# Patient Record
Sex: MALE | Race: WHITE | NOT HISPANIC OR LATINO | Employment: OTHER | URBAN - METROPOLITAN AREA
[De-identification: names, ages, dates, MRNs, and addresses within clinical notes are randomized per-mention and may not be internally consistent; named-entity substitution may affect disease eponyms.]

---

## 2017-06-19 ENCOUNTER — HOSPITAL ENCOUNTER (OUTPATIENT)
Dept: CARDIOLOGY | Facility: CLINIC | Age: 67
Discharge: HOME | End: 2017-06-19
Attending: INTERNAL MEDICINE | Admitting: INTERNAL MEDICINE
Payer: MEDICARE

## 2017-06-19 DIAGNOSIS — I25.10 CAD (CORONARY ARTERY DISEASE): ICD-10-CM

## 2017-06-19 PROCEDURE — 93350 STRESS TTE ONLY: CPT | Mod: 26 | Performed by: INTERNAL MEDICINE

## 2017-06-19 PROCEDURE — 93018 CV STRESS TEST I&R ONLY: CPT | Performed by: INTERNAL MEDICINE

## 2017-06-19 PROCEDURE — 93321 DOPPLER ECHO F-UP/LMTD STD: CPT | Mod: 26 | Performed by: INTERNAL MEDICINE

## 2017-06-19 PROCEDURE — 93325 DOPPLER ECHO COLOR FLOW MAPG: CPT | Mod: 26 | Performed by: INTERNAL MEDICINE

## 2017-06-19 PROCEDURE — 93350 STRESS TTE ONLY: CPT | Mod: TC

## 2017-06-19 PROCEDURE — 93016 CV STRESS TEST SUPVJ ONLY: CPT | Performed by: INTERNAL MEDICINE

## 2017-06-22 ENCOUNTER — OFFICE VISIT (OUTPATIENT)
Dept: FAMILY MEDICINE | Facility: CLINIC | Age: 67
End: 2017-06-22
Payer: COMMERCIAL

## 2017-06-22 VITALS
RESPIRATION RATE: 16 BRPM | HEIGHT: 74 IN | BODY MASS INDEX: 28.88 KG/M2 | OXYGEN SATURATION: 98 % | DIASTOLIC BLOOD PRESSURE: 64 MMHG | HEART RATE: 68 BPM | SYSTOLIC BLOOD PRESSURE: 116 MMHG | WEIGHT: 225 LBS | TEMPERATURE: 97.7 F

## 2017-06-22 DIAGNOSIS — K52.9 COLITIS: ICD-10-CM

## 2017-06-22 DIAGNOSIS — E66.3 OVERWEIGHT (BMI 25.0-29.9): ICD-10-CM

## 2017-06-22 DIAGNOSIS — R49.0 HOARSENESS: ICD-10-CM

## 2017-06-22 DIAGNOSIS — Z12.5 SCREENING FOR PROSTATE CANCER: ICD-10-CM

## 2017-06-22 DIAGNOSIS — K59.09 CHRONIC CONSTIPATION: ICD-10-CM

## 2017-06-22 DIAGNOSIS — Z23 NEED FOR VACCINATION: ICD-10-CM

## 2017-06-22 DIAGNOSIS — Z79.899 CURRENT USE OF PROTON PUMP INHIBITOR: ICD-10-CM

## 2017-06-22 DIAGNOSIS — I48.0 PAROXYSMAL ATRIAL FIBRILLATION (H): ICD-10-CM

## 2017-06-22 DIAGNOSIS — N40.0 BENIGN PROSTATIC HYPERPLASIA, PRESENCE OF LOWER URINARY TRACT SYMPTOMS UNSPECIFIED, UNSPECIFIED MORPHOLOGY: ICD-10-CM

## 2017-06-22 DIAGNOSIS — R73.01 ELEVATED FASTING BLOOD SUGAR: ICD-10-CM

## 2017-06-22 DIAGNOSIS — Z80.0 FAMILY HISTORY OF COLON CANCER: ICD-10-CM

## 2017-06-22 DIAGNOSIS — Z00.00 LABORATORY EXAMINATION ORDERED AS PART OF A ROUTINE GENERAL MEDICAL EXAMINATION: ICD-10-CM

## 2017-06-22 DIAGNOSIS — K21.9 GASTROESOPHAGEAL REFLUX DISEASE, ESOPHAGITIS PRESENCE NOT SPECIFIED: ICD-10-CM

## 2017-06-22 DIAGNOSIS — E78.5 HYPERLIPIDEMIA LDL GOAL <130: ICD-10-CM

## 2017-06-22 DIAGNOSIS — K21.9 CHRONIC GERD: ICD-10-CM

## 2017-06-22 DIAGNOSIS — D75.839 THROMBOCYTOSIS: ICD-10-CM

## 2017-06-22 DIAGNOSIS — Z00.01 ENCOUNTER FOR ROUTINE ADULT MEDICAL EXAM WITH ABNORMAL FINDINGS: Primary | ICD-10-CM

## 2017-06-22 LAB
ALBUMIN SERPL-MCNC: 3.9 G/DL (ref 3.4–5)
ALP SERPL-CCNC: 73 U/L (ref 40–150)
ALT SERPL W P-5'-P-CCNC: 42 U/L (ref 0–70)
ANION GAP SERPL CALCULATED.3IONS-SCNC: 10 MMOL/L (ref 3–14)
AST SERPL W P-5'-P-CCNC: 29 U/L (ref 0–45)
BASOPHILS NFR BLD AUTO: 1 %
BILIRUB SERPL-MCNC: 1 MG/DL (ref 0.2–1.3)
BUN SERPL-MCNC: 14 MG/DL (ref 7–30)
CALCIUM SERPL-MCNC: 9.4 MG/DL (ref 8.5–10.1)
CHLORIDE SERPL-SCNC: 106 MMOL/L (ref 94–109)
CHOLEST SERPL-MCNC: 141 MG/DL
CO2 SERPL-SCNC: 24 MMOL/L (ref 20–32)
CREAT SERPL-MCNC: 0.98 MG/DL (ref 0.66–1.25)
CRP SERPL-MCNC: <2.9 MG/L (ref 0–8)
DIFFERENTIAL METHOD BLD: NORMAL
EOSINOPHIL NFR BLD AUTO: 9 %
ERYTHROCYTE [DISTWIDTH] IN BLOOD BY AUTOMATED COUNT: 13.5 % (ref 10–15)
ERYTHROCYTE [SEDIMENTATION RATE] IN BLOOD BY WESTERGREN METHOD: 10 MM/H (ref 0–20)
FERRITIN SERPL-MCNC: 96 NG/ML (ref 26–388)
GFR SERPL CREATININE-BSD FRML MDRD: 77 ML/MIN/1.7M2
GLUCOSE SERPL-MCNC: 112 MG/DL (ref 70–99)
HBA1C MFR BLD: 5.6 % (ref 4.3–6)
HCT VFR BLD AUTO: 44.4 % (ref 40–53)
HDLC SERPL-MCNC: 49 MG/DL
HGB BLD-MCNC: 14.5 G/DL (ref 13.3–17.7)
LDLC SERPL CALC-MCNC: 71 MG/DL
LYMPHOCYTES NFR BLD AUTO: 18 %
MAGNESIUM SERPL-MCNC: 2.4 MG/DL (ref 1.6–2.3)
MCH RBC QN AUTO: 29.7 PG (ref 26.5–33)
MCHC RBC AUTO-ENTMCNC: 32.7 G/DL (ref 31.5–36.5)
MCV RBC AUTO: 91 FL (ref 78–100)
MONOCYTES NFR BLD AUTO: 6 %
NEUTROPHILS NFR BLD AUTO: 66 %
NONHDLC SERPL-MCNC: 92 MG/DL
PLATELET # BLD AUTO: 532 10E9/L (ref 150–450)
POTASSIUM SERPL-SCNC: 4.3 MMOL/L (ref 3.4–5.3)
PROT SERPL-MCNC: 7.3 G/DL (ref 6.8–8.8)
PSA SERPL-ACNC: 1.35 UG/L (ref 0–4)
RBC # BLD AUTO: 4.88 10E12/L (ref 4.4–5.9)
RETICS # AUTO: 59.8 10E9/L (ref 25–95)
RETICS/RBC NFR AUTO: 1.2 % (ref 0.5–2)
SODIUM SERPL-SCNC: 140 MMOL/L (ref 133–144)
TRIGL SERPL-MCNC: 107 MG/DL
TSH SERPL DL<=0.005 MIU/L-ACNC: 0.96 MU/L (ref 0.4–4)
VIT B12 SERPL-MCNC: 1178 PG/ML (ref 193–986)
WBC # BLD AUTO: 6.5 10E9/L (ref 4–11)

## 2017-06-22 PROCEDURE — 82607 VITAMIN B-12: CPT | Performed by: PHYSICIAN ASSISTANT

## 2017-06-22 PROCEDURE — 85045 AUTOMATED RETICULOCYTE COUNT: CPT | Performed by: PHYSICIAN ASSISTANT

## 2017-06-22 PROCEDURE — 83036 HEMOGLOBIN GLYCOSYLATED A1C: CPT | Performed by: PHYSICIAN ASSISTANT

## 2017-06-22 PROCEDURE — 99213 OFFICE O/P EST LOW 20 MIN: CPT | Mod: 25 | Performed by: PHYSICIAN ASSISTANT

## 2017-06-22 PROCEDURE — 86140 C-REACTIVE PROTEIN: CPT | Performed by: PHYSICIAN ASSISTANT

## 2017-06-22 PROCEDURE — 80050 GENERAL HEALTH PANEL: CPT | Performed by: PHYSICIAN ASSISTANT

## 2017-06-22 PROCEDURE — G0009 ADMIN PNEUMOCOCCAL VACCINE: HCPCS | Performed by: PHYSICIAN ASSISTANT

## 2017-06-22 PROCEDURE — 80061 LIPID PANEL: CPT | Performed by: PHYSICIAN ASSISTANT

## 2017-06-22 PROCEDURE — 90670 PCV13 VACCINE IM: CPT | Performed by: PHYSICIAN ASSISTANT

## 2017-06-22 PROCEDURE — G0103 PSA SCREENING: HCPCS | Performed by: PHYSICIAN ASSISTANT

## 2017-06-22 PROCEDURE — 82728 ASSAY OF FERRITIN: CPT | Performed by: PHYSICIAN ASSISTANT

## 2017-06-22 PROCEDURE — G0402 INITIAL PREVENTIVE EXAM: HCPCS | Performed by: PHYSICIAN ASSISTANT

## 2017-06-22 PROCEDURE — 83735 ASSAY OF MAGNESIUM: CPT | Performed by: PHYSICIAN ASSISTANT

## 2017-06-22 PROCEDURE — 85652 RBC SED RATE AUTOMATED: CPT | Performed by: PHYSICIAN ASSISTANT

## 2017-06-22 PROCEDURE — 85060 BLOOD SMEAR INTERPRETATION: CPT | Performed by: PHYSICIAN ASSISTANT

## 2017-06-22 PROCEDURE — 36415 COLL VENOUS BLD VENIPUNCTURE: CPT | Performed by: PHYSICIAN ASSISTANT

## 2017-06-22 PROCEDURE — 85004 AUTOMATED DIFF WBC COUNT: CPT | Performed by: PHYSICIAN ASSISTANT

## 2017-06-22 RX ORDER — YEAST,DRIED (S. CEREVISIAE)
1 POWDER (GRAM) ORAL DAILY
COMMUNITY
End: 2022-04-19

## 2017-06-22 RX ORDER — ATORVASTATIN CALCIUM 40 MG/1
40 TABLET, FILM COATED ORAL AT BEDTIME
Qty: 90 TABLET | Refills: 3 | Status: SHIPPED | OUTPATIENT
Start: 2017-06-22 | End: 2018-07-05

## 2017-06-22 RX ORDER — POLYETHYLENE GLYCOL 3350 17 G/17G
POWDER, FOR SOLUTION ORAL
Qty: 510 G | Refills: 1 | COMMUNITY
Start: 2017-06-22 | End: 2019-08-27

## 2017-06-22 NOTE — MR AVS SNAPSHOT
After Visit Summary   6/22/2017    Peter Peralta    MRN: [id number]           Patient Information     Date Of Birth          1950        Visit Information        Provider Department      6/22/2017 8:40 AM Ana Ugarte PA-C [facility]        Today's Diagnoses     Elevated fasting blood sugar    -  1    Laboratory examination ordered as part of a routine general medical examination        Hyperlipidemia LDL goal <130        Chronic GERD        Hoarseness        Current use of proton pump inhibitor        Chronic constipation          Care Instructions    1. Abdominal aortic aneurysm screening due to smoking history.       Preventive Health Recommendations:   Male Ages 65 and over    Yearly exam:             See your health care provider every year in order to  o   Review health changes.   o   Discuss preventive care.    o   Review your medicines if your doctor has prescribed any.    Talk with your health care provider about whether you should have a test to screen for prostate cancer (PSA).    Every 3 years, have a diabetes test (fasting glucose). If you are at risk for diabetes, you should have this test more often.    Every 5 years, have a cholesterol test. Have this test more often if you are at risk for high cholesterol or heart disease.     Every 10 years, have a colonoscopy. Or, have a yearly FIT test (stool test). These exams will check for colon cancer.    Talk to with your health care provider about screening for Abdominal Aortic Aneurysm if you have a family history of AAA or have a history of smoking.    Shots:     Get a flu shot each year.     Get a tetanus shot every 10 years.     Talk to your doctor about your pneumonia vaccines. There are now two you should receive - Pneumovax (PPSV 23) and Prevnar (PCV 13).     Talk to your doctor about a shingles vaccine.     Talk to your doctor about the hepatitis B vaccine.  Nutrition:     Eat at least 5 servings  of fruits and vegetables each day.     Eat whole-grain bread, whole-wheat pasta and brown rice instead of white grains and rice.     Talk to your provider about Calcium and Vitamin D.   Lifestyle    Exercise for at least 150 minutes a week (30 minutes a day, 5 days a week). This will help you control your weight and prevent disease.     Limit alcohol to one drink per day.     No smoking.     Wear sunscreen to prevent skin cancer.     See your dentist every six months for an exam and cleaning.     See your eye doctor every 1 to 2 years to screen for conditions such as glaucoma, macular degeneration, cataracts, etc           Follow-ups after your visit        Additional Services     GASTROENTEROLOGY ADULT REF PROCEDURE ONLY       Last Lab Result: Creatinine (mg/dL)       Date                     Value                 08/11/2016               1.01             ----------  Body mass index is 28.89 kg/(m^2).      Patient will be contacted to schedule procedure.     Please be aware that coverage of these services is subject to the terms and limitations of your health insurance plan.  Call member services at your health plan with any benefit or coverage questions.  Any procedures must be performed at a [facility] facility OR coordinated by your clinic's referral office.    Please bring the following with you to your appointment:    (1) Any X-Rays, CTs or MRIs which have been performed.  Contact the facility where they were done to arrange for  prior to your scheduled appointment.    (2) List of current medications   (3) This referral request   (4) Any documents/labs given to you for this referral                  Your next 10 appointments already scheduled     Jul 06, 2017  4:45 PM CDT   Return Visit with Eulalio Del Rosario MD   [facility] [place] ([facility])    [address] [place] [id number] [phone]              Who to contact     If you have  "questions or need follow up information about today's clinic visit or your schedule please contact [facility] [place] directly at [phone].  Normal or non-critical lab and imaging results will be communicated to you by MyChart, letter or phone within 4 business days after the clinic has received the results. If you do not hear from us within 7 days, please contact the clinic through WebVisible[employer] or phone. If you have a critical or abnormal lab result, we will notify you by phone as soon as possible.  Submit refill requests through [employer] or call your pharmacy and they will forward the refill request to us. Please allow 3 business days for your refill to be completed.          Additional Information About Your Visit        Web[employer]ptive Information     [employer] gives you secure access to your electronic health record. If you see a primary care provider, you can also send messages to your care team and make appointments. If you have questions, please call your primary care clinic.  If you do not have a primary care provider, please call [phone] and they will assist you.        Care EveryWhere ID     This is your Care EveryWhere ID. This could be used by other organizations to access your [facility] medical records  [id number]        Your Vitals Were     Pulse Temperature Respirations Height Pulse Oximetry BMI (Body Mass Index)    68 97.7  F (36.5  C) 16 6' 2\" (1.88 m) 98% 28.89 kg/m2       Blood Pressure from Last 3 Encounters:   06/22/17 116/64   08/11/16 123/82   03/23/16 (!) 142/100    Weight from Last 3 Encounters:   06/22/17 225 lb (102.1 kg)   08/11/16 239 lb 12.8 oz (108.8 kg)   03/23/16 250 lb (113.4 kg)              We Performed the Following     CBC with platelets     Comprehensive metabolic panel (BMP + Alb, Alk Phos, ALT, AST, Total. Bili, TP)     GASTROENTEROLOGY ADULT REF PROCEDURE ONLY     Hemoglobin A1c     Lipid Profile with reflex to direct LDL     Magnesium     TSH with free T4 reflex     " Vitamin B12        Primary Care Provider Office Phone # Fax #    Ana Gomez [phone]       [facility] [place] [address]  [place] [address]        Equal Access to Services     NAINAKIRTI GEREMIAS : Hadii aad ku hadasho Soomaali, waaxda luqadaha, qaybta kaalmada adeegyada, waxay idiin hayaan adenelson khabundiosh laronan ah. So [facility] [phone].    ATENCIÓN: Si habla español, tiene a mirza disposición servicios gratuitos de asistencia lingüística. Llame al [phone].    We comply with applicable federal civil rights laws and [place] laws. We do not discriminate on the basis of race, color, national origin, age, disability sex, sexual orientation or gender identity.            Thank you!     Thank you for choosing [facility] [place]  for your care. Our goal is always to provide you with excellent care. Hearing back from our patients is one way we can continue to improve our services. Please take a few minutes to complete the written survey that you may receive in the mail after your visit with us. Thank you!             Your Updated Medication List - Protect others around you: Learn how to safely use, store and throw away your medicines at www.disposemymeds.org.          This list is accurate as of: 6/22/17  9:25 AM.  Always use your most recent med list.                   Brand Name Dispense Instructions for use Diagnosis    atorvastatin 40 MG tablet    LIPITOR    90 tablet    Take 1 tablet (40 mg) by mouth At Bedtime    Hyperlipidemia LDL goal <130       B-12 PO      Take 1,000 mg by mouth daily        JUAN LOW STRENGTH 81 MG EC tablet   Generic drug:  aspirin     30    2 DAILY        CALCIUM + D + K 750-500-40 MG-UNT-MCG Tabs   Generic drug:  Calcium-Vitamin D-Vitamin K      Take 2 tablets by mouth daily.        coenzyme Q-10 200 MG Caps      Take by mouth daily        fluticasone 50 MCG/ACT spray    FLONASE    16 g    Spray 2 sprays into both nostrils daily     Chronic rhinitis       KRILL OIL PO      Take 500 mg by mouth daily Nael Red        MIRALAX powder   Generic drug:  polyethylene glycol     510 g    Take 3/4 capful daily    Chronic constipation       MOVE FREE JOINT HEALTH ADVANCE Tabs      Take 1 mg by mouth daily        MULTIVITAMIN TABS   OR      1 daily        nexIUM 40 MG CR capsule   Generic drug:  esomeprazole      Take 40 mg by mouth every morning (before breakfast) Take 30-60 minutes before eating.        psyllium 58.6 % Powd    METAMUCIL     Take 2 teaspoonful by mouth 2 times daily        VITAMIN D (CHOLECALCIFEROL) PO      Take 5,000 Units by mouth daily

## 2017-06-22 NOTE — PROGRESS NOTES
"Chief Complaint   Patient presents with     Physical       Initial /64  Pulse 68  Temp 97.7  F (36.5  C)  Resp 16  Ht 6' 2\" (1.88 m)  Wt 225 lb (102.1 kg)  SpO2 98%  BMI 28.89 kg/m2 Estimated body mass index is 28.89 kg/(m^2) as calculated from the following:    Height as of this encounter: 6' 2\" (1.88 m).    Weight as of this encounter: 225 lb (102.1 kg).  Medication Reconciliation: complete. FLORECITA Manzano LPN      SUBJECTIVE:                                                            Peter Peralta is a 67 year old male who presents for Preventive Visit.      Are you in the first 12 months of your Medicare Part B coverage?  Yes,  Visual Acuity:  Right Eye: 20/40   Left Eye: 20/25  Both Eyes: 20/25    Healthy Habits:    Do you get at least three servings of calcium containing foods daily (dairy, green leafy vegetables, etc.)? no, taking calcium and/or vitamin D supplement: yes - several supplements    Amount of exercise or daily activities, outside of work: 3 day(s) per week    Problems taking medications regularly No    Medication side effects: No    Have you had an eye exam in the past two years? yes    Do you see a dentist twice per year? yes    Do you have sleep apnea, excessive snoring or daytime drowsiness?no    COGNITIVE SCREEN  1) Repeat 3 items (Banana, Sunrise, Chair)    2) Clock draw: NORMAL  3) 3 item recall: Recalls 3 objects  Results: 3 items recalled: COGNITIVE IMPAIRMENT LESS LIKELY    Mini-CogTM Copyright [employer]. Licensed by the author for use in [facility]; reprinted with permission (michel@.[facility]). All rights reserved.      Complains of ongoing voice hoarseness for \"a while\". Has chronic GERD - was taking omeprazole, now taking esomeprazole. He was recommend to have EGD last yr, didn't go - has never had one.   Has taken PPI 10 + yrs.   Takes daily B12.   Takes metamucil and miralax daily for chronic constipation. Q5 yr for colonoscopy.     Would like PSA drawn d/t h/o " BPH.   Also question low testosterone - says his desire for intercourse has lessened over the yrs, but no erectile issues. Still having intercourse weekly.   -------------------------------------    Reviewed and updated as needed this visit by clinical staff  Tobacco  Allergies  Meds  Problems  Surg Hx  Fam Hx  Soc Hx        Reviewed and updated as needed this visit by Provider  Allergies  Meds  Problems        Social History   Substance Use Topics     Smoking status: Former Smoker     Packs/day: 1.50     Years: 24.00     Types: Cigarettes     Quit date: 1/2/1996     Smokeless tobacco: Never Used     Alcohol use 0.0 oz/week     0 Standard drinks or equivalent per week      Comment: less than one per week       The patient does not drink >3 drinks per day nor >7 drinks per week.    Today's PHQ-2 Score:   PHQ-2 ( 1999 Pfizer) 6/22/2017 8/11/2016   Q1: Little interest or pleasure in doing things 0 0   Q2: Feeling down, depressed or hopeless 0 0   PHQ-2 Score 0 0   Q1: Little interest or pleasure in doing things - -   Q2: Feeling down, depressed or hopeless - -   PHQ-2 Score - -       Do you feel safe in your environment - No    Do you have a Health Care Directive?: Yes: Advance Directive has been received and scanned.    Current providers sharing in care for this patient include:   Patient Care Team:  Ana Ugarte PA-C as PCP - General (Physician Assistant)      Hearing impairment: No    Ability to successfully perform activities of daily living: Yes, no assistance needed     Fall risk:       Home safety:  none identified      The following health maintenance items are reviewed in Epic and correct as of today:  Health Maintenance   Topic Date Due     AORTIC ANEURYSM SCREENING (SYSTEM ASSIGNED)  06/08/2017     FALL RISK ASSESSMENT  06/08/2017     INFLUENZA VACCINE (SYSTEM ASSIGNED)  09/01/2017     PNEUMOCOCCAL (2 of 2 - PPSV23) 06/22/2018     COLONOSCOPY Q5 YR  01/29/2019     ADVANCE DIRECTIVE  PLANNING Q5 YRS  08/11/2021     LIPID SCREEN Q5 YR MALE (SYSTEM ASSIGNED)  06/22/2022     TETANUS IMMUNIZATION (SYSTEM ASSIGNED)  09/12/2022     HEPATITIS C SCREENING  Completed     FLORECITA Manzano LPN      Pneumonia Vaccine:Adults age 65+ who have not received previous Pneumovax (PPSV23) or PCV13 as an adult: Should first be given PCV13 AND then should be given PPSV23 6-12 months after PCV13     ROS:  10 point review of systems negative other than symptoms noted above.   Constitutional, HEENT, CV, pulmonary, GI, , MS, Endo, Psych systems are all negative, except as otherwise noted.       Problem list, Medication list, Allergies, and Medical/Social/Surgical histories reviewed in [facility] and updated as appropriate.  Labs reviewed in EPIC  BP Readings from Last 3 Encounters:   06/22/17 116/64   08/11/16 123/82   03/23/16 (!) 142/100    Wt Readings from Last 3 Encounters:   06/22/17 225 lb (102.1 kg)   08/11/16 239 lb 12.8 oz (108.8 kg)   03/23/16 250 lb (113.4 kg)                  Patient Active Problem List   Diagnosis     Family history of coronary artery disease     Hyperlipidemia LDL goal <130     GERD (gastroesophageal reflux disease)     Family history of colon cancer     Colitis     Right inguinal hernia     Atrial fibrillation (H)     Hyperlipidemia     Murmur     Nonspecific abnormal results of cardiovascular function study     Chest pain     Elevated blood pressure reading without diagnosis of hypertension     Benign prostatic hyperplasia, presence of lower urinary tract symptoms unspecified, unspecified morphology     Advanced directives, counseling/discussion     Elevated fasting blood sugar     Hoarseness     BMI > 25     Thrombocytosis (H)     Past Surgical History:   Procedure Laterality Date     CARDIAC NUC POLINA STRESS TEST NL      Normal     CARDIOVERSION  12/2011    Atrial Fib     COLONOSCOPY  1/29/2014    Procedure: COMBINED COLONOSCOPY, SINGLE BIOPSY/POLYPECTOMY BY BIOPSY;  COLONOSCOPY;  Surgeon: Janet  Ashtyn Herbert MD;  Location:  GI     CORONARY ANGIOGRAPHY ADULT ORDER  12/2011    1 LMA 5%, LAD 40-50%, LCx 30-40%, RCA 25-30%     HC INJECTION SCLEROSING SOLUTION HEMORRHOID  8/24/2012    Procedure: HEMORRHOID INJECTION SCLEROSING SOLUTION;  Surgeon: Mayito Chow MD;  Location:  GI     HC TOOTH EXTRACTION W/FORCEP       LASIK  1/2011     ROTATOR CUFF REPAIR RT/LT Left 10/15/2015    RCR Left - Dr. Carvalho       Social History   Substance Use Topics     Smoking status: Former Smoker     Packs/day: 1.50     Years: 24.00     Types: Cigarettes     Quit date: 1/2/1996     Smokeless tobacco: Never Used     Alcohol use 0.0 oz/week     0 Standard drinks or equivalent per week      Comment: less than one per week     Family History   Problem Relation Age of Onset     C.A.D. Father      passed at 52 from MI     Cardiovascular Father      HEART DISEASE Father      Obesity Father      Myocardial Infarction Father      52 - passed away     Colon Cancer Mother 65     passed away from colon cancer - lived about 2 mo from dx 1981     Arthritis Maternal Grandmother      DIABETES Maternal Grandmother      Arthritis Maternal Grandfather      HEART DISEASE Brother      DIABETES Brother      Coronary Artery Disease Brother      stent placed      Hypertension No family hx of      CEREBROVASCULAR DISEASE No family hx of      Breast Cancer No family hx of      Prostate Cancer No family hx of      Alcohol/Drug No family hx of      Allergies No family hx of      Alzheimer Disease No family hx of      Circulatory No family hx of      Congenital Anomalies No family hx of      Connective Tissue Disorder No family hx of      Depression No family hx of      Eye Disorder No family hx of      Genetic Disorder No family hx of      GASTROINTESTINAL DISEASE No family hx of      Genitourinary Problems No family hx of      Gynecology No family hx of      Musculoskeletal Disorder No family hx of      Neurologic Disorder No family hx of       "OSTEOPOROSIS No family hx of      Psychotic Disorder No family hx of      Respiratory No family hx of      Thyroid Disease No family hx of      Anesthesia Reaction No family hx of      Blood Disease No family hx of          Current Outpatient Prescriptions   Medication Sig Dispense Refill     Glucos-Chond-Hyal Ac-Ca Fructo (MOVE FREE JOINT HEALTH ADVANCE) TABS Take 1 mg by mouth daily       VITAMIN D, CHOLECALCIFEROL, PO Take 5,000 Units by mouth daily       atorvastatin (LIPITOR) 40 MG tablet Take 1 tablet (40 mg) by mouth At Bedtime 90 tablet 3     polyethylene glycol (MIRALAX) powder Take 3/4 capful daily 510 g 1     fluticasone (FLONASE) 50 MCG/ACT nasal spray Spray 2 sprays into both nostrils daily 16 g 11     esomeprazole (NEXIUM) 40 MG capsule Take 40 mg by mouth every morning (before breakfast) Take 30-60 minutes before eating.       KRILL OIL PO Take 500 mg by mouth daily Nael Red       psyllium (METAMUCIL) 58.6 % POWD Take 2 teaspoonful by mouth 2 times daily        Cyanocobalamin (B-12 PO) Take 1,000 mg by mouth daily        coenzyme Q-10 (COQ-10) 200 MG CAPS Take by mouth daily        Calcium-Vitamin D-Vitamin K (CALCIUM + D + K) 750-500-40 MG-UNT-MCG TABS Take 2 tablets by mouth daily.       JUAN LOW STRENGTH 81 MG OR TBEC 2 DAILY 30 0     MULTIVITAMIN TABS   OR 1 daily  0     [DISCONTINUED] atorvastatin (LIPITOR) 40 MG tablet Take 1 tablet (40 mg) by mouth At Bedtime 90 tablet 3     Allergies   Allergen Reactions     No Known Drug Allergies      OBJECTIVE:                                                            /64  Pulse 68  Temp 97.7  F (36.5  C)  Resp 16  Ht 6' 2\" (1.88 m)  Wt 225 lb (102.1 kg)  SpO2 98%  BMI 28.89 kg/m2 Estimated body mass index is 28.89 kg/(m^2) as calculated from the following:    Height as of this encounter: 6' 2\" (1.88 m).    Weight as of this encounter: 225 lb (102.1 kg).  EXAM:   GENERAL: healthy, alert and no distress  EYES: Eyes grossly normal to " inspection, PERRL and conjunctivae and sclerae normal  HENT: ear canals and TM's normal, nose and mouth without ulcers or lesions  NECK: no adenopathy, no asymmetry, masses, or scars and thyroid normal to palpation  RESP: lungs clear to auscultation - no rales, rhonchi or wheezes  CV: regular rate and rhythm, normal S1 S2, no S3 or S4, no murmur, click or rub, no peripheral edema and peripheral pulses strong  ABDOMEN: soft, nontender, no hepatosplenomegaly, no masses and bowel sounds normal  MS: no gross musculoskeletal defects noted, no edema  SKIN: no suspicious lesions or rashes  NEURO: Normal strength and tone, mentation intact and speech normal  PSYCH: mentation appears normal, affect normal/bright    Results for orders placed or performed in visit on 06/22/17   CBC with platelets   Result Value Ref Range    WBC 6.5 4.0 - 11.0 10e9/L    RBC Count 4.88 4.4 - 5.9 10e12/L    Hemoglobin 14.5 13.3 - 17.7 g/dL    Hematocrit 44.4 40.0 - 53.0 %    MCV 91 78 - 100 fl    MCH 29.7 26.5 - 33.0 pg    MCHC 32.7 31.5 - 36.5 g/dL    RDW 13.5 10.0 - 15.0 %    Platelet Count 532 (H) 150 - 450 10e9/L   Comprehensive metabolic panel (BMP + Alb, Alk Phos, ALT, AST, Total. Bili, TP)   Result Value Ref Range    Sodium 140 133 - 144 mmol/L    Potassium 4.3 3.4 - 5.3 mmol/L    Chloride 106 94 - 109 mmol/L    Carbon Dioxide 24 20 - 32 mmol/L    Anion Gap 10 3 - 14 mmol/L    Glucose 112 (H) 70 - 99 mg/dL    Urea Nitrogen 14 7 - 30 mg/dL    Creatinine 0.98 0.66 - 1.25 mg/dL    GFR Estimate 77 >60 mL/min/1.7m2    GFR Estimate If Black >90   GFR Calc   >60 mL/min/1.7m2    Calcium 9.4 8.5 - 10.1 mg/dL    Bilirubin Total 1.0 0.2 - 1.3 mg/dL    Albumin 3.9 3.4 - 5.0 g/dL    Protein Total 7.3 6.8 - 8.8 g/dL    Alkaline Phosphatase 73 40 - 150 U/L    ALT 42 0 - 70 U/L    AST 29 0 - 45 U/L   TSH with free T4 reflex   Result Value Ref Range    TSH 0.96 0.40 - 4.00 mU/L   Lipid Profile with reflex to direct LDL   Result Value Ref  Range    Cholesterol 141 <200 mg/dL    Triglycerides 107 <150 mg/dL    HDL Cholesterol 49 >39 mg/dL    LDL Cholesterol Calculated 71 <100 mg/dL    Non HDL Cholesterol 92 <130 mg/dL   Hemoglobin A1c   Result Value Ref Range    Hemoglobin A1C 5.6 4.3 - 6.0 %   Magnesium   Result Value Ref Range    Magnesium 2.4 (H) 1.6 - 2.3 mg/dL   Vitamin B12   Result Value Ref Range    Vitamin B12 1178 (H) 193 - 986 pg/mL   PSA, screen   Result Value Ref Range    PSA 1.35 0 - 4 ug/L   Reticulocyte Count   Result Value Ref Range    % Retic 1.2 0.5 - 2.0 %    Absolute Retic 59.8 25 - 95 10e9/L   CRP, inflammation   Result Value Ref Range    CRP Inflammation <2.9 0.0 - 8.0 mg/L   ESR: Erythrocyte sedimentation rate   Result Value Ref Range    Sed Rate 10 0 - 20 mm/h   Ferritin   Result Value Ref Range    Ferritin 96 26 - 388 ng/mL   Differential   Result Value Ref Range    % Neutrophils 66.0 %    % Lymphocytes 18.0 %    % Monocytes 6.0 %    % Eosinophils 9.0 %    % Basophils 1.0 %    Diff Method Manual Differential          ASSESSMENT / PLAN:                                                            Peter was seen today for physical.    Diagnoses and all orders for this visit:    Encounter for routine adult medical exam with abnormal findings  Thrombocytosis, persistent.     Elevated fasting blood sugar  -     Comprehensive metabolic panel (BMP + Alb, Alk Phos, ALT, AST, Total. Bili, TP)  -     Hemoglobin A1c  Just under pre-dm range     Paroxysmal atrial fibrillation (H)  -isolated event during angiogram. Monitor. F/b Cardiology.    Thrombocytosis (H)  -     Cancel: CBC with platelets differential  -     Reticulocyte Count  -     Blood Morphology Pathologist Review  -     CRP, inflammation  -     ESR: Erythrocyte sedimentation rate  -     Ferritin  -     Differential  Noted today and 10 mo ago, slightly worsening. Will f/u with other labs. Await smear.     Laboratory examination ordered as part of a routine general medical  examination  -     CBC with platelets  -     Comprehensive metabolic panel (BMP + Alb, Alk Phos, ALT, AST, Total. Bili, TP)  -     TSH with free T4 reflex  -     Lipid Profile with reflex to direct LDL  -     Hemoglobin A1c    Hyperlipidemia LDL goal <130  -     atorvastatin (LIPITOR) 40 MG tablet; Take 1 tablet (40 mg) by mouth At Bedtime  Stable.     Chronic GERD  -     GASTROENTEROLOGY ADULT REF PROCEDURE ONLY    Hoarseness  -     GASTROENTEROLOGY ADULT REF PROCEDURE ONLY  Since he has had ongoing GERD and PPI use for 10 + yrs, strongly recommend EGD. Re-orderd for him. If normal, consider ENT consult for LP scope to evaluate vocal cords.     Current use of proton pump inhibitor  -     GASTROENTEROLOGY ADULT REF PROCEDURE ONLY  -     Magnesium  -     Vitamin B12  Will have him back off on B12 supp and mg.     Chronic constipation      Benign prostatic hyperplasia, presence of lower urinary tract symptoms unspecified, unspecified morphology    Screening for prostate cancer  -     PSA, screen    Need for vaccination  -     Pneumococcal vaccine 13 valent PCV13 IM (Prevnar) [42833]  -     ADMIN MEDICARE: Pneumococcal Vaccine ()    BMI > 25  LSMs.     Gastroesophageal reflux disease, esophagitis presence not specified    Family history of colon cancer  Q5 yr plan.    Colitis  Noted on colonoscopy 2014- no symptoms - f/b GI    Would not recommend testoserone monitoring - reviewed this will be low, which is age - appropriate. Offered urology referral if wanted to discuss further and talk about supplementation.    End of Life Planning:  Patient currently has an advanced directive: No.  I have verified the patient's ablity to prepare an advanced directive/make health care decisions.  Literature was provided to assist patient in preparing an advanced directive.    COUNSELING:  Reviewed preventive health counseling, as reflected in patient instructions  Special attention given to:       Consider AAA screening for ages  "65-75 and smoking history       Regular exercise       Healthy diet/nutrition       Vision screening       Dental care       Immunizations    Vaccinated for: Pneumococcal           Aspirin Prophylaxsis       Hepatitis C screening       Colon cancer screening       Prostate cancer screening        Estimated body mass index is 28.89 kg/(m^2) as calculated from the following:    Height as of this encounter: 6' 2\" (1.88 m).    Weight as of this encounter: 225 lb (102.1 kg).  Weight management plan: Discussed healthy diet and exercise guidelines and patient will follow up in 12 months in clinic to re-evaluate.   reports that he quit smoking about 21 years ago. His smoking use included Cigarettes. He has a 36.00 pack-year smoking history. He has never used smokeless tobacco.      Appropriate preventive services were discussed with this patient, including applicable screening as appropriate for cardiovascular disease, diabetes, osteopenia/osteoporosis, and glaucoma.  As appropriate for age/gender, discussed screening for colorectal cancer, prostate cancer, breast cancer, and cervical cancer. Checklist reviewing preventive services available has been given to the patient.    Reviewed patients plan of care and provided an AVS. The Basic Care Plan (routine screening as documented in Health Maintenance) for Peter meets the Care Plan requirement. This Care Plan has been established and reviewed with the Patient.    Counseling Resources:  ATP IV Guidelines  Pooled Cohorts Equation Calculator  Breast Cancer Risk Calculator  FRAX Risk Assessment  ICSI Preventive Guidelines  Dietary Guidelines for Americans, 2010  USDA's MyPlate  ASA Prophylaxis  Lung CA Screening    Ana Ugarte PA-C  [facility] [place]  "

## 2017-06-22 NOTE — PATIENT INSTRUCTIONS
1. Abdominal aortic aneurysm screening due to smoking history.       Preventive Health Recommendations:   Male Ages 65 and over    Yearly exam:             See your health care provider every year in order to  o   Review health changes.   o   Discuss preventive care.    o   Review your medicines if your doctor has prescribed any.    Talk with your health care provider about whether you should have a test to screen for prostate cancer (PSA).    Every 3 years, have a diabetes test (fasting glucose). If you are at risk for diabetes, you should have this test more often.    Every 5 years, have a cholesterol test. Have this test more often if you are at risk for high cholesterol or heart disease.     Every 10 years, have a colonoscopy. Or, have a yearly FIT test (stool test). These exams will check for colon cancer.    Talk to with your health care provider about screening for Abdominal Aortic Aneurysm if you have a family history of AAA or have a history of smoking.    Shots:     Get a flu shot each year.     Get a tetanus shot every 10 years.     Talk to your doctor about your pneumonia vaccines. There are now two you should receive - Pneumovax (PPSV 23) and Prevnar (PCV 13).     Talk to your doctor about a shingles vaccine.     Talk to your doctor about the hepatitis B vaccine.  Nutrition:     Eat at least 5 servings of fruits and vegetables each day.     Eat whole-grain bread, whole-wheat pasta and brown rice instead of white grains and rice.     Talk to your provider about Calcium and Vitamin D.   Lifestyle    Exercise for at least 150 minutes a week (30 minutes a day, 5 days a week). This will help you control your weight and prevent disease.     Limit alcohol to one drink per day.     No smoking.     Wear sunscreen to prevent skin cancer.     See your dentist every six months for an exam and cleaning.     See your eye doctor every 1 to 2 years to screen for conditions such as glaucoma, macular degeneration,  cataracts, etc

## 2017-06-23 LAB — COPATH REPORT: NORMAL

## 2017-07-06 ENCOUNTER — OFFICE VISIT (OUTPATIENT)
Dept: CARDIOLOGY | Facility: CLINIC | Age: 67
End: 2017-07-06
Payer: COMMERCIAL

## 2017-07-06 VITALS
OXYGEN SATURATION: 94 % | BODY MASS INDEX: 29.77 KG/M2 | HEIGHT: 74 IN | HEART RATE: 74 BPM | WEIGHT: 232 LBS | DIASTOLIC BLOOD PRESSURE: 72 MMHG | SYSTOLIC BLOOD PRESSURE: 126 MMHG

## 2017-07-06 DIAGNOSIS — I25.10 CORONARY ARTERY DISEASE INVOLVING NATIVE CORONARY ARTERY OF NATIVE HEART WITHOUT ANGINA PECTORIS: Primary | ICD-10-CM

## 2017-07-06 PROCEDURE — 99214 OFFICE O/P EST MOD 30 MIN: CPT | Performed by: INTERNAL MEDICINE

## 2017-07-06 NOTE — PROGRESS NOTES
HPI and Plan:   Mr. Peralta returns for clinical office visit follow-up. Today's visit is 30 minutes. 50% counseling. The patient has a history of three-vessel coronary disease are moderate not flow-limiting. We reviewed his stress echocardiogram today the exercise time was quite good ejection fraction was low normal at the beginning and went to 65% with exercise. No ischemia was identified on the stress test. I did review outside labs including lipid profile which is quite improved. Platelet count is elevated the patient may be seen a hematologist later for that No cardiovascular complaints are noted. I will see the patient back in 2 years I will repeat the stress test  Orders Placed This Encounter   Procedures     Follow-Up with Cardiologist     Exercise Stress Echocardiogram     Orders Placed This Encounter   Medications     APPLE CIDER VINEGAR PO     Sig: Take 1 Tablespoonful by mouth     There are no discontinued medications.      Encounter Diagnosis   Name Primary?     Coronary artery disease involving native coronary artery of native heart without angina pectoris Yes       CURRENT MEDICATIONS:  Current Outpatient Prescriptions   Medication Sig Dispense Refill     APPLE CIDER VINEGAR PO Take 1 Tablespoonful by mouth       Glucos-Chond-Hyal Ac-Ca Fructo (MOVE FREE [facility] ADVANCE) TABS Take 1 mg by mouth daily       atorvastatin (LIPITOR) 40 MG tablet Take 1 tablet (40 mg) by mouth At Bedtime 90 tablet 3     polyethylene glycol (MIRALAX) powder Take 3/4 capful daily 510 g 1     fluticasone (FLONASE) 50 MCG/ACT nasal spray Spray 2 sprays into both nostrils daily 16 g 11     esomeprazole (NEXIUM) 40 MG capsule Take 40 mg by mouth every morning (before breakfast) Take 30-60 minutes before eating.       KRILL OIL PO Take 500 mg by mouth daily Nael Red       psyllium (METAMUCIL) 58.6 % POWD Take 2 teaspoonful by mouth 2 times daily        coenzyme Q-10 (COQ-10) 200 MG CAPS Take by mouth daily         Calcium-Vitamin D-Vitamin K (CALCIUM + D + K) 750-500-40 MG-UNT-MCG TABS Take 2 tablets by mouth daily.       JUAN LOW STRENGTH 81 MG OR TBEC 2 DAILY 30 0     MULTIVITAMIN TABS   OR 1 daily  0     VITAMIN D, CHOLECALCIFEROL, PO Take 5,000 Units by mouth daily       Cyanocobalamin (B-12 PO) Take 1,000 mg by mouth daily          ALLERGIES     Allergies   Allergen Reactions     No Known Drug Allergies        PAST MEDICAL HISTORY:  Past Medical History:   Diagnosis Date     Atrial fibrillation (H)     only after adenosine test     BPH (benign prostatic hypertrophy)      Colitis 1/1/2014    found on colonoscopy     Coronary artery disease 2011 2011-Cath Lma 5%, Lad 40-50%, Lcx 30-40%, Nlj38-51% (-FFR of Lad)     Family history of colon cancer 7/6/2012    mother age 65     GERD (gastroesophageal reflux disease)     nexium     Hyperlipidaemia      IFG (impaired fasting glucose)      Obesity, unspecified     Waist size 42, BMI 30.8     Right inguinal hernia 7/1/2014     Thrombocytosis (H)        PAST SURGICAL HISTORY:  Past Surgical History:   Procedure Laterality Date     CARDIAC NUC POLINA STRESS TEST NL      Normal     CARDIOVERSION  12/2011    Atrial Fib     COLONOSCOPY  1/29/2014    Procedure: COMBINED COLONOSCOPY, SINGLE BIOPSY/POLYPECTOMY BY BIOPSY;  COLONOSCOPY;  Surgeon: Ashtyn Gonzales MD;  Location:  GI     CORONARY ANGIOGRAPHY ADULT ORDER  12/2011    1 LMA 5%, LAD 40-50%, LCx 30-40%, RCA 25-30%     HC INJECTION SCLEROSING SOLUTION HEMORRHOID  8/24/2012    Procedure: HEMORRHOID INJECTION SCLEROSING SOLUTION;  Surgeon: Mayito Chow MD;  Location:  GI     HC TOOTH EXTRACTION W/FORCEP       LASIK  1/2011     ROTATOR CUFF REPAIR RT/LT Left 10/15/2015    RCR Left - Dr. Carvalho       FAMILY HISTORY:  Family History   Problem Relation Age of Onset     C.A.D. Father      passed at 52 from MI     Cardiovascular Father      HEART DISEASE Father      Obesity Father      Myocardial Infarction Father      52 -  passed away     Colon Cancer Mother 65     passed away from colon cancer - lived about 2 mo from dx 1981     Arthritis Maternal Grandmother      DIABETES Maternal Grandmother      Arthritis Maternal Grandfather      HEART DISEASE Brother      DIABETES Brother      Coronary Artery Disease Brother      stent placed      Hypertension No family hx of      CEREBROVASCULAR DISEASE No family hx of      Breast Cancer No family hx of      Prostate Cancer No family hx of      Alcohol/Drug No family hx of      Allergies No family hx of      Alzheimer Disease No family hx of      Circulatory No family hx of      Congenital Anomalies No family hx of      Connective Tissue Disorder No family hx of      Depression No family hx of      Eye Disorder No family hx of      Genetic Disorder No family hx of      GASTROINTESTINAL DISEASE No family hx of      Genitourinary Problems No family hx of      Gynecology No family hx of      Musculoskeletal Disorder No family hx of      Neurologic Disorder No family hx of      OSTEOPOROSIS No family hx of      Psychotic Disorder No family hx of      Respiratory No family hx of      Thyroid Disease No family hx of      Anesthesia Reaction No family hx of      Blood Disease No family hx of        SOCIAL HISTORY:  Social History     Social History     Marital status:      Spouse name: Emy     Number of children: 2     Years of education: 16     Occupational History     [employer]     Social History Main Topics     Smoking status: Former Smoker     Packs/day: 1.50     Years: 24.00     Types: Cigarettes     Quit date: 1/2/1996     Smokeless tobacco: Never Used     Alcohol use 0.0 oz/week     0 Standard drinks or equivalent per week      Comment: less than one per week     Drug use: No     Sexual activity: Yes     Partners: Female     Other Topics Concern      Service Yes     Blood Transfusions No     Caffeine Concern No     2 cups per day     Occupational Exposure No  "    Hobby Hazards No     Sleep Concern No     Stress Concern Yes     Weight Concern Yes     Special Diet Yes     Back Care No     Exercise No     8,000 steps a day. Fitbit     Bike Helmet No     n/a     Seat Belt Yes     Self-Exams Yes     Social History Narrative    Eats fruits and vegetables every day. He takes extra vitamin D. He was advised to aim for 1200 mg of calcium per day.       Review of Systems:  Skin:  Negative     Eyes:  Negative    ENT:  Negative    Respiratory:  Negative    Cardiovascular:  Negative lightheadedness  Gastroenterology: Positive for heartburn  Genitourinary:  Negative    Musculoskeletal:  Positive for joint pain (left hip)  Neurologic:  Negative numbness or tingling of hands (left side tingling)  Psychiatric:  Negative    Heme/Lymph/Imm:  Negative    Endocrine:  Negative      Physical Exam:  Vitals: /72  Pulse 74  Ht 1.88 m (6' 2\")  Wt 105.2 kg (232 lb)  SpO2 94%  BMI 29.79 kg/m2    Constitutional:  cooperative, alert and oriented, well developed, well nourished, in no acute distress        Skin:  warm and dry to the touch, no apparent skin lesions or masses noted        Head:  normocephalic, no masses or lesions        Eyes:  pupils equal and round, conjunctivae and lids unremarkable, sclera white, no xanthalasma, EOMS intact, no nystagmus        ENT:  no pallor or cyanosis, dentition good        Neck:  carotid pulses are full and equal bilaterally, JVP normal, no carotid bruit, no thyromegaly        Chest:  normal breath sounds, clear to auscultation, normal A-P diameter, normal symmetry, normal respiratory excursion, no use of accessory muscles          Cardiac: regular rhythm;normal S1 and S2       systolic ejection murmur;grade 1          Abdomen:  abdomen soft, non-tender, BS normoactive, no mass, no HSM, no bruits        Vascular: pulses full and equal, no bruits auscultated                                        Extremities and Back:  no deformities, clubbing, " cyanosis, erythema observed              Neurological:  affect appropriate, oriented to time, person and place          Recent Lab Results:  LIPID RESULTS:  Lab Results   Component Value Date    CHOL 141 06/22/2017    HDL 49 06/22/2017    LDL 71 06/22/2017    TRIG 107 06/22/2017    CHOLHDLRATIO 2.8 07/10/2015       LIVER ENZYME RESULTS:  Lab Results   Component Value Date    AST 29 06/22/2017    ALT 42 06/22/2017       CBC RESULTS:  Lab Results   Component Value Date    WBC 6.5 06/22/2017    RBC 4.88 06/22/2017    HGB 14.5 06/22/2017    HCT 44.4 06/22/2017    MCV 91 06/22/2017    MCH 29.7 06/22/2017    MCHC 32.7 06/22/2017    RDW 13.5 06/22/2017     (H) 06/22/2017       BMP RESULTS:  Lab Results   Component Value Date     06/22/2017    POTASSIUM 4.3 06/22/2017    CHLORIDE 106 06/22/2017    CO2 24 06/22/2017    ANIONGAP 10 06/22/2017     (H) 06/22/2017    BUN 14 06/22/2017    CR 0.98 06/22/2017    GFRESTIMATED 77 06/22/2017    GFRESTBLACK >90   GFR Calc   06/22/2017    CAMERON 9.4 06/22/2017        A1C RESULTS:  Lab Results   Component Value Date    A1C 5.6 06/22/2017       INR RESULTS:  Lab Results   Component Value Date    INR 1.02 12/22/2011           CC  Ana Gomez MD  [facility]  [address] [place] [address] [place] [address]  HPI and Plan:   See dictation    Orders Placed This Encounter   Procedures     Follow-Up with Cardiologist     Exercise Stress Echocardiogram     Orders Placed This Encounter   Medications     APPLE CIDER VINEGAR PO     Sig: Take 1 Tablespoonful by mouth     There are no discontinued medications.      Encounter Diagnosis   Name Primary?     Coronary artery disease involving native coronary artery of native heart without angina pectoris Yes       CURRENT MEDICATIONS:  Current Outpatient Prescriptions   Medication Sig Dispense Refill     APPLE CIDER VINEGAR PO Take 1 Tablespoonful by mouth       Glucos-Chond-Hyal Ac-Ca Fructo (MOVE  FREE [facility] ADVANCE) TABS Take 1 mg by mouth daily       atorvastatin (LIPITOR) 40 MG tablet Take 1 tablet (40 mg) by mouth At Bedtime 90 tablet 3     polyethylene glycol (MIRALAX) powder Take 3/4 capful daily 510 g 1     fluticasone (FLONASE) 50 MCG/ACT nasal spray Spray 2 sprays into both nostrils daily 16 g 11     esomeprazole (NEXIUM) 40 MG capsule Take 40 mg by mouth every morning (before breakfast) Take 30-60 minutes before eating.       KRILL OIL PO Take 500 mg by mouth daily Nael Red       psyllium (METAMUCIL) 58.6 % POWD Take 2 teaspoonful by mouth 2 times daily        coenzyme Q-10 (COQ-10) 200 MG CAPS Take by mouth daily        Calcium-Vitamin D-Vitamin K (CALCIUM + D + K) 750-500-40 MG-UNT-MCG TABS Take 2 tablets by mouth daily.       JUAN LOW STRENGTH 81 MG OR TBEC 2 DAILY 30 0     MULTIVITAMIN TABS   OR 1 daily  0     VITAMIN D, CHOLECALCIFEROL, PO Take 5,000 Units by mouth daily       Cyanocobalamin (B-12 PO) Take 1,000 mg by mouth daily          ALLERGIES     Allergies   Allergen Reactions     No Known Drug Allergies        PAST MEDICAL HISTORY:  Past Medical History:   Diagnosis Date     Atrial fibrillation (H)     only after adenosine test     BPH (benign prostatic hypertrophy)      Colitis 1/1/2014    found on colonoscopy     Coronary artery disease 2011 2011-Cath Lma 5%, Lad 40-50%, Lcx 30-40%, Yxz39-31% (-FFR of Lad)     Family history of colon cancer 7/6/2012    mother age 65     GERD (gastroesophageal reflux disease)     nexium     Hyperlipidaemia      IFG (impaired fasting glucose)      Obesity, unspecified     Waist size 42, BMI 30.8     Right inguinal hernia 7/1/2014     Thrombocytosis (H)        PAST SURGICAL HISTORY:  Past Surgical History:   Procedure Laterality Date     CARDIAC NUC POLINA STRESS TEST NL      Normal     CARDIOVERSION  12/2011    Atrial Fib     COLONOSCOPY  1/29/2014    Procedure: COMBINED COLONOSCOPY, SINGLE BIOPSY/POLYPECTOMY BY BIOPSY;  COLONOSCOPY;  Surgeon:  Ashtyn Gonzales MD;  Location:  GI     CORONARY ANGIOGRAPHY ADULT ORDER  12/2011    1 LMA 5%, LAD 40-50%, LCx 30-40%, RCA 25-30%     HC INJECTION SCLEROSING SOLUTION HEMORRHOID  8/24/2012    Procedure: HEMORRHOID INJECTION SCLEROSING SOLUTION;  Surgeon: Mayito Chow MD;  Location:  GI     HC TOOTH EXTRACTION W/FORCEP       LASIK  1/2011     ROTATOR CUFF REPAIR RT/LT Left 10/15/2015    RCR Left - Dr. Carvalho       FAMILY HISTORY:  Family History   Problem Relation Age of Onset     C.A.D. Father      passed at 52 from MI     Cardiovascular Father      HEART DISEASE Father      Obesity Father      Myocardial Infarction Father      52 - passed away     Colon Cancer Mother 65     passed away from colon cancer - lived about 2 mo from dx 1981     Arthritis Maternal Grandmother      DIABETES Maternal Grandmother      Arthritis Maternal Grandfather      HEART DISEASE Brother      DIABETES Brother      Coronary Artery Disease Brother      stent placed      Hypertension No family hx of      CEREBROVASCULAR DISEASE No family hx of      Breast Cancer No family hx of      Prostate Cancer No family hx of      Alcohol/Drug No family hx of      Allergies No family hx of      Alzheimer Disease No family hx of      Circulatory No family hx of      Congenital Anomalies No family hx of      Connective Tissue Disorder No family hx of      Depression No family hx of      Eye Disorder No family hx of      Genetic Disorder No family hx of      GASTROINTESTINAL DISEASE No family hx of      Genitourinary Problems No family hx of      Gynecology No family hx of      Musculoskeletal Disorder No family hx of      Neurologic Disorder No family hx of      OSTEOPOROSIS No family hx of      Psychotic Disorder No family hx of      Respiratory No family hx of      Thyroid Disease No family hx of      Anesthesia Reaction No family hx of      Blood Disease No family hx of        SOCIAL HISTORY:  Social History     Social History     Marital  "status:      Spouse name: Emy     Number of children: 2     Years of education: 16     Occupational History     [employer]     Social History Main Topics     Smoking status: Former Smoker     Packs/day: 1.50     Years: 24.00     Types: Cigarettes     Quit date: 1/2/1996     Smokeless tobacco: Never Used     Alcohol use 0.0 oz/week     0 Standard drinks or equivalent per week      Comment: less than one per week     Drug use: No     Sexual activity: Yes     Partners: Female     Other Topics Concern      Service Yes     Blood Transfusions No     Caffeine Concern No     2 cups per day     Occupational Exposure No     Hobby Hazards No     Sleep Concern No     Stress Concern Yes     Weight Concern Yes     Special Diet Yes     Back Care No     Exercise No     8,000 steps a day. Fitbit     Bike Helmet No     n/a     Seat Belt Yes     Self-Exams Yes     Social History Narrative    Eats fruits and vegetables every day. He takes extra vitamin D. He was advised to aim for 1200 mg of calcium per day.       Review of Systems:  Skin:  Negative     Eyes:  Negative    ENT:  Negative    Respiratory:  Negative    Cardiovascular:  Negative lightheadedness  Gastroenterology: Positive for heartburn  Genitourinary:  Negative    Musculoskeletal:  Positive for joint pain (left hip)  Neurologic:  Negative numbness or tingling of hands (left side tingling)  Psychiatric:  Negative    Heme/Lymph/Imm:  Negative    Endocrine:  Negative      Physical Exam:  Vitals: /72  Pulse 74  Ht 1.88 m (6' 2\")  Wt 105.2 kg (232 lb)  SpO2 94%  BMI 29.79 kg/m2    Constitutional:  cooperative, alert and oriented, well developed, well nourished, in no acute distress        Skin:  warm and dry to the touch, no apparent skin lesions or masses noted        Head:  normocephalic, no masses or lesions        Eyes:  pupils equal and round, conjunctivae and lids unremarkable, sclera white, no xanthalasma, EOMS intact, no " nystagmus        ENT:  no pallor or cyanosis, dentition good        Neck:  carotid pulses are full and equal bilaterally, JVP normal, no carotid bruit, no thyromegaly        Chest:  normal breath sounds, clear to auscultation, normal A-P diameter, normal symmetry, normal respiratory excursion, no use of accessory muscles          Cardiac: regular rhythm;normal S1 and S2       systolic ejection murmur;grade 1          Abdomen:  abdomen soft, non-tender, BS normoactive, no mass, no HSM, no bruits        Vascular: pulses full and equal, no bruits auscultated                                        Extremities and Back:  no deformities, clubbing, cyanosis, erythema observed              Neurological:  affect appropriate, oriented to time, person and place          Recent Lab Results:  LIPID RESULTS:  Lab Results   Component Value Date    CHOL 141 06/22/2017    HDL 49 06/22/2017    LDL 71 06/22/2017    TRIG 107 06/22/2017    CHOLHDLRATIO 2.8 07/10/2015       LIVER ENZYME RESULTS:  Lab Results   Component Value Date    AST 29 06/22/2017    ALT 42 06/22/2017       CBC RESULTS:  Lab Results   Component Value Date    WBC 6.5 06/22/2017    RBC 4.88 06/22/2017    HGB 14.5 06/22/2017    HCT 44.4 06/22/2017    MCV 91 06/22/2017    MCH 29.7 06/22/2017    MCHC 32.7 06/22/2017    RDW 13.5 06/22/2017     (H) 06/22/2017       BMP RESULTS:  Lab Results   Component Value Date     06/22/2017    POTASSIUM 4.3 06/22/2017    CHLORIDE 106 06/22/2017    CO2 24 06/22/2017    ANIONGAP 10 06/22/2017     (H) 06/22/2017    BUN 14 06/22/2017    CR 0.98 06/22/2017    GFRESTIMATED 77 06/22/2017    GFRESTBLACK >90   GFR Calc   06/22/2017    CAMERON 9.4 06/22/2017        A1C RESULTS:  Lab Results   Component Value Date    A1C 5.6 06/22/2017       INR RESULTS:  Lab Results   Component Value Date    INR 1.02 12/22/2011           CC  Ana Gomez MD  [facility]  [address] [place] [address] [place] [id number]

## 2017-07-06 NOTE — LETTER
7/6/2017    Ana Ugarte PA-C  [facility]   [address]  [place] [address]    RE: Peter Peralta       Dear Colleague,    I had the pleasure of seeing Peter Peralta in the [facility].    HPI and Plan:   Mr. Peralta returns for clinical office visit follow-up. Today's visit is 30 minutes. 50% counseling. The patient has a history of three-vessel coronary disease are moderate not flow-limiting. We reviewed his stress echocardiogram today the exercise time was quite good ejection fraction was low normal at the beginning and went to 65% with exercise. No ischemia was identified on the stress test. I did review outside labs including lipid profile which is quite improved. Platelet count is elevated the patient may be seen a hematologist later for that No cardiovascular complaints are noted. I will see the patient back in 2 years I will repeat the stress test  Orders Placed This Encounter   Procedures     Follow-Up with Cardiologist     Exercise Stress Echocardiogram     Orders Placed This Encounter   Medications     APPLE CIDER VINEGAR PO     Sig: Take 1 Tablespoonful by mouth     There are no discontinued medications.      Encounter Diagnosis   Name Primary?     Coronary artery disease involving native coronary artery of native heart without angina pectoris Yes       CURRENT MEDICATIONS:  Current Outpatient Prescriptions   Medication Sig Dispense Refill     APPLE CIDER VINEGAR PO Take 1 Tablespoonful by mouth       Glucos-Chond-Hyal Ac-Ca Fructo (MOVE FREE [facility] HEALTH ADVANCE) TABS Take 1 mg by mouth daily       atorvastatin (LIPITOR) 40 MG tablet Take 1 tablet (40 mg) by mouth At Bedtime 90 tablet 3     polyethylene glycol (MIRALAX) powder Take 3/4 capful daily 510 g 1     fluticasone (FLONASE) 50 MCG/ACT nasal spray Spray 2 sprays into both nostrils daily 16 g 11     esomeprazole (NEXIUM) 40 MG capsule Take 40 mg by mouth every morning  (before breakfast) Take 30-60 minutes before eating.       KRILL OIL PO Take 500 mg by mouth daily Nael Red       psyllium (METAMUCIL) 58.6 % POWD Take 2 teaspoonful by mouth 2 times daily        coenzyme Q-10 (COQ-10) 200 MG CAPS Take by mouth daily        Calcium-Vitamin D-Vitamin K (CALCIUM + D + K) 750-500-40 MG-UNT-MCG TABS Take 2 tablets by mouth daily.       JUAN LOW STRENGTH 81 MG OR TBEC 2 DAILY 30 0     MULTIVITAMIN TABS   OR 1 daily  0     VITAMIN D, CHOLECALCIFEROL, PO Take 5,000 Units by mouth daily       Cyanocobalamin (B-12 PO) Take 1,000 mg by mouth daily          ALLERGIES     Allergies   Allergen Reactions     No Known Drug Allergies        PAST MEDICAL HISTORY:  Past Medical History:   Diagnosis Date     Atrial fibrillation (H)     only after adenosine test     BPH (benign prostatic hypertrophy)      Colitis 1/1/2014    found on colonoscopy     Coronary artery disease 2011 2011-Cath Lma 5%, Lad 40-50%, Lcx 30-40%, Ook74-95% (-FFR of Lad)     Family history of colon cancer 7/6/2012    mother age 65     GERD (gastroesophageal reflux disease)     nexium     Hyperlipidaemia      IFG (impaired fasting glucose)      Obesity, unspecified     Waist size 42, BMI 30.8     Right inguinal hernia 7/1/2014     Thrombocytosis (H)        PAST SURGICAL HISTORY:  Past Surgical History:   Procedure Laterality Date     CARDIAC NUC POLINA STRESS TEST NL      Normal     CARDIOVERSION  12/2011    Atrial Fib     COLONOSCOPY  1/29/2014    Procedure: COMBINED COLONOSCOPY, SINGLE BIOPSY/POLYPECTOMY BY BIOPSY;  COLONOSCOPY;  Surgeon: Ashtyn Gonzales MD;  Location:  GI     CORONARY ANGIOGRAPHY ADULT ORDER  12/2011    1 LMA 5%, LAD 40-50%, LCx 30-40%, RCA 25-30%     HC INJECTION SCLEROSING SOLUTION HEMORRHOID  8/24/2012    Procedure: HEMORRHOID INJECTION SCLEROSING SOLUTION;  Surgeon: Mayito Chow MD;  Location: [facility]     HC TOOTH EXTRACTION W/FORCEP       LASIK  1/2011     ROTATOR CUFF REPAIR RT/LT Left  10/15/2015    RCR Left - Dr. Carvalho       FAMILY HISTORY:  Family History   Problem Relation Age of Onset     C.A.D. Father      passed at 52 from MI     Cardiovascular Father      HEART DISEASE Father      Obesity Father      Myocardial Infarction Father      52 - passed away     Colon Cancer Mother 65     passed away from colon cancer - lived about 2 mo from dx 1981     Arthritis Maternal Grandmother      DIABETES Maternal Grandmother      Arthritis Maternal Grandfather      HEART DISEASE Brother      DIABETES Brother      Coronary Artery Disease Brother      stent placed      Hypertension No family hx of      CEREBROVASCULAR DISEASE No family hx of      Breast Cancer No family hx of      Prostate Cancer No family hx of      Alcohol/Drug No family hx of      Allergies No family hx of      Alzheimer Disease No family hx of      Circulatory No family hx of      Congenital Anomalies No family hx of      Connective Tissue Disorder No family hx of      Depression No family hx of      Eye Disorder No family hx of      Genetic Disorder No family hx of      GASTROINTESTINAL DISEASE No family hx of      Genitourinary Problems No family hx of      Gynecology No family hx of      Musculoskeletal Disorder No family hx of      Neurologic Disorder No family hx of      OSTEOPOROSIS No family hx of      Psychotic Disorder No family hx of      Respiratory No family hx of      Thyroid Disease No family hx of      Anesthesia Reaction No family hx of      Blood Disease No family hx of        SOCIAL HISTORY:  Social History     Social History     Marital status:      Spouse name: Emy     Number of children: 2     Years of education: 16     Occupational History     [employer]     Social History Main Topics     Smoking status: Former Smoker     Packs/day: 1.50     Years: 24.00     Types: Cigarettes     Quit date: 1/2/1996     Smokeless tobacco: Never Used     Alcohol use 0.0 oz/week     0 Standard drinks or  "equivalent per week      Comment: less than one per week     Drug use: No     Sexual activity: Yes     Partners: Female     Other Topics Concern      Service Yes     Blood Transfusions No     Caffeine Concern No     2 cups per day     Occupational Exposure No     Hobby Hazards No     Sleep Concern No     Stress Concern Yes     Weight Concern Yes     Special Diet Yes     Back Care No     Exercise No     8,000 steps a day. Fitbit     Bike Helmet No     n/a     Seat Belt Yes     Self-Exams Yes     Social History Narrative    Eats fruits and vegetables every day. He takes extra vitamin D. He was advised to aim for 1200 mg of calcium per day.       Review of Systems:  Skin:  Negative     Eyes:  Negative    ENT:  Negative    Respiratory:  Negative    Cardiovascular:  Negative lightheadedness  Gastroenterology: Positive for heartburn  Genitourinary:  Negative    Musculoskeletal:  Positive for joint pain (left hip)  Neurologic:  Negative numbness or tingling of hands (left side tingling)  Psychiatric:  Negative    Heme/Lymph/Imm:  Negative    Endocrine:  Negative      Physical Exam:  Vitals: /72  Pulse 74  Ht 1.88 m (6' 2\")  Wt 105.2 kg (232 lb)  SpO2 94%  BMI 29.79 kg/m2    Constitutional:  cooperative, alert and oriented, well developed, well nourished, in no acute distress        Skin:  warm and dry to the touch, no apparent skin lesions or masses noted        Head:  normocephalic, no masses or lesions        Eyes:  pupils equal and round, conjunctivae and lids unremarkable, sclera white, no xanthalasma, EOMS intact, no nystagmus        ENT:  no pallor or cyanosis, dentition good        Neck:  carotid pulses are full and equal bilaterally, JVP normal, no carotid bruit, no thyromegaly        Chest:  normal breath sounds, clear to auscultation, normal A-P diameter, normal symmetry, normal respiratory excursion, no use of accessory muscles          Cardiac: regular rhythm;normal S1 and S2       systolic " ejection murmur;grade 1          Abdomen:  abdomen soft, non-tender, BS normoactive, no mass, no HSM, no bruits        Vascular: pulses full and equal, no bruits auscultated                                        Extremities and Back:  no deformities, clubbing, cyanosis, erythema observed              Neurological:  affect appropriate, oriented to time, person and place          Recent Lab Results:  LIPID RESULTS:  Lab Results   Component Value Date    CHOL 141 06/22/2017    HDL 49 06/22/2017    LDL 71 06/22/2017    TRIG 107 06/22/2017    CHOLHDLRATIO 2.8 07/10/2015       LIVER ENZYME RESULTS:  Lab Results   Component Value Date    AST 29 06/22/2017    ALT 42 06/22/2017       CBC RESULTS:  Lab Results   Component Value Date    WBC 6.5 06/22/2017    RBC 4.88 06/22/2017    HGB 14.5 06/22/2017    HCT 44.4 06/22/2017    MCV 91 06/22/2017    MCH 29.7 06/22/2017    MCHC 32.7 06/22/2017    RDW 13.5 06/22/2017     (H) 06/22/2017       BMP RESULTS:  Lab Results   Component Value Date     06/22/2017    POTASSIUM 4.3 06/22/2017    CHLORIDE 106 06/22/2017    CO2 24 06/22/2017    ANIONGAP 10 06/22/2017     (H) 06/22/2017    BUN 14 06/22/2017    CR 0.98 06/22/2017    GFRESTIMATED 77 06/22/2017    GFRESTBLACK >90   GFR Calc   06/22/2017    CAMERON 9.4 06/22/2017        A1C RESULTS:  Lab Results   Component Value Date    A1C 5.6 06/22/2017       INR RESULTS:  Lab Results   Component Value Date    INR 1.02 12/22/2011           CC  Ana Gomez MD  [facility]  [address] [place] [address]  HPI and Plan:   See dictation    Orders Placed This Encounter   Procedures     Follow-Up with Cardiologist     Exercise Stress Echocardiogram     Orders Placed This Encounter   Medications     APPLE CIDER VINEGAR PO     Sig: Take 1 Tablespoonful by mouth     There are no discontinued medications.      Encounter Diagnosis   Name Primary?     Coronary artery disease involving  native coronary artery of native heart without angina pectoris Yes       CURRENT MEDICATIONS:  Current Outpatient Prescriptions   Medication Sig Dispense Refill     APPLE CIDER VINEGAR PO Take 1 Tablespoonful by mouth       Glucos-Chond-Hyal Ac-Ca Fructo (MOVE FREE JOINT HEALTH ADVANCE) TABS Take 1 mg by mouth daily       atorvastatin (LIPITOR) 40 MG tablet Take 1 tablet (40 mg) by mouth At Bedtime 90 tablet 3     polyethylene glycol (MIRALAX) powder Take 3/4 capful daily 510 g 1     fluticasone (FLONASE) 50 MCG/ACT nasal spray Spray 2 sprays into both nostrils daily 16 g 11     esomeprazole (NEXIUM) 40 MG capsule Take 40 mg by mouth every morning (before breakfast) Take 30-60 minutes before eating.       KRILL OIL PO Take 500 mg by mouth daily Nael Red       psyllium (METAMUCIL) 58.6 % POWD Take 2 teaspoonful by mouth 2 times daily        coenzyme Q-10 (COQ-10) 200 MG CAPS Take by mouth daily        Calcium-Vitamin D-Vitamin K (CALCIUM + D + K) 750-500-40 MG-UNT-MCG TABS Take 2 tablets by mouth daily.       JUAN LOW STRENGTH 81 MG OR TBEC 2 DAILY 30 0     MULTIVITAMIN TABS   OR 1 daily  0     VITAMIN D, CHOLECALCIFEROL, PO Take 5,000 Units by mouth daily       Cyanocobalamin (B-12 PO) Take 1,000 mg by mouth daily          ALLERGIES     Allergies   Allergen Reactions     No Known Drug Allergies        PAST MEDICAL HISTORY:  Past Medical History:   Diagnosis Date     Atrial fibrillation (H)     only after adenosine test     BPH (benign prostatic hypertrophy)      Colitis 1/1/2014    found on colonoscopy     Coronary artery disease 2011 2011-Cath Lma 5%, Lad 40-50%, Lcx 30-40%, Fcs13-63% (-FFR of Lad)     Family history of colon cancer 7/6/2012    mother age 65     GERD (gastroesophageal reflux disease)     nexium     Hyperlipidaemia      IFG (impaired fasting glucose)      Obesity, unspecified     Waist size 42, BMI 30.8     Right inguinal hernia 7/1/2014     Thrombocytosis (H)        PAST SURGICAL HISTORY:  Past  Surgical History:   Procedure Laterality Date     CARDIAC NUC POLINA STRESS TEST NL      Normal     CARDIOVERSION  12/2011    Atrial Fib     COLONOSCOPY  1/29/2014    Procedure: COMBINED COLONOSCOPY, SINGLE BIOPSY/POLYPECTOMY BY BIOPSY;  COLONOSCOPY;  Surgeon: Ashtyn Gonzales MD;  Location:  GI     CORONARY ANGIOGRAPHY ADULT ORDER  12/2011    1 LMA 5%, LAD 40-50%, LCx 30-40%, RCA 25-30%     HC INJECTION SCLEROSING SOLUTION HEMORRHOID  8/24/2012    Procedure: HEMORRHOID INJECTION SCLEROSING SOLUTION;  Surgeon: Mayito Chow MD;  Location:  GI     HC TOOTH EXTRACTION W/FORCEP       LASIK  1/2011     ROTATOR CUFF REPAIR RT/LT Left 10/15/2015    RCR Left - Dr. Carvalho       FAMILY HISTORY:  Family History   Problem Relation Age of Onset     C.A.D. Father      passed at 52 from MI     Cardiovascular Father      HEART DISEASE Father      Obesity Father      Myocardial Infarction Father      52 - passed away     Colon Cancer Mother 65     passed away from colon cancer - lived about 2 mo from dx 1981     Arthritis Maternal Grandmother      DIABETES Maternal Grandmother      Arthritis Maternal Grandfather      HEART DISEASE Brother      DIABETES Brother      Coronary Artery Disease Brother      stent placed      Hypertension No family hx of      CEREBROVASCULAR DISEASE No family hx of      Breast Cancer No family hx of      Prostate Cancer No family hx of      Alcohol/Drug No family hx of      Allergies No family hx of      Alzheimer Disease No family hx of      Circulatory No family hx of      Congenital Anomalies No family hx of      Connective Tissue Disorder No family hx of      Depression No family hx of      Eye Disorder No family hx of      Genetic Disorder No family hx of      GASTROINTESTINAL DISEASE No family hx of      Genitourinary Problems No family hx of      Gynecology No family hx of      Musculoskeletal Disorder No family hx of      Neurologic Disorder No family hx of      OSTEOPOROSIS No family hx  "of      Psychotic Disorder No family hx of      Respiratory No family hx of      Thyroid Disease No family hx of      Anesthesia Reaction No family hx of      Blood Disease No family hx of        SOCIAL HISTORY:  Social History     Social History     Marital status:      Spouse name: mEy     Number of children: 2     Years of education: 16     Occupational History     [employer]     Social History Main Topics     Smoking status: Former Smoker     Packs/day: 1.50     Years: 24.00     Types: Cigarettes     Quit date: 1/2/1996     Smokeless tobacco: Never Used     Alcohol use 0.0 oz/week     0 Standard drinks or equivalent per week      Comment: less than one per week     Drug use: No     Sexual activity: Yes     Partners: Female     Other Topics Concern      Service Yes     Blood Transfusions No     Caffeine Concern No     2 cups per day     Occupational Exposure No     Hobby Hazards No     Sleep Concern No     Stress Concern Yes     Weight Concern Yes     Special Diet Yes     Back Care No     Exercise No     8,000 steps a day. Fitbit     Bike Helmet No     n/a     Seat Belt Yes     Self-Exams Yes     Social History Narrative    Eats fruits and vegetables every day. He takes extra vitamin D. He was advised to aim for 1200 mg of calcium per day.       Review of Systems:  Skin:  Negative     Eyes:  Negative    ENT:  Negative    Respiratory:  Negative    Cardiovascular:  Negative lightheadedness  Gastroenterology: Positive for heartburn  Genitourinary:  Negative    Musculoskeletal:  Positive for joint pain (left hip)  Neurologic:  Negative numbness or tingling of hands (left side tingling)  Psychiatric:  Negative    Heme/Lymph/Imm:  Negative    Endocrine:  Negative      Physical Exam:  Vitals: /72  Pulse 74  Ht 1.88 m (6' 2\")  Wt 105.2 kg (232 lb)  SpO2 94%  BMI 29.79 kg/m2    Constitutional:  cooperative, alert and oriented, well developed, well nourished, in no acute " distress        Skin:  warm and dry to the touch, no apparent skin lesions or masses noted        Head:  normocephalic, no masses or lesions        Eyes:  pupils equal and round, conjunctivae and lids unremarkable, sclera white, no xanthalasma, EOMS intact, no nystagmus        ENT:  no pallor or cyanosis, dentition good        Neck:  carotid pulses are full and equal bilaterally, JVP normal, no carotid bruit, no thyromegaly        Chest:  normal breath sounds, clear to auscultation, normal A-P diameter, normal symmetry, normal respiratory excursion, no use of accessory muscles          Cardiac: regular rhythm;normal S1 and S2       systolic ejection murmur;grade 1          Abdomen:  abdomen soft, non-tender, BS normoactive, no mass, no HSM, no bruits        Vascular: pulses full and equal, no bruits auscultated                                        Extremities and Back:  no deformities, clubbing, cyanosis, erythema observed              Neurological:  affect appropriate, oriented to time, person and place          Recent Lab Results:  LIPID RESULTS:  Lab Results   Component Value Date    CHOL 141 06/22/2017    HDL 49 06/22/2017    LDL 71 06/22/2017    TRIG 107 06/22/2017    CHOLHDLRATIO 2.8 07/10/2015       LIVER ENZYME RESULTS:  Lab Results   Component Value Date    AST 29 06/22/2017    ALT 42 06/22/2017       CBC RESULTS:  Lab Results   Component Value Date    WBC 6.5 06/22/2017    RBC 4.88 06/22/2017    HGB 14.5 06/22/2017    HCT 44.4 06/22/2017    MCV 91 06/22/2017    MCH 29.7 06/22/2017    MCHC 32.7 06/22/2017    RDW 13.5 06/22/2017     (H) 06/22/2017       BMP RESULTS:  Lab Results   Component Value Date     06/22/2017    POTASSIUM 4.3 06/22/2017    CHLORIDE 106 06/22/2017    CO2 24 06/22/2017    ANIONGAP 10 06/22/2017     (H) 06/22/2017    BUN 14 06/22/2017    CR 0.98 06/22/2017    GFRESTIMATED 77 06/22/2017    GFRESTBLACK >90   GFR Calc   06/22/2017    CAMERON 9.4 06/22/2017         A1C RESULTS:  Lab Results   Component Value Date    A1C 5.6 06/22/2017       INR RESULTS:  Lab Results   Component Value Date    INR 1.02 12/22/2011     Thank you for allowing me to participate in the care of your patient.    Sincerely,     Eulalio Del Rosario MD     [facility]

## 2017-07-06 NOTE — MR AVS SNAPSHOT
After Visit Summary   7/6/2017    Peter Peralta    MRN: [id number]           Patient Information     Date Of Birth          1950        Visit Information        Provider Department      7/6/2017 4:45 PM Eulalio Del Rosario MD [facility] [place]        Today's Diagnoses     Coronary artery disease involving native coronary artery of native heart without angina pectoris    -  1       Follow-ups after your visit        Additional Services     Follow-Up with Cardiologist                 Future tests that were ordered for you today     Open Future Orders        Priority Expected Expires Ordered    Exercise Stress Echocardiogram Routine 7/6/2019 7/26/2019 7/6/2017    Follow-Up with Cardiologist Routine 7/6/2019 7/26/2019 7/6/2017            Who to contact     If you have questions or need follow up information about today's clinic visit or your schedule please contact [facility] directly at [phone].  Normal or non-critical lab and imaging results will be communicated to you by Press[employer], letter or phone within 4 business days after the clinic has received the results. If you do not hear from us within 7 days, please contact the clinic through Pressy[employer] or phone. If you have a critical or abnormal lab result, we will notify you by phone as soon as possible.  Submit refill requests through [facility] or call your pharmacy and they will forward the refill request to us. Please allow 3 business days for your refill to be completed.          Additional Information About Your Visit        MyChart Information     [facility] gives you secure access to your electronic health record. If you see a primary care provider, you can also send messages to your care team and make appointments. If you have questions, please call your primary care clinic.  If you do not have a primary care provider, please call [phone] and they will assist you.    "     Care EveryWhere ID     This is your Care EveryWhere ID. This could be used by other organizations to access your [place] medical records  [id number]        Your Vitals Were     Pulse Height Pulse Oximetry BMI (Body Mass Index)          74 1.88 m (6' 2\") 94% 29.79 kg/m2         Blood Pressure from Last 3 Encounters:   07/06/17 126/72   06/22/17 116/64   08/11/16 123/82    Weight from Last 3 Encounters:   07/06/17 105.2 kg (232 lb)   06/22/17 102.1 kg (225 lb)   08/11/16 108.8 kg (239 lb 12.8 oz)               Primary Care Provider Office Phone # Fax #    Ana Ugarte PA-C [phone]       [facility] [address]  [place] [address]        Equal Access to Services     ZENIA ARCHULETA : Hadii aad ku hadasho Soomaali, waaxda luqadaha, qaybta kaalmada adeegyada, waxay idiin hayaan joya gómez . So [facility] [phone].    ATENCIÓN: Si habla español, tiene a mirza disposición servicios gratuitos de asistencia lingüística. Llame al [phone].    We comply with applicable federal civil rights laws and [place] laws. We do not discriminate on the basis of race, color, national origin, age, disability sex, sexual orientation or gender identity.            Thank you!     Thank you for choosing [facility] [place]  for your care. Our goal is always to provide you with excellent care. Hearing back from our patients is one way we can continue to improve our services. Please take a few minutes to complete the written survey that you may receive in the mail after your visit with us. Thank you!             Your Updated Medication List - Protect others around you: Learn how to safely use, store and throw away your medicines at www.disposemymeds.org.          This list is accurate as of: 7/6/17  5:41 PM.  Always use your most recent med list.                   Brand Name Dispense Instructions for use Diagnosis    APPLE CIDER VINEGAR " PO      Take 1 Tablespoonful by mouth        atorvastatin 40 MG tablet    LIPITOR    90 tablet    Take 1 tablet (40 mg) by mouth At Bedtime    Hyperlipidemia LDL goal <130       B-12 PO      Take 1,000 mg by mouth daily        JUAN LOW STRENGTH 81 MG EC tablet   Generic drug:  aspirin     30    2 DAILY        CALCIUM + D + K 750-500-40 MG-UNT-MCG Tabs   Generic drug:  Calcium-Vitamin D-Vitamin K      Take 2 tablets by mouth daily.        coenzyme Q-10 200 MG Caps      Take by mouth daily        fluticasone 50 MCG/ACT spray    FLONASE    16 g    Spray 2 sprays into both nostrils daily    Chronic rhinitis       KRILL OIL PO      Take 500 mg by mouth daily Nael Red        MIRALAX powder   Generic drug:  polyethylene glycol     510 g    Take 3/4 capful daily    Chronic constipation       MOVE FREE JOINT HEALTH ADVANCE Tabs      Take 1 mg by mouth daily        MULTIVITAMIN TABS   OR      1 daily        nexIUM 40 MG CR capsule   Generic drug:  esomeprazole      Take 40 mg by mouth every morning (before breakfast) Take 30-60 minutes before eating.        psyllium 58.6 % Powd    METAMUCIL     Take 2 teaspoonful by mouth 2 times daily        VITAMIN D (CHOLECALCIFEROL) PO      Take 5,000 Units by mouth daily

## 2018-07-02 ASSESSMENT — ACTIVITIES OF DAILY LIVING (ADL)
I_NEED_ASSISTANCE_FOR_THE_FOLLOWING_DAILY_ACTIVITIES:: NO ASSISTANCE IS NEEDED
CURRENT_FUNCTION: NO ASSISTANCE NEEDED

## 2018-07-05 ENCOUNTER — OFFICE VISIT (OUTPATIENT)
Dept: FAMILY MEDICINE | Facility: CLINIC | Age: 68
End: 2018-07-05
Payer: COMMERCIAL

## 2018-07-05 VITALS
BODY MASS INDEX: 31.14 KG/M2 | SYSTOLIC BLOOD PRESSURE: 127 MMHG | OXYGEN SATURATION: 96 % | RESPIRATION RATE: 14 BRPM | DIASTOLIC BLOOD PRESSURE: 85 MMHG | HEART RATE: 69 BPM | WEIGHT: 235 LBS | TEMPERATURE: 97.4 F | HEIGHT: 73 IN

## 2018-07-05 DIAGNOSIS — Z00.00 HEALTHCARE MAINTENANCE: Primary | ICD-10-CM

## 2018-07-05 DIAGNOSIS — Z23 NEED FOR VACCINATION: ICD-10-CM

## 2018-07-05 DIAGNOSIS — E78.5 HYPERLIPIDEMIA LDL GOAL <130: ICD-10-CM

## 2018-07-05 DIAGNOSIS — K52.9 COLITIS: ICD-10-CM

## 2018-07-05 DIAGNOSIS — Z12.11 SCREEN FOR COLON CANCER: ICD-10-CM

## 2018-07-05 DIAGNOSIS — Z12.5 SCREENING FOR PROSTATE CANCER: ICD-10-CM

## 2018-07-05 DIAGNOSIS — Z23 NEED FOR PROPHYLACTIC VACCINATION AGAINST STREPTOCOCCUS PNEUMONIAE (PNEUMOCOCCUS): ICD-10-CM

## 2018-07-05 LAB
ALBUMIN SERPL-MCNC: 4.1 G/DL (ref 3.4–5)
ALP SERPL-CCNC: 71 U/L (ref 40–150)
ALT SERPL W P-5'-P-CCNC: 50 U/L (ref 0–70)
ANION GAP SERPL CALCULATED.3IONS-SCNC: 7 MMOL/L (ref 3–14)
AST SERPL W P-5'-P-CCNC: 41 U/L (ref 0–45)
BILIRUB SERPL-MCNC: 0.8 MG/DL (ref 0.2–1.3)
BUN SERPL-MCNC: 20 MG/DL (ref 7–30)
CALCIUM SERPL-MCNC: 9.3 MG/DL (ref 8.5–10.1)
CHLORIDE SERPL-SCNC: 105 MMOL/L (ref 94–109)
CHOLEST SERPL-MCNC: 159 MG/DL
CO2 SERPL-SCNC: 28 MMOL/L (ref 20–32)
CREAT SERPL-MCNC: 0.99 MG/DL (ref 0.66–1.25)
ERYTHROCYTE [DISTWIDTH] IN BLOOD BY AUTOMATED COUNT: 13.3 % (ref 10–15)
GFR SERPL CREATININE-BSD FRML MDRD: 76 ML/MIN/1.7M2
GLUCOSE SERPL-MCNC: 111 MG/DL (ref 70–99)
HCT VFR BLD AUTO: 46.8 % (ref 40–53)
HDLC SERPL-MCNC: 44 MG/DL
HGB BLD-MCNC: 15.5 G/DL (ref 13.3–17.7)
LDLC SERPL CALC-MCNC: 83 MG/DL
MCH RBC QN AUTO: 29.8 PG (ref 26.5–33)
MCHC RBC AUTO-ENTMCNC: 33.1 G/DL (ref 31.5–36.5)
MCV RBC AUTO: 90 FL (ref 78–100)
NONHDLC SERPL-MCNC: 115 MG/DL
PLATELET # BLD AUTO: 505 10E9/L (ref 150–450)
POTASSIUM SERPL-SCNC: 4.7 MMOL/L (ref 3.4–5.3)
PROT SERPL-MCNC: 7.9 G/DL (ref 6.8–8.8)
PSA SERPL-ACNC: 1.11 UG/L (ref 0–4)
RBC # BLD AUTO: 5.21 10E12/L (ref 4.4–5.9)
SODIUM SERPL-SCNC: 140 MMOL/L (ref 133–144)
TRIGL SERPL-MCNC: 161 MG/DL
WBC # BLD AUTO: 8.9 10E9/L (ref 4–11)

## 2018-07-05 PROCEDURE — 36415 COLL VENOUS BLD VENIPUNCTURE: CPT | Performed by: FAMILY MEDICINE

## 2018-07-05 PROCEDURE — G0438 PPPS, INITIAL VISIT: HCPCS | Performed by: FAMILY MEDICINE

## 2018-07-05 PROCEDURE — 80061 LIPID PANEL: CPT | Performed by: FAMILY MEDICINE

## 2018-07-05 PROCEDURE — 80053 COMPREHEN METABOLIC PANEL: CPT | Performed by: FAMILY MEDICINE

## 2018-07-05 PROCEDURE — G0103 PSA SCREENING: HCPCS | Performed by: FAMILY MEDICINE

## 2018-07-05 PROCEDURE — 90732 PPSV23 VACC 2 YRS+ SUBQ/IM: CPT | Performed by: FAMILY MEDICINE

## 2018-07-05 PROCEDURE — G0009 ADMIN PNEUMOCOCCAL VACCINE: HCPCS | Performed by: FAMILY MEDICINE

## 2018-07-05 PROCEDURE — 85027 COMPLETE CBC AUTOMATED: CPT | Performed by: FAMILY MEDICINE

## 2018-07-05 RX ORDER — ATORVASTATIN CALCIUM 40 MG/1
40 TABLET, FILM COATED ORAL AT BEDTIME
Qty: 90 TABLET | Refills: 3 | Status: SHIPPED | OUTPATIENT
Start: 2018-07-05 | End: 2019-03-31

## 2018-07-05 ASSESSMENT — ACTIVITIES OF DAILY LIVING (ADL): CURRENT_FUNCTION: NO ASSISTANCE NEEDED

## 2018-07-05 NOTE — MR AVS SNAPSHOT
After Visit Summary   7/5/2018    Peter Peralta    MRN: [id number]           Patient Information     Date Of Birth          1950        Visit Information        Provider Department      7/5/2018 9:40 AM Donell Zuñiga MD [facility] [place]        Today's Diagnoses     Healthcare maintenance    -  1    Need for prophylactic vaccination against Streptococcus pneumoniae (pneumococcus)        Screening for prostate cancer        Hyperlipidemia LDL goal <130        Screen for colon cancer        Colitis          Care Instructions    [facility]  Colonoscopy will be at [facility] with Dr Janet Alejo July 11, 2018 at 8:30AM exam; 7:45AM Arrival    2 day Prep done on the 9th and 10th  *Will need ride home, will send paperwork to you in the mail          Follow-ups after your visit        Additional Services     GASTROENTEROLOGY ADULT REF PROCEDURE ONLY Nikki Brian [phone]       Last Lab Result: Creatinine (mg/dL)       Date                     Value                 06/22/2017               0.98             ----------  Body mass index is 31 kg/(m^2).     Needed:  No  Language:  English    Patient will be contacted to schedule procedure.     Please be aware that coverage of these services is subject to the terms and limitations of your health insurance plan.  Call member services at your health plan with any benefit or coverage questions.  Any procedures must be performed at a [facility] facility OR coordinated by your clinic's referral office.    Please bring the following with you to your appointment:    (1) Any X-Rays, CTs or MRIs which have been performed.  Contact the facility where they were done to arrange for  prior to your scheduled appointment.    (2) List of current medications   (3) This referral request   (4) Any documents/labs given to you for this referral                  Follow-up notes from your care team      "Return in about 1 year (around 7/5/2019) for Physical Exam.      Who to contact     If you have questions or need follow up information about today's clinic visit or your schedule please contact [facility] [place] directly at [phone].  Normal or non-critical lab and imaging results will be communicated to you by MyChart, letter or phone within 4 business days after the clinic has received the results. If you do not hear from us within 7 days, please contact the clinic through [employer] or phone. If you have a critical or abnormal lab result, we will notify you by phone as soon as possible.  Submit refill requests through [employer] or call your pharmacy and they will forward the refill request to us. Please allow 3 business days for your refill to be completed.          Additional Information About Your Visit        VisTrackshart Information     [employer] gives you secure access to your electronic health record. If you see a primary care provider, you can also send messages to your care team and make appointments. If you have questions, please call your primary care clinic.  If you do not have a primary care provider, please call [phone] and they will assist you.        Care EveryWhere ID     This is your Care EveryWhere ID. This could be used by other organizations to access your [facility] medical records  [id number]        Your Vitals Were     Pulse Temperature Respirations Height Pulse Oximetry BMI (Body Mass Index)    69 97.4  F (36.3  C) (Tympanic) 14 6' 1\" (1.854 m) 96% 31 kg/m2       Blood Pressure from Last 3 Encounters:   07/05/18 127/85   07/06/17 126/72   06/22/17 116/64    Weight from Last 3 Encounters:   07/05/18 235 lb (106.6 kg)   07/06/17 232 lb (105.2 kg)   06/22/17 225 lb (102.1 kg)              We Performed the Following     CBC with platelets     Comprehensive metabolic panel     GASTROENTEROLOGY ADULT REF PROCEDURE ONLY Nikki Brian [phone]     Lipid panel reflex to direct " LDL Fasting     PSA, screen          Where to get your medicines      These medications were sent to [id number] - [place], [place] - [address]  [address] [address] [place] [address]    Hours:  Added 10/26 CK Checked with pharmacy Phone:  [phone]     atorvastatin 40 MG tablet          Primary Care Provider Fax #    Physician No Ref-Primary [phone]       No address on file        Equal Access to Services     [facility]: Hadii aad ku hadasho Soomaali, waaxda luqadaha, qaybta kaalmada adeegyada, waxay idiin hayaan adeeg kharash la'aan . So [facility] [phone].    ATENCIÓN: Si habla español, tiene a mirza disposición servicios gratuitos de asistencia lingüística. Llame al [phone].    We comply with applicable federal civil rights laws and [place] laws. We do not discriminate on the basis of race, color, national origin, age, disability, sex, sexual orientation, or gender identity.            Thank you!     Thank you for choosing [facility] [place]  for your care. Our goal is always to provide you with excellent care. Hearing back from our patients is one way we can continue to improve our services. Please take a few minutes to complete the written survey that you may receive in the mail after your visit with us. Thank you!             Your Updated Medication List - Protect others around you: Learn how to safely use, store and throw away your medicines at www.disposemymeds.org.          This list is accurate as of 7/5/18 10:45 AM.  Always use your most recent med list.                   Brand Name Dispense Instructions for use Diagnosis    APPLE CIDER VINEGAR PO      Take 1 Tablespoonful by mouth        atorvastatin 40 MG tablet    LIPITOR    90 tablet    Take 1 tablet (40 mg) by mouth At Bedtime    Hyperlipidemia LDL goal <130       B-12 PO      Take 1,000 mg by mouth daily        JUAN LOW STRENGTH 81 MG EC tablet   Generic drug:  aspirin     30    2 DAILY         CALCIUM + D + K 750-500-40 MG-UNT-MCG Tabs   Generic drug:  Calcium-Vitamin D-Vitamin K      Take 2 tablets by mouth daily.        coenzyme Q-10 200 MG Caps      Take by mouth daily        fluticasone 50 MCG/ACT spray    FLONASE    16 g    Spray 2 sprays into both nostrils daily    Chronic rhinitis       KRILL OIL PO      Take 500 mg by mouth daily Nael Red        MIRALAX powder   Generic drug:  polyethylene glycol     510 g    Take 3/4 capful daily    Chronic constipation       MOVE FREE JOINT HEALTH ADVANCE Tabs      Take 1 mg by mouth daily        MULTIVITAMIN TABS   OR      1 daily        nexIUM 40 MG CR capsule   Generic drug:  esomeprazole      Take 40 mg by mouth every morning (before breakfast) Take 30-60 minutes before eating.        psyllium 58.6 % Powd    METAMUCIL     Take 2 teaspoonful by mouth 2 times daily        VITAMIN D (CHOLECALCIFEROL) PO      Take 5,000 Units by mouth daily

## 2018-07-05 NOTE — PATIENT INSTRUCTIONS
[facility]  Colonoscopy will be at [facility] with Dr Gonzales  Wed July 11, 2018 at 8:30AM exam; 7:45AM Arrival    2 day Prep done on the 9th and 10th  *Will need ride home, will send paperwork to you in the mail

## 2018-07-05 NOTE — PROGRESS NOTES
SUBJECTIVE:   Peter Peralta is a 68 year old male who presents for Preventive Visit.  Are you in the first 12 months of your Medicare coverage?  No    Physical   Annual:     Getting at least 3 servings of Calcium per day:  Yes    Bi-annual eye exam:  Yes    Dental care twice a year:  Yes    Sleep apnea or symptoms of sleep apnea:  None    Diet:  Regular (no restrictions)    Frequency of exercise:  4-5 days/week    Duration of exercise:  30-45 minutes    Taking medications regularly:  Yes    Medication side effects:  Not applicable    Additional concerns today:  YES    Ability to successfully perform activities of daily living: no assistance needed    Home Safety:  Throw rugs in the hallway and lack of grab bars in the bathroom    Hearing Impairment: difficulty following a conversation in a noisy restaurant or crowded room      Right Ear:      1000 Hz RESPONSE- on Level: 40 db (Conditioning sound)   1000 Hz: RESPONSE- on Level:   20 db    2000 Hz: RESPONSE- on Level:   20 db    4000 Hz: RESPONSE- on Level:   20 db     Left Ear:      4000 Hz: RESPONSE- on Level:   20 db    2000 Hz: RESPONSE- on Level:   20 db    1000 Hz: RESPONSE- on Level:   20 db     500 Hz: RESPONSE- on Level: 25 db    Right Ear:    500 Hz: RESPONSE- on Level: 25 db    Hearing Acuity: Pass    Hearing Assessment: normal  Ability to successfully perform activities of daily living: Yes, no assistance needed  Home safety:  none identified     COGNITIVE SCREEN  1) Repeat 3 items (Leader, Season, Table)    2) Clock draw: NORMAL  3) 3 item recall: Recalls 3 objects  Results: 3 items recalled: COGNITIVE IMPAIRMENT LESS LIKELY    Mini-CogTM Jefferson Lora. Licensed by the author for use in [facility]; reprinted with permission ([email or web address]@.[email or web address]). All rights reserved.        Reviewed and updated as needed this visit by clinical staff         Reviewed and updated as needed this visit by Provider        Social History   Substance Use Topics      Smoking status: Former Smoker     Packs/day: 1.50     Years: 24.00     Types: Cigarettes     Quit date: 1/2/1996     Smokeless tobacco: Never Used     Alcohol use 0.0 oz/week     0 Standard drinks or equivalent per week      Comment: less than one per week       Alcohol Use 7/2/2018   If you drink alcohol do you typically have greater than 3 drinks per day OR greater than 7 drinks per week? No   No flowsheet data found.        Additional concerns: Referral for colonoscopy, atorvastatin prescription, colonitis acting up, some abdominal pain and gas for about a month now.     Today's PHQ-2 Score:   PHQ-2 ( 1999 [employer]) 7/2/2018   Q1: Little interest or pleasure in doing things 0   Q2: Feeling down, depressed or hopeless 0   PHQ-2 Score 0   Q1: Little interest or pleasure in doing things Not at all   Q2: Feeling down, depressed or hopeless Not at all   PHQ-2 Score 0       Do you feel safe in your environment - Yes    Do you have a Health Care Directive?: Yes: Advance Directive has been received and scanned.    Current providers sharing in care for this patient include:   Patient Care Team:  System, Provider Not In as PCP - General (Clinic)    The following health maintenance items are reviewed in Epic and correct as of today:  Health Maintenance   Topic Date Due     FALL RISK ASSESSMENT  06/08/2017     AORTIC ANEURYSM SCREENING (SYSTEM ASSIGNED)  06/08/2017     PNEUMOCOCCAL (2 of 2 - PPSV23) 06/22/2018     INFLUENZA VACCINE (1) 09/01/2018     COLONOSCOPY Q5 YR  01/29/2019     PHQ-2 Q1 YR  07/05/2019     ADVANCE DIRECTIVE PLANNING Q5 YRS  08/11/2021     LIPID SCREEN Q5 YR MALE (SYSTEM ASSIGNED)  06/22/2022     TETANUS IMMUNIZATION (SYSTEM ASSIGNED)  09/12/2022     HEPATITIS C SCREENING  Completed     BP Readings from Last 3 Encounters:   07/05/18 127/85   07/06/17 126/72   06/22/17 116/64    Wt Readings from Last 3 Encounters:   07/05/18 235 lb (106.6 kg)   07/06/17 232 lb (105.2 kg)   06/22/17 225 lb (102.1 kg)                   Patient Active Problem List   Diagnosis     Family history of coronary artery disease     Hyperlipidemia LDL goal <130     GERD (gastroesophageal reflux disease)     Family history of colon cancer     Colitis     Right inguinal hernia     Atrial fibrillation (H)     Hyperlipidemia     Murmur     Nonspecific abnormal results of cardiovascular function study     Chest pain     Elevated blood pressure reading without diagnosis of hypertension     Benign prostatic hyperplasia, presence of lower urinary tract symptoms unspecified, unspecified morphology     Advanced directives, counseling/discussion     Elevated fasting blood sugar     Hoarseness     BMI > 25     Thrombocytosis (H)     Past Surgical History:   Procedure Laterality Date     CARDIAC NUC POLINA STRESS TEST NL      Normal     CARDIOVERSION  12/2011    Atrial Fib     COLONOSCOPY  1/29/2014    Procedure: COMBINED COLONOSCOPY, SINGLE BIOPSY/POLYPECTOMY BY BIOPSY;  COLONOSCOPY;  Surgeon: Ashtyn Gonzales MD;  Location:  GI     CORONARY ANGIOGRAPHY ADULT ORDER  12/2011    1 LMA 5%, LAD 40-50%, LCx 30-40%, RCA 25-30%     HC INJECTION SCLEROSING SOLUTION HEMORRHOID  8/24/2012    Procedure: HEMORRHOID INJECTION SCLEROSING SOLUTION;  Surgeon: Mayito Chow MD;  Location:  GI     HC TOOTH EXTRACTION W/FORCEP       LASIK  1/2011     ROTATOR CUFF REPAIR RT/LT Left 10/15/2015    RCR Left - Dr. Carvalho       Social History   Substance Use Topics     Smoking status: Former Smoker     Packs/day: 1.50     Years: 24.00     Types: Cigarettes     Quit date: 1/2/1996     Smokeless tobacco: Never Used     Alcohol use 0.0 oz/week     0 Standard drinks or equivalent per week      Comment: less than one per week     Family History   Problem Relation Age of Onset     C.A.D. Father      passed at 52 from MI     Cardiovascular Father      HEART DISEASE Father      Obesity Father      Myocardial Infarction Father      52 - passed away     Colon Cancer Mother 65      passed away from colon cancer - lived about 2 mo from dx 1981     Arthritis Maternal Grandmother      Diabetes Maternal Grandmother      Arthritis Maternal Grandfather      HEART DISEASE Brother      Diabetes Brother      Coronary Artery Disease Brother      stent placed      Hypertension No family hx of      Cerebrovascular Disease No family hx of      Breast Cancer No family hx of      Prostate Cancer No family hx of      Alcohol/Drug No family hx of      Allergies No family hx of      Alzheimer Disease No family hx of      Circulatory No family hx of      Congenital Anomalies No family hx of      Connective Tissue Disorder No family hx of      Depression No family hx of      Eye Disorder No family hx of      Genetic Disorder No family hx of      GASTROINTESTINAL DISEASE No family hx of      Genitourinary Problems No family hx of      Gynecology No family hx of      Musculoskeletal Disorder No family hx of      Neurologic Disorder No family hx of      Osteoperosis No family hx of      Psychotic Disorder No family hx of      Respiratory No family hx of      Thyroid Disease No family hx of      Anesthesia Reaction No family hx of      Blood Disease No family hx of          Current Outpatient Prescriptions   Medication Sig Dispense Refill     APPLE CIDER VINEGAR PO Take 1 Tablespoonful by mouth       atorvastatin (LIPITOR) 40 MG tablet Take 1 tablet (40 mg) by mouth At Bedtime 90 tablet 3     JUAN LOW STRENGTH 81 MG OR TBEC 2 DAILY 30 0     Calcium-Vitamin D-Vitamin K (CALCIUM + D + K) 750-500-40 MG-UNT-MCG TABS Take 2 tablets by mouth daily.       coenzyme Q-10 (COQ-10) 200 MG CAPS Take by mouth daily        Cyanocobalamin (B-12 PO) Take 1,000 mg by mouth daily        esomeprazole (NEXIUM) 40 MG capsule Take 40 mg by mouth every morning (before breakfast) Take 30-60 minutes before eating.       fluticasone (FLONASE) 50 MCG/ACT nasal spray Spray 2 sprays into both nostrils daily 16 g 11     Glucos-Chond-Hyal Ac-Ca  "Fructo (MOVE FREE JOINT HEALTH ADVANCE) TABS Take 1 mg by mouth daily       KRILL OIL PO Take 500 mg by mouth daily Nael Red       MULTIVITAMIN TABS   OR 1 daily  0     polyethylene glycol (MIRALAX) powder Take 3/4 capful daily 510 g 1     psyllium (METAMUCIL) 58.6 % POWD Take 2 teaspoonful by mouth 2 times daily        VITAMIN D, CHOLECALCIFEROL, PO Take 5,000 Units by mouth daily       [DISCONTINUED] atorvastatin (LIPITOR) 40 MG tablet Take 1 tablet (40 mg) by mouth At Bedtime 90 tablet 3     Allergies   Allergen Reactions     No Known Drug Allergies      Recent Labs   Lab Test  06/22/17   0931  08/11/16   0846  10/20/15   1208  07/10/15   0716   A1C  5.6   --    --    --    LDL  71  63   --   54   HDL  49  41   --   44   TRIG  107  145   --   126   ALT  42  40  29  42   CR  0.98  1.01  1.08  0.92   GFRESTIMATED  77  74  69  83   GFRESTBLACK  >90   GFR Calc    90  83  >90   GFR Calc     POTASSIUM  4.3  4.2  4.3  3.9   TSH  0.96   --    --    --         Pneumonia Vaccine:Adults age 65+ who received Pneumovax (PPSV23) at 65 years or older: Should be given PCV13 > 1 year after their most recent PPSV23    Review of Systems  Constitutional, HEENT, cardiovascular, pulmonary, gi and gu systems are negative, except as otherwise noted.    OBJECTIVE:   /85  Pulse 69  Temp 97.4  F (36.3  C) (Tympanic)  Resp 14  Ht 6' 1\" (1.854 m)  Wt 235 lb (106.6 kg)  SpO2 96%  BMI 31 kg/m2 Estimated body mass index is 31 kg/(m^2) as calculated from the following:    Height as of this encounter: 6' 1\" (1.854 m).    Weight as of this encounter: 235 lb (106.6 kg).  Physical Exam  GENERAL: healthy, alert and no distress  EYES: Eyes grossly normal to inspection, PERRL and conjunctivae and sclerae normal  HENT: ear canals and TM's normal, nose and mouth without ulcers or lesions  NECK: no adenopathy, no asymmetry, masses, or scars and thyroid normal to palpation  RESP: lungs clear to auscultation - no " "rales, rhonchi or wheezes  CV: regular rate and rhythm, normal S1 S2, no S3 or S4, no murmur, click or rub, no peripheral edema and peripheral pulses strong  ABDOMEN: soft, nontender, no hepatosplenomegaly, no masses and bowel sounds normal  MS: no gross musculoskeletal defects noted, no edema  SKIN: no suspicious lesions or rashes  NEURO: Normal strength and tone, mentation intact and speech normal  BACK: no CVA tenderness, no paralumbar tenderness  PSYCH: mentation appears normal, affect normal/bright      ASSESSMENT / PLAN:   1. Need for prophylactic vaccination against Streptococcus pneumoniae (pneumococcus)      2. Healthcare maintenance    - CBC with platelets  - Comprehensive metabolic panel  - Lipid panel reflex to direct LDL Fasting  - PSA, screen    3. Screening for prostate cancer    - PSA, screen    4. Hyperlipidemia LDL goal <130    - atorvastatin (LIPITOR) 40 MG tablet; Take 1 tablet (40 mg) by mouth At Bedtime  Dispense: 90 tablet; Refill: 3    5. Screen for colon cancer    - GASTROENTEROLOGY ADULT REF PROCEDURE ONLY Nikki Brian [phone]    6. Colitis  Has diarrhea with low abd pain, had blood in stool,   Had past h/o colitis in the past, will have him to check with GI and colonoscopy for another colonoscopy   - GASTROENTEROLOGY ADULT REF PROCEDURE ONLY Nikki Brian [phone]    End of Life Planning:  Patient currently has an advanced directive: Yes.  Practitioner is supportive of decision.    COUNSELING:  Reviewed preventive health counseling, as reflected in patient instructions    BP Readings from Last 1 Encounters:   07/06/17 126/72     Estimated body mass index is 29.79 kg/(m^2) as calculated from the following:    Height as of 7/6/17: 6' 2\" (1.88 m).    Weight as of 7/6/17: 232 lb (105.2 kg).           reports that he quit smoking about 22 years ago. His smoking use included Cigarettes. He has a 36.00 pack-year smoking history. He has never used smokeless " tobacco.      Appropriate preventive services were discussed with this patient, including applicable screening as appropriate for cardiovascular disease, diabetes, osteopenia/osteoporosis, and glaucoma.  As appropriate for age/gender, discussed screening for colorectal cancer, prostate cancer, breast cancer, and cervical cancer. Checklist reviewing preventive services available has been given to the patient.    Reviewed patients plan of care and provided an AVS. The Basic Care Plan (routine screening as documented in Health Maintenance) for Peter meets the Care Plan requirement. This Care Plan has been established and reviewed with the Patient.    Counseling Resources:  ATP IV Guidelines  Pooled Cohorts Equation Calculator  Breast Cancer Risk Calculator  FRAX Risk Assessment  ICSI Preventive Guidelines  Dietary Guidelines for Americans, 2010  Partender's MyPlate  ASA Prophylaxis  Lung CA Screening    Donell Zuñiga MD  [facility] [place] for HPI/ROS submitted by the patient on 7/2/2018   PHQ-2 Score: 0

## 2018-07-11 ENCOUNTER — SURGERY (OUTPATIENT)
Age: 68
End: 2018-07-11

## 2018-07-11 ENCOUNTER — HOSPITAL ENCOUNTER (OUTPATIENT)
Facility: CLINIC | Age: 68
Discharge: HOME | End: 2018-07-11
Attending: COLON & RECTAL SURGERY | Admitting: COLON & RECTAL SURGERY
Payer: MEDICARE

## 2018-07-11 VITALS
SYSTOLIC BLOOD PRESSURE: 126 MMHG | RESPIRATION RATE: 21 BRPM | DIASTOLIC BLOOD PRESSURE: 80 MMHG | OXYGEN SATURATION: 93 %

## 2018-07-11 LAB — COLONOSCOPY: NORMAL

## 2018-07-11 PROCEDURE — G0500 MOD SEDAT ENDO SERVICE >5YRS: HCPCS | Performed by: COLON & RECTAL SURGERY

## 2018-07-11 PROCEDURE — 25000128 H RX IP 250 OP 636: Performed by: COLON & RECTAL SURGERY

## 2018-07-11 PROCEDURE — 45380 COLONOSCOPY AND BIOPSY: CPT | Performed by: COLON & RECTAL SURGERY

## 2018-07-11 PROCEDURE — 88305 TISSUE EXAM BY PATHOLOGIST: CPT | Performed by: COLON & RECTAL SURGERY

## 2018-07-11 PROCEDURE — 88305 TISSUE EXAM BY PATHOLOGIST: CPT | Mod: 26 | Performed by: COLON & RECTAL SURGERY

## 2018-07-11 RX ORDER — LIDOCAINE 40 MG/G
CREAM TOPICAL
Status: DISCONTINUED | OUTPATIENT
Start: 2018-07-11 | End: 2018-07-11 | Stop reason: HOSPADM

## 2018-07-11 RX ORDER — FLUMAZENIL 0.1 MG/ML
0.2 INJECTION, SOLUTION INTRAVENOUS
Status: DISCONTINUED | OUTPATIENT
Start: 2018-07-11 | End: 2018-07-11 | Stop reason: HOSPADM

## 2018-07-11 RX ORDER — ONDANSETRON 2 MG/ML
4 INJECTION INTRAMUSCULAR; INTRAVENOUS EVERY 6 HOURS PRN
Status: DISCONTINUED | OUTPATIENT
Start: 2018-07-11 | End: 2018-07-11 | Stop reason: HOSPADM

## 2018-07-11 RX ORDER — ONDANSETRON 4 MG/1
4 TABLET, ORALLY DISINTEGRATING ORAL EVERY 6 HOURS PRN
Status: DISCONTINUED | OUTPATIENT
Start: 2018-07-11 | End: 2018-07-11 | Stop reason: HOSPADM

## 2018-07-11 RX ORDER — FENTANYL CITRATE 50 UG/ML
INJECTION, SOLUTION INTRAMUSCULAR; INTRAVENOUS PRN
Status: DISCONTINUED | OUTPATIENT
Start: 2018-07-11 | End: 2018-07-11 | Stop reason: HOSPADM

## 2018-07-11 RX ORDER — ONDANSETRON 2 MG/ML
4 INJECTION INTRAMUSCULAR; INTRAVENOUS
Status: DISCONTINUED | OUTPATIENT
Start: 2018-07-11 | End: 2018-07-11 | Stop reason: HOSPADM

## 2018-07-11 RX ORDER — NALOXONE HYDROCHLORIDE 0.4 MG/ML
.1-.4 INJECTION, SOLUTION INTRAMUSCULAR; INTRAVENOUS; SUBCUTANEOUS
Status: DISCONTINUED | OUTPATIENT
Start: 2018-07-11 | End: 2018-07-11 | Stop reason: HOSPADM

## 2018-07-11 RX ADMIN — MIDAZOLAM 1 MG: 1 INJECTION INTRAMUSCULAR; INTRAVENOUS at 08:54

## 2018-07-11 RX ADMIN — FENTANYL CITRATE 100 MCG: 50 INJECTION, SOLUTION INTRAMUSCULAR; INTRAVENOUS at 08:37

## 2018-07-11 RX ADMIN — MIDAZOLAM 2 MG: 1 INJECTION INTRAMUSCULAR; INTRAVENOUS at 08:38

## 2018-07-11 NOTE — H&P
Pre-Endoscopy History and Physical     Peter Peralta MRN# [id number]   YOB: 1950 Age: 68 year old     Date of Procedure: 7/11/2018  Primary care provider: Yogesh  Type of Endoscopy: colonoscopy  Reason for Procedure: screening  Type of Anesthesia Anticipated: moderate sedation    HPI:    Peter is a 68 year old male who will be undergoing the above procedure.  Patient has noted some rectal bleeding and leakage of mucus after a bout of constipation. He has had internal hemorrhoids treated in the past. He states his bowel habits are more regular now.     A history and physical has been performed. The patient's medications and allergies have been reviewed. The risks and benefits of the procedure and the sedation options and risks were discussed with the patient.  All questions were answered and informed consent was obtained.      He denies a personal or family history of anesthesia complications or bleeding disorders.   Prior to Admission medications    Medication Sig Start Date End Date Taking? Authorizing Provider   APPLE CIDER VINEGAR PO Take 1 Tablespoonful by mouth   Yes Reported, Patient   atorvastatin (LIPITOR) 40 MG tablet Take 1 tablet (40 mg) by mouth At Bedtime 7/5/18  Yes Donell Zuñiga MD   JUAN LOW STRENGTH 81 MG OR TBEC 2 DAILY 6/19/08  Yes Amos Roman MD   Calcium-Vitamin D-Vitamin K (CALCIUM + D + K) 750-500-40 MG-UNT-MCG TABS Take 2 tablets by mouth daily.   Yes Reported, Patient   coenzyme Q-10 (COQ-10) 200 MG CAPS Take by mouth daily    Yes Reported, Patient   esomeprazole (NEXIUM) 40 MG capsule Take 40 mg by mouth every morning (before breakfast) Take 30-60 minutes before eating.   Yes Reported, Patient   fluticasone (FLONASE) 50 MCG/ACT nasal spray Spray 2 sprays into both nostrils daily 3/23/16  Yes Jyothi Alvarez PA-C   MULTIVITAMIN TABS   OR 1 daily 1/2/04  Yes Amos Roman MD   polyethylene glycol (MIRALAX) powder Take 3/4 capful daily 6/22/17  Yes  Ana Ugarte PA-C   Cyanocobalamin (B-12 PO) Take 1,000 mg by mouth daily     Reported, Patient   Glucos-Chond-Hyal Ac-Ca Fructo (MOVE FREE JOINT HEALTH ADVANCE) TABS Take 1 mg by mouth daily    Reported, Patient   KRILL OIL PO Take 500 mg by mouth daily Nael Red    Reported, Patient   psyllium (METAMUCIL) 58.6 % POWD Take 2 teaspoonful by mouth 2 times daily     Reported, Patient   VITAMIN D, CHOLECALCIFEROL, PO Take 5,000 Units by mouth daily    Reported, Patient       Allergies   Allergen Reactions     No Known Drug Allergies         No current facility-administered medications for this encounter.        Patient Active Problem List   Diagnosis     Family history of coronary artery disease     Hyperlipidemia LDL goal <130     GERD (gastroesophageal reflux disease)     Family history of colon cancer     Colitis     Right inguinal hernia     Atrial fibrillation (H)     Hyperlipidemia     Murmur     Nonspecific abnormal results of cardiovascular function study     Chest pain     Elevated blood pressure reading without diagnosis of hypertension     Benign prostatic hyperplasia, presence of lower urinary tract symptoms unspecified, unspecified morphology     Advanced directives, counseling/discussion     Elevated fasting blood sugar     Hoarseness     BMI > 25     Thrombocytosis (H)     Complete tear of left rotator cuff        Past Medical History:   Diagnosis Date     Atrial fibrillation (H)     only after adenosine test     BPH (benign prostatic hypertrophy)      Colitis 1/1/2014    found on colonoscopy     Coronary artery disease 2011 2011-Cath Lma 5%, Lad 40-50%, Lcx 30-40%, Hfm55-37% (-FFR of Lad)     Family history of colon cancer 7/6/2012    mother age 65     GERD (gastroesophageal reflux disease)     nexium     Hyperlipidaemia      IFG (impaired fasting glucose)      Obesity, unspecified     Waist size 42, BMI 30.8     Right inguinal hernia 7/1/2014     Thrombocytosis (H)         Past Surgical  History:   Procedure Laterality Date     CARDIAC NUC POLINA STRESS TEST NL      Normal     CARDIOVERSION  12/2011    Atrial Fib     COLONOSCOPY  1/29/2014    Procedure: COMBINED COLONOSCOPY, SINGLE BIOPSY/POLYPECTOMY BY BIOPSY;  COLONOSCOPY;  Surgeon: Ashtyn Gonzales MD;  Location:  GI     CORONARY ANGIOGRAPHY ADULT ORDER  12/2011    1 LMA 5%, LAD 40-50%, LCx 30-40%, RCA 25-30%     HC INJECTION SCLEROSING SOLUTION HEMORRHOID  8/24/2012    Procedure: HEMORRHOID INJECTION SCLEROSING SOLUTION;  Surgeon: Mayito Chow MD;  Location:  GI     HC TOOTH EXTRACTION W/FORCEP       LASIK  1/2011     ROTATOR CUFF REPAIR RT/LT Left 10/15/2015    RCR Left - Dr. Carvalho       Social History   Substance Use Topics     Smoking status: Former Smoker     Packs/day: 1.50     Years: 24.00     Types: Cigarettes     Quit date: 1/2/1996     Smokeless tobacco: Never Used     Alcohol use 0.0 oz/week     0 Standard drinks or equivalent per week      Comment: less than one per week       Family History   Problem Relation Age of Onset     C.A.D. Father      passed at 52 from MI     Cardiovascular Father      HEART DISEASE Father      Obesity Father      Myocardial Infarction Father      52 - passed away     Colon Cancer Mother 65     passed away from colon cancer - lived about 2 mo from dx 1981     Arthritis Maternal Grandmother      Diabetes Maternal Grandmother      Arthritis Maternal Grandfather      HEART DISEASE Brother      Diabetes Brother      Coronary Artery Disease Brother      stent placed      Hypertension No family hx of      Cerebrovascular Disease No family hx of      Breast Cancer No family hx of      Prostate Cancer No family hx of      Alcohol/Drug No family hx of      Allergies No family hx of      Alzheimer Disease No family hx of      Circulatory No family hx of      Congenital Anomalies No family hx of      Connective Tissue Disorder No family hx of      Depression No family hx of      Eye Disorder No family hx of  "     Genetic Disorder No family hx of      GASTROINTESTINAL DISEASE No family hx of      Genitourinary Problems No family hx of      Gynecology No family hx of      Musculoskeletal Disorder No family hx of      Neurologic Disorder No family hx of      Osteoperosis No family hx of      Psychotic Disorder No family hx of      Respiratory No family hx of      Thyroid Disease No family hx of      Anesthesia Reaction No family hx of      Blood Disease No family hx of        REVIEW OF SYSTEMS:     5 point ROS negative except as noted above in HPI, including Gen., Resp., CV, GI &  system review.      PHYSICAL EXAM:   There were no vitals taken for this visit. Estimated body mass index is 31 kg/(m^2) as calculated from the following:    Height as of 7/5/18: 1.854 m (6' 1\").    Weight as of 7/5/18: 106.6 kg (235 lb).   GENERAL APPEARANCE: healthy  MENTAL STATUS: alert  AIRWAY EXAM: Mallampatti Class II (visualization of the soft palate, fauces, and uvula)  RESP: lungs clear to auscultation - no rales, rhonchi or wheezes  CV: regular rates and rhythm      DIAGNOSTICS:    Not indicated      IMPRESSION   ASA Class 2 - Mild systemic disease        PLAN:       Colonoscopy with possible polypectomy, possible biopsy. The indications, procedure and risks were explained to the patient who agrees to proceed.       The above has been forwarded to the consulting provider.      Signed Electronically by: Ashtyn Gonzales  July 11, 2018          "

## 2018-07-12 LAB — COPATH REPORT: NORMAL

## 2018-09-20 ENCOUNTER — TRANSFERRED RECORDS (OUTPATIENT)
Dept: HEALTH INFORMATION MANAGEMENT | Facility: CLINIC | Age: 68
End: 2018-09-20

## 2018-09-28 ENCOUNTER — TRANSFERRED RECORDS (OUTPATIENT)
Dept: HEALTH INFORMATION MANAGEMENT | Facility: CLINIC | Age: 68
End: 2018-09-28

## 2018-12-18 ENCOUNTER — TRANSFERRED RECORDS (OUTPATIENT)
Dept: HEALTH INFORMATION MANAGEMENT | Facility: CLINIC | Age: 68
End: 2018-12-18

## 2019-03-31 DIAGNOSIS — E78.5 HYPERLIPIDEMIA LDL GOAL <130: ICD-10-CM

## 2019-04-01 RX ORDER — ATORVASTATIN CALCIUM 40 MG/1
TABLET, FILM COATED ORAL
Qty: 90 TABLET | Refills: 0 | Status: SHIPPED | OUTPATIENT
Start: 2019-04-01 | End: 2019-07-06

## 2019-04-01 NOTE — TELEPHONE ENCOUNTER
Patient would like medication sent to a new pharmacy. Sending remaining refill to new pharmacy.     Prescription approved per [facility] Refill Protocol.    Marlene LINN, RN   [facility]

## 2019-04-01 NOTE — TELEPHONE ENCOUNTER
"Requested Prescriptions   Pending Prescriptions Disp Refills     atorvastatin (LIPITOR) 40 MG tablet [Pharmacy Med Name: ATORVASTATIN 40 MG TABLET] 90 tablet 0     Sig: TAKE 1 TABLET BY MOUTH EVERY DAY    Statins Protocol Passed - 3/31/2019  7:37 AM       Passed - LDL on file in past 12 months    Recent Labs   Lab Test 07/05/18  1018   LDL 83            Passed - No abnormal creatine kinase in past 12 months    No lab results found.            Passed - Recent (12 mo) or future (30 days) visit within the authorizing provider's specialty    Patient had office visit in the last 12 months or has a visit in the next 30 days with authorizing provider or within the authorizing provider's specialty.  See \"Patient Info\" tab in inbasket, or \"Choose Columns\" in Meds & Orders section of the refill encounter.             Passed - Medication is active on med list       Passed - Patient is age 18 or older        atorvastatin (LIPITOR) 40 MG tablet 90 tablet 3 7/5/2018       Last Written Prescription Date:  07/05/2018  Last Fill Quantity: 90,  # refills: 3   Last office visit: 7/5/2018 with prescribing provider:  Dr. Zuñiga   Future Office Visit:  Unknown       "

## 2019-06-06 DIAGNOSIS — Z82.49 FAMILY HISTORY OF CORONARY ARTERY DISEASE: Primary | ICD-10-CM

## 2019-06-06 DIAGNOSIS — I48.91 ATRIAL FIBRILLATION (H): ICD-10-CM

## 2019-07-10 DIAGNOSIS — E78.5 HYPERLIPIDEMIA LDL GOAL <130: ICD-10-CM

## 2019-07-10 RX ORDER — ATORVASTATIN CALCIUM 40 MG/1
TABLET, FILM COATED ORAL
Qty: 90 TABLET | Refills: 0 | Status: SHIPPED | OUTPATIENT
Start: 2019-07-10 | End: 2019-08-27

## 2019-07-10 NOTE — TELEPHONE ENCOUNTER
"Requested Prescriptions   Pending Prescriptions Disp Refills     atorvastatin (LIPITOR) 40 MG tablet [Pharmacy Med Name: ATORVASTATIN 40 MG  Last Written Prescription Date:  7-8-2019  Last Fill Quantity: 30 tablet,  # refills: 0   Last office visit: 7/5/2018 with prescribing provider:     Future Office Visit:   Next 5 appointments (look out 90 days)    Aug 15, 2019  9:00 AM CDT  Office Visit with Donell Zuñiga MD  [facility] [place] ([address] [address]  [phone]   Sep 23, 2019  8:45 AM CDT  Return Visit with Eulalio Del Rosario MD  [facility] ([facility]) [address] [place] [phone] OPT 2          TABLET] 90 tablet 0     Sig: TAKE 1 TABLET BY MOUTH EVERY DAY       Statins Protocol Failed - 7/10/2019  3:15 PM        Failed - LDL on file in past 12 months     Recent Labs   Lab Test 07/05/18  1018   LDL 83             Failed - Recent (12 mo) or future (30 days) visit within the authorizing provider's specialty     Patient had office visit in the last 12 months or has a visit in the next 30 days with authorizing provider or within the authorizing provider's specialty.  See \"Patient Info\" tab in inbasket, or \"Choose Columns\" in Meds & Orders section of the refill encounter.              Passed - No abnormal creatine kinase in past 12 months     No lab results found.             Passed - Medication is active on med list        Passed - Patient is age 18 or older            "

## 2019-07-31 DIAGNOSIS — E78.5 HYPERLIPIDEMIA LDL GOAL <130: ICD-10-CM

## 2019-07-31 RX ORDER — ATORVASTATIN CALCIUM 40 MG/1
TABLET, FILM COATED ORAL
Qty: 30 TABLET | Refills: 0 | OUTPATIENT
Start: 2019-07-31

## 2019-07-31 NOTE — TELEPHONE ENCOUNTER
"Requested Prescriptions   Pending Prescriptions Disp Refills     atorvastatin (LIPITOR) 40 MG tablet [Pharmacy Med Name: ATORVASTATIN 40 MG  Last Written Prescription Date:  7-  Last Fill Quantity: 90 tablet,  # refills: 0   Last office visit: 7/5/2018 with prescribing provider:     Future Office Visit:   Next 5 appointments (look out 90 days)    Aug 15, 2019  9:00 AM CDT  Office Visit with Donell Zuñiga MD  [facility] [place] [address]  [phone]   Sep 23, 2019  8:45 AM CDT  Return Visit with Eulalio Del Rosario MD  [facility] ([facility]) [address] [place] [phone] OPT 2          TABLET] 30 tablet 0     Sig: TAKE 1 TABLET BY MOUTH EVERY DAY       Statins Protocol Failed - 7/31/2019 11:34 AM        Failed - LDL on file in past 12 months     Recent Labs   Lab Test 07/05/18  1018   LDL 83             Passed - No abnormal creatine kinase in past 12 months     No lab results found.             Passed - Recent (12 mo) or future (30 days) visit within the authorizing provider's specialty     Patient had office visit in the last 12 months or has a visit in the next 30 days with authorizing provider or within the authorizing provider's specialty.  See \"Patient Info\" tab in inbasket, or \"Choose Columns\" in Meds & Orders section of the refill encounter.              Passed - Medication is active on med list        Passed - Patient is age 18 or older          "

## 2019-07-31 NOTE — TELEPHONE ENCOUNTER
Refill request too soon.  Patient has appointment scheduled on 8/15/2019.    RILEY ElenaN, RN  [employer] [facility]

## 2019-08-16 ENCOUNTER — TRANSFERRED RECORDS (OUTPATIENT)
Dept: HEALTH INFORMATION MANAGEMENT | Facility: CLINIC | Age: 69
End: 2019-08-16

## 2019-08-27 ENCOUNTER — OFFICE VISIT (OUTPATIENT)
Dept: FAMILY MEDICINE | Facility: CLINIC | Age: 69
End: 2019-08-27
Payer: MEDICARE

## 2019-08-27 VITALS
SYSTOLIC BLOOD PRESSURE: 126 MMHG | HEIGHT: 73 IN | RESPIRATION RATE: 14 BRPM | TEMPERATURE: 97.8 F | DIASTOLIC BLOOD PRESSURE: 74 MMHG | WEIGHT: 230 LBS | HEART RATE: 68 BPM | BODY MASS INDEX: 30.48 KG/M2 | OXYGEN SATURATION: 98 %

## 2019-08-27 DIAGNOSIS — Z00.00 ENCOUNTER FOR MEDICARE ANNUAL WELLNESS EXAM: Primary | ICD-10-CM

## 2019-08-27 DIAGNOSIS — Z12.5 SCREENING FOR PROSTATE CANCER: ICD-10-CM

## 2019-08-27 DIAGNOSIS — E78.5 HYPERLIPIDEMIA LDL GOAL <130: ICD-10-CM

## 2019-08-27 DIAGNOSIS — N52.9 ERECTILE DYSFUNCTION, UNSPECIFIED ERECTILE DYSFUNCTION TYPE: ICD-10-CM

## 2019-08-27 LAB
ALBUMIN SERPL-MCNC: 3.7 G/DL (ref 3.4–5)
ALP SERPL-CCNC: 93 U/L (ref 40–150)
ALT SERPL W P-5'-P-CCNC: 48 U/L (ref 0–70)
ANION GAP SERPL CALCULATED.3IONS-SCNC: 6 MMOL/L (ref 3–14)
AST SERPL W P-5'-P-CCNC: 26 U/L (ref 0–45)
BILIRUB SERPL-MCNC: 0.9 MG/DL (ref 0.2–1.3)
BUN SERPL-MCNC: 15 MG/DL (ref 7–30)
CALCIUM SERPL-MCNC: 10 MG/DL (ref 8.5–10.1)
CHLORIDE SERPL-SCNC: 105 MMOL/L (ref 94–109)
CHOLEST SERPL-MCNC: 128 MG/DL
CO2 SERPL-SCNC: 27 MMOL/L (ref 20–32)
CREAT SERPL-MCNC: 0.9 MG/DL (ref 0.66–1.25)
ERYTHROCYTE [DISTWIDTH] IN BLOOD BY AUTOMATED COUNT: 12.8 % (ref 10–15)
GFR SERPL CREATININE-BSD FRML MDRD: 88 ML/MIN/{1.73_M2}
GLUCOSE SERPL-MCNC: 110 MG/DL (ref 70–99)
HCT VFR BLD AUTO: 45.9 % (ref 40–53)
HDLC SERPL-MCNC: 36 MG/DL
HGB BLD-MCNC: 15.4 G/DL (ref 13.3–17.7)
LDLC SERPL CALC-MCNC: 62 MG/DL
MCH RBC QN AUTO: 29.6 PG (ref 26.5–33)
MCHC RBC AUTO-ENTMCNC: 33.6 G/DL (ref 31.5–36.5)
MCV RBC AUTO: 88 FL (ref 78–100)
NONHDLC SERPL-MCNC: 92 MG/DL
PLATELET # BLD AUTO: 553 10E9/L (ref 150–450)
POTASSIUM SERPL-SCNC: 4.2 MMOL/L (ref 3.4–5.3)
PROT SERPL-MCNC: 7.8 G/DL (ref 6.8–8.8)
PSA SERPL-ACNC: 1.03 UG/L (ref 0–4)
RBC # BLD AUTO: 5.2 10E12/L (ref 4.4–5.9)
SODIUM SERPL-SCNC: 138 MMOL/L (ref 133–144)
TRIGL SERPL-MCNC: 149 MG/DL
WBC # BLD AUTO: 10.5 10E9/L (ref 4–11)

## 2019-08-27 PROCEDURE — G0439 PPPS, SUBSEQ VISIT: HCPCS | Performed by: FAMILY MEDICINE

## 2019-08-27 PROCEDURE — 36415 COLL VENOUS BLD VENIPUNCTURE: CPT | Performed by: FAMILY MEDICINE

## 2019-08-27 PROCEDURE — 85027 COMPLETE CBC AUTOMATED: CPT | Performed by: FAMILY MEDICINE

## 2019-08-27 PROCEDURE — 80053 COMPREHEN METABOLIC PANEL: CPT | Performed by: FAMILY MEDICINE

## 2019-08-27 PROCEDURE — G0103 PSA SCREENING: HCPCS | Performed by: FAMILY MEDICINE

## 2019-08-27 PROCEDURE — 80061 LIPID PANEL: CPT | Performed by: FAMILY MEDICINE

## 2019-08-27 RX ORDER — ATORVASTATIN CALCIUM 40 MG/1
40 TABLET, FILM COATED ORAL DAILY
Qty: 90 TABLET | Refills: 3 | Status: SHIPPED | OUTPATIENT
Start: 2019-08-27 | End: 2020-10-06

## 2019-08-27 RX ORDER — SILDENAFIL 25 MG/1
25 TABLET, FILM COATED ORAL DAILY PRN
Qty: 30 TABLET | Refills: 3 | Status: SHIPPED | OUTPATIENT
Start: 2019-08-27 | End: 2019-10-30

## 2019-08-27 ASSESSMENT — MIFFLIN-ST. JEOR: SCORE: 1872.15

## 2019-08-27 NOTE — PROGRESS NOTES
"      SUBJECTIVE:   Peter Peralta is a 69 year old male who presents for Preventive Visit.      Are you in the first 12 months of your Medicare Part B coverage?  No    Physical Health:    In general, how would you rate your overall physical health? good    Outside of work, how many days during the week do you exercise? 4-5 days/week    Outside of work, approximately how many minutes a day do you exercise?45-60 minutes    If you drink alcohol do you typically have >3 drinks per day or >7 drinks per week? No    Do you usually eat at least 4 servings of fruit and vegetables a day, include whole grains & fiber and avoid regularly eating high fat or \"junk\" foods? Yes    Do you have any problems taking medications regularly?  No    Do you have any side effects from medications? none    Needs assistance for the following daily activities: no assistance needed    Which of the following safety concerns are present in your home?  none identified     Hearing impairment: Yes, Difficulty following a conversation in a noisy restaurant or crowded room.    In the past 6 months, have you been bothered by leaking of urine? no    Mental Health:    In general, how would you rate your overall mental or emotional health? excellent  PHQ-2 Score:      Do you feel safe in your environment? Yes    Do you have a Health Care Directive? Yes: Patient states has Advance Directive and will bring in a copy to clinic.    Additional concerns to address?  No    Fall risk:  Fallen 2 or more times in the past year?: No  Any fall with injury in the past year?: No    Cognitive Screenin) Repeat 3 items (Leader, Season, Table)    2) Clock draw: NORMAL  3) 3 item recall: Recalls 3 objects  Results: 3 items recalled: COGNITIVE IMPAIRMENT LESS LIKELY    Mini-CogTM Copyright KARIN Lora. Licensed by the author for use in [facility]; reprinted with permission (michel@.[facility]). All rights reserved.      Do you have sleep apnea, excessive snoring " or daytime drowsiness?: no      Reviewed and updated as needed this visit by clinical staff         Reviewed and updated as needed this visit by Provider        Social History     Tobacco Use     Smoking status: Former Smoker     Packs/day: 1.50     Years: 24.00     Pack years: 36.00     Types: Cigarettes     Last attempt to quit: 1996     Years since quittin.6     Smokeless tobacco: Never Used   Substance Use Topics     Alcohol use: Yes     Alcohol/week: 0.0 oz     Comment: less than one per week                           Current providers sharing in care for this patient include:   Patient Care Team:  No Ref-Primary, Physician as PCP - Donell Pappas MD as Assigned PCP    The following health maintenance items are reviewed in Epic and correct as of today:  Health Maintenance   Topic Date Due     ZOSTER IMMUNIZATION (2 of 3) 2016     AORTIC ANEURYSM SCREENING (SYSTEM ASSIGNED)  2017     PHQ-2  2019     MEDICARE ANNUAL WELLNESS VISIT  2019     FALL RISK ASSESSMENT  2019     INFLUENZA VACCINE (1) 2019     ADVANCE CARE PLANNING  2021     DTAP/TDAP/TD IMMUNIZATION (4 - Td) 2022     LIPID  2023     COLONOSCOPY  2023     HEPATITIS C SCREENING  Completed     PNEUMOCOCCAL IMMUNIZATION 65+ LOW/MEDIUM RISK  Completed     IPV IMMUNIZATION  Aged Out     MENINGITIS IMMUNIZATION  Aged Out     BP Readings from Last 3 Encounters:   19 126/74   18 126/80   18 127/85    Wt Readings from Last 3 Encounters:   19 104.3 kg (230 lb)   18 106.6 kg (235 lb)   17 105.2 kg (232 lb)                  Patient Active Problem List   Diagnosis     Family history of coronary artery disease     Hyperlipidemia LDL goal <130     GERD (gastroesophageal reflux disease)     Family history of colon cancer     Colitis     Right inguinal hernia     Atrial fibrillation (H)     Hyperlipidemia     Murmur     Nonspecific abnormal results of  cardiovascular function study     Chest pain     Elevated blood pressure reading without diagnosis of hypertension     Benign prostatic hyperplasia, presence of lower urinary tract symptoms unspecified, unspecified morphology     Advanced directives, counseling/discussion     Elevated fasting blood sugar     Hoarseness     BMI > 25     Thrombocytosis (H)     Complete tear of left rotator cuff     Past Surgical History:   Procedure Laterality Date     CARDIAC NUC POLINA STRESS TEST NL      Normal     CARDIOVERSION  2011    Atrial Fib     COLONOSCOPY  2014    Procedure: COMBINED COLONOSCOPY, SINGLE BIOPSY/POLYPECTOMY BY BIOPSY;  COLONOSCOPY;  Surgeon: Ashtyn Gonzales MD;  Location: [facility]     COLONOSCOPY N/A 2018    Procedure: COMBINED COLONOSCOPY, SINGLE OR MULTIPLE BIOPSY/POLYPECTOMY BY BIOPSY;  COLONOSCOPY ;  Surgeon: Ashtyn Gonzales MD;  Location: [facility]     CORONARY ANGIOGRAPHY ADULT ORDER  2011    1 LMA 5%, LAD 40-50%, LCx 30-40%, RCA 25-30%     HC INJECTION SCLEROSING SOLUTION HEMORRHOID  2012    Procedure: HEMORRHOID INJECTION SCLEROSING SOLUTION;  Surgeon: Mayito Chow MD;  Location: [facility]     HC TOOTH EXTRACTION W/FORCEP       LASIK  2011     ROTATOR CUFF REPAIR RT/LT Left 10/15/2015    RCR Left - Dr. Carvalho       Social History     Tobacco Use     Smoking status: Former Smoker     Packs/day: 1.50     Years: 24.00     Pack years: 36.00     Types: Cigarettes     Last attempt to quit: 1996     Years since quittin.6     Smokeless tobacco: Never Used   Substance Use Topics     Alcohol use: Yes     Alcohol/week: 0.0 oz     Comment: less than one per week     Family History   Problem Relation Age of Onset     C.A.D. Father         passed at 52 from MI     Cardiovascular Father      Heart Disease Father      Obesity Father      Myocardial Infarction Father         52 - passed away     Colon Cancer Mother 65        passed away from colon cancer - lived about 2 mo from   1981     Arthritis Maternal Grandmother      Diabetes Maternal Grandmother      Arthritis Maternal Grandfather      Heart Disease Brother      Diabetes Brother      Coronary Artery Disease Brother         stent placed      Hypertension No family hx of      Cerebrovascular Disease No family hx of      Breast Cancer No family hx of      Prostate Cancer No family hx of      Alcohol/Drug No family hx of      Allergies No family hx of      Alzheimer Disease No family hx of      Circulatory No family hx of      Congenital Anomalies No family hx of      Connective Tissue Disorder No family hx of      Depression No family hx of      Eye Disorder No family hx of      Genetic Disorder No family hx of      Gastrointestinal Disease No family hx of      Genitourinary Problems No family hx of      Gynecology No family hx of      Musculoskeletal Disorder No family hx of      Neurologic Disorder No family hx of      Osteoporosis No family hx of      Psychotic Disorder No family hx of      Respiratory No family hx of      Thyroid Disease No family hx of      Anesthesia Reaction No family hx of      Blood Disease No family hx of          Current Outpatient Medications   Medication Sig Dispense Refill     atorvastatin (LIPITOR) 40 MG tablet Take 1 tablet (40 mg) by mouth daily 90 tablet 3     JUAN LOW STRENGTH 81 MG OR TBEC 2 DAILY 30 0     Calcium-Vitamin D-Vitamin K (CALCIUM + D + K) 750-500-40 MG-UNT-MCG TABS Take 2 tablets by mouth daily.       coenzyme Q-10 (COQ-10) 200 MG CAPS Take by mouth daily        esomeprazole (NEXIUM) 40 MG capsule Take 40 mg by mouth every morning (before breakfast) Take 30-60 minutes before eating.       fluticasone (FLONASE) 50 MCG/ACT nasal spray Spray 2 sprays into both nostrils daily 16 g 11     Glucos-Chond-Hyal Ac-Ca Fructo (MOVE FREE JOINT HEALTH ADVANCE) TABS Take 1 mg by mouth daily       MULTIVITAMIN TABS   OR 1 daily  0     Probiotic Product (PROBIOTIC PO) Take by mouth daily       psyllium  "(METAMUCIL) 58.6 % POWD Take 2 teaspoonful by mouth 2 times daily        sildenafil (VIAGRA) 25 MG tablet Take 1 tablet (25 mg) by mouth daily as needed 30 tablet 3     VITAMIN D, CHOLECALCIFEROL, PO Take 5,000 Units by mouth daily       Allergies   Allergen Reactions     No Known Drug Allergies      Recent Labs   Lab Test 07/05/18  1018 06/22/17  0931 08/11/16  0846   A1C  --  5.6  --    LDL 83 71 63   HDL 44 49 41   TRIG 161* 107 145   ALT 50 42 40   CR 0.99 0.98 1.01   GFRESTIMATED 76 77 74   GFRESTBLACK >90 >90   GFR Calc   90   POTASSIUM 4.7 4.3 4.2   TSH  --  0.96  --       Pneumonia Vaccine:Adults age 65+ who received Pneumovax (PPSV23) at 65 years or older: Should be given PCV13 > 1 year after their most recent PPSV23    ROS:  Constitutional, HEENT, cardiovascular, pulmonary, gi and gu systems are negative, except as otherwise noted.    OBJECTIVE:   /74 (Cuff Size: Adult Large)   Pulse 68   Temp 97.8  F (36.6  C) (Tympanic)   Resp 14   Ht 1.854 m (6' 1\")   Wt 104.3 kg (230 lb)   SpO2 98%   BMI 30.34 kg/m   Estimated body mass index is 30.34 kg/m  as calculated from the following:    Height as of this encounter: 1.854 m (6' 1\").    Weight as of this encounter: 104.3 kg (230 lb).  EXAM:   GENERAL: healthy, alert and no distress  EYES: Eyes grossly normal to inspection, PERRL and conjunctivae and sclerae normal  HENT: ear canals and TM's normal, nose and mouth without ulcers or lesions  NECK: no adenopathy, no asymmetry, masses, or scars and thyroid normal to palpation  RESP: lungs clear to auscultation - no rales, rhonchi or wheezes  CV: regular rate and rhythm, normal S1 S2, no S3 or S4, no murmur, click or rub, no peripheral edema and peripheral pulses strong  ABDOMEN: soft, nontender, no hepatosplenomegaly, no masses and bowel sounds normal  MS: no gross musculoskeletal defects noted, no edema  SKIN: no suspicious lesions or rashes  NEURO: Normal strength and tone, mentation " "intact and speech normal  BACK: no CVA tenderness, no paralumbar tenderness  PSYCH: mentation appears normal, affect normal/bright  LYMPH: no cervical, supraclavicular, axillary, or inguinal adenopathy        ASSESSMENT / PLAN:       ICD-10-CM    1. Encounter for Medicare annual wellness exam Z00.00 CBC with platelets     Comprehensive metabolic panel     Lipid panel reflex to direct LDL Fasting     PSA, screen   2. Screening for prostate cancer Z12.5 PSA, screen   3. Hyperlipidemia LDL goal <130 E78.5 atorvastatin (LIPITOR) 40 MG tablet   4. Erectile dysfunction, unspecified erectile dysfunction type N52.9 sildenafil (VIAGRA) 25 MG tablet       End of Life Planning:  Patient currently has an advanced directive: Yes.  Practitioner is supportive of decision.    COUNSELING:  Reviewed preventive health counseling, as reflected in patient instructions    Estimated body mass index is 31 kg/m  as calculated from the following:    Height as of 7/5/18: 1.854 m (6' 1\").    Weight as of 7/5/18: 106.6 kg (235 lb).         reports that he quit smoking about 23 years ago. His smoking use included cigarettes. He has a 36.00 pack-year smoking history. He has never used smokeless tobacco.      Appropriate preventive services were discussed with this patient, including applicable screening as appropriate for cardiovascular disease, diabetes, osteopenia/osteoporosis, and glaucoma.  As appropriate for age/gender, discussed screening for colorectal cancer, prostate cancer, breast cancer, and cervical cancer. Checklist reviewing preventive services available has been given to the patient.    Reviewed patients plan of care and provided an AVS. The Basic Care Plan (routine screening as documented in Health Maintenance) for Peter meets the Care Plan requirement. This Care Plan has been established and reviewed with the Patient.    Counseling Resources:  ATP IV Guidelines  Pooled Cohorts Equation Calculator  Breast Cancer Risk " Calculator  FRAX Risk Assessment  ICSI Preventive Guidelines  Dietary Guidelines for Americans, 2010  [employer] MyPlate  ASA Prophylaxis  Lung CA Screening    Donell Zuñiga MD  [facility]

## 2019-08-27 NOTE — PATIENT INSTRUCTIONS
Patient Education   Personalized Prevention Plan  You are due for the preventive services outlined below.  Your care team is available to assist you in scheduling these services.  If you have already completed any of these items, please share that information with your care team to update in your medical record.  Health Maintenance Due   Topic Date Due     Zoster (Shingles) Vaccine (2 of 3) 12/30/2016     AORTIC ANEURYSM SCREENING (SYSTEM ASSIGNED)  06/08/2017     PHQ-2  01/01/2019     Annual Wellness Visit  07/05/2019     FALL RISK ASSESSMENT  07/05/2019

## 2019-09-16 ENCOUNTER — HOSPITAL ENCOUNTER (OUTPATIENT)
Dept: CARDIOLOGY | Facility: CLINIC | Age: 69
Discharge: HOME | End: 2019-09-16
Attending: INTERNAL MEDICINE | Admitting: INTERNAL MEDICINE
Payer: MEDICARE

## 2019-09-16 DIAGNOSIS — Z82.49 FAMILY HISTORY OF CORONARY ARTERY DISEASE: ICD-10-CM

## 2019-09-16 DIAGNOSIS — I48.91 ATRIAL FIBRILLATION (H): ICD-10-CM

## 2019-09-16 PROCEDURE — 93321 DOPPLER ECHO F-UP/LMTD STD: CPT | Mod: 26 | Performed by: INTERNAL MEDICINE

## 2019-09-16 PROCEDURE — 93350 STRESS TTE ONLY: CPT | Mod: 26 | Performed by: INTERNAL MEDICINE

## 2019-09-16 PROCEDURE — 93350 STRESS TTE ONLY: CPT | Mod: TC

## 2019-09-16 PROCEDURE — 93018 CV STRESS TEST I&R ONLY: CPT | Performed by: INTERNAL MEDICINE

## 2019-09-16 PROCEDURE — 93016 CV STRESS TEST SUPVJ ONLY: CPT | Performed by: INTERNAL MEDICINE

## 2019-09-16 PROCEDURE — 93325 DOPPLER ECHO COLOR FLOW MAPG: CPT | Mod: 26 | Performed by: INTERNAL MEDICINE

## 2019-09-23 ENCOUNTER — OFFICE VISIT (OUTPATIENT)
Dept: CARDIOLOGY | Facility: CLINIC | Age: 69
End: 2019-09-23
Payer: MEDICARE

## 2019-09-23 VITALS
BODY MASS INDEX: 30.35 KG/M2 | SYSTOLIC BLOOD PRESSURE: 136 MMHG | HEIGHT: 73 IN | HEART RATE: 59 BPM | DIASTOLIC BLOOD PRESSURE: 78 MMHG

## 2019-09-23 DIAGNOSIS — I25.10 CORONARY ARTERY DISEASE INVOLVING NATIVE CORONARY ARTERY OF NATIVE HEART WITHOUT ANGINA PECTORIS: Primary | ICD-10-CM

## 2019-09-23 PROCEDURE — 99214 OFFICE O/P EST MOD 30 MIN: CPT | Performed by: INTERNAL MEDICINE

## 2019-09-23 RX ORDER — MESALAMINE 1.2 G/1
4800 TABLET, DELAYED RELEASE ORAL
COMMUNITY

## 2019-09-23 NOTE — LETTER
9/23/2019    Donell Zuñiga MD  [address] [facility] [address]    RE: Peter Peralta       Dear Colleague,    I had the pleasure of seeing Peter Peralta in the [facility].    HPI and Plan:   See dictation    Orders Placed This Encounter   Procedures     Follow-Up with Cardiologist     Orders Placed This Encounter   Medications     mesalamine (LIALDA) 1.2 g EC tablet     Sig: Take 1,200 mg by mouth daily (with breakfast)     TURMERIC PO     Sig: Take 1,500 mg by mouth daily     There are no discontinued medications.      Encounter Diagnosis   Name Primary?     Coronary artery disease involving native coronary artery of native heart without angina pectoris Yes       CURRENT MEDICATIONS:  Current Outpatient Medications   Medication Sig Dispense Refill     atorvastatin (LIPITOR) 40 MG tablet Take 1 tablet (40 mg) by mouth daily 90 tablet 3     JUAN LOW STRENGTH 81 MG OR TBEC 2 DAILY 30 0     Calcium-Vitamin D-Vitamin K (CALCIUM + D + K) 750-500-40 MG-UNT-MCG TABS Take 2 tablets by mouth daily.       coenzyme Q-10 (COQ-10) 200 MG CAPS Take by mouth daily        esomeprazole (NEXIUM) 40 MG capsule Take 40 mg by mouth every morning (before breakfast) Take 30-60 minutes before eating.       fluticasone (FLONASE) 50 MCG/ACT nasal spray Spray 2 sprays into both nostrils daily 16 g 11     Glucos-Chond-Hyal Ac-Ca Fructo (MOVE FREE JOINT HEALTH ADVANCE) TABS Take 1 mg by mouth daily       mesalamine (LIALDA) 1.2 g EC tablet Take 1,200 mg by mouth daily (with breakfast)       MULTIVITAMIN TABS   OR 1 daily  0     Probiotic Product (PROBIOTIC PO) Take by mouth daily       psyllium (METAMUCIL) 58.6 % POWD Take 2 teaspoonful by mouth 2 times daily        sildenafil (VIAGRA) 25 MG tablet Take 1 tablet (25 mg) by mouth daily as needed 30 tablet 3     TURMERIC PO Take 1,500 mg by mouth daily       VITAMIN D, CHOLECALCIFEROL, PO Take 5,000 Units by mouth daily         ALLERGIES      Allergies   Allergen Reactions     No Known Drug Allergies        PAST MEDICAL HISTORY:  Past Medical History:   Diagnosis Date     Actinic keratoses     face     Atrial fibrillation (H)     only after adenosine test     BPH (benign prostatic hypertrophy)      Coronary artery disease 2011 2011-Cath Lma 5%, Lad 40-50%, Lcx 30-40%, Gdv40-29% (-FFR of Lad)     ED (erectile dysfunction)      Family history of colon cancer 7/6/2012    mother age 65     GERD (gastroesophageal reflux disease)     nexium     Hyperlipidaemia      IFG (impaired fasting glucose)      Obesity, unspecified     Waist size 42, BMI 30.8     Right inguinal hernia 7/1/2014     Thrombocytosis (H)      Ulcerative colitis (H)        PAST SURGICAL HISTORY:  Past Surgical History:   Procedure Laterality Date     CARDIAC NUC POLINA STRESS TEST NL      Normal     CARDIOVERSION  12/2011    Atrial Fib     COLONOSCOPY  1/29/2014    Procedure: COMBINED COLONOSCOPY, SINGLE BIOPSY/POLYPECTOMY BY BIOPSY;  COLONOSCOPY;  Surgeon: Ashtyn Gonzales MD;  Location: [facility]     COLONOSCOPY N/A 7/11/2018    Procedure: COMBINED COLONOSCOPY, SINGLE OR MULTIPLE BIOPSY/POLYPECTOMY BY BIOPSY;  COLONOSCOPY ;  Surgeon: Ashtyn Gonzales MD;  Location: [facility]     CORONARY ANGIOGRAPHY ADULT ORDER  12/2011    1 LMA 5%, LAD 40-50%, LCx 30-40%, RCA 25-30%     HC INJECTION SCLEROSING SOLUTION HEMORRHOID  8/24/2012    Procedure: HEMORRHOID INJECTION SCLEROSING SOLUTION;  Surgeon: Mayito Chow MD;  Location: [facility]     HC TOOTH EXTRACTION W/FORCEP       LASIK  1/2011     ROTATOR CUFF REPAIR RT/LT Left 10/15/2015    RCR Left - Dr. Carvalho       FAMILY HISTORY:  Family History   Problem Relation Age of Onset     C.A.D. Father         passed at 52 from MI     Cardiovascular Father      Heart Disease Father      Obesity Father      Myocardial Infarction Father         52 - passed away     Colon Cancer Mother 65        passed away from colon cancer - lived about 2 mo from dx 1981      Arthritis Maternal Grandmother      Diabetes Maternal Grandmother      Arthritis Maternal Grandfather      Heart Disease Brother      Diabetes Brother      Coronary Artery Disease Brother         stent placed      Hypertension No family hx of      Cerebrovascular Disease No family hx of      Breast Cancer No family hx of      Prostate Cancer No family hx of      Alcohol/Drug No family hx of      Allergies No family hx of      Alzheimer Disease No family hx of      Circulatory No family hx of      Congenital Anomalies No family hx of      Connective Tissue Disorder No family hx of      Depression No family hx of      Eye Disorder No family hx of      Genetic Disorder No family hx of      Gastrointestinal Disease No family hx of      Genitourinary Problems No family hx of      Gynecology No family hx of      Musculoskeletal Disorder No family hx of      Neurologic Disorder No family hx of      Osteoporosis No family hx of      Psychotic Disorder No family hx of      Respiratory No family hx of      Thyroid Disease No family hx of      Anesthesia Reaction No family hx of      Blood Disease No family hx of        SOCIAL HISTORY:  Social History     Socioeconomic History     Marital status:      Spouse name: Emy     Number of children: 2     Years of education: 16     Highest education level: Not on file   Occupational History     Occupation: [employer]     Employer: [employer]   Social Needs     Financial resource strain: Not on file     Food insecurity:     Worry: Not on file     Inability: Not on file     Transportation needs:     Medical: Not on file     Non-medical: Not on file   Tobacco Use     Smoking status: Former Smoker     Packs/day: 1.50     Years: 24.00     Pack years: 36.00     Types: Cigarettes     Last attempt to quit: 1996     Years since quittin.7     Smokeless tobacco: Never Used   Substance and Sexual Activity     Alcohol use: Yes     Alcohol/week: 0.0 standard drinks      "Comment: less than one per week     Drug use: No     Sexual activity: Yes     Partners: Female   Lifestyle     Physical activity:     Days per week: Not on file     Minutes per session: Not on file     Stress: Not on file   Relationships     Social connections:     Talks on phone: Not on file     Gets together: Not on file     Attends Congregational service: Not on file     Active member of club or organization: Not on file     Attends meetings of clubs or organizations: Not on file     Relationship status: Not on file     Intimate partner violence:     Fear of current or ex partner: Not on file     Emotionally abused: Not on file     Physically abused: Not on file     Forced sexual activity: Not on file   Other Topics Concern      Service Yes     Blood Transfusions No     Caffeine Concern No     Comment: 2 cups per day     Occupational Exposure No     Hobby Hazards No     Sleep Concern No     Stress Concern Yes     Weight Concern Yes     Special Diet Yes     Back Care No     Exercise No     Comment: 8,000 steps a day. Fitbit     Bike Helmet No     Comment: n/a     Seat Belt Yes     Self-Exams Yes     Parent/sibling w/ CABG, MI or angioplasty before 65F 55M? Not Asked   Social History Narrative    Eats fruits and vegetables every day. He takes extra vitamin D. He was advised to aim for 1200 mg of calcium per day.       Review of Systems:  Skin:  Negative     Eyes:  Negative    ENT:  Negative    Respiratory:  Negative    Cardiovascular:  Negative lightheadedness  Gastroenterology: Positive for heartburn  Genitourinary:  Negative    Musculoskeletal:  Positive for joint pain(left hip)  Neurologic:  Negative numbness or tingling of hands(left side tingling)  Psychiatric:  Negative    Heme/Lymph/Imm:  Negative    Endocrine:  Negative      Physical Exam:  Vitals: /78   Pulse 59   Ht 1.854 m (6' 0.99\")   BMI 30.35 kg/m       Constitutional:  cooperative, alert and oriented, well developed, well nourished, in no " acute distress        Skin:  warm and dry to the touch, no apparent skin lesions or masses noted          Head:  normocephalic, no masses or lesions        Eyes:  pupils equal and round, conjunctivae and lids unremarkable, sclera white, no xanthalasma, EOMS intact, no nystagmus        Lymph:No Cervical lymphadenopathy present;No thyromegaly     ENT:  no pallor or cyanosis, dentition good        Neck:  carotid pulses are full and equal bilaterally, JVP normal, no carotid bruit        Respiratory:  normal breath sounds, clear to auscultation, normal A-P diameter, normal symmetry, normal respiratory excursion, no use of accessory muscles         Cardiac: regular rhythm, normal S1/S2, no S3 or S4, apical impulse not displaced, no murmurs, gallops or rubs                pulses full and equal, no bruits auscultated                                        GI:  abdomen soft, non-tender, BS normoactive, no mass, no HSM, no bruits        Extremities and Muscular Skeletal:  no deformities, clubbing, cyanosis, erythema observed;no edema              Neurological:  no gross motor deficits        Psych:  Alert and Oriented x 3      Recent Lab Results:  LIPID RESULTS:  Lab Results   Component Value Date    CHOL 128 08/27/2019    HDL 36 (L) 08/27/2019    LDL 62 08/27/2019    TRIG 149 08/27/2019    CHOLHDLRATIO 2.8 07/10/2015       LIVER ENZYME RESULTS:  Lab Results   Component Value Date    AST 26 08/27/2019    ALT 48 08/27/2019       CBC RESULTS:  Lab Results   Component Value Date    WBC 10.5 08/27/2019    RBC 5.20 08/27/2019    HGB 15.4 08/27/2019    HCT 45.9 08/27/2019    MCV 88 08/27/2019    MCH 29.6 08/27/2019    MCHC 33.6 08/27/2019    RDW 12.8 08/27/2019     (H) 08/27/2019       BMP RESULTS:  Lab Results   Component Value Date     08/27/2019    POTASSIUM 4.2 08/27/2019    CHLORIDE 105 08/27/2019    CO2 27 08/27/2019    ANIONGAP 6 08/27/2019     (H) 08/27/2019    BUN 15 08/27/2019    CR 0.90 08/27/2019     GFRESTIMATED 88 08/27/2019    GFRESTBLACK >90 08/27/2019    CAMERON 10.0 08/27/2019        A1C RESULTS:  Lab Results   Component Value Date    A1C 5.6 06/22/2017       INR RESULTS:  Lab Results   Component Value Date    INR 1.02 12/22/2011           CC  No referring provider defined for this encounter.    Thank you for allowing me to participate in the care of your patient.      Sincerely,     Eulalio Del Rosario MD     [facility]    cc:   No referring provider defined for this encounter.

## 2019-09-23 NOTE — PROGRESS NOTES
HPI and Plan:   See dictation    Orders Placed This Encounter   Procedures     Follow-Up with Cardiologist     Orders Placed This Encounter   Medications     mesalamine (LIALDA) 1.2 g EC tablet     Sig: Take 1,200 mg by mouth daily (with breakfast)     TURMERIC PO     Sig: Take 1,500 mg by mouth daily     There are no discontinued medications.      Encounter Diagnosis   Name Primary?     Coronary artery disease involving native coronary artery of native heart without angina pectoris Yes       CURRENT MEDICATIONS:  Current Outpatient Medications   Medication Sig Dispense Refill     atorvastatin (LIPITOR) 40 MG tablet Take 1 tablet (40 mg) by mouth daily 90 tablet 3     JUAN LOW STRENGTH 81 MG OR TBEC 2 DAILY 30 0     Calcium-Vitamin D-Vitamin K (CALCIUM + D + K) 750-500-40 MG-UNT-MCG TABS Take 2 tablets by mouth daily.       coenzyme Q-10 (COQ-10) 200 MG CAPS Take by mouth daily        esomeprazole (NEXIUM) 40 MG capsule Take 40 mg by mouth every morning (before breakfast) Take 30-60 minutes before eating.       fluticasone (FLONASE) 50 MCG/ACT nasal spray Spray 2 sprays into both nostrils daily 16 g 11     Glucos-Chond-Hyal Ac-Ca Fructo (MOVE FREE JOINT HEALTH ADVANCE) TABS Take 1 mg by mouth daily       mesalamine (LIALDA) 1.2 g EC tablet Take 1,200 mg by mouth daily (with breakfast)       MULTIVITAMIN TABS   OR 1 daily  0     Probiotic Product (PROBIOTIC PO) Take by mouth daily       psyllium (METAMUCIL) 58.6 % POWD Take 2 teaspoonful by mouth 2 times daily        sildenafil (VIAGRA) 25 MG tablet Take 1 tablet (25 mg) by mouth daily as needed 30 tablet 3     TURMERIC PO Take 1,500 mg by mouth daily       VITAMIN D, CHOLECALCIFEROL, PO Take 5,000 Units by mouth daily         ALLERGIES     Allergies   Allergen Reactions     No Known Drug Allergies        PAST MEDICAL HISTORY:  Past Medical History:   Diagnosis Date     Actinic keratoses     face     Atrial fibrillation (H)     only after adenosine test     BPH  (benign prostatic hypertrophy)      Coronary artery disease 2011 2011-Cath Lma 5%, Lad 40-50%, Lcx 30-40%, Xxp49-45% (-FFR of Lad)     ED (erectile dysfunction)      Family history of colon cancer 7/6/2012    mother age 65     GERD (gastroesophageal reflux disease)     nexium     Hyperlipidaemia      IFG (impaired fasting glucose)      Obesity, unspecified     Waist size 42, BMI 30.8     Right inguinal hernia 7/1/2014     Thrombocytosis (H)      Ulcerative colitis (H)        PAST SURGICAL HISTORY:  Past Surgical History:   Procedure Laterality Date     CARDIAC NUC POLINA STRESS TEST NL      Normal     CARDIOVERSION  12/2011    Atrial Fib     COLONOSCOPY  1/29/2014    Procedure: COMBINED COLONOSCOPY, SINGLE BIOPSY/POLYPECTOMY BY BIOPSY;  COLONOSCOPY;  Surgeon: Ashtyn Gonzales MD;  Location: [facility]     COLONOSCOPY N/A 7/11/2018    Procedure: COMBINED COLONOSCOPY, SINGLE OR MULTIPLE BIOPSY/POLYPECTOMY BY BIOPSY;  COLONOSCOPY ;  Surgeon: Ashtyn Gonzales MD;  Location: [facility]     CORONARY ANGIOGRAPHY ADULT ORDER  12/2011    1 LMA 5%, LAD 40-50%, LCx 30-40%, RCA 25-30%     HC INJECTION SCLEROSING SOLUTION HEMORRHOID  8/24/2012    Procedure: HEMORRHOID INJECTION SCLEROSING SOLUTION;  Surgeon: Mayito Chow MD;  Location: [facility]     HC TOOTH EXTRACTION W/FORCEP       LASIK  1/2011     ROTATOR CUFF REPAIR RT/LT Left 10/15/2015    RCR Left - Dr. Carvalho       FAMILY HISTORY:  Family History   Problem Relation Age of Onset     C.A.D. Father         passed at 52 from MI     Cardiovascular Father      Heart Disease Father      Obesity Father      Myocardial Infarction Father         52 - passed away     Colon Cancer Mother 65        passed away from colon cancer - lived about 2 mo from dx 1981     Arthritis Maternal Grandmother      Diabetes Maternal Grandmother      Arthritis Maternal Grandfather      Heart Disease Brother      Diabetes Brother      Coronary Artery Disease Brother         stent placed       Hypertension No family hx of      Cerebrovascular Disease No family hx of      Breast Cancer No family hx of      Prostate Cancer No family hx of      Alcohol/Drug No family hx of      Allergies No family hx of      Alzheimer Disease No family hx of      Circulatory No family hx of      Congenital Anomalies No family hx of      Connective Tissue Disorder No family hx of      Depression No family hx of      Eye Disorder No family hx of      Genetic Disorder No family hx of      Gastrointestinal Disease No family hx of      Genitourinary Problems No family hx of      Gynecology No family hx of      Musculoskeletal Disorder No family hx of      Neurologic Disorder No family hx of      Osteoporosis No family hx of      Psychotic Disorder No family hx of      Respiratory No family hx of      Thyroid Disease No family hx of      Anesthesia Reaction No family hx of      Blood Disease No family hx of        SOCIAL HISTORY:  Social History     Socioeconomic History     Marital status:      Spouse name: Emy     Number of children: 2     Years of education: 16     Highest education level: Not on file   Occupational History     Occupation: [employer]     Employer: [employer]   Social Needs     Financial resource strain: Not on file     Food insecurity:     Worry: Not on file     Inability: Not on file     Transportation needs:     Medical: Not on file     Non-medical: Not on file   Tobacco Use     Smoking status: Former Smoker     Packs/day: 1.50     Years: 24.00     Pack years: 36.00     Types: Cigarettes     Last attempt to quit: 1996     Years since quittin.7     Smokeless tobacco: Never Used   Substance and Sexual Activity     Alcohol use: Yes     Alcohol/week: 0.0 standard drinks     Comment: less than one per week     Drug use: No     Sexual activity: Yes     Partners: Female   Lifestyle     Physical activity:     Days per week: Not on file     Minutes per session: Not on file     Stress: Not on  "file   Relationships     Social connections:     Talks on phone: Not on file     Gets together: Not on file     Attends [employer] service: Not on file     Active member of club or organization: Not on file     Attends meetings of clubs or organizations: Not on file     Relationship status: Not on file     Intimate partner violence:     Fear of current or ex partner: Not on file     Emotionally abused: Not on file     Physically abused: Not on file     Forced sexual activity: Not on file   Other Topics Concern      Service Yes     Blood Transfusions No     Caffeine Concern No     Comment: 2 cups per day     Occupational Exposure No     Hobby Hazards No     Sleep Concern No     Stress Concern Yes     Weight Concern Yes     Special Diet Yes     Back Care No     Exercise No     Comment: 8,000 steps a day. Fitbit     Bike Helmet No     Comment: n/a     Seat Belt Yes     Self-Exams Yes     Parent/sibling w/ CABG, MI or angioplasty before 65F 55M? Not Asked   Social History Narrative    Eats fruits and vegetables every day. He takes extra vitamin D. He was advised to aim for 1200 mg of calcium per day.       Review of Systems:  Skin:  Negative     Eyes:  Negative    ENT:  Negative    Respiratory:  Negative    Cardiovascular:  Negative lightheadedness  Gastroenterology: Positive for heartburn  Genitourinary:  Negative    Musculoskeletal:  Positive for joint pain(left hip)  Neurologic:  Negative numbness or tingling of hands(left side tingling)  Psychiatric:  Negative    Heme/Lymph/Imm:  Negative    Endocrine:  Negative      Physical Exam:  Vitals: /78   Pulse 59   Ht 1.854 m (6' 0.99\")   BMI 30.35 kg/m      Constitutional:  cooperative, alert and oriented, well developed, well nourished, in no acute distress        Skin:  warm and dry to the touch, no apparent skin lesions or masses noted          Head:  normocephalic, no masses or lesions        Eyes:  pupils equal and round, conjunctivae and lids " unremarkable, sclera white, no xanthalasma, EOMS intact, no nystagmus        Lymph:No Cervical lymphadenopathy present;No thyromegaly     ENT:  no pallor or cyanosis, dentition good        Neck:  carotid pulses are full and equal bilaterally, JVP normal, no carotid bruit        Respiratory:  normal breath sounds, clear to auscultation, normal A-P diameter, normal symmetry, normal respiratory excursion, no use of accessory muscles         Cardiac: regular rhythm, normal S1/S2, no S3 or S4, apical impulse not displaced, no murmurs, gallops or rubs                pulses full and equal, no bruits auscultated                                        GI:  abdomen soft, non-tender, BS normoactive, no mass, no HSM, no bruits        Extremities and Muscular Skeletal:  no deformities, clubbing, cyanosis, erythema observed;no edema              Neurological:  no gross motor deficits        Psych:  Alert and Oriented x 3      Recent Lab Results:  LIPID RESULTS:  Lab Results   Component Value Date    CHOL 128 08/27/2019    HDL 36 (L) 08/27/2019    LDL 62 08/27/2019    TRIG 149 08/27/2019    CHOLHDLRATIO 2.8 07/10/2015       LIVER ENZYME RESULTS:  Lab Results   Component Value Date    AST 26 08/27/2019    ALT 48 08/27/2019       CBC RESULTS:  Lab Results   Component Value Date    WBC 10.5 08/27/2019    RBC 5.20 08/27/2019    HGB 15.4 08/27/2019    HCT 45.9 08/27/2019    MCV 88 08/27/2019    MCH 29.6 08/27/2019    MCHC 33.6 08/27/2019    RDW 12.8 08/27/2019     (H) 08/27/2019       BMP RESULTS:  Lab Results   Component Value Date     08/27/2019    POTASSIUM 4.2 08/27/2019    CHLORIDE 105 08/27/2019    CO2 27 08/27/2019    ANIONGAP 6 08/27/2019     (H) 08/27/2019    BUN 15 08/27/2019    CR 0.90 08/27/2019    GFRESTIMATED 88 08/27/2019    GFRESTBLACK >90 08/27/2019    CAMERON 10.0 08/27/2019        A1C RESULTS:  Lab Results   Component Value Date    A1C 5.6 06/22/2017       INR RESULTS:  Lab Results   Component Value  Date    INR 1.02 12/22/2011           CC  No referring provider defined for this encounter.

## 2019-09-23 NOTE — PROGRESS NOTES
Service Date: 09/23/2019      HISTORY OF PRESENT ILLNESS:  It was a pleasure following up on our mutual patient, Peter Peralta.  He is a pleasant 69-year-old gentleman accompanied by his wife who is a nurse.  He has coronary disease.  In 2011, angiogram showed a 50% LAD lesion with a normal flow reserve, a 30%-40% circumflex and a 25%-30% right coronary artery.  The patient has had no further chest pain.        We reviewed his stress echo today.  His stress echo is completely normal.  He went 10 minutes on a Callum protocol with no EKG changes, no chest pain and normal ejection fraction and no ischemia.  He did have PVCs.  We reviewed blood work that I think you actually might have done.  He still has what looks to possibly be a thrombocytosis and you might want to consider having him see [facility] to exclude essential thrombocytosis or further workup.  The rest of his CBC I believe was normal.        His cholesterol numbers are reasonable.  His HDL is a little bit low, but this is the first time we have seen it this low.  He does clearly do have impaired fasting glucose and metabolic syndrome.  We talked pretty extensively about diet and exercise today as a way to treat that and we would not recommend metformin at the outset.        He had a number of other questions, which were not cardiac related, including dark urine that comes and goes, etc. and I am going to refer him back to you for that.        He does have erectile dysfunction, was started on Viagra and he had some questions for me about Viagra.  I reviewed with him the interactions with nitrates and safe use of Viagra and of course, we would be happy to see him if he develops new chest pain, but he is now 8 years from his angiogram and still doing well.  I am going to recommend he come back in about 3 years for an office visit and a repeat stress echo.      Thank you for allowing me to see Mr. Peralta.  Today's visit was 30 minutes, greater than  50% counseling.      cc:      Donell Zuñiga MD    [address] [address]         GINA ALDRIDGE MD             D: 2019   T: 2019   MT: [id number]      Name:     BUCK SAMANIEGO   MRN:      [id number]        Account:      [id number]   :      1950           Service Date: 2019      Document: [id number]

## 2019-09-23 NOTE — LETTER
9/23/2019      Donell Zuñiga MD  [address] [facility] [address]      RE: Peter Bacamendoza       Dear Colleague,    I had the pleasure of seeing Peter Peralta in the [facility].    Service Date: 09/23/2019      HISTORY OF PRESENT ILLNESS:  It was a pleasure following up on our mutual patient, Peter Peralta.  He is a pleasant 69-year-old gentleman accompanied by his wife who is a nurse.  He has coronary disease.  In 2011, angiogram showed a 50% LAD lesion with a normal flow reserve, a 30%-40% circumflex and a 25%-30% right coronary artery.  The patient has had no further chest pain.        We reviewed his stress echo today.  His stress echo is completely normal.  He went 10 minutes on a Callum protocol with no EKG changes, no chest pain and normal ejection fraction and no ischemia.  He did have PVCs.  We reviewed blood work that I think you actually might have done.  He still has what looks to possibly be a thrombocytosis and you might want to consider having him see [facility] to exclude essential thrombocytosis or further workup.  The rest of his CBC I believe was normal.        His cholesterol numbers are reasonable.  His HDL is a little bit low, but this is the first time we have seen it this low.  He does clearly do have impaired fasting glucose and metabolic syndrome.  We talked pretty extensively about diet and exercise today as a way to treat that and we would not recommend metformin at the outset.        He had a number of other questions, which were not cardiac related, including dark urine that comes and goes, etc. and I am going to refer him back to you for that.        He does have erectile dysfunction, was started on Viagra and he had some questions for me about Viagra.  I reviewed with him the interactions with nitrates and safe use of Viagra and of course, we would be happy to see him if he develops new chest pain, but he is now 8 years from  his angiogram and still doing well.  I am going to recommend he come back in about 3 years for an office visit and a repeat stress echo.      Thank you for allowing me to see Mr. Peralta.  Today's visit was 30 minutes, greater than 50% counseling.      cc:      Donell Zuñiga MD    [address] [address]         GINA ALDRIDGE MD             D: 2019   T: 2019   MT:       Name:     BUCK PERALTA   MRN:      [id number]        Account:      [id number]   :      1950           Service Date: 2019      Document: [id number]         Outpatient Encounter Medications as of 2019   Medication Sig Dispense Refill     atorvastatin (LIPITOR) 40 MG tablet Take 1 tablet (40 mg) by mouth daily 90 tablet 3     JUAN LOW STRENGTH 81 MG OR TBEC 2 DAILY 30 0     Calcium-Vitamin D-Vitamin K (CALCIUM + D + K) 750-500-40 MG-UNT-MCG TABS Take 2 tablets by mouth daily.       coenzyme Q-10 (COQ-10) 200 MG CAPS Take by mouth daily        esomeprazole (NEXIUM) 40 MG capsule Take 40 mg by mouth every morning (before breakfast) Take 30-60 minutes before eating.       fluticasone (FLONASE) 50 MCG/ACT nasal spray Spray 2 sprays into both nostrils daily 16 g 11     Glucos-Chond-Hyal Ac-Ca Fructo (MOVE FREE JOINT HEALTH ADVANCE) TABS Take 1 mg by mouth daily       mesalamine (LIALDA) 1.2 g EC tablet Take 1,200 mg by mouth daily (with breakfast)       MULTIVITAMIN TABS   OR 1 daily  0     Probiotic Product (PROBIOTIC PO) Take by mouth daily       psyllium (METAMUCIL) 58.6 % POWD Take 2 teaspoonful by mouth 2 times daily        sildenafil (VIAGRA) 25 MG tablet Take 1 tablet (25 mg) by mouth daily as needed 30 tablet 3     TURMERIC PO Take 1,500 mg by mouth daily       VITAMIN D, CHOLECALCIFEROL, PO Take 5,000 Units by mouth daily       No facility-administered encounter medications on file as of 2019.        Again, thank you for allowing me to participate  in the care of your patient.      Sincerely,    Eulalio Del Rosario MD     [facility]

## 2019-10-01 ENCOUNTER — HEALTH MAINTENANCE LETTER (OUTPATIENT)
Age: 69
End: 2019-10-01

## 2019-10-16 ENCOUNTER — TRANSFERRED RECORDS (OUTPATIENT)
Dept: HEALTH INFORMATION MANAGEMENT | Facility: CLINIC | Age: 69
End: 2019-10-16

## 2019-10-28 ENCOUNTER — TRANSFERRED RECORDS (OUTPATIENT)
Dept: HEALTH INFORMATION MANAGEMENT | Facility: CLINIC | Age: 69
End: 2019-10-28

## 2019-10-30 ENCOUNTER — TELEPHONE (OUTPATIENT)
Dept: FAMILY MEDICINE | Facility: CLINIC | Age: 69
End: 2019-10-30

## 2019-10-30 DIAGNOSIS — N52.9 ERECTILE DYSFUNCTION, UNSPECIFIED ERECTILE DYSFUNCTION TYPE: ICD-10-CM

## 2019-10-30 RX ORDER — SILDENAFIL 50 MG/1
50-75 TABLET, FILM COATED ORAL DAILY PRN
Qty: 30 TABLET | Refills: 3 | Status: CANCELLED | OUTPATIENT
Start: 2019-10-30

## 2019-10-30 RX ORDER — SILDENAFIL 25 MG/1
75 TABLET, FILM COATED ORAL DAILY PRN
Qty: 30 TABLET | Refills: 3
Start: 2019-08-27 | End: 2020-03-17

## 2019-10-30 NOTE — TELEPHONE ENCOUNTER
Patient states that he increased his Viagra dose to 50 mg and is still was not adequate.  Requesting to go to 75 mg.  Please advise. Triage to call the patient back.  LOV with Dr. Zuñiga 8/27/19  Laura Ch RN

## 2019-10-30 NOTE — TELEPHONE ENCOUNTER
Non detailed message left for pt to return call to clinic and ask to speak with a triage nurse.    Catia BERNABE RN  EP Triage

## 2019-10-30 NOTE — TELEPHONE ENCOUNTER
Reason for Call:   call back    Detailed comment PT would like to increase his  dose to 75 mg for  generic Viagra .   plz advise     Phone Number Patient can be reached at: Cell number on file:    Telephone Information:   Mobile [phone]       Best Time: any    Can we leave a detailed message on this number?   Yes  Call taken on 10/30/2019 at 12:19 PM by Mitzi Bower

## 2019-10-30 NOTE — TELEPHONE ENCOUNTER
There is no 75mg tablet, he should try 1.5 tablets of 50mg for 75mg dosage.  I can send same 30 tablets with 3 refill if he agrees  Please ask him   thx

## 2019-10-30 NOTE — TELEPHONE ENCOUNTER
Patient called back, patient states that he has the 25 mg tablets of Viagra and would prefer to continue to take the 25 mg tablets, so then he can take 3 tablets. Patient does not want to take 1.5 tablets, he prefers taking 3, 25 mg tablets in stead of having to cut a tablet in half (patients preference) Patient stated at this time he has 2 more refills left from 8/27/19, so he should be good. He will start taking the 3 tablets (75 mg) as needed.     Dr. Zuñiga do you want to edit the script from 8/27/19?     Routing to PCP as FYI. Thank you.     Marisel Herrera RN, BSN  [facility]

## 2019-10-30 NOTE — TELEPHONE ENCOUNTER
Left a non-detailed message and call back number [phone].     Marisel Herrera RN, BSN  [facility]

## 2019-11-07 ENCOUNTER — TRANSFERRED RECORDS (OUTPATIENT)
Dept: HEALTH INFORMATION MANAGEMENT | Facility: CLINIC | Age: 69
End: 2019-11-07

## 2019-12-12 ENCOUNTER — TRANSFERRED RECORDS (OUTPATIENT)
Dept: HEALTH INFORMATION MANAGEMENT | Facility: CLINIC | Age: 69
End: 2019-12-12

## 2019-12-12 LAB — CREAT SERPL-MCNC: 1.08 MG/DL (ref 0.7–1.3)

## 2020-03-17 DIAGNOSIS — N52.9 ERECTILE DYSFUNCTION, UNSPECIFIED ERECTILE DYSFUNCTION TYPE: ICD-10-CM

## 2020-03-17 RX ORDER — SILDENAFIL 25 MG/1
75 TABLET, FILM COATED ORAL DAILY PRN
Qty: 30 TABLET | Refills: 3 | Status: SHIPPED | OUTPATIENT
Start: 2020-03-17 | End: 2020-10-06

## 2020-03-17 NOTE — TELEPHONE ENCOUNTER
Reason for Call:  Other prescription    Detailed comments: Pt called this morning and would like a refill on his viagra. The pt would like a 90 day supply as well. Please give pt a call if there are any questions. Thank you.    Phone Number Patient can be reached at: Home number on file [phone] (home)    Best Time:     Can we leave a detailed message on this number? YES    Call taken on 3/17/2020 at 10:11 AM by Leanne Barber

## 2020-03-17 NOTE — TELEPHONE ENCOUNTER
"Requested Prescriptions   Pending Prescriptions Disp Refills     sildenafil (VIAGRA) 25 MG tablet 30 tablet 3     Sig: Take 3 tablets (75 mg) by mouth daily as needed       Erectile Dysfuction Protocol Passed - 3/17/2020 10:12 AM        Passed - Absence of nitrates on medication list        Passed - Absence of Alpha Blockers on Med list        Passed - Recent (12 mo) or future (30 days) visit within the authorizing provider's specialty     Patient has had an office visit with the authorizing provider or a provider within the authorizing providers department within the previous 12 mos or has a future within next 30 days. See \"Patient Info\" tab in inbasket, or \"Choose Columns\" in Meds & Orders section of the refill encounter.              Passed - Medication is active on med list        Passed - Patient is age 18 or older           Last Written Prescription Date:  8/27/2019  Last Fill Quantity: 30,  # refills: 3   Last office visit: 8/27/2019 with prescribing provider: 8/27/2019    Future Office Visit:  None        "

## 2020-10-06 ENCOUNTER — OFFICE VISIT (OUTPATIENT)
Dept: FAMILY MEDICINE | Facility: CLINIC | Age: 70
End: 2020-10-06
Payer: MEDICARE

## 2020-10-06 VITALS
BODY MASS INDEX: 30.42 KG/M2 | SYSTOLIC BLOOD PRESSURE: 112 MMHG | HEIGHT: 74 IN | HEART RATE: 103 BPM | WEIGHT: 237 LBS | OXYGEN SATURATION: 97 % | DIASTOLIC BLOOD PRESSURE: 72 MMHG | TEMPERATURE: 97.2 F

## 2020-10-06 DIAGNOSIS — Z12.5 SCREENING FOR PROSTATE CANCER: ICD-10-CM

## 2020-10-06 DIAGNOSIS — N52.9 ERECTILE DYSFUNCTION, UNSPECIFIED ERECTILE DYSFUNCTION TYPE: ICD-10-CM

## 2020-10-06 DIAGNOSIS — Z23 NEED FOR IMMUNIZATION AGAINST INFLUENZA: ICD-10-CM

## 2020-10-06 DIAGNOSIS — R73.09 ELEVATED GLUCOSE: ICD-10-CM

## 2020-10-06 DIAGNOSIS — E78.5 HYPERLIPIDEMIA LDL GOAL <130: ICD-10-CM

## 2020-10-06 DIAGNOSIS — Z00.00 ENCOUNTER FOR MEDICARE ANNUAL WELLNESS EXAM: Primary | ICD-10-CM

## 2020-10-06 LAB
ALBUMIN SERPL-MCNC: 3.9 G/DL (ref 3.4–5)
ALP SERPL-CCNC: 66 U/L (ref 40–150)
ALT SERPL W P-5'-P-CCNC: 70 U/L (ref 0–70)
ANION GAP SERPL CALCULATED.3IONS-SCNC: 5 MMOL/L (ref 3–14)
AST SERPL W P-5'-P-CCNC: 35 U/L (ref 0–45)
BILIRUB SERPL-MCNC: 0.8 MG/DL (ref 0.2–1.3)
BUN SERPL-MCNC: 15 MG/DL (ref 7–30)
CALCIUM SERPL-MCNC: 9.5 MG/DL (ref 8.5–10.1)
CHLORIDE SERPL-SCNC: 105 MMOL/L (ref 94–109)
CHOLEST SERPL-MCNC: 139 MG/DL
CO2 SERPL-SCNC: 27 MMOL/L (ref 20–32)
CREAT SERPL-MCNC: 0.99 MG/DL (ref 0.66–1.25)
ERYTHROCYTE [DISTWIDTH] IN BLOOD BY AUTOMATED COUNT: 13.5 % (ref 10–15)
GFR SERPL CREATININE-BSD FRML MDRD: 78 ML/MIN/{1.73_M2}
GLUCOSE SERPL-MCNC: 131 MG/DL (ref 70–99)
HCT VFR BLD AUTO: 47.9 % (ref 40–53)
HDLC SERPL-MCNC: 41 MG/DL
HGB BLD-MCNC: 16.3 G/DL (ref 13.3–17.7)
LDLC SERPL CALC-MCNC: 69 MG/DL
MCH RBC QN AUTO: 30.2 PG (ref 26.5–33)
MCHC RBC AUTO-ENTMCNC: 34 G/DL (ref 31.5–36.5)
MCV RBC AUTO: 89 FL (ref 78–100)
NONHDLC SERPL-MCNC: 98 MG/DL
PLATELET # BLD AUTO: 595 10E9/L (ref 150–450)
POTASSIUM SERPL-SCNC: 4.8 MMOL/L (ref 3.4–5.3)
PROT SERPL-MCNC: 8 G/DL (ref 6.8–8.8)
PSA SERPL-ACNC: 0.9 UG/L (ref 0–4)
RBC # BLD AUTO: 5.39 10E12/L (ref 4.4–5.9)
SODIUM SERPL-SCNC: 137 MMOL/L (ref 133–144)
TRIGL SERPL-MCNC: 146 MG/DL
WBC # BLD AUTO: 9.1 10E9/L (ref 4–11)

## 2020-10-06 PROCEDURE — 90662 IIV NO PRSV INCREASED AG IM: CPT | Performed by: FAMILY MEDICINE

## 2020-10-06 PROCEDURE — 80061 LIPID PANEL: CPT | Performed by: FAMILY MEDICINE

## 2020-10-06 PROCEDURE — G0008 ADMIN INFLUENZA VIRUS VAC: HCPCS | Performed by: FAMILY MEDICINE

## 2020-10-06 PROCEDURE — G0103 PSA SCREENING: HCPCS | Performed by: FAMILY MEDICINE

## 2020-10-06 PROCEDURE — 36415 COLL VENOUS BLD VENIPUNCTURE: CPT | Performed by: FAMILY MEDICINE

## 2020-10-06 PROCEDURE — G0439 PPPS, SUBSEQ VISIT: HCPCS | Performed by: FAMILY MEDICINE

## 2020-10-06 PROCEDURE — 85027 COMPLETE CBC AUTOMATED: CPT | Performed by: FAMILY MEDICINE

## 2020-10-06 PROCEDURE — 80053 COMPREHEN METABOLIC PANEL: CPT | Performed by: FAMILY MEDICINE

## 2020-10-06 RX ORDER — SILDENAFIL 25 MG/1
75 TABLET, FILM COATED ORAL DAILY PRN
Qty: 90 TABLET | Refills: 11 | Status: ON HOLD | OUTPATIENT
Start: 2020-10-06 | End: 2022-03-05

## 2020-10-06 RX ORDER — SILDENAFIL 25 MG/1
75 TABLET, FILM COATED ORAL DAILY PRN
Qty: 30 TABLET | Refills: 3 | Status: SHIPPED | OUTPATIENT
Start: 2020-10-06 | End: 2020-10-06

## 2020-10-06 RX ORDER — ATORVASTATIN CALCIUM 40 MG/1
40 TABLET, FILM COATED ORAL DAILY
Qty: 90 TABLET | Refills: 3 | Status: SHIPPED | OUTPATIENT
Start: 2020-10-06

## 2020-10-06 ASSESSMENT — MIFFLIN-ST. JEOR: SCORE: 1906.83

## 2020-10-06 NOTE — PROGRESS NOTES
"  SUBJECTIVE:   Peter Peralta is a 70 year old male who presents for Preventive Visit.      Patient has been advised of split billing requirements and indicates understanding: Yes  Are you in the first 12 months of your Medicare Part B coverage?  No    Physical Health:    In general, how would you rate your overall physical health? Good to excellent    Outside of work, how many days during the week do you exercise? 4-5 days/week    Outside of work, approximately how many minutes a day do you exercise?30-45 minutes    If you drink alcohol do you typically have >3 drinks per day or >7 drinks per week? No    Do you usually eat at least 4 servings of fruit and vegetables a day, include whole grains & fiber and avoid regularly eating high fat or \"junk\" foods? Yes    Do you have any problems taking medications regularly?  No    Do you have any side effects from medications? not applicable    Needs assistance for the following daily activities: no assistance needed    Which of the following safety concerns are present in your home?  none identified     Hearing impairment: No    In the past 6 months, have you been bothered by leaking of urine? no    Mental Health:    In general, how would you rate your overall mental or emotional health? excellent  PHQ-2 Score:      Do you feel safe in your environment? Yes    Have you ever done Advance Care Planning? (For example, a Health Directive, POLST, or a discussion with a medical provider or your loved ones about your wishes): Yes, advance care planning is on file.    Additional concerns to address?  No    Fall risk:  Fallen 2 or more times in the past year?: No  Any fall with injury in the past year?: No    Cognitive Screenin) Repeat 3 items (Leader, Season, Table)    2) Clock draw: NORMAL  3) 3 item recall: Recalls 3 objects  Results: 3 items recalled: COGNITIVE IMPAIRMENT LESS LIKELY    Mini-CogTM Copyright KARIN Lora. Licensed by the author for use in [facility] " Services; reprinted with permission (soob@.[email or web address]). All rights reserved.      Do you have sleep apnea, excessive snoring or daytime drowsiness?: no      Reviewed and updated as needed this visit by clinical staff                 Reviewed and updated as needed this visit by Provider                Social History     Tobacco Use     Smoking status: Former Smoker     Packs/day: 1.50     Years: 24.00     Pack years: 36.00     Types: Cigarettes     Quit date: 1996     Years since quittin.7     Smokeless tobacco: Never Used   Substance Use Topics     Alcohol use: Yes     Alcohol/week: 0.0 standard drinks     Comment: less than one per week                           Current providers sharing in care for this patient include:   Patient Care Team:  Donell Zuñiga MD as PCP - General (Family Practice)  Donell Zuñiga MD as Assigned PCP    The following health maintenance items are reviewed in Epic and correct as of today:  Health Maintenance   Topic Date Due     AORTIC ANEURYSM SCREENING (SYSTEM ASSIGNED)  2017     PHQ-2  2020     MEDICARE ANNUAL WELLNESS VISIT  2020     FALL RISK ASSESSMENT  2020     INFLUENZA VACCINE (1) 2020     ZOSTER IMMUNIZATION (3 of 3) 2020     ADVANCE CARE PLANNING  2021     DTAP/TDAP/TD IMMUNIZATION (4 - Td) 2022     COLORECTAL CANCER SCREENING  2023     LIPID  2024     HEPATITIS C SCREENING  Completed     Pneumococcal Vaccine: 65+ Years  Completed     Pneumococcal Vaccine: Pediatrics (0 to 5 Years) and At-Risk Patients (6 to 64 Years)  Aged Out     IPV IMMUNIZATION  Aged Out     MENINGITIS IMMUNIZATION  Aged Out     HEPATITIS B IMMUNIZATION  Aged Out     BP Readings from Last 3 Encounters:   10/06/20 112/72   19 136/78   19 126/74    Wt Readings from Last 3 Encounters:   10/06/20 107.5 kg (237 lb)   19 104.3 kg (230 lb)   18 106.6 kg (235 lb)                  Patient Active Problem List   Diagnosis     Family  history of coronary artery disease     Hyperlipidemia LDL goal <130     GERD (gastroesophageal reflux disease)     Family history of colon cancer     Colitis     Right inguinal hernia     Atrial fibrillation (H)     Hyperlipidemia     Murmur     Nonspecific abnormal results of cardiovascular function study     Chest pain     Elevated blood pressure reading without diagnosis of hypertension     Benign prostatic hyperplasia, presence of lower urinary tract symptoms unspecified, unspecified morphology     Advanced directives, counseling/discussion     Elevated fasting blood sugar     Hoarseness     BMI > 25     Thrombocytosis (H)     Complete tear of left rotator cuff     Past Surgical History:   Procedure Laterality Date     CARDIAC NUC POLINA STRESS TEST NL      Normal     CARDIOVERSION  2011    Atrial Fib     COLONOSCOPY  2014    Procedure: COMBINED COLONOSCOPY, SINGLE BIOPSY/POLYPECTOMY BY BIOPSY;  COLONOSCOPY;  Surgeon: Ashtyn Gonzales MD;  Location: [facility]     COLONOSCOPY N/A 2018    Procedure: COMBINED COLONOSCOPY, SINGLE OR MULTIPLE BIOPSY/POLYPECTOMY BY BIOPSY;  COLONOSCOPY ;  Surgeon: Ashtyn Gonzales MD;  Location: [facility]     CORONARY ANGIOGRAPHY ADULT ORDER  2011    1 LMA 5%, LAD 40-50%, LCx 30-40%, RCA 25-30%     HC INJECTION SCLEROSING SOLUTION HEMORRHOID  2012    Procedure: HEMORRHOID INJECTION SCLEROSING SOLUTION;  Surgeon: Mayito Chow MD;  Location: [facility]     HC TOOTH EXTRACTION W/FORCEP       LASIK  2011     ROTATOR CUFF REPAIR RT/LT Left 10/15/2015    RCR Left - Dr. Carvalho       Social History     Tobacco Use     Smoking status: Former Smoker     Packs/day: 1.50     Years: 24.00     Pack years: 36.00     Types: Cigarettes     Quit date: 1996     Years since quittin.7     Smokeless tobacco: Never Used   Substance Use Topics     Alcohol use: Yes     Alcohol/week: 0.0 standard drinks     Comment: less than one per week     Family History   Problem Relation Age  of Onset     C.A.D. Father         passed at 52 from MI     Cardiovascular Father      Heart Disease Father      Obesity Father      Myocardial Infarction Father         52 - passed away     Colon Cancer Mother 65        passed away from colon cancer - lived about 2 mo from dx 1981     Arthritis Maternal Grandmother      Diabetes Maternal Grandmother      Arthritis Maternal Grandfather      Heart Disease Brother      Diabetes Brother      Coronary Artery Disease Brother         stent placed      Hypertension No family hx of      Cerebrovascular Disease No family hx of      Breast Cancer No family hx of      Prostate Cancer No family hx of      Alcohol/Drug No family hx of      Allergies No family hx of      Alzheimer Disease No family hx of      Circulatory No family hx of      Congenital Anomalies No family hx of      Connective Tissue Disorder No family hx of      Depression No family hx of      Eye Disorder No family hx of      Genetic Disorder No family hx of      Gastrointestinal Disease No family hx of      Genitourinary Problems No family hx of      Gynecology No family hx of      Musculoskeletal Disorder No family hx of      Neurologic Disorder No family hx of      Osteoporosis No family hx of      Psychotic Disorder No family hx of      Respiratory No family hx of      Thyroid Disease No family hx of      Anesthesia Reaction No family hx of      Blood Disease No family hx of          Current Outpatient Medications   Medication Sig Dispense Refill     atorvastatin (LIPITOR) 40 MG tablet Take 1 tablet (40 mg) by mouth daily 90 tablet 3     sildenafil (VIAGRA) 25 MG tablet Take 3 tablets (75 mg) by mouth daily as needed 90 tablet 11     JUAN LOW STRENGTH 81 MG OR TBEC 2 DAILY 30 0     Calcium-Vitamin D-Vitamin K (CALCIUM + D + K) 750-500-40 MG-UNT-MCG TABS Take 2 tablets by mouth daily.       coenzyme Q-10 (COQ-10) 200 MG CAPS Take by mouth daily        esomeprazole (NEXIUM) 40 MG capsule Take 40 mg by mouth  "every morning (before breakfast) Take 30-60 minutes before eating.       fluticasone (FLONASE) 50 MCG/ACT nasal spray Spray 2 sprays into both nostrils daily 16 g 11     Glucos-Chond-Hyal Ac-Ca Fructo (MOVE FREE JOINT HEALTH ADVANCE) TABS Take 1 mg by mouth daily       mesalamine (LIALDA) 1.2 g EC tablet Take 1,200 mg by mouth daily (with breakfast)       MULTIVITAMIN TABS   OR 1 daily  0     Probiotic Product (PROBIOTIC PO) Take by mouth daily       psyllium (METAMUCIL) 58.6 % POWD Take 2 teaspoonful by mouth 2 times daily        TURMERIC PO Take 1,500 mg by mouth daily       VITAMIN D, CHOLECALCIFEROL, PO Take 5,000 Units by mouth daily       Allergies   Allergen Reactions     No Known Drug Allergies      Recent Labs   Lab Test 12/12/19 08/27/19  0929 07/05/18  1018 06/22/17  0931   A1C  --   --   --  5.6   LDL  --  62 83 71   HDL  --  36* 44 49   TRIG  --  149 161* 107   ALT  --  48 50 42   CR 1.08 0.90 0.99 0.98   GFRESTIMATED  --  88 76 77   GFRESTBLACK  --  >90 >90 >90  African American GFR Calc     POTASSIUM  --  4.2 4.7 4.3   TSH  --   --   --  0.96      Pneumonia Vaccine:Adults age 65+ who received Pneumovax (PPSV23) at 65 years or older: Should be given PCV13 > 1 year after their most recent PPSV23    ROS:  Constitutional, HEENT, cardiovascular, pulmonary, gi and gu systems are negative, except as otherwise noted.    OBJECTIVE:   /72 (BP Location: Right arm, Cuff Size: Adult Regular)   Pulse 103   Temp 97.2  F (36.2  C) (Tympanic)   Ht 1.867 m (6' 1.5\")   Wt 107.5 kg (237 lb)   SpO2 97%   BMI 30.84 kg/m   Estimated body mass index is 30.84 kg/m  as calculated from the following:    Height as of this encounter: 1.867 m (6' 1.5\").    Weight as of this encounter: 107.5 kg (237 lb).  EXAM:   GENERAL: healthy, alert and no distress  EYES: Eyes grossly normal to inspection, PERRL and conjunctivae and sclerae normal  HENT: ear canals and TM's normal, nose and mouth without ulcers or lesions  NECK: no " "adenopathy, no asymmetry, masses, or scars and thyroid normal to palpation  RESP: lungs clear to auscultation - no rales, rhonchi or wheezes  CV: regular rate and rhythm, normal S1 S2, no S3 or S4, no murmur, click or rub, no peripheral edema and peripheral pulses strong  ABDOMEN: soft, nontender, no hepatosplenomegaly, no masses and bowel sounds normal  MS: no gross musculoskeletal defects noted, no edema  SKIN: no suspicious lesions or rashes  NEURO: Normal strength and tone, mentation intact and speech normal  BACK: no CVA tenderness, no paralumbar tenderness  PSYCH: mentation appears normal, affect normal/bright  LYMPH: no cervical, supraclavicular, axillary, or inguinal adenopathy        ASSESSMENT / PLAN:       ICD-10-CM    1. Encounter for Medicare annual wellness exam  Z00.00 CBC with platelets     Comprehensive metabolic panel (BMP + Alb, Alk Phos, ALT, AST, Total. Bili, TP)     Lipid panel reflex to direct LDL Fasting     PSA, screen   2. Screening for prostate cancer  Z12.5 PSA, screen   3. Need for immunization against influenza  Z23 FLUZONE HIGH DOSE 65+  [86705]   4. Erectile dysfunction, unspecified erectile dysfunction type  N52.9 sildenafil (VIAGRA) 25 MG tablet     DISCONTINUED: sildenafil (VIAGRA) 25 MG tablet   5. Hyperlipidemia LDL goal <130  E78.5 atorvastatin (LIPITOR) 40 MG tablet       Patient has been advised of split billing requirements and indicates understanding: Yes    COUNSELING:  Reviewed preventive health counseling, as reflected in patient instructions    Estimated body mass index is 30.35 kg/m  as calculated from the following:    Height as of 9/23/19: 1.854 m (6' 0.99\").    Weight as of 8/27/19: 104.3 kg (230 lb).        He reports that he quit smoking about 24 years ago. His smoking use included cigarettes. He has a 36.00 pack-year smoking history. He has never used smokeless tobacco.    Appropriate preventive services were discussed with this patient, including applicable " screening as appropriate for cardiovascular disease, diabetes, osteopenia/osteoporosis, and glaucoma.  As appropriate for age/gender, discussed screening for colorectal cancer, prostate cancer, breast cancer, and cervical cancer. Checklist reviewing preventive services available has been given to the patient.    Reviewed patients plan of care and provided an AVS. The Basic Care Plan (routine screening as documented in Health Maintenance) for Peter meets the Care Plan requirement. This Care Plan has been established and reviewed with the Patient.    Counseling Resources:  ATP IV Guidelines  Pooled Cohorts Equation Calculator  Breast Cancer Risk Calculator  BRCA-Related Cancer Risk Assessment: FHS-7 Tool  FRAX Risk Assessment  ICSI Preventive Guidelines  Dietary Guidelines for Americans, 2010  USDA's MyPlate  ASA Prophylaxis  Lung CA Screening    Donell Zuñiga MD  [facility]

## 2020-10-06 NOTE — PATIENT INSTRUCTIONS
Patient Education   Personalized Prevention Plan  You are due for the preventive services outlined below.  Your care team is available to assist you in scheduling these services.  If you have already completed any of these items, please share that information with your care team to update in your medical record.  Health Maintenance Due   Topic Date Due     AORTIC ANEURYSM SCREENING (SYSTEM ASSIGNED)  06/08/2017     PHQ-2  01/01/2020     Annual Wellness Visit  08/27/2020     FALL RISK ASSESSMENT  08/27/2020     Flu Vaccine (1) 09/01/2020     Zoster (Shingles) Vaccine (3 of 3) 01/13/2020

## 2021-02-24 ENCOUNTER — TELEPHONE (OUTPATIENT)
Dept: FAMILY MEDICINE | Facility: CLINIC | Age: 71
End: 2021-02-24

## 2021-02-24 NOTE — TELEPHONE ENCOUNTER
Called patient and relayed provider message below. They have more detailed questions about specifically how long before the vaccine they can take medications. Advised office visit with provider, but they declined. Advised that this is a new vaccine and that specific detail is likely not known at this time. Provided CDC website and Cvgram.me vaccine information as reference for further questions.

## 2021-02-24 NOTE — TELEPHONE ENCOUNTER
Received COVID 19 vaccine yesterday. Immunization record updated.     Patient is concerned if he can take tylenol before the second vaccine. Has arthritis and takes tylenol arthritis,  1300 mg at bedtime.     Should he not take this the night before the second vaccine?  Laura Ch RN

## 2021-03-26 ENCOUNTER — TELEPHONE (OUTPATIENT)
Dept: FAMILY MEDICINE | Facility: CLINIC | Age: 71
End: 2021-03-26

## 2021-09-29 ENCOUNTER — TRANSFERRED RECORDS (OUTPATIENT)
Dept: HEALTH INFORMATION MANAGEMENT | Facility: CLINIC | Age: 71
End: 2021-09-29

## 2021-09-30 ENCOUNTER — TRANSFERRED RECORDS (OUTPATIENT)
Dept: HEALTH INFORMATION MANAGEMENT | Facility: CLINIC | Age: 71
End: 2021-09-30

## 2021-10-24 ENCOUNTER — HEALTH MAINTENANCE LETTER (OUTPATIENT)
Age: 71
End: 2021-10-24

## 2021-12-19 ENCOUNTER — HEALTH MAINTENANCE LETTER (OUTPATIENT)
Age: 71
End: 2021-12-19

## 2021-12-30 ENCOUNTER — OFFICE VISIT (OUTPATIENT)
Dept: FAMILY MEDICINE | Facility: CLINIC | Age: 71
End: 2021-12-30
Payer: MEDICARE

## 2021-12-30 VITALS
RESPIRATION RATE: 14 BRPM | OXYGEN SATURATION: 98 % | DIASTOLIC BLOOD PRESSURE: 82 MMHG | TEMPERATURE: 97.9 F | HEIGHT: 74 IN | HEART RATE: 98 BPM | SYSTOLIC BLOOD PRESSURE: 118 MMHG | WEIGHT: 237 LBS | BODY MASS INDEX: 30.42 KG/M2

## 2021-12-30 DIAGNOSIS — I48.91 ATRIAL FIBRILLATION, UNSPECIFIED TYPE (H): ICD-10-CM

## 2021-12-30 DIAGNOSIS — R73.01 IFG (IMPAIRED FASTING GLUCOSE): ICD-10-CM

## 2021-12-30 DIAGNOSIS — J20.9 ACUTE BRONCHITIS, UNSPECIFIED ORGANISM: Primary | ICD-10-CM

## 2021-12-30 DIAGNOSIS — K51.919 ULCERATIVE COLITIS WITH COMPLICATION, UNSPECIFIED LOCATION (H): ICD-10-CM

## 2021-12-30 PROBLEM — J31.0 RHINITIS: Status: ACTIVE | Noted: 2021-12-30

## 2021-12-30 LAB — HBA1C MFR BLD: 6.3 % (ref 0–5.6)

## 2021-12-30 PROCEDURE — 83036 HEMOGLOBIN GLYCOSYLATED A1C: CPT | Performed by: INTERNAL MEDICINE

## 2021-12-30 PROCEDURE — 99214 OFFICE O/P EST MOD 30 MIN: CPT | Performed by: INTERNAL MEDICINE

## 2021-12-30 PROCEDURE — 36415 COLL VENOUS BLD VENIPUNCTURE: CPT | Performed by: INTERNAL MEDICINE

## 2021-12-30 RX ORDER — SILDENAFIL 50 MG/1
TABLET, FILM COATED ORAL
COMMUNITY
Start: 2021-03-26 | End: 2021-12-30

## 2021-12-30 RX ORDER — GUAIFENESIN/DEXTROMETHORPHAN 100-10MG/5
10 SYRUP ORAL EVERY 4 HOURS PRN
Qty: 354 ML | Refills: 0 | Status: SHIPPED | OUTPATIENT
Start: 2021-12-30 | End: 2022-02-25

## 2021-12-30 RX ORDER — ATORVASTATIN CALCIUM 80 MG/1
TABLET, FILM COATED ORAL
COMMUNITY
Start: 2021-03-26 | End: 2022-02-16

## 2021-12-30 ASSESSMENT — PAIN SCALES - GENERAL: PAINLEVEL: NO PAIN (0)

## 2021-12-30 ASSESSMENT — MIFFLIN-ST. JEOR: SCORE: 1901.83

## 2021-12-30 NOTE — PROGRESS NOTES
Assessment and Plan  1. Acute bronchitis, unspecified organism  New problem, given HPI and my physical exam this is acute bronchitis. Will give emperic antibiotic, no albuterol given A.fib risk. X ray down in clinic today, pt will inform us if no improvement for possible need of CXR at that time.    - amoxicillin-clavulanate (AUGMENTIN) 875-125 MG tablet; Take 1 tablet by mouth 2 times daily  Dispense: 20 tablet; Refill: 0  - guaiFENesin-dextromethorphan (ROBITUSSIN DM) 100-10 MG/5ML syrup; Take 10 mLs by mouth every 4 hours as needed for cough  Dispense: 354 mL; Refill: 0    2. IFG (impaired fasting glucose)  - Hemoglobin A1c; Future    3. Atrial fibrillation, unspecified type (H) S/P Cardioversion  Stable, not on OAC . Follows Cardiology.     4. Ulcerative colitis with complication, unspecified location (H)  Stable, follows GI. Continue current Mesalamine.      Patient Instructions   As discussed sent in antibiotic and cough syrup for improvement.     ==================    Patient Education     Acute Bronchitis  Your healthcare provider has told you that you have acute bronchitis. Bronchitis is infection or inflammation of the airways in the lungs (bronchial tubes). Normally, air moves easily in and out of the airways. Bronchitis narrows the airways. This makes it harder for air to flow in and out of the lungs. This causes symptoms such as shortness of breath, coughing up yellow or green mucus, and wheezing.  Bronchitis can be acute or chronic. Acute means it happens quickly and goes away in a short time. Chronic means a condition lasts a long time and often comes back. Most people with acute bronchitis get better in 1 to 2 weeks.     What causes acute bronchitis?  Acute bronchitis is often caused by a virus such as a cold or the flu. In some cases, it may be caused by bacteria. Certain factors make it more likely for a cold or flu to turn into bronchitis. These include being very young, being elderly, having a  heart or lung problem, or having a weak immune system. Cigarette smoking also makes bronchitis more likely.  When bronchitis develops, the airways become swollen. The airways may also become infected with bacteria. This is known as a secondary infection.  Symptoms of acute bronchitis  Symptoms can include:    Coughing with mucus    Wheezing    Feeling short of breath    Chest pain    Fever  Diagnosing acute bronchitis  Your healthcare provider will ask about your symptoms and health history. He or she will give you a physical exam. This will include listening to your lungs while you breathe. You may have a chest X-ray to look for infection in the lungs (pneumonia) if you have had a fever. You may also have blood tests to check for infection.  Treating acute bronchitis  Bronchitis usually goes away in 1 to 2 weeks without treatment. You can help feel better by:    Taking medicine as directed. Talk to your healthcare provider before taking any over-the-counter medicines (OTC). Some OTC medicines help relieve inflammation in your bronchial tubes. They can also thin mucus. This makes it easier to cough up. Your healthcare provider may prescribe an inhaler to help open up the bronchial tubes. Most of the time, acute bronchitis is caused by a viral infection. Antibiotics are usually not prescribed for viral infections.    Drinking plenty of fluids, such as water, juice, or warm soup. Fluids loosen mucus so that you can cough it up. This helps you breathe more easily. Fluids also prevent dehydration.    Using a humidifier. This can help reduce coughing.    Getting plenty of rest    Not smoking. Also, don't let anyone else smoke in your home. In public places, move away from secondhand smoke.  Recovery and follow-up  Follow up with your doctor. You will likely feel better in 1 to 2 weeks. But you may have a dry cough for a longer time. Let your doctor know if you still have symptoms other than a dry cough after 2 weeks.  Tell him or her if you get bronchial infections often.  Self-care tips  To get relief from your symptoms and prevent bronchitis:    Stop smoking. Stopping smoking is the most important step you can take to treat bronchitis. If you need help stopping smoking, talk with your healthcare provider.    Stay away from secondhand smoke and other irritants. Try to stay away from smoke, chemicals, fumes, and dust. Don t let anyone smoke in your home. Stay indoors on smoggy days.    Prevent lung infections. Ask your healthcare provider about the flu and pneumonia vaccines. Take steps to prevent colds and other lung infections.    Wash your hands well. Wash your hands often with soap and water. Use hand  when you can t wash your hands. Stay away from crowds during cold and flu season.  When to call your healthcare provider  Call the healthcare provider if you have any of these:    Fever of 100.4 F ( 38.0 C) or higher, or as directed by your healthcare provider    Symptoms that get worse, or new symptoms    Breathing not getting better with treatments    Symptoms that don t start to get better in 1 week  [facility] last reviewed this educational content on 8/1/2019 2000-2021 [employer]. All rights reserved. This information is not intended as a substitute for professional medical care. Always follow your healthcare professional's instructions.             Return in about 4 weeks (around 1/27/2022), or if symptoms worsen or fail to improve, for Annual Wellness Exam.    Leann Correia MD  [facility] is a 71 year old who presents for the following health issues       HPI     Acute Illness  Acute illness concerns: Cough (at home test negative)  Onset/Duration: 5 weeks  Symptoms:  Fever: no  Chills/Sweats: no  Headache (location?): YES  Sinus Pressure: YES  Conjunctivitis:  no  Ear Pain: no  Rhinorrhea: YES  Congestion: YES  Sore Throat: no  Cough:  YES-productive of yellow sputum  Wheeze: YES  Decreased Appetite: YES  Nausea: no  Vomiting: no  Diarrhea: no  Dysuria/Freq.: no  Dysuria or Hematuria: no  Fatigue/Achiness: no  Sick/Strep Exposure: YES- wife and daughter had the same issue back in November  Therapies tried and outcome: Robitussin, cough drops       Allergies   Allergen Reactions     No Known Drug Allergies         Past Medical History:   Diagnosis Date     Actinic keratoses     face     Atrial fibrillation (H)     only after adenosine test     BPH (benign prostatic hypertrophy)      Coronary artery disease 2011 2011-Cath Lma 5%, Lad 40-50%, Lcx 30-40%, Nge88-20% (-FFR of Lad)     ED (erectile dysfunction)      Family history of colon cancer 7/6/2012    mother age 65     GERD (gastroesophageal reflux disease)     nexium     Hyperlipidaemia      IFG (impaired fasting glucose)      Obesity, unspecified     Waist size 42, BMI 30.8     Right inguinal hernia 7/1/2014     Thrombocytosis      Ulcerative colitis (H)        Past Surgical History:   Procedure Laterality Date     CARDIAC NUC POLINA STRESS TEST NL      Normal     CARDIOVERSION  12/2011    Atrial Fib     COLONOSCOPY  1/29/2014    Procedure: COMBINED COLONOSCOPY, SINGLE BIOPSY/POLYPECTOMY BY BIOPSY;  COLONOSCOPY;  Surgeon: Ashtyn Gonzales MD;  Location: [facility]     COLONOSCOPY N/A 7/11/2018    Procedure: COMBINED COLONOSCOPY, SINGLE OR MULTIPLE BIOPSY/POLYPECTOMY BY BIOPSY;  COLONOSCOPY ;  Surgeon: Ashtyn Gonzales MD;  Location: [facility]     CORONARY ANGIOGRAPHY ADULT ORDER  12/2011    1 LMA 5%, LAD 40-50%, LCx 30-40%, RCA 25-30%     HC INJECTION SCLEROSING SOLUTION HEMORRHOID  8/24/2012    Procedure: HEMORRHOID INJECTION SCLEROSING SOLUTION;  Surgeon: Mayito Chow MD;  Location: [facility]     HC TOOTH EXTRACTION W/FORCEP       LASIK  1/2011     ROTATOR CUFF REPAIR RT/LT Left 10/15/2015    RCR Left - Dr. Carvalho       Family History   Problem Relation Age of Onset     C.A.D. Father          passed at 52 from MI     Cardiovascular Father      Heart Disease Father      Obesity Father      Myocardial Infarction Father         52 - passed away     Colon Cancer Mother 65        passed away from colon cancer - lived about 2 mo from dx      Arthritis Maternal Grandmother      Diabetes Maternal Grandmother      Arthritis Maternal Grandfather      Heart Disease Brother      Diabetes Brother      Coronary Artery Disease Brother         stent placed      Hypertension No family hx of      Cerebrovascular Disease No family hx of      Breast Cancer No family hx of      Prostate Cancer No family hx of      Alcohol/Drug No family hx of      Allergies No family hx of      Alzheimer Disease No family hx of      Circulatory No family hx of      Congenital Anomalies No family hx of      Connective Tissue Disorder No family hx of      Depression No family hx of      Eye Disorder No family hx of      Genetic Disorder No family hx of      Gastrointestinal Disease No family hx of      Genitourinary Problems No family hx of      Gynecology No family hx of      Musculoskeletal Disorder No family hx of      Neurologic Disorder No family hx of      Osteoporosis No family hx of      Psychotic Disorder No family hx of      Respiratory No family hx of      Thyroid Disease No family hx of      Anesthesia Reaction No family hx of      Blood Disease No family hx of        Social History     Tobacco Use     Smoking status: Former Smoker     Packs/day: 1.50     Years: 24.00     Pack years: 36.00     Types: Cigarettes     Quit date: 1996     Years since quittin.0     Smokeless tobacco: Never Used   Substance Use Topics     Alcohol use: Yes     Alcohol/week: 0.0 standard drinks     Comment: less than one per week        Current Outpatient Medications   Medication     amoxicillin-clavulanate (AUGMENTIN) 875-125 MG tablet     aspirin (ASA) 81 MG EC tablet     atorvastatin (LIPITOR) 40 MG tablet     atorvastatin (LIPITOR) 80 MG  "tablet     JUAN LOW STRENGTH 81 MG OR TBEC     Calcium-Vitamin D-Vitamin K (CALCIUM + D + K) 750-500-40 MG-UNT-MCG TABS     coenzyme Q-10 (COQ-10) 200 MG CAPS     esomeprazole (NEXIUM) 40 MG capsule     fluticasone (FLONASE) 50 MCG/ACT nasal spray     Glucos-Chond-Hyal Ac-Ca Fructo (MOVE FREE JOINT HEALTH ADVANCE) TABS     guaiFENesin-dextromethorphan (ROBITUSSIN DM) 100-10 MG/5ML syrup     mesalamine (LIALDA) 1.2 g EC tablet     MULTIVITAMIN TABS   OR     Probiotic Product (PROBIOTIC PO)     psyllium (METAMUCIL) 58.6 % POWD     sildenafil (VIAGRA) 25 MG tablet     TURMERIC PO     VITAMIN D, CHOLECALCIFEROL, PO     No current facility-administered medications for this visit.        Review of Systems   Constitutional, HEENT, cardiovascular, pulmonary, GI, , musculoskeletal, neuro, skin, endocrine and psych systems are negative, except as otherwise noted.      Objective    /82   Pulse 98   Temp 97.9  F (36.6  C) (Tympanic)   Resp 14   Ht 1.867 m (6' 1.5\")   Wt 107.5 kg (237 lb)   SpO2 98%   BMI 30.84 kg/m    Body mass index is 30.84 kg/m .  Physical Exam   GENERAL: healthy, alert and no distress  NECK: no adenopathy, no asymmetry, masses, or scars and thyroid normal to palpation  RESP: lungs clear to auscultation - no rales, rhonchi or wheezes  CV: regular rate and rhythm, normal S1 S2, no S3 or S4, no murmur, click or rub, no peripheral edema and peripheral pulses strong  ABDOMEN: soft, nontender, no hepatosplenomegaly, no masses and bowel sounds normal  MS: no gross musculoskeletal defects noted, no edema      "

## 2021-12-30 NOTE — PATIENT INSTRUCTIONS
As discussed sent in antibiotic and cough syrup for improvement.     ==================    Patient Education     Acute Bronchitis  Your healthcare provider has told you that you have acute bronchitis. Bronchitis is infection or inflammation of the airways in the lungs (bronchial tubes). Normally, air moves easily in and out of the airways. Bronchitis narrows the airways. This makes it harder for air to flow in and out of the lungs. This causes symptoms such as shortness of breath, coughing up yellow or green mucus, and wheezing.  Bronchitis can be acute or chronic. Acute means it happens quickly and goes away in a short time. Chronic means a condition lasts a long time and often comes back. Most people with acute bronchitis get better in 1 to 2 weeks.     What causes acute bronchitis?  Acute bronchitis is often caused by a virus such as a cold or the flu. In some cases, it may be caused by bacteria. Certain factors make it more likely for a cold or flu to turn into bronchitis. These include being very young, being elderly, having a heart or lung problem, or having a weak immune system. Cigarette smoking also makes bronchitis more likely.  When bronchitis develops, the airways become swollen. The airways may also become infected with bacteria. This is known as a secondary infection.  Symptoms of acute bronchitis  Symptoms can include:    Coughing with mucus    Wheezing    Feeling short of breath    Chest pain    Fever  Diagnosing acute bronchitis  Your healthcare provider will ask about your symptoms and health history. He or she will give you a physical exam. This will include listening to your lungs while you breathe. You may have a chest X-ray to look for infection in the lungs (pneumonia) if you have had a fever. You may also have blood tests to check for infection.  Treating acute bronchitis  Bronchitis usually goes away in 1 to 2 weeks without treatment. You can help feel better by:    Taking medicine as  directed. Talk to your healthcare provider before taking any over-the-counter medicines (OTC). Some OTC medicines help relieve inflammation in your bronchial tubes. They can also thin mucus. This makes it easier to cough up. Your healthcare provider may prescribe an inhaler to help open up the bronchial tubes. Most of the time, acute bronchitis is caused by a viral infection. Antibiotics are usually not prescribed for viral infections.    Drinking plenty of fluids, such as water, juice, or warm soup. Fluids loosen mucus so that you can cough it up. This helps you breathe more easily. Fluids also prevent dehydration.    Using a humidifier. This can help reduce coughing.    Getting plenty of rest    Not smoking. Also, don't let anyone else smoke in your home. In public places, move away from secondhand smoke.  Recovery and follow-up  Follow up with your doctor. You will likely feel better in 1 to 2 weeks. But you may have a dry cough for a longer time. Let your doctor know if you still have symptoms other than a dry cough after 2 weeks. Tell him or her if you get bronchial infections often.  Self-care tips  To get relief from your symptoms and prevent bronchitis:    Stop smoking. Stopping smoking is the most important step you can take to treat bronchitis. If you need help stopping smoking, talk with your healthcare provider.    Stay away from secondhand smoke and other irritants. Try to stay away from smoke, chemicals, fumes, and dust. Don t let anyone smoke in your home. Stay indoors on smoggy days.    Prevent lung infections. Ask your healthcare provider about the flu and pneumonia vaccines. Take steps to prevent colds and other lung infections.    Wash your hands well. Wash your hands often with soap and water. Use hand  when you can t wash your hands. Stay away from crowds during cold and flu season.  When to call your healthcare provider  Call the healthcare provider if you have any of these:    Fever  of 100.4 F ( 38.0 C) or higher, or as directed by your healthcare provider    Symptoms that get worse, or new symptoms    Breathing not getting better with treatments    Symptoms that don t start to get better in 1 week  [employer] last reviewed this educational content on 8/1/2019 2000-2021 [employer]. All rights reserved. This information is not intended as a substitute for professional medical care. Always follow your healthcare professional's instructions.

## 2021-12-31 NOTE — RESULT ENCOUNTER NOTE
"Your lab work is POSITIVE for Prediabetes. Please follow the diet below for improvement. Will need follow up on this with your PCP .     American Diabetes Association (ADA) nutritional guidelines, which do not give specific total dietary compositional targets except for the following recommendations [1] that are in large part similar to the recommendations for the general population (see \"Healthy diet in adults\"):  ?A diet that includes carbohydrates from fruits, vegetables, whole grains, legumes, and low-fat milk is encouraged.  The ideal amount of carbohydrate intake is uncertain. However, monitoring carbohydrate intake (carbohydrate counting or experience-based estimation) is important in patients with diabetes, as carbohydrate intake directly determines postprandial blood glucose, and appropriate insulin adjustment for identified quantities of carbohydrate is one of the most important factors that can improve glycemic control.  ?A variety of eating patterns (Mediterranean, low fat, low carbohydrate, vegetarian) are acceptable.  ?Fat quality is more important than fat quantity. Trans fats contribute to coronary heart disease, while mono- and polyunsaturated fats (eg, those found in fish, olive oil, nuts) are relatively protective. Saturated fatty acids and different food sources of saturated fat have divergent effects on cardiovascular and metabolic health. Trans fatty acid consumption should be kept as low as possible.  ?Protein intake goals should be individualized but not lower than 0.8 g/kg body weight per day (the recommended daily allowance). Patients should be encouraged to substitute lean meats, fish, eggs, beans, peas, soy products, and nuts and seeds for red meat.  An automatic reduction of dietary protein intake (eg, 15 to 19 percent of calories) below usual protein intake in patients who develop diabetic kidney disease is not recommended. The role of dietary protein restriction is uncertain, " particularly in view of problems with compliance in patients already being treated with saturated fat and simple carbohydrate restriction. Furthermore, it is uncertain if a low-protein diet is significantly additive to other measures aimed at reducing cardiovascular risk and preserving renal function, such as angiotensin-converting enzyme (ACE) inhibition and aggressive control of blood pressure and blood glucose.  The usual daily intake of protein should be approximately 10 to 20 percent of total caloric intake. Higher levels of dietary protein intake (>20 percent of calories from protein or >1.3 g/kg/day) have been associated with increased albuminuria, more rapid kidney function loss, and cardiovascular disease (CVD) mortality and therefore should be avoided [69].  ?Fiber intake should be at least 14 grams per 1000 calories daily; higher fiber intake may improve glycemic control.  ?A reduced sodium intake of 2300 mg per day with a diet high in fruits, vegetables, and low-fat dairy products is prudent and has demonstrated beneficial effects on blood pressure.  ?Sugar-sweetened beverages should be avoided in order to control glycemia, weight, and reduce risk for CVD and fatty liver. Consumption of foods with added sugar that have the capacity to displace healthier, more nutrient-dense food choices should be minimized. Care should be taken to avoid excess calories from sucrose; however, foods containing sucrose may be substituted for other carbohydrates or covered with insulin or insulin secretagogue medications.  ?Sugar alcohols and non-nutritive sweeteners are safe when consumed within daily levels established by the US Food and Drug Administration (FDA). When calculating carbohydrate content of foods, one-half of the sugar alcohol content can be counted in the total carbohydrate content of the food. Use of sugar alcohols needs to be balanced with their potential to cause gastrointestinal side effects in sensitive  individuals.        AVOID THESE FOODS WITH HIGH GLYCEMIC INDEX      Dietary glycemic indices and glycemic load for the top 20 carbohydrate-contributing foods in the Nurses' Health Study in 1984  Foods Glycemic index*, % Carbohydrate per serving, g Glycemic load per serving  1. Cooked potatoes (mashed or baked)  102 37 38  2. White bread 100 13 13  3. Cold breakfast cereal Varies by cereal Varies by cereal Varies by cereal  4. Dark bread 102 12 12  5. Orange juice 75 20 15  6. Banana 88 27 24  7. White rice 102 45 46  8. Pizza 86 78 68  9. Pasta 71 40 28  10. English muffins 84 26 22  11. Fruit punch 95 44 42  12. Cola 90 39 35  13. Apple 55 21 12  14. Skim milk 46 11 5  15. Pancake 119 56 67  16. Table sugar 84 4 3  17. Jam 91 13 12  18. Cranberry juice 105 19 20  19. French fries 95 35 33  20. Candy 99 28 28    Please let me know if you have any questions.  Leann Correia MD on 12/30/2021 at 8:55 PM

## 2022-02-09 ENCOUNTER — TRANSFERRED RECORDS (OUTPATIENT)
Dept: HEALTH INFORMATION MANAGEMENT | Facility: CLINIC | Age: 72
End: 2022-02-09
Payer: MEDICARE

## 2022-02-09 LAB
ALT SERPL-CCNC: 38 U/L
AST SERPL-CCNC: 31 U/L
CHOLESTEROL (EXTERNAL): 129 MG/DL
CREATININE (EXTERNAL): 1 MG/DL (ref 0.7–1.2)
GLUCOSE (EXTERNAL): 123 MG/DL (ref 74–100)
HBA1C MFR BLD: 6.3 % (ref 4–6)
HDLC SERPL-MCNC: 35 MG/DL
HEP C HIM: NORMAL
LDL CHOLESTEROL (EXTERNAL): 71 MG/DL
POTASSIUM (EXTERNAL): 4.5 MMOL/L (ref 3.5–5.1)
TRIGLYCERIDES (EXTERNAL): 117 MG/DL
TSH SERPL-ACNC: 1.12 UIU/ML (ref 0.35–4.94)

## 2022-02-15 ENCOUNTER — TELEPHONE (OUTPATIENT)
Dept: CARDIOLOGY | Facility: CLINIC | Age: 72
End: 2022-02-15
Payer: MEDICARE

## 2022-02-15 ENCOUNTER — TRANSFERRED RECORDS (OUTPATIENT)
Dept: HEALTH INFORMATION MANAGEMENT | Facility: CLINIC | Age: 72
End: 2022-02-15
Payer: MEDICARE

## 2022-02-15 NOTE — TELEPHONE ENCOUNTER
[facility] Call Center    Phone Message    May a detailed message be left on voicemail: no     Reason for Call: Pt is calling and states the he is having irregular heart beats which have been happening for about a month. Pt states that this is not an emergency situation.    Action Taken: TE sent to clinic    Travel Screening: Not Applicable     Sasha Rowell on 2/15/2022 at 4:52 PM

## 2022-02-16 ENCOUNTER — OFFICE VISIT (OUTPATIENT)
Dept: CARDIOLOGY | Facility: CLINIC | Age: 72
End: 2022-02-16
Payer: MEDICARE

## 2022-02-16 VITALS
BODY MASS INDEX: 32.07 KG/M2 | HEART RATE: 73 BPM | WEIGHT: 242 LBS | SYSTOLIC BLOOD PRESSURE: 148 MMHG | HEIGHT: 73 IN | DIASTOLIC BLOOD PRESSURE: 78 MMHG

## 2022-02-16 DIAGNOSIS — Z11.59 ENCOUNTER FOR SCREENING FOR OTHER VIRAL DISEASES: Primary | ICD-10-CM

## 2022-02-16 DIAGNOSIS — I48.19 PERSISTENT ATRIAL FIBRILLATION (H): Primary | ICD-10-CM

## 2022-02-16 PROCEDURE — 99215 OFFICE O/P EST HI 40 MIN: CPT | Mod: 25 | Performed by: INTERNAL MEDICINE

## 2022-02-16 PROCEDURE — 93000 ELECTROCARDIOGRAM COMPLETE: CPT | Performed by: INTERNAL MEDICINE

## 2022-02-16 RX ORDER — METOPROLOL SUCCINATE 50 MG/1
50 TABLET, EXTENDED RELEASE ORAL DAILY
Qty: 60 TABLET | Refills: 0 | Status: SHIPPED | OUTPATIENT
Start: 2022-02-16 | End: 2022-02-25

## 2022-02-16 NOTE — PROGRESS NOTES
Service Date: 02/16/2022    REFERRING PHYSICIAN:  Dr. Donell Zuñiga.    HISTORY OF PRESENT ILLNESS:  It was my pleasure to see your patient, ePter Peralta, who is a 71-year-old patient with a history of non-flow-limiting coronary artery disease based upon coronary angiography in 2011, which showed a 50% LAD lesion with a normal fractional flow reserve, 30-40% circumflex disease, 25-30% disease.  The patient had a normal stress echocardiogram performed on 09/16/2019, which was the last time that he saw his own cardiologist, which was Dr. Eulalio Del Rosario.  The patient was seen by his gastroenterologist yesterday and it was noted that he had an irregular heartbeat and for that reason, he was referred here today.  A 12-lead electrocardiogram confirms that the patient is in atrial fibrillation with a ventricular rate of 101 beats per minute.  The patient is asymptomatic with his atrial fibrillation.  He does not feel any palpitations.  He does not feel tachycardic at all.  He did have an upper respiratory tract infection and felt to be possible pneumonia back in November last, and he stated that he has not felt quite well since that time and that he has noticed that he is more short of breath going down to the mailbox.  [facility] physician, but no mention of atrial fibrillation or irregular heartbeat was noticed, and [facility] may [facility] recently if I am not mistaken and there was no mention made of any irregular heartbeat at that time.  The patient does have a right bundle branch block also on his 12-lead electrocardiogram.  He has not been complaining of any chest pains or chest pressure.  [facility] were available to me today.  These were drawn recently, I believe within the last month and this did not show any evidence of hypokalemia, hypomagnesemia and his TSH was also normal.  He has not been complaining  of any arm, throat, jaw discomfort or chest pain or chest pressure with exertion.  The patient does have a history of hyperglycemia, controlled with diet.  His CHADS-VASc score is as follows:  1 for being over 65 years of age, 1 for hypertension as his blood pressure today was raised at 148/78,  1 for diabetes and 1 for vascular disease as he has known coronary artery disease.  With a CHADS-VASc score of 4, this patient requires anticoagulation.    The patient may have obstructive sleep apnea.  His wife who is a nurse has noticed that he has obstruction and snoring.  [facility] or his primary care physician is arranging the home sleep apnea test.  There is a very close association as you know between atrial fibrillation and obstructive sleep apnea.    IMPRESSION:    1.  Newly diagnosed atrial fibrillation of uncertain duration and uncertain onset with a CHADS-VASc score of 4.  2.  Possible obstructive sleep apnea, which may have a bearing on the development of atrial fibrillation.  3.  Normal potassium, magnesium and TSH.  4.  Non-flow limiting coronary artery disease based upon coronary angiography in 2011 and no symptoms to suggest angina pectoris.  5.  Essential hypertension today at 148/78, though his blood pressure normally is quite well controlled.    PLAN:    1.  Firstly, we will start the patient on Eliquis 5 mg twice a day.  The patient does have a history of ulcerative colitis, but he has no bleeding at present and his ulcerative colitis appears to be quiescent.  2.  We will stop the baby aspirin.  3.  We will start the patient on metoprolol succinate 50 mg at nighttime.  I did warn the patient that this can affect erectile dysfunction, but he is taking Viagra.  4.  The patient should switch from coffee to decaffeinated coffee.  He drinks 3 cups of coffee a day.  5.  We will obtain an echocardiogram to look for possible causes of atrial fibrillation.  6.  We will obtain a Lexiscan study to determine  if there is any evidence of ischemia.  7.  We will arrange for the patient to have electrical cardioversion in 4 weeks' time and I told him not to miss any doses of Eliquis.  We will have the patient seen by an [facility] just before the electrical cardioversion to go through the risks and benefits which I have already explained to him.  We will have the patient follow up with his own cardiologist who normally follows with and has done so for quite some time, which is Dr. Del Rosario, and we will have him follow up in 2 months' time, which will be 4 weeks after his electrical cardioversion.    It has been my pleasure to be involved in the care of this very nice patient.  This was a 1-hour visit including explaining atrial fibrillation, going through the laboratory investigations, explaining CHADS-VASc scoring system.    One important point is that [facility], but it takes time for that to be set up, so I have given him 2 months' worth of Eliquis and 2 months' worth of metoprolol succinate, which will go to [facility].  I have told him not to run out of his medications and to get moving on having his medications at the [facility] which is much cheaper for him, but again I stressed to him that he is not to run out of any of his medications.  It has been my pleasure to be involved in the care of this nice patient.      Yon Modi MD, FACC    cc:  Eulalio Del Rosario MD  [facility] at [address], [address]  [address]     Yon Modi MD, FACC        D: 2022   T: 2022   MT: DELMER    Name:     BUCK SAMANIEGO  MRN:      [id number]        Account:      [id number]   :      1950           Service Date: 2022       Document: [id number]

## 2022-02-16 NOTE — PROGRESS NOTES
HPI and Plan:   See dictation          Orders Placed This Encounter   Procedures     Follow-Up with Cardiology [facility]UR     Follow-Up with Cardiology     EKG 12-lead complete w/read - Clinics (performed today)     EKG 12-lead complete w/read - Clinics (to be scheduled)     Cardioversion External     Echocardiogram Complete       Orders Placed This Encounter   Medications     apixaban ANTICOAGULANT (ELIQUIS ANTICOAGULANT) 5 MG tablet     Sig: Take 1 tablet (5 mg) by mouth 2 times daily     Dispense:  120 tablet     Refill:  0     metoprolol succinate ER (TOPROL-XL) 50 MG 24 hr tablet     Sig: Take 1 tablet (50 mg) by mouth daily     Dispense:  60 tablet     Refill:  0       Medications Discontinued During This Encounter   Medication Reason     atorvastatin (LIPITOR) 80 MG tablet      aspirin (ASA) 81 MG EC tablet          Encounter Diagnosis   Name Primary?     Persistent atrial fibrillation (H) Yes       CURRENT MEDICATIONS:  Current Outpatient Medications   Medication Sig Dispense Refill     apixaban ANTICOAGULANT (ELIQUIS ANTICOAGULANT) 5 MG tablet Take 1 tablet (5 mg) by mouth 2 times daily 120 tablet 0     atorvastatin (LIPITOR) 40 MG tablet Take 1 tablet (40 mg) by mouth daily 90 tablet 3     JUAN LOW STRENGTH 81 MG OR TBEC 2 DAILY 30 0     Calcium-Vitamin D-Vitamin K (CALCIUM + D + K) 750-500-40 MG-UNT-MCG TABS Take 2 tablets by mouth daily.       esomeprazole (NEXIUM) 40 MG capsule Take 40 mg by mouth every morning (before breakfast) Take 30-60 minutes before eating.       fluticasone (FLONASE) 50 MCG/ACT nasal spray Spray 2 sprays into both nostrils daily 16 g 11     Glucos-Chond-Hyal Ac-Ca Fructo (MOVE FREE JOINT HEALTH ADVANCE) TABS Take 1 mg by mouth daily       guaiFENesin-dextromethorphan (ROBITUSSIN DM) 100-10 MG/5ML syrup Take 10 mLs by mouth every 4 hours as needed for cough 354 mL 0     mesalamine (LIALDA) 1.2 g EC tablet Take 1,200 mg by mouth daily (with breakfast)       metoprolol succinate ER  (TOPROL-XL) 50 MG 24 hr tablet Take 1 tablet (50 mg) by mouth daily 60 tablet 0     MULTIVITAMIN TABS   OR 1 daily  0     Probiotic Product (PROBIOTIC PO) Take by mouth daily       psyllium (METAMUCIL) 58.6 % POWD Take 2 teaspoonful by mouth 2 times daily        sildenafil (VIAGRA) 25 MG tablet Take 3 tablets (75 mg) by mouth daily as needed 90 tablet 11     TURMERIC PO Take 1,500 mg by mouth daily       amoxicillin-clavulanate (AUGMENTIN) 875-125 MG tablet Take 1 tablet by mouth 2 times daily (Patient not taking: Reported on 2/16/2022) 20 tablet 0     coenzyme Q-10 (COQ-10) 200 MG CAPS Take by mouth 2 times daily        VITAMIN D, CHOLECALCIFEROL, PO Take 5,000 Units by mouth daily         ALLERGIES     Allergies   Allergen Reactions     No Known Drug Allergies        PAST MEDICAL HISTORY:  Past Medical History:   Diagnosis Date     Actinic keratoses     face     Atrial fibrillation (H)     only after adenosine test     BPH (benign prostatic hypertrophy)      Coronary artery disease 2011 2011-Cath Lma 5%, Lad 40-50%, Lcx 30-40%, Vsl63-91% (-FFR of Lad)     ED (erectile dysfunction)      Family history of colon cancer 7/6/2012    mother age 65     GERD (gastroesophageal reflux disease)     nexium     Hyperlipidaemia      IFG (impaired fasting glucose)      Obesity, unspecified     Waist size 42, BMI 30.8     Right inguinal hernia 7/1/2014     Thrombocytosis      Ulcerative colitis (H)        PAST SURGICAL HISTORY:  Past Surgical History:   Procedure Laterality Date     CARDIAC NUC POLINA STRESS TEST NL      Normal     CARDIOVERSION  12/2011    Atrial Fib     COLONOSCOPY  1/29/2014    Procedure: COMBINED COLONOSCOPY, SINGLE BIOPSY/POLYPECTOMY BY BIOPSY;  COLONOSCOPY;  Surgeon: Ashtyn Gonzales MD;  Location:  GI     COLONOSCOPY N/A 7/11/2018    Procedure: COMBINED COLONOSCOPY, SINGLE OR MULTIPLE BIOPSY/POLYPECTOMY BY BIOPSY;  COLONOSCOPY ;  Surgeon: Ashtyn Gonzales MD;  Location:  GI     CORONARY  ANGIOGRAPHY ADULT ORDER  12/2011    1 LMA 5%, LAD 40-50%, LCx 30-40%, RCA 25-30%     HC INJECTION SCLEROSING SOLUTION HEMORRHOID  8/24/2012    Procedure: HEMORRHOID INJECTION SCLEROSING SOLUTION;  Surgeon: Mayito Chow MD;  Location: [facility]     HC TOOTH EXTRACTION W/FORCEP       LASIK  1/2011     ROTATOR CUFF REPAIR RT/LT Left 10/15/2015    RCR Left - Dr. Carvalho       FAMILY HISTORY:  Family History   Problem Relation Age of Onset     C.A.D. Father         passed at 52 from MI     Cardiovascular Father      Heart Disease Father      Obesity Father      Myocardial Infarction Father         52 - passed away     Colon Cancer Mother 65        passed away from colon cancer - lived about 2 mo from dx 1981     Arthritis Maternal Grandmother      Diabetes Maternal Grandmother      Arthritis Maternal Grandfather      Heart Disease Brother      Diabetes Brother      Coronary Artery Disease Brother         stent placed      Hypertension No family hx of      Cerebrovascular Disease No family hx of      Breast Cancer No family hx of      Prostate Cancer No family hx of      Alcohol/Drug No family hx of      Allergies No family hx of      Alzheimer Disease No family hx of      Circulatory No family hx of      Congenital Anomalies No family hx of      Connective Tissue Disorder No family hx of      Depression No family hx of      Eye Disorder No family hx of      Genetic Disorder No family hx of      Gastrointestinal Disease No family hx of      Genitourinary Problems No family hx of      Gynecology No family hx of      Musculoskeletal Disorder No family hx of      Neurologic Disorder No family hx of      Osteoporosis No family hx of      Psychotic Disorder No family hx of      Respiratory No family hx of      Thyroid Disease No family hx of      Anesthesia Reaction No family hx of      Blood Disease No family hx of        SOCIAL HISTORY:  Social History     Socioeconomic History     Marital status:      Spouse name:  Emy     Number of children: 2     Years of education: 16     Highest education level: None   Occupational History     Occupation: [employer]     Employer: [employer]   Tobacco Use     Smoking status: Former Smoker     Packs/day: 1.50     Years: 24.00     Pack years: 36.00     Types: Cigarettes     Quit date: 1996     Years since quittin.1     Smokeless tobacco: Never Used   Substance and Sexual Activity     Alcohol use: Yes     Alcohol/week: 0.0 standard drinks     Comment: less than one per week     Drug use: No     Sexual activity: Yes     Partners: Female   Other Topics Concern      Service Yes     Blood Transfusions No     Caffeine Concern No     Comment: 2 cups per day     Occupational Exposure No     Hobby Hazards No     Sleep Concern No     Stress Concern Yes     Weight Concern Yes     Special Diet Yes     Back Care No     Exercise No     Comment: 8,000 steps a day. Fitbit     Bike Helmet No     Comment: n/a     Seat Belt Yes     Self-Exams Yes     Parent/sibling w/ CABG, MI or angioplasty before 65F 55M? Not Asked   Social History Narrative    Eats fruits and vegetables every day. He takes extra vitamin D. He was advised to aim for 1200 mg of calcium per day.     Social Determinants of Health     Financial Resource Strain: Not on file   Food Insecurity: Not on file   Transportation Needs: Not on file   Physical Activity: Not on file   Stress: Not on file   Social Connections: Not on file   Intimate Partner Violence: Not on file   Housing Stability: Not on file       Review of Systems:  Skin:  Negative       Eyes:  Negative      ENT:  Negative      Respiratory:  Positive for dyspnea on exertion;cough     Cardiovascular:    lightheadedness;Positive for    Gastroenterology: Positive for heartburn    Genitourinary:  not assessed      Musculoskeletal:  Positive for neck pain    Neurologic:  Positive for headaches;numbness or tingling of hands    Psychiatric:  Positive for sleep  "disturbances    Heme/Lymph/Imm:  Negative      Endocrine:  Negative        Physical Exam:  Vitals: BP (!) 148/78   Pulse 73   Ht 1.854 m (6' 1\")   Wt 109.8 kg (242 lb)   BMI 31.93 kg/m      Constitutional:  cooperative, alert and oriented, well developed, well nourished, in no acute distress;cooperative overweight      Skin:  warm and dry to the touch, no apparent skin lesions or masses noted          Head:  normocephalic, no masses or lesions        Eyes:  pupils equal and round        Lymph:No Cervical lymphadenopathy present;No thyromegaly     ENT:  no pallor or cyanosis, dentition good        Neck:  carotid pulses are full and equal bilaterally, JVP normal, no carotid bruit        Respiratory:  normal breath sounds, clear to auscultation, normal A-P diameter, normal symmetry, normal respiratory excursion, no use of accessory muscles         Cardiac:  (Variable S1 and normal S2) irregularly irregular rhythm     systolic ejection murmur;grade 1        pulses full and equal, no bruits auscultated                                        GI:    obese      Extremities and Muscular Skeletal:  no deformities, clubbing, cyanosis, erythema observed;no edema              Neurological:  no gross motor deficits        Psych:  Alert and Oriented x 3        CC  No referring provider defined for this encounter.              "

## 2022-02-16 NOTE — TELEPHONE ENCOUNTER
"RN received call from Dr. Coulter advising that he received after hours call from patient and patient's wife with concerns with patient's irregular HR. Dr. Coulter advised this RN he would like patient to be seen today with EKG prior. Patient has not been seen since 2019 so will need to be DOD spot. RN called patient and patient's wife and reviewed with them their concerns with patient's irregular heart beat. Patient's wife reported that patient has been having an irregular Heart beat for the past month and has also noticed some chest discomfort and SOB with ambulation. Patient's HR currently in the 70's, but irregular per patient's wife's report. Patient reports that he has noticed more \"indigestion\" the past few months and increased SOB with going up and down stairs and ambulating across the floor in his kitchen while making breakfast. Patient currently endorses very mild chest pressure, but reports this is not new for him. Patient and patient's wife are in agreement with Dr. Coulter's recommendations to have EKG completed today and f/u apt with cardiology. Patient and Patient's wife will call prior to apt with any questions/concerns.           "

## 2022-02-16 NOTE — LETTER
2/16/2022    Donell Zuñiga MD  [address] [facility] [address]    RE: Peetr Peralta       Dear Colleague,     I had the pleasure of seeing Peter Peralta in the [facility].  HPI and Plan:   See dictation          Orders Placed This Encounter   Procedures     Follow-Up with Cardiology MAYUR     Follow-Up with Cardiology     EKG 12-lead complete w/read - Clinics (performed today)     EKG 12-lead complete w/read - Clinics (to be scheduled)     Cardioversion External     Echocardiogram Complete       Orders Placed This Encounter   Medications     apixaban ANTICOAGULANT (ELIQUIS ANTICOAGULANT) 5 MG tablet     Sig: Take 1 tablet (5 mg) by mouth 2 times daily     Dispense:  120 tablet     Refill:  0     metoprolol succinate ER (TOPROL-XL) 50 MG 24 hr tablet     Sig: Take 1 tablet (50 mg) by mouth daily     Dispense:  60 tablet     Refill:  0       Medications Discontinued During This Encounter   Medication Reason     atorvastatin (LIPITOR) 80 MG tablet      aspirin (ASA) 81 MG EC tablet          Encounter Diagnosis   Name Primary?     Persistent atrial fibrillation (H) Yes       CURRENT MEDICATIONS:  Current Outpatient Medications   Medication Sig Dispense Refill     apixaban ANTICOAGULANT (ELIQUIS ANTICOAGULANT) 5 MG tablet Take 1 tablet (5 mg) by mouth 2 times daily 120 tablet 0     atorvastatin (LIPITOR) 40 MG tablet Take 1 tablet (40 mg) by mouth daily 90 tablet 3     JUAN LOW STRENGTH 81 MG OR TBEC 2 DAILY 30 0     Calcium-Vitamin D-Vitamin K (CALCIUM + D + K) 750-500-40 MG-UNT-MCG TABS Take 2 tablets by mouth daily.       esomeprazole (NEXIUM) 40 MG capsule Take 40 mg by mouth every morning (before breakfast) Take 30-60 minutes before eating.       fluticasone (FLONASE) 50 MCG/ACT nasal spray Spray 2 sprays into both nostrils daily 16 g 11     Glucos-Chond-Hyal Ac-Ca Fructo (MOVE FREE JOINT HEALTH ADVANCE) TABS Take 1 mg by mouth daily       guaiFENesin-dextromethorphan  (ROBITUSSIN DM) 100-10 MG/5ML syrup Take 10 mLs by mouth every 4 hours as needed for cough 354 mL 0     mesalamine (LIALDA) 1.2 g EC tablet Take 1,200 mg by mouth daily (with breakfast)       metoprolol succinate ER (TOPROL-XL) 50 MG 24 hr tablet Take 1 tablet (50 mg) by mouth daily 60 tablet 0     MULTIVITAMIN TABS   OR 1 daily  0     Probiotic Product (PROBIOTIC PO) Take by mouth daily       psyllium (METAMUCIL) 58.6 % POWD Take 2 teaspoonful by mouth 2 times daily        sildenafil (VIAGRA) 25 MG tablet Take 3 tablets (75 mg) by mouth daily as needed 90 tablet 11     TURMERIC PO Take 1,500 mg by mouth daily       amoxicillin-clavulanate (AUGMENTIN) 875-125 MG tablet Take 1 tablet by mouth 2 times daily (Patient not taking: Reported on 2/16/2022) 20 tablet 0     coenzyme Q-10 (COQ-10) 200 MG CAPS Take by mouth 2 times daily        VITAMIN D, CHOLECALCIFEROL, PO Take 5,000 Units by mouth daily         ALLERGIES     Allergies   Allergen Reactions     No Known Drug Allergies        PAST MEDICAL HISTORY:  Past Medical History:   Diagnosis Date     Actinic keratoses     face     Atrial fibrillation (H)     only after adenosine test     BPH (benign prostatic hypertrophy)      Coronary artery disease 2011 2011-Cath Lma 5%, Lad 40-50%, Lcx 30-40%, Ixv70-81% (-FFR of Lad)     ED (erectile dysfunction)      Family history of colon cancer 7/6/2012    mother age 65     GERD (gastroesophageal reflux disease)     nexium     Hyperlipidaemia      IFG (impaired fasting glucose)      Obesity, unspecified     Waist size 42, BMI 30.8     Right inguinal hernia 7/1/2014     Thrombocytosis      Ulcerative colitis (H)        PAST SURGICAL HISTORY:  Past Surgical History:   Procedure Laterality Date     CARDIAC NUC POLINA STRESS TEST NL      Normal     CARDIOVERSION  12/2011    Atrial Fib     COLONOSCOPY  1/29/2014    Procedure: COMBINED COLONOSCOPY, SINGLE BIOPSY/POLYPECTOMY BY BIOPSY;  COLONOSCOPY;  Surgeon: Ashtyn Gonzales MD;   Location:  GI     COLONOSCOPY N/A 7/11/2018    Procedure: COMBINED COLONOSCOPY, SINGLE OR MULTIPLE BIOPSY/POLYPECTOMY BY BIOPSY;  COLONOSCOPY ;  Surgeon: Ashtyn Gonzales MD;  Location:  GI     CORONARY ANGIOGRAPHY ADULT ORDER  12/2011    1 LMA 5%, LAD 40-50%, LCx 30-40%, RCA 25-30%     HC INJECTION SCLEROSING SOLUTION HEMORRHOID  8/24/2012    Procedure: HEMORRHOID INJECTION SCLEROSING SOLUTION;  Surgeon: Mayito Chow MD;  Location:  GI     HC TOOTH EXTRACTION W/FORCEP       LASIK  1/2011     ROTATOR CUFF REPAIR RT/LT Left 10/15/2015    RCR Left - Dr. Carvalho       FAMILY HISTORY:  Family History   Problem Relation Age of Onset     C.A.D. Father         passed at 52 from MI     Cardiovascular Father      Heart Disease Father      Obesity Father      Myocardial Infarction Father         52 - passed away     Colon Cancer Mother 65        passed away from colon cancer - lived about 2 mo from dx 1981     Arthritis Maternal Grandmother      Diabetes Maternal Grandmother      Arthritis Maternal Grandfather      Heart Disease Brother      Diabetes Brother      Coronary Artery Disease Brother         stent placed      Hypertension No family hx of      Cerebrovascular Disease No family hx of      Breast Cancer No family hx of      Prostate Cancer No family hx of      Alcohol/Drug No family hx of      Allergies No family hx of      Alzheimer Disease No family hx of      Circulatory No family hx of      Congenital Anomalies No family hx of      Connective Tissue Disorder No family hx of      Depression No family hx of      Eye Disorder No family hx of      Genetic Disorder No family hx of      Gastrointestinal Disease No family hx of      Genitourinary Problems No family hx of      Gynecology No family hx of      Musculoskeletal Disorder No family hx of      Neurologic Disorder No family hx of      Osteoporosis No family hx of      Psychotic Disorder No family hx of      Respiratory No family hx of      Thyroid  Disease No family hx of      Anesthesia Reaction No family hx of      Blood Disease No family hx of        SOCIAL HISTORY:  Social History     Socioeconomic History     Marital status:      Spouse name: Emy     Number of children: 2     Years of education: 16     Highest education level: None   Occupational History     Occupation: [employer]     Employer: [employer]   Tobacco Use     Smoking status: Former Smoker     Packs/day: 1.50     Years: 24.00     Pack years: 36.00     Types: Cigarettes     Quit date: 1996     Years since quittin.1     Smokeless tobacco: Never Used   Substance and Sexual Activity     Alcohol use: Yes     Alcohol/week: 0.0 standard drinks     Comment: less than one per week     Drug use: No     Sexual activity: Yes     Partners: Female   Other Topics Concern      Service Yes     Blood Transfusions No     Caffeine Concern No     Comment: 2 cups per day     Occupational Exposure No     Hobby Hazards No     Sleep Concern No     Stress Concern Yes     Weight Concern Yes     Special Diet Yes     Back Care No     Exercise No     Comment: 8,000 steps a day. Fitbit     Bike Helmet No     Comment: n/a     Seat Belt Yes     Self-Exams Yes     Parent/sibling w/ CABG, MI or angioplasty before 65F 55M? Not Asked   Social History Narrative    Eats fruits and vegetables every day. He takes extra vitamin D. He was advised to aim for 1200 mg of calcium per day.     Social Determinants of Health     Financial Resource Strain: Not on file   Food Insecurity: Not on file   Transportation Needs: Not on file   Physical Activity: Not on file   Stress: Not on file   Social Connections: Not on file   Intimate Partner Violence: Not on file   Housing Stability: Not on file       Review of Systems:  Skin:  Negative       Eyes:  Negative      ENT:  Negative      Respiratory:  Positive for dyspnea on exertion;cough     Cardiovascular:    lightheadedness;Positive for    Gastroenterology:  "Positive for heartburn    Genitourinary:  not assessed      Musculoskeletal:  Positive for neck pain    Neurologic:  Positive for headaches;numbness or tingling of hands    Psychiatric:  Positive for sleep disturbances    Heme/Lymph/Imm:  Negative      Endocrine:  Negative        Physical Exam:  Vitals: BP (!) 148/78   Pulse 73   Ht 1.854 m (6' 1\")   Wt 109.8 kg (242 lb)   BMI 31.93 kg/m      Constitutional:  cooperative, alert and oriented, well developed, well nourished, in no acute distress;cooperative overweight      Skin:  warm and dry to the touch, no apparent skin lesions or masses noted          Head:  normocephalic, no masses or lesions        Eyes:  pupils equal and round        Lymph:No Cervical lymphadenopathy present;No thyromegaly     ENT:  no pallor or cyanosis, dentition good        Neck:  carotid pulses are full and equal bilaterally, JVP normal, no carotid bruit        Respiratory:  normal breath sounds, clear to auscultation, normal A-P diameter, normal symmetry, normal respiratory excursion, no use of accessory muscles         Cardiac:  (Variable S1 and normal S2) irregularly irregular rhythm     systolic ejection murmur;grade 1        pulses full and equal, no bruits auscultated                                        GI:    obese      Extremities and Muscular Skeletal:  no deformities, clubbing, cyanosis, erythema observed;no edema              Neurological:  no gross motor deficits        Psych:  Alert and Oriented x 3        CC  No referring provider defined for this encounter.    Thank you for allowing me to participate in the care of your patient.      Sincerely,     Yon iGlliam MD, MD     [facility]  cc:   No referring provider defined for this encounter.        "

## 2022-02-17 ENCOUNTER — TELEPHONE (OUTPATIENT)
Dept: CARDIOLOGY | Facility: CLINIC | Age: 72
End: 2022-02-17
Payer: MEDICARE

## 2022-02-17 NOTE — TELEPHONE ENCOUNTER
Called pt - reviewed that patient is having Lexiscan on Feb 24th and no need to hold metoprolol.  Also do not miss any Eliquis doses to protect patient from forming a potential clot and delaying cardioversion.  Call if further questions.

## 2022-02-21 ENCOUNTER — TELEPHONE (OUTPATIENT)
Dept: CARDIOLOGY | Facility: CLINIC | Age: 72
End: 2022-02-21
Payer: MEDICARE

## 2022-02-21 ASSESSMENT — ACTIVITIES OF DAILY LIVING (ADL): CURRENT_FUNCTION: NO ASSISTANCE NEEDED

## 2022-02-21 ASSESSMENT — ENCOUNTER SYMPTOMS
HEMATURIA: 0
NERVOUS/ANXIOUS: 0
CONSTIPATION: 0
WEAKNESS: 0
ABDOMINAL PAIN: 0
SORE THROAT: 0
NAUSEA: 0
SHORTNESS OF BREATH: 1
JOINT SWELLING: 0
DIZZINESS: 0
PALPITATIONS: 0
DIARRHEA: 0
HEMATOCHEZIA: 0
ARTHRALGIAS: 0
HEADACHES: 1
FREQUENCY: 0
MYALGIAS: 1
EYE PAIN: 0
CHILLS: 0
COUGH: 1
PARESTHESIAS: 0
DYSURIA: 0
FEVER: 0
HEARTBURN: 1

## 2022-02-21 NOTE — TELEPHONE ENCOUNTER
Called pt back regarding questions  Pt states he was given information regarding his appts - his Nuc gxt lexiscan 02/24/2022 info says that test is at 0830 and arrive at 0630  Reviewed that he does not need to come early and should arrive at 0830    Also, pt has cardioversion 03/22/2022- that test is at 0830 and he should arrive at hospital at 0630  Do not miss eliquis dose  And then see Kaye GOLDSTEIN 03/30/2022 to discuss results of nuc gxt and cardioversion  BJ Bhagat 04/20/2022 to follow up    Pt would  [facility] - [facility] be less [facility].  Also would [facility]   [facility] Agnes Lane

## 2022-02-23 ENCOUNTER — OFFICE VISIT (OUTPATIENT)
Dept: FAMILY MEDICINE | Facility: CLINIC | Age: 72
End: 2022-02-23
Payer: MEDICARE

## 2022-02-23 ENCOUNTER — TELEPHONE (OUTPATIENT)
Dept: FAMILY MEDICINE | Facility: CLINIC | Age: 72
End: 2022-02-23

## 2022-02-23 VITALS
HEART RATE: 99 BPM | DIASTOLIC BLOOD PRESSURE: 74 MMHG | WEIGHT: 235 LBS | RESPIRATION RATE: 20 BRPM | BODY MASS INDEX: 31.14 KG/M2 | SYSTOLIC BLOOD PRESSURE: 118 MMHG | OXYGEN SATURATION: 98 % | TEMPERATURE: 98.7 F | HEIGHT: 73 IN

## 2022-02-23 DIAGNOSIS — G47.33 OSA (OBSTRUCTIVE SLEEP APNEA): ICD-10-CM

## 2022-02-23 DIAGNOSIS — N52.9 ERECTILE DYSFUNCTION, UNSPECIFIED ERECTILE DYSFUNCTION TYPE: ICD-10-CM

## 2022-02-23 DIAGNOSIS — R80.9 MICROALBUMINURIA: Primary | ICD-10-CM

## 2022-02-23 DIAGNOSIS — Z12.5 SCREENING FOR PROSTATE CANCER: ICD-10-CM

## 2022-02-23 DIAGNOSIS — R73.09 ELEVATED GLUCOSE: ICD-10-CM

## 2022-02-23 DIAGNOSIS — Z00.00 HEALTH MAINTENANCE EXAMINATION: Primary | ICD-10-CM

## 2022-02-23 LAB
CREAT UR-MCNC: 137 MG/DL
MICROALBUMIN UR-MCNC: 440 MG/L
MICROALBUMIN/CREAT UR: 321.17 MG/G CR (ref 0–17)

## 2022-02-23 PROCEDURE — G0439 PPPS, SUBSEQ VISIT: HCPCS | Performed by: FAMILY MEDICINE

## 2022-02-23 PROCEDURE — 82043 UR ALBUMIN QUANTITATIVE: CPT | Performed by: FAMILY MEDICINE

## 2022-02-23 RX ORDER — TADALAFIL 10 MG/1
10 TABLET ORAL DAILY PRN
Qty: 60 TABLET | Refills: 0 | Status: ON HOLD | OUTPATIENT
Start: 2022-02-23 | End: 2022-03-05

## 2022-02-23 ASSESSMENT — ENCOUNTER SYMPTOMS
HEADACHES: 1
FREQUENCY: 0
COUGH: 1
NAUSEA: 0
NERVOUS/ANXIOUS: 0
PARESTHESIAS: 0
ARTHRALGIAS: 0
HEMATURIA: 0
CHILLS: 0
DYSURIA: 0
CONSTIPATION: 0
SHORTNESS OF BREATH: 1
EYE PAIN: 0
HEARTBURN: 1
FEVER: 0
HEMATOCHEZIA: 0
SORE THROAT: 0
DIZZINESS: 0
ABDOMINAL PAIN: 0
PALPITATIONS: 0
WEAKNESS: 0
JOINT SWELLING: 0
MYALGIAS: 1
DIARRHEA: 0

## 2022-02-23 ASSESSMENT — PAIN SCALES - GENERAL: PAINLEVEL: NO PAIN (0)

## 2022-02-23 ASSESSMENT — ACTIVITIES OF DAILY LIVING (ADL): CURRENT_FUNCTION: NO ASSISTANCE NEEDED

## 2022-02-23 NOTE — PROGRESS NOTES
"SUBJECTIVE:   Peter Peralta is a 71 year old male who presents for Preventive Visit.      Patient has been advised of split billing requirements and indicates understanding: Yes  Are you in the first 12 months of your Medicare coverage?  No    Healthy Habits:     In general, how would you rate your overall health?  Fair    Frequency of exercise:  1 day/week    Duration of exercise:  15-30 minutes    Do you usually eat at least 4 servings of fruit and vegetables a day, include whole grains    & fiber and avoid regularly eating high fat or \"junk\" foods?  Yes    Taking medications regularly:  Yes    Medication side effects:  None    Ability to successfully perform activities of daily living:  No assistance needed    Home Safety:  No safety concerns identified    Hearing Impairment:  No hearing concerns    In the past 6 months, have you been bothered by leaking of urine?  No    In general, how would you rate your overall mental or emotional health?  Excellent      PHQ-2 Total Score: 0    Do you feel safe in your environment? Yes    Have you ever done Advance Care Planning? (For example, a Health Directive, POLST, or a discussion with a medical provider or your loved ones about your wishes): Yes, advance care planning is on file.    Fall risk  Fallen 2 or more times in the past year?: No  Any fall with injury in the past year?: No    Cognitive Screening   1) Repeat 3 items (Leader, Season, Table)    2) Clock draw: NORMAL  3) 3 item recall: Recalls 3 objects  Results: 3 items recalled: COGNITIVE IMPAIRMENT LESS LIKELY    Mini-CogTM Copyright KARIN Lora. Licensed by the author for use in [facility]; reprinted with permission (michel@.[email or web address]). All rights reserved.      Do you have sleep apnea, excessive snoring or daytime drowsiness?: yes    Reviewed and updated as needed this visit by clinical staff   Tobacco  Allergies  Meds   Med Hx  Surg Hx  Fam Hx  Soc Hx        Reviewed and updated as needed this " visit by Provider                 Social History     Tobacco Use     Smoking status: Former Smoker     Packs/day: 1.50     Years: 24.00     Pack years: 36.00     Types: Cigarettes     Quit date: 1996     Years since quittin.1     Smokeless tobacco: Never Used   Substance Use Topics     Alcohol use: Yes     Alcohol/week: 0.0 standard drinks     Comment: 1 beer per week     If you drink alcohol do you typically have >3 drinks per day or >7 drinks per week? No    No flowsheet data found.        Current providers sharing in care for this patient include:   Patient Care Team:  Donell Zuñiga MD as PCP - General (Family Practice)  Donell Zuñiga MD as Assigned PCP    The following health maintenance items are reviewed in Epic and correct as of today:  Health Maintenance Due   Topic Date Due     AORTIC ANEURYSM SCREENING (SYSTEM ASSIGNED)  Never done     FALL RISK ASSESSMENT  10/06/2021     COVID-19 Vaccine (4 - Booster) 2022     BP Readings from Last 3 Encounters:   22 118/74   22 (!) 148/78   21 118/82    Wt Readings from Last 3 Encounters:   22 106.6 kg (235 lb)   22 109.8 kg (242 lb)   21 107.5 kg (237 lb)                  Patient Active Problem List   Diagnosis     Family history of coronary artery disease     Hyperlipidemia LDL goal <130     GERD (gastroesophageal reflux disease)     Family history of colon cancer     Ulcerative colitis (H)     Right inguinal hernia     Atrial fibrillation, unspecified type (H) S/P Cardioversion     Hyperlipidemia     Murmur     Nonspecific abnormal results of cardiovascular function study     Chest pain     Elevated blood pressure reading without diagnosis of hypertension     Benign prostatic hyperplasia, presence of lower urinary tract symptoms unspecified, unspecified morphology     Advanced directives, counseling/discussion     Elevated fasting blood sugar     Hoarseness     BMI > 25     Thrombocytosis     Complete tear of left  rotator cuff     Rhinitis     Erectile dysfunction     Past Surgical History:   Procedure Laterality Date     CARDIAC NUC POLINA STRESS TEST NL      Normal     CARDIOVERSION  2011    Atrial Fib     COLONOSCOPY  2014    Procedure: COMBINED COLONOSCOPY, SINGLE BIOPSY/POLYPECTOMY BY BIOPSY;  COLONOSCOPY;  Surgeon: Ashtyn Gonzales MD;  Location:  GI     COLONOSCOPY N/A 2018    Procedure: COMBINED COLONOSCOPY, SINGLE OR MULTIPLE BIOPSY/POLYPECTOMY BY BIOPSY;  COLONOSCOPY ;  Surgeon: Ashtyn Gonzales MD;  Location:  GI     CORONARY ANGIOGRAPHY ADULT ORDER  2011    1 LMA 5%, LAD 40-50%, LCx 30-40%, RCA 25-30%     ENT SURGERY      Ringing in the ears     HC INJECTION SCLEROSING SOLUTION HEMORRHOID  2012    Procedure: HEMORRHOID INJECTION SCLEROSING SOLUTION;  Surgeon: Mayito Chow MD;  Location: [facility]     HC TOOTH EXTRACTION W/FORCEP       HERNIA REPAIR  Needed     LASIK  2011     ROTATOR CUFF REPAIR RT/LT Left 10/15/2015    RCR Left - Dr. Carvalho     SOFT TISSUE SURGERY  10/2015    Torn rotator cuff       Social History     Tobacco Use     Smoking status: Former Smoker     Packs/day: 1.50     Years: 24.00     Pack years: 36.00     Types: Cigarettes     Quit date: 1996     Years since quittin.1     Smokeless tobacco: Never Used   Substance Use Topics     Alcohol use: Yes     Alcohol/week: 0.0 standard drinks     Comment: 1 beer per week     Family History   Problem Relation Age of Onset     C.A.D. Father         passed at 52 from MI     Cardiovascular Father      Heart Disease Father      Obesity Father      Myocardial Infarction Father         52 - passed away     Coronary Artery Disease Father      Colon Cancer Mother 65        passed away from colon cancer - lived about 2 mo from dx      Arthritis Maternal Grandmother      Diabetes Maternal Grandmother      Arthritis Maternal Grandfather      Diabetes Maternal Grandfather      Heart Disease Brother      Diabetes Brother       Coronary Artery Disease Brother      Hypertension Brother      Hyperlipidemia Brother      Coronary Artery Disease Brother         stent placed      Hyperlipidemia Brother      Substance Abuse Paternal Grandfather      Hypertension No family hx of      Cerebrovascular Disease No family hx of      Breast Cancer No family hx of      Prostate Cancer No family hx of      Alcohol/Drug No family hx of      Allergies No family hx of      Alzheimer Disease No family hx of      Circulatory No family hx of      Congenital Anomalies No family hx of      Connective Tissue Disorder No family hx of      Depression No family hx of      Eye Disorder No family hx of      Genetic Disorder No family hx of      Gastrointestinal Disease No family hx of      Genitourinary Problems No family hx of      Gynecology No family hx of      Musculoskeletal Disorder No family hx of      Neurologic Disorder No family hx of      Osteoporosis No family hx of      Psychotic Disorder No family hx of      Respiratory No family hx of      Thyroid Disease No family hx of      Anesthesia Reaction No family hx of      Blood Disease No family hx of          Current Outpatient Medications   Medication Sig Dispense Refill     apixaban ANTICOAGULANT (ELIQUIS ANTICOAGULANT) 5 MG tablet Take 1 tablet (5 mg) by mouth 2 times daily 120 tablet 0     atorvastatin (LIPITOR) 40 MG tablet Take 1 tablet (40 mg) by mouth daily 90 tablet 3     Calcium-Vitamin D-Vitamin K (CALCIUM + D + K) 750-500-40 MG-UNT-MCG TABS Take 2 tablets by mouth daily.       coenzyme Q-10 (COQ-10) 200 MG CAPS Take by mouth 2 times daily        esomeprazole (NEXIUM) 40 MG capsule Take 40 mg by mouth every morning (before breakfast) Take 30-60 minutes before eating.       fluticasone (FLONASE) 50 MCG/ACT nasal spray Spray 2 sprays into both nostrils daily 16 g 11     Glucos-Chond-Hyal Ac-Ca Fructo (MOVE FREE JOINT HEALTH ADVANCE) TABS Take 1 mg by mouth daily       mesalamine (LIALDA) 1.2 g EC  tablet Take 1,200 mg by mouth daily (with breakfast)       metoprolol succinate ER (TOPROL-XL) 50 MG 24 hr tablet Take 1 tablet (50 mg) by mouth daily 60 tablet 0     MULTIVITAMIN TABS   OR 1 daily  0     Probiotic Product (PROBIOTIC PO) Take by mouth daily       psyllium (METAMUCIL) 58.6 % POWD Take 2 teaspoonful by mouth 2 times daily        sildenafil (VIAGRA) 25 MG tablet Take 3 tablets (75 mg) by mouth daily as needed 90 tablet 11     TURMERIC PO Take 1,500 mg by mouth daily       VITAMIN D, CHOLECALCIFEROL, PO Take 5,000 Units by mouth daily       amoxicillin-clavulanate (AUGMENTIN) 875-125 MG tablet Take 1 tablet by mouth 2 times daily (Patient not taking: Reported on 2/16/2022) 20 tablet 0     JUAN LOW STRENGTH 81 MG OR TBEC 2 DAILY 30 0     guaiFENesin-dextromethorphan (ROBITUSSIN DM) 100-10 MG/5ML syrup Take 10 mLs by mouth every 4 hours as needed for cough 354 mL 0     Allergies   Allergen Reactions     No Known Drug Allergies      Recent Labs   Lab Test 12/30/21  1455 10/06/20  0929 12/12/19  0000 08/27/19  0929 07/05/18  1018 06/22/17  0931   A1C 6.3*  --   --   --   --  5.6   LDL  --  69  --  62 83 71   HDL  --  41  --  36* 44 49   TRIG  --  146  --  149 161* 107   ALT  --  70  --  48 50 42   CR  --  0.99 1.08 0.90 0.99 0.98   GFRESTIMATED  --  78  --  88 76 77   GFRESTBLACK  --  90  --  >90 >90 >90  African American GFR Calc     POTASSIUM  --  4.8  --  4.2 4.7 4.3   TSH  --   --   --   --   --  0.96      Pneumonia Vaccine:Adults age 65+ who received Pneumovax (PPSV23) at 65 years or older: Should be given PCV13 > 1 year after their most recent PPSV23        Review of Systems   Constitutional: Negative for chills and fever.   HENT: Positive for congestion. Negative for ear pain, hearing loss and sore throat.    Eyes: Negative for pain and visual disturbance.   Respiratory: Positive for cough and shortness of breath.    Cardiovascular: Positive for chest pain. Negative for palpitations and peripheral  "edema.   Gastrointestinal: Positive for heartburn. Negative for abdominal pain, constipation, diarrhea, hematochezia and nausea.   Genitourinary: Positive for impotence. Negative for dysuria, frequency, genital sores, hematuria, penile discharge and urgency.   Musculoskeletal: Positive for myalgias. Negative for arthralgias and joint swelling.   Skin: Negative for rash.   Neurological: Positive for headaches. Negative for dizziness, weakness and paresthesias.   Psychiatric/Behavioral: Negative for mood changes. The patient is not nervous/anxious.          OBJECTIVE:   /74   Pulse 99   Temp 98.7  F (37.1  C) (Tympanic)   Resp 20   Ht 1.854 m (6' 1\")   Wt 106.6 kg (235 lb)   SpO2 98%   BMI 31.00 kg/m   Estimated body mass index is 31 kg/m  as calculated from the following:    Height as of this encounter: 1.854 m (6' 1\").    Weight as of this encounter: 106.6 kg (235 lb).  Physical Exam  GENERAL: healthy, alert and no distress  NECK: no adenopathy, no asymmetry, masses, or scars and thyroid normal to palpation  RESP: lungs clear to auscultation - no rales, rhonchi or wheezes  CV: regular rate and rhythm, normal S1 S2, no S3 or S4, no murmur, click or rub, no peripheral edema and peripheral pulses strong  ABDOMEN: soft, nontender, no hepatosplenomegaly, no masses and bowel sounds normal  MS: no gross musculoskeletal defects noted, no edema        ASSESSMENT / PLAN:       ICD-10-CM    1. Health maintenance examination  Z00.00 Albumin Random Urine Quantitative with Creat Ratio   2. Screening for prostate cancer  Z12.5    3. Elevated glucose  R73.09 Albumin Random Urine Quantitative with Creat Ratio   4. Erectile dysfunction, unspecified erectile dysfunction type  N52.9 tadalafil (CIALIS) 10 MG tablet   5. FAM (obstructive sleep apnea)  G47.33 Adult Sleep Eval & Management  Referral       Patient has been advised of split billing requirements and indicates understanding: Yes    COUNSELING:  Reviewed " "preventive health counseling, as reflected in patient instructions    Estimated body mass index is 31.93 kg/m  as calculated from the following:    Height as of 2/16/22: 1.854 m (6' 1\").    Weight as of 2/16/22: 109.8 kg (242 lb).    Weight management plan: Patient referred to endocrine and/or weight management specialty    He reports that he quit smoking about 26 years ago. His smoking use included cigarettes. He has a 36.00 pack-year smoking history. He has never used smokeless tobacco.      Appropriate preventive services were discussed with this patient, including applicable screening as appropriate for cardiovascular disease, diabetes, osteopenia/osteoporosis, and glaucoma.  As appropriate for age/gender, discussed screening for colorectal cancer, prostate cancer, breast cancer, and cervical cancer. Checklist reviewing preventive services available has been given to the patient.    Reviewed patients plan of care and provided an AVS. The Basic Care Plan (routine screening as documented in Health Maintenance) for Peter meets the Care Plan requirement. This Care Plan has been established and reviewed with the Patient.    Counseling Resources:  ATP IV Guidelines  Pooled Cohorts Equation Calculator  Breast Cancer Risk Calculator  Breast Cancer: Medication to Reduce Risk  FRAX Risk Assessment  ICSI Preventive Guidelines  Dietary Guidelines for Americans, 2010  [employer]'s MyPlate  ASA Prophylaxis  Lung CA Screening    Donell Zuñiga MD  [facility]    Identified Health Risks:  "

## 2022-02-23 NOTE — TELEPHONE ENCOUNTER
Pt and wife called regarding results from today 2/23/22. They were concerned regarding elevated level and wanted to know what Dr. Zuñiga's recommendation is regarding this. Writer notified them provider probably has not been able to review results yet as they just resulted earlier today. They stated their understanding. Routing to provider to review and advise when able, can [facility] or call:    [phone], OK to leave detailed vm.     Terese COPE RN  [facility]

## 2022-02-24 ENCOUNTER — HOSPITAL ENCOUNTER (OUTPATIENT)
Dept: CARDIOLOGY | Facility: CLINIC | Age: 72
End: 2022-02-24
Attending: INTERNAL MEDICINE
Payer: MEDICARE

## 2022-02-24 ENCOUNTER — HOSPITAL ENCOUNTER (OUTPATIENT)
Dept: CARDIOLOGY | Facility: CLINIC | Age: 72
Setting detail: NUCLEAR MEDICINE
End: 2022-02-24
Attending: INTERNAL MEDICINE
Payer: MEDICARE

## 2022-02-24 ENCOUNTER — TELEPHONE (OUTPATIENT)
Dept: CARDIOLOGY | Facility: CLINIC | Age: 72
End: 2022-02-24

## 2022-02-24 VITALS
DIASTOLIC BLOOD PRESSURE: 68 MMHG | HEART RATE: 100 BPM | WEIGHT: 235.4 LBS | BODY MASS INDEX: 31.2 KG/M2 | SYSTOLIC BLOOD PRESSURE: 110 MMHG | HEIGHT: 73 IN | OXYGEN SATURATION: 96 %

## 2022-02-24 DIAGNOSIS — I48.19 PERSISTENT ATRIAL FIBRILLATION (H): ICD-10-CM

## 2022-02-24 LAB
CV STRESS MAX HR HE: 118
NUC STRESS EJECTION FRACTION: 26 %
RATE PRESSURE PRODUCT: NORMAL
STRESS ECHO BASELINE DIASTOLIC HE: 68
STRESS ECHO BASELINE HR: 100 BPM
STRESS ECHO BASELINE SYSTOLIC BP: 110
STRESS ECHO CALCULATED PERCENT HR: 78 %
STRESS ECHO LAST STRESS DIASTOLIC BP: 70
STRESS ECHO LAST STRESS SYSTOLIC BP: 118
STRESS ECHO TARGET HR: 151

## 2022-02-24 PROCEDURE — 93017 CV STRESS TEST TRACING ONLY: CPT | Mod: MG

## 2022-02-24 PROCEDURE — 250N000011 HC RX IP 250 OP 636: Performed by: INTERNAL MEDICINE

## 2022-02-24 PROCEDURE — 93018 CV STRESS TEST I&R ONLY: CPT | Mod: MG | Performed by: INTERNAL MEDICINE

## 2022-02-24 PROCEDURE — 93016 CV STRESS TEST SUPVJ ONLY: CPT | Performed by: INTERNAL MEDICINE

## 2022-02-24 PROCEDURE — 78452 HT MUSCLE IMAGE SPECT MULT: CPT | Mod: 26 | Performed by: INTERNAL MEDICINE

## 2022-02-24 PROCEDURE — 343N000001 HC RX 343: Performed by: INTERNAL MEDICINE

## 2022-02-24 PROCEDURE — G1004 CDSM NDSC: HCPCS | Performed by: INTERNAL MEDICINE

## 2022-02-24 PROCEDURE — A9502 TC99M TETROFOSMIN: HCPCS | Performed by: INTERNAL MEDICINE

## 2022-02-24 RX ORDER — ALBUTEROL SULFATE 90 UG/1
2 AEROSOL, METERED RESPIRATORY (INHALATION) EVERY 5 MIN PRN
Status: DISCONTINUED | OUTPATIENT
Start: 2022-02-24 | End: 2022-02-25 | Stop reason: HOSPADM

## 2022-02-24 RX ORDER — ACYCLOVIR 200 MG/1
0-1 CAPSULE ORAL
Status: DISCONTINUED | OUTPATIENT
Start: 2022-02-24 | End: 2022-02-25 | Stop reason: HOSPADM

## 2022-02-24 RX ORDER — REGADENOSON 0.08 MG/ML
0.4 INJECTION, SOLUTION INTRAVENOUS ONCE
Status: COMPLETED | OUTPATIENT
Start: 2022-02-24 | End: 2022-02-24

## 2022-02-24 RX ORDER — CAFFEINE CITRATE 20 MG/ML
60 SOLUTION INTRAVENOUS
Status: DISCONTINUED | OUTPATIENT
Start: 2022-02-24 | End: 2022-02-25 | Stop reason: HOSPADM

## 2022-02-24 RX ORDER — AMINOPHYLLINE 25 MG/ML
50-100 INJECTION, SOLUTION INTRAVENOUS
Status: DISCONTINUED | OUTPATIENT
Start: 2022-02-24 | End: 2022-02-25 | Stop reason: HOSPADM

## 2022-02-24 RX ADMIN — REGADENOSON 0.4 MG: 0.08 INJECTION, SOLUTION INTRAVENOUS at 10:18

## 2022-02-24 RX ADMIN — TETROFOSMIN 3.73 MCI.: 1.38 INJECTION, POWDER, LYOPHILIZED, FOR SOLUTION INTRAVENOUS at 08:55

## 2022-02-24 RX ADMIN — TETROFOSMIN 12.57 MCI.: 1.38 INJECTION, POWDER, LYOPHILIZED, FOR SOLUTION INTRAVENOUS at 10:21

## 2022-02-24 NOTE — TELEPHONE ENCOUNTER
I attached results note as below. Please relay it to him.  thx      Dear Nathan,    As was explained yesterday, urine microabumin is predicting index of diabetes. Elevated microalbumin doesn't mean your renal function is low currently. But, it means that could be happening in future if your elevated glucose gets worse. Plus, poorly controlled blood pressure makes the process accelerated.     So, we strongly recommend you to continue working on life style modification with low fat/carb diet with increasing amount of hydration to prevent worsening the index. Plus, continue monitoring your blood pressure as well. We should recheck the lab in 6 months for follow up. The future lab order is already placed.      Thanks,

## 2022-02-24 NOTE — TELEPHONE ENCOUNTER
Reviewed lexiscan showing:      The nuclear stress test is abnormal.     There is a small area of ischemia in the mid inferolateral segment(s) of the left ventricle.     There is transmural infarction in the distal inferior and inferoseptal segment(s) of the left ventricle.     There is a small area of nontransmural infarction in the apical segment(s) of the left ventricle.     Left ventricular function is severely reduced.     The left ventricular ejection fraction at stress is 26%.     A prior study was conducted on 3/30/2015.  This study has changes noted when compared with the prior study.    Upcoming appts are:       Will message Dr. Gilliam to review. Andrés DONIS

## 2022-02-24 NOTE — TELEPHONE ENCOUNTER
S/w pt and wife and gave Dr. Zuñiga's reply below.  Advised message is available via my chart also.    Pt and wife state understanding.    Catia BERNABE RN  EP Triage

## 2022-02-25 ENCOUNTER — OFFICE VISIT (OUTPATIENT)
Dept: CARDIOLOGY | Facility: CLINIC | Age: 72
End: 2022-02-25
Attending: INTERNAL MEDICINE
Payer: MEDICARE

## 2022-02-25 ENCOUNTER — TELEPHONE (OUTPATIENT)
Dept: CARDIOLOGY | Facility: CLINIC | Age: 72
End: 2022-02-25

## 2022-02-25 ENCOUNTER — DOCUMENTATION ONLY (OUTPATIENT)
Dept: CARDIOLOGY | Facility: CLINIC | Age: 72
End: 2022-02-25

## 2022-02-25 ENCOUNTER — LAB (OUTPATIENT)
Dept: LAB | Facility: CLINIC | Age: 72
End: 2022-02-25
Payer: MEDICARE

## 2022-02-25 ENCOUNTER — HOSPITAL ENCOUNTER (OUTPATIENT)
Dept: CARDIOLOGY | Facility: CLINIC | Age: 72
Discharge: HOME | End: 2022-02-25
Attending: INTERNAL MEDICINE | Admitting: INTERNAL MEDICINE
Payer: MEDICARE

## 2022-02-25 VITALS
OXYGEN SATURATION: 95 % | BODY MASS INDEX: 31.56 KG/M2 | DIASTOLIC BLOOD PRESSURE: 70 MMHG | SYSTOLIC BLOOD PRESSURE: 114 MMHG | HEART RATE: 109 BPM | WEIGHT: 238.1 LBS | HEIGHT: 73 IN

## 2022-02-25 DIAGNOSIS — I48.19 PERSISTENT ATRIAL FIBRILLATION (H): ICD-10-CM

## 2022-02-25 DIAGNOSIS — I42.8 CARDIOMYOPATHY, NONISCHEMIC (H): Primary | ICD-10-CM

## 2022-02-25 DIAGNOSIS — Z11.59 ENCOUNTER FOR SCREENING FOR OTHER VIRAL DISEASES: Primary | ICD-10-CM

## 2022-02-25 LAB
ANION GAP SERPL CALCULATED.3IONS-SCNC: 3 MMOL/L (ref 3–14)
BUN SERPL-MCNC: 24 MG/DL (ref 7–30)
CALCIUM SERPL-MCNC: 9.4 MG/DL (ref 8.5–10.1)
CHLORIDE BLD-SCNC: 105 MMOL/L (ref 94–109)
CO2 SERPL-SCNC: 26 MMOL/L (ref 20–32)
CREAT SERPL-MCNC: 1.13 MG/DL (ref 0.66–1.25)
ERYTHROCYTE [DISTWIDTH] IN BLOOD BY AUTOMATED COUNT: 12.5 % (ref 10–15)
GFR SERPL CREATININE-BSD FRML MDRD: 70 ML/MIN/1.73M2
GLUCOSE BLD-MCNC: 112 MG/DL (ref 70–99)
HCT VFR BLD AUTO: 47.2 % (ref 40–53)
HGB BLD-MCNC: 15.2 G/DL (ref 13.3–17.7)
LVEF ECHO: NORMAL
MCH RBC QN AUTO: 29.3 PG (ref 26.5–33)
MCHC RBC AUTO-ENTMCNC: 32.2 G/DL (ref 31.5–36.5)
MCV RBC AUTO: 91 FL (ref 78–100)
PLATELET # BLD AUTO: 589 10E3/UL (ref 150–450)
POTASSIUM BLD-SCNC: 4.8 MMOL/L (ref 3.4–5.3)
RBC # BLD AUTO: 5.18 10E6/UL (ref 4.4–5.9)
SODIUM SERPL-SCNC: 134 MMOL/L (ref 133–144)
TSH SERPL DL<=0.005 MIU/L-ACNC: 1.05 MU/L (ref 0.4–4)
WBC # BLD AUTO: 12.9 10E3/UL (ref 4–11)

## 2022-02-25 PROCEDURE — 93306 TTE W/DOPPLER COMPLETE: CPT | Mod: 26 | Performed by: INTERNAL MEDICINE

## 2022-02-25 PROCEDURE — 80048 BASIC METABOLIC PNL TOTAL CA: CPT | Performed by: PHYSICIAN ASSISTANT

## 2022-02-25 PROCEDURE — 85027 COMPLETE CBC AUTOMATED: CPT | Performed by: PHYSICIAN ASSISTANT

## 2022-02-25 PROCEDURE — 36415 COLL VENOUS BLD VENIPUNCTURE: CPT | Performed by: PHYSICIAN ASSISTANT

## 2022-02-25 PROCEDURE — 99417 PROLNG OP E/M EACH 15 MIN: CPT | Performed by: PHYSICIAN ASSISTANT

## 2022-02-25 PROCEDURE — 84443 ASSAY THYROID STIM HORMONE: CPT | Performed by: PHYSICIAN ASSISTANT

## 2022-02-25 PROCEDURE — 99215 OFFICE O/P EST HI 40 MIN: CPT | Mod: 25 | Performed by: PHYSICIAN ASSISTANT

## 2022-02-25 PROCEDURE — 93306 TTE W/DOPPLER COMPLETE: CPT

## 2022-02-25 RX ORDER — TORSEMIDE 10 MG/1
TABLET ORAL
Qty: 30 TABLET | Refills: 3 | Status: SHIPPED | OUTPATIENT
Start: 2022-02-25 | End: 2022-02-28

## 2022-02-25 RX ORDER — METOPROLOL SUCCINATE 50 MG/1
50 TABLET, EXTENDED RELEASE ORAL 2 TIMES DAILY
Qty: 180 TABLET | Refills: 3 | Status: ON HOLD | OUTPATIENT
Start: 2022-02-25 | End: 2022-03-05

## 2022-02-25 NOTE — TELEPHONE ENCOUNTER
[facility] Call Center    Phone Message    May a detailed message be left on voicemail: yes     Reason for Call: Other: Emy called in stating pt was told to r/s his echo to today. There are no openings at any of the OP clinics, please c/b to discuss     Action Taken: Message routed to:  Other: [facility]    Travel Screening: Not Applicable

## 2022-02-25 NOTE — LETTER
2/25/2022    Donell Zuñiga MD  [address] [facility] [address]    RE: Peter Peralta       Dear Colleague,     I had the pleasure of seeing Peter Peralta in the [facility].  [id number]      HPI and Plan:   See dictation    Orders this Visit:  Orders Placed This Encounter   Procedures     Basic metabolic panel     CBC with platelets     Basic metabolic panel     TSH with free T4 reflex     Follow-Up with Cardiology MAYUR Core     Case Request Cath Lab: Coronary Angiogram     Orders Placed This Encounter   Medications     torsemide (DEMADEX) 10 MG tablet     Sig: Take 1/2 pill once a day in the morning for 3 days then call     Dispense:  30 tablet     Refill:  3     metoprolol succinate ER (TOPROL-XL) 50 MG 24 hr tablet     Sig: Take 1 tablet (50 mg) by mouth 2 times daily     Dispense:  180 tablet     Refill:  3     Medications Discontinued During This Encounter   Medication Reason     JUAN LOW STRENGTH 81 MG OR TBEC Alternate therapy     amoxicillin-clavulanate (AUGMENTIN) 875-125 MG tablet Therapy completed     guaiFENesin-dextromethorphan (ROBITUSSIN DM) 100-10 MG/5ML syrup Therapy completed     metoprolol succinate ER (TOPROL-XL) 50 MG 24 hr tablet          Encounter Diagnoses   Name Primary?     Persistent atrial fibrillation (H)      Cardiomyopathy, nonischemic (H) Yes       CURRENT MEDICATIONS:  Current Outpatient Medications   Medication Sig Dispense Refill     apixaban ANTICOAGULANT (ELIQUIS ANTICOAGULANT) 5 MG tablet Take 1 tablet (5 mg) by mouth 2 times daily 120 tablet 0     atorvastatin (LIPITOR) 40 MG tablet Take 1 tablet (40 mg) by mouth daily 90 tablet 3     Calcium-Vitamin D-Vitamin K (CALCIUM + D + K) 750-500-40 MG-UNT-MCG TABS Take 2 tablets by mouth daily.       coenzyme Q-10 (COQ-10) 200 MG CAPS Take by mouth 2 times daily        esomeprazole (NEXIUM) 40 MG capsule Take 40 mg by mouth every morning (before breakfast) Take 30-60 minutes before eating.        fluticasone (FLONASE) 50 MCG/ACT nasal spray Spray 2 sprays into both nostrils daily 16 g 11     Glucos-Chond-Hyal Ac-Ca Fructo (MOVE FREE JOINT HEALTH ADVANCE) TABS Take 1 mg by mouth daily       mesalamine (LIALDA) 1.2 g EC tablet Take 1,200 mg by mouth daily (with breakfast)       metoprolol succinate ER (TOPROL-XL) 50 MG 24 hr tablet Take 1 tablet (50 mg) by mouth 2 times daily 180 tablet 3     MULTIVITAMIN TABS   OR 1 daily  0     Probiotic Product (PROBIOTIC PO) Take by mouth daily       psyllium (METAMUCIL) 58.6 % POWD Take 2 teaspoonful by mouth 2 times daily        sildenafil (VIAGRA) 25 MG tablet Take 3 tablets (75 mg) by mouth daily as needed 90 tablet 11     tadalafil (CIALIS) 10 MG tablet Take 1 tablet (10 mg) by mouth daily as needed 60 tablet 0     torsemide (DEMADEX) 10 MG tablet Take 1/2 pill once a day in the morning for 3 days then call 30 tablet 3     TURMERIC PO Take 1,500 mg by mouth daily       VITAMIN D, CHOLECALCIFEROL, PO Take 5,000 Units by mouth daily         ALLERGIES     Allergies   Allergen Reactions     No Known Drug Allergies        PAST MEDICAL HISTORY:  Past Medical History:   Diagnosis Date     Actinic keratoses     face     Atrial fibrillation (H)     only after adenosine test     BPH (benign prostatic hypertrophy)      Chest pain      Coronary artery disease 2011 2011-Cath Lma 5%, Lad 40-50%, Lcx 30-40%, Dxi73-15% (-FFR of Lad)     ED (erectile dysfunction)      Erectile dysfunction 12/30/2021     Family history of colon cancer 7/6/2012    mother age 65     GERD (gastroesophageal reflux disease)     nexium     Hyperlipidaemia      Hypertension 2/16/2022     IFG (impaired fasting glucose)      Murmur      Obesity, unspecified     Waist size 42, BMI 30.8     Right inguinal hernia 7/1/2014     Thrombocytosis      Ulcerative colitis (H)        PAST SURGICAL HISTORY:  Past Surgical History:   Procedure Laterality Date     CARDIAC NUC POLINA STRESS TEST NL      Normal      CARDIOVERSION  12/2011    Atrial Fib     COLONOSCOPY  1/29/2014    Procedure: COMBINED COLONOSCOPY, SINGLE BIOPSY/POLYPECTOMY BY BIOPSY;  COLONOSCOPY;  Surgeon: Ashtyn Gonzales MD;  Location:  GI     COLONOSCOPY N/A 7/11/2018    Procedure: COMBINED COLONOSCOPY, SINGLE OR MULTIPLE BIOPSY/POLYPECTOMY BY BIOPSY;  COLONOSCOPY ;  Surgeon: Ashtyn Gonzales MD;  Location:  GI     CORONARY ANGIOGRAPHY ADULT ORDER  12/2011    1 LMA 5%, LAD 40-50%, LCx 30-40%, RCA 25-30%     ENT SURGERY      Ringing in the ears     HC INJECTION SCLEROSING SOLUTION HEMORRHOID  8/24/2012    Procedure: HEMORRHOID INJECTION SCLEROSING SOLUTION;  Surgeon: Mayito Chow MD;  Location:  GI     HC TOOTH EXTRACTION W/FORCEP       HERNIA REPAIR  Needed     LASIK  1/2011     ROTATOR CUFF REPAIR RT/LT Left 10/15/2015    RCR Left - Dr. Carvalho     SOFT TISSUE SURGERY  10/2015    Torn rotator cuff       FAMILY HISTORY:  Family History   Problem Relation Age of Onset     C.A.D. Father         passed at 52 from MI     Cardiovascular Father      Heart Disease Father      Obesity Father      Myocardial Infarction Father         52 - passed away     Coronary Artery Disease Father      Colon Cancer Mother 65        passed away from colon cancer - lived about 2 mo from dx 1981     Arthritis Maternal Grandmother      Diabetes Maternal Grandmother      Arthritis Maternal Grandfather      Diabetes Maternal Grandfather      Heart Disease Brother      Diabetes Brother      Coronary Artery Disease Brother      Hypertension Brother      Hyperlipidemia Brother      Coronary Artery Disease Brother         stent placed      Hyperlipidemia Brother      Substance Abuse Paternal Grandfather      Hypertension No family hx of      Cerebrovascular Disease No family hx of      Breast Cancer No family hx of      Prostate Cancer No family hx of      Alcohol/Drug No family hx of      Allergies No family hx of      Alzheimer Disease No family hx of      Circulatory  No family hx of      Congenital Anomalies No family hx of      Connective Tissue Disorder No family hx of      Depression No family hx of      Eye Disorder No family hx of      Genetic Disorder No family hx of      Gastrointestinal Disease No family hx of      Genitourinary Problems No family hx of      Gynecology No family hx of      Musculoskeletal Disorder No family hx of      Neurologic Disorder No family hx of      Osteoporosis No family hx of      Psychotic Disorder No family hx of      Respiratory No family hx of      Thyroid Disease No family hx of      Anesthesia Reaction No family hx of      Blood Disease No family hx of        SOCIAL HISTORY:  Social History     Socioeconomic History     Marital status:      Spouse name: Emy     Number of children: 2     Years of education: 16     Highest education level: None   Occupational History     Occupation: [employer]     Employer: [employer]   Tobacco Use     Smoking status: Former Smoker     Packs/day: 1.50     Years: 24.00     Pack years: 36.00     Types: Cigarettes     Quit date: 1996     Years since quittin.1     Smokeless tobacco: Never Used   Substance and Sexual Activity     Alcohol use: Not Currently     Alcohol/week: 0.0 standard drinks     Comment: 1 beer per week     Drug use: No     Sexual activity: Yes     Partners: Female     Birth control/protection: None   Other Topics Concern      Service Yes     Blood Transfusions No     Caffeine Concern No     Comment: 2 cups per day     Occupational Exposure No     Hobby Hazards No     Sleep Concern No     Stress Concern Yes     Weight Concern Yes     Special Diet Yes     Back Care No     Exercise No     Comment: 8,000 steps a day. Fitbit     Bike Helmet No     Comment: n/a     Seat Belt Yes     Self-Exams Yes     Parent/sibling w/ CABG, MI or angioplasty before 65F 55M? Yes     Comment: Father and brother   Social History Narrative    Eats fruits and vegetables every day. He  "takes extra vitamin D. He was advised to aim for 1200 mg of calcium per day.     Social Determinants of Health     Financial Resource Strain: Not on file   Food Insecurity: Not on file   Transportation Needs: Not on file   Physical Activity: Not on file   Stress: Not on file   Social Connections: Not on file   Intimate Partner Violence: Not on file   Housing Stability: Not on file       Review of Systems:  Skin:  Negative     Eyes:  Positive for glasses  ENT:  Positive for hearing loss  Respiratory:  Positive for dyspnea on exertion;cough  Cardiovascular:    Positive for;fatigue  Gastroenterology: Positive for heartburn  Genitourinary:  Positive for nocturia  Musculoskeletal:  Negative    Neurologic:  Negative    Psychiatric:  Positive for sleep disturbances  Heme/Lymph/Imm:  Negative    Endocrine:  Positive for hot flashes    Physical Exam:  Vitals: /70   Pulse 109   Ht 1.854 m (6' 1\")   Wt 108 kg (238 lb 1.6 oz)   SpO2 95%   BMI 31.41 kg/m     Please refer to dictation for physical exam    Recent Lab Results: all reviewed today  CBC RESULTS:  Lab Results   Component Value Date    WBC 9.1 10/06/2020    RBC 5.39 10/06/2020    HGB 16.3 10/06/2020    HCT 47.9 10/06/2020    MCV 89 10/06/2020    MCH 30.2 10/06/2020    MCHC 34.0 10/06/2020    RDW 13.5 10/06/2020     (H) 10/06/2020       BMP RESULTS:  Lab Results   Component Value Date     10/06/2020    POTASSIUM 4.8 10/06/2020    CHLORIDE 105 10/06/2020    CO2 27 10/06/2020    ANIONGAP 5 10/06/2020     (H) 10/06/2020    BUN 15 10/06/2020    CR 0.99 10/06/2020    GFRESTIMATED 78 10/06/2020    GFRESTBLACK 90 10/06/2020    CAMERON 9.5 10/06/2020        INR RESULTS:  Lab Results   Component Value Date    INR 1.02 12/22/2011         Thank you for allowing me to participate in the care of your patient.      Sincerely,     Yue Sequeira PA-C     [facility]  cc:   Yon Gilliam, " MD  [address]  [place] [id number]

## 2022-02-25 NOTE — PATIENT INSTRUCTIONS
Call [facility] nurse for any questions or concerns Mon-Fri 8am-4pm:                                                #[phone]                                       For concerns after hours:                                               #[phone]     1: Medication changes: slowly increase your Toprol XL/ metoprolol succinate.  Starting tomorrow take 25 mg in the morning and 50 mg at night.  After 5 days please increase it to 50 mg twice a day.    Start torsemide 5 mg once a day for 3 days.      2: Plan from today: labs today.    We'll arrange an angiogram.  You'll need to hold your eliquis for 2 days before that visit.    We'll make another visit before your cardioversion.

## 2022-02-25 NOTE — PROGRESS NOTES
[id number]      HPI and Plan:   See dictation    Orders this Visit:  Orders Placed This Encounter   Procedures     Basic metabolic panel     CBC with platelets     Basic metabolic panel     TSH with free T4 reflex     Follow-Up with Cardiology MAYUR Core     Case Request Cath Lab: Coronary Angiogram     Orders Placed This Encounter   Medications     torsemide (DEMADEX) 10 MG tablet     Sig: Take 1/2 pill once a day in the morning for 3 days then call     Dispense:  30 tablet     Refill:  3     metoprolol succinate ER (TOPROL-XL) 50 MG 24 hr tablet     Sig: Take 1 tablet (50 mg) by mouth 2 times daily     Dispense:  180 tablet     Refill:  3     Medications Discontinued During This Encounter   Medication Reason     JUAN LOW STRENGTH 81 MG OR TBEC Alternate therapy     amoxicillin-clavulanate (AUGMENTIN) 875-125 MG tablet Therapy completed     guaiFENesin-dextromethorphan (ROBITUSSIN DM) 100-10 MG/5ML syrup Therapy completed     metoprolol succinate ER (TOPROL-XL) 50 MG 24 hr tablet          Encounter Diagnoses   Name Primary?     Persistent atrial fibrillation (H)      Cardiomyopathy, nonischemic (H) Yes       CURRENT MEDICATIONS:  Current Outpatient Medications   Medication Sig Dispense Refill     apixaban ANTICOAGULANT (ELIQUIS ANTICOAGULANT) 5 MG tablet Take 1 tablet (5 mg) by mouth 2 times daily 120 tablet 0     atorvastatin (LIPITOR) 40 MG tablet Take 1 tablet (40 mg) by mouth daily 90 tablet 3     Calcium-Vitamin D-Vitamin K (CALCIUM + D + K) 750-500-40 MG-UNT-MCG TABS Take 2 tablets by mouth daily.       coenzyme Q-10 (COQ-10) 200 MG CAPS Take by mouth 2 times daily        esomeprazole (NEXIUM) 40 MG capsule Take 40 mg by mouth every morning (before breakfast) Take 30-60 minutes before eating.       fluticasone (FLONASE) 50 MCG/ACT nasal spray Spray 2 sprays into both nostrils daily 16 g 11     Glucos-Chond-Hyal Ac-Ca Fructo (MOVE FREE JOINT HEALTH ADVANCE) TABS Take 1 mg by mouth daily       mesalamine (LIALDA)  1.2 g EC tablet Take 1,200 mg by mouth daily (with breakfast)       metoprolol succinate ER (TOPROL-XL) 50 MG 24 hr tablet Take 1 tablet (50 mg) by mouth 2 times daily 180 tablet 3     MULTIVITAMIN TABS   OR 1 daily  0     Probiotic Product (PROBIOTIC PO) Take by mouth daily       psyllium (METAMUCIL) 58.6 % POWD Take 2 teaspoonful by mouth 2 times daily        sildenafil (VIAGRA) 25 MG tablet Take 3 tablets (75 mg) by mouth daily as needed 90 tablet 11     tadalafil (CIALIS) 10 MG tablet Take 1 tablet (10 mg) by mouth daily as needed 60 tablet 0     torsemide (DEMADEX) 10 MG tablet Take 1/2 pill once a day in the morning for 3 days then call 30 tablet 3     TURMERIC PO Take 1,500 mg by mouth daily       VITAMIN D, CHOLECALCIFEROL, PO Take 5,000 Units by mouth daily         ALLERGIES     Allergies   Allergen Reactions     No Known Drug Allergies        PAST MEDICAL HISTORY:  Past Medical History:   Diagnosis Date     Actinic keratoses     face     Atrial fibrillation (H)     only after adenosine test     BPH (benign prostatic hypertrophy)      Chest pain      Coronary artery disease 2011 2011-Cath Lma 5%, Lad 40-50%, Lcx 30-40%, Ckl98-84% (-FFR of Lad)     ED (erectile dysfunction)      Erectile dysfunction 12/30/2021     Family history of colon cancer 7/6/2012    mother age 65     GERD (gastroesophageal reflux disease)     nexium     Hyperlipidaemia      Hypertension 2/16/2022     IFG (impaired fasting glucose)      Murmur      Obesity, unspecified     Waist size 42, BMI 30.8     Right inguinal hernia 7/1/2014     Thrombocytosis      Ulcerative colitis (H)        PAST SURGICAL HISTORY:  Past Surgical History:   Procedure Laterality Date     CARDIAC NUC POLINA STRESS TEST NL      Normal     CARDIOVERSION  12/2011    Atrial Fib     COLONOSCOPY  1/29/2014    Procedure: COMBINED COLONOSCOPY, SINGLE BIOPSY/POLYPECTOMY BY BIOPSY;  COLONOSCOPY;  Surgeon: Ashtyn Gonzales MD;  Location:  GI     COLONOSCOPY N/A  7/11/2018    Procedure: COMBINED COLONOSCOPY, SINGLE OR MULTIPLE BIOPSY/POLYPECTOMY BY BIOPSY;  COLONOSCOPY ;  Surgeon: Ashtyn Gonzales MD;  Location:  GI     CORONARY ANGIOGRAPHY ADULT ORDER  12/2011    1 LMA 5%, LAD 40-50%, LCx 30-40%, RCA 25-30%     ENT SURGERY      Ringing in the ears     HC INJECTION SCLEROSING SOLUTION HEMORRHOID  8/24/2012    Procedure: HEMORRHOID INJECTION SCLEROSING SOLUTION;  Surgeon: Mayito Chow MD;  Location:  GI     HC TOOTH EXTRACTION W/FORCEP       HERNIA REPAIR  Needed     LASIK  1/2011     ROTATOR CUFF REPAIR RT/LT Left 10/15/2015    RCR Left - Dr. Carvalho     SOFT TISSUE SURGERY  10/2015    Torn rotator cuff       FAMILY HISTORY:  Family History   Problem Relation Age of Onset     C.A.D. Father         passed at 52 from MI     Cardiovascular Father      Heart Disease Father      Obesity Father      Myocardial Infarction Father         52 - passed away     Coronary Artery Disease Father      Colon Cancer Mother 65        passed away from colon cancer - lived about 2 mo from  1981     Arthritis Maternal Grandmother      Diabetes Maternal Grandmother      Arthritis Maternal Grandfather      Diabetes Maternal Grandfather      Heart Disease Brother      Diabetes Brother      Coronary Artery Disease Brother      Hypertension Brother      Hyperlipidemia Brother      Coronary Artery Disease Brother         stent placed      Hyperlipidemia Brother      Substance Abuse Paternal Grandfather      Hypertension No family hx of      Cerebrovascular Disease No family hx of      Breast Cancer No family hx of      Prostate Cancer No family hx of      Alcohol/Drug No family hx of      Allergies No family hx of      Alzheimer Disease No family hx of      Circulatory No family hx of      Congenital Anomalies No family hx of      Connective Tissue Disorder No family hx of      Depression No family hx of      Eye Disorder No family hx of      Genetic Disorder No family hx of       Gastrointestinal Disease No family hx of      Genitourinary Problems No family hx of      Gynecology No family hx of      Musculoskeletal Disorder No family hx of      Neurologic Disorder No family hx of      Osteoporosis No family hx of      Psychotic Disorder No family hx of      Respiratory No family hx of      Thyroid Disease No family hx of      Anesthesia Reaction No family hx of      Blood Disease No family hx of        SOCIAL HISTORY:  Social History     Socioeconomic History     Marital status:      Spouse name: Emy     Number of children: 2     Years of education: 16     Highest education level: None   Occupational History     Occupation: [employer]     Employer: [employer]   Tobacco Use     Smoking status: Former Smoker     Packs/day: 1.50     Years: 24.00     Pack years: 36.00     Types: Cigarettes     Quit date: 1996     Years since quittin.1     Smokeless tobacco: Never Used   Substance and Sexual Activity     Alcohol use: Not Currently     Alcohol/week: 0.0 standard drinks     Comment: 1 beer per week     Drug use: No     Sexual activity: Yes     Partners: Female     Birth control/protection: None   Other Topics Concern      Service Yes     Blood Transfusions No     Caffeine Concern No     Comment: 2 cups per day     Occupational Exposure No     Hobby Hazards No     Sleep Concern No     Stress Concern Yes     Weight Concern Yes     Special Diet Yes     Back Care No     Exercise No     Comment: 8,000 steps a day. Fitbit     Bike Helmet No     Comment: n/a     Seat Belt Yes     Self-Exams Yes     Parent/sibling w/ CABG, MI or angioplasty before 65F 55M? Yes     Comment: Father and brother   Social History Narrative    Eats fruits and vegetables every day. He takes extra vitamin D. He was advised to aim for 1200 mg of calcium per day.     Social Determinants of Health     Financial Resource Strain: Not on file   Food Insecurity: Not on file   Transportation Needs: Not on  "file   Physical Activity: Not on file   Stress: Not on file   Social Connections: Not on file   Intimate Partner Violence: Not on file   Housing Stability: Not on file       Review of Systems:  Skin:  Negative     Eyes:  Positive for glasses  ENT:  Positive for hearing loss  Respiratory:  Positive for dyspnea on exertion;cough  Cardiovascular:    Positive for;fatigue  Gastroenterology: Positive for heartburn  Genitourinary:  Positive for nocturia  Musculoskeletal:  Negative    Neurologic:  Negative    Psychiatric:  Positive for sleep disturbances  Heme/Lymph/Imm:  Negative    Endocrine:  Positive for hot flashes    Physical Exam:  Vitals: /70   Pulse 109   Ht 1.854 m (6' 1\")   Wt 108 kg (238 lb 1.6 oz)   SpO2 95%   BMI 31.41 kg/m     Please refer to dictation for physical exam    Recent Lab Results: all reviewed today  CBC RESULTS:  Lab Results   Component Value Date    WBC 9.1 10/06/2020    RBC 5.39 10/06/2020    HGB 16.3 10/06/2020    HCT 47.9 10/06/2020    MCV 89 10/06/2020    MCH 30.2 10/06/2020    MCHC 34.0 10/06/2020    RDW 13.5 10/06/2020     (H) 10/06/2020       BMP RESULTS:  Lab Results   Component Value Date     10/06/2020    POTASSIUM 4.8 10/06/2020    CHLORIDE 105 10/06/2020    CO2 27 10/06/2020    ANIONGAP 5 10/06/2020     (H) 10/06/2020    BUN 15 10/06/2020    CR 0.99 10/06/2020    GFRESTIMATED 78 10/06/2020    GFRESTBLACK 90 10/06/2020    CAMERON 9.5 10/06/2020        INR RESULTS:  Lab Results   Component Value Date    INR 1.02 12/22/2011           CC  Yon Gilliam MD  [address]  [place] [address]      "

## 2022-02-25 NOTE — PROGRESS NOTES
Echo ordered in mid february and will not be completed for 1 month after order ???????.  With new hx of severely reduced LV function and ischemia in nuc probably needs coronary angiogram,. Would run it by his own cardiologist Dr Del Rosario or his team. Lizabethx

## 2022-02-25 NOTE — PROGRESS NOTES
Service Date: 2022    PRIMARY CARDIOLOGIST:  Eulalio Del Rosario MD, was most recently seen by Yon Gilliam MD    REASON FOR VISIT:  Abnormal stress test, cardiomyopathy, AFib, followup.    HISTORY OF PRESENT ILLNESS:  Mr. Peralta is a delightful 71-year-old gentleman with past medical history significant for the followin.  Non-flow limiting coronary disease on angiogram in  with about a 50% mid LAD narrowing, 25% circumflex, 25% distal RCA with a negative FFR of the LAD.  2.  Dyslipidemia, on atorvastatin.  3.  New diagnosis of atrial fibrillation, which was noted by the patient's gastroenterologist at his annual followup in early February.  4.  Ulcerative colitis with 2 rounds of prednisone this .  5.  Erectile dysfunction.    I am seeing Nathan today as he has recently undergone a workup for a new diagnosis atrial fibrillation.  Going back a little bit, he had a flare of his ulcerative colitis in the fall.  This is his first flare in about 3 years and it required 2 courses of prednisone to get rid of it.  Later in the fall, he developed bronchitis that lasted for about 6 weeks and went away finally with Augmentin.      He was seen in followup in early February by his GI doctor for annual followup and on exam was found to have irregular heartbeat and was then seen by Dr. Gilliam.  Dr. Gilliam diagnosed him with paroxysmal atrial fibrillation with a CHADS-VASc of 4 and started him on anticoagulation with plans for a OSCAR-guided cardioversion.  He also sent him for a nuclear stress test and echocardiogram, which were reviewed today.    Nuclear stress test done yesterday shows small area of ischemia in the mid inferolateral segment of the left ventricle, a transmural infarct in the distal inferior and inferoseptal segments of small transmural inferolateral infarct in the apical segment.  The EF was noted to be 26%.  For that reason, he was added on urgently for an angiogram today with an EF  of 25%-30% with biventricular heart failure with severe biatrial enlargement.  He had mild MR and TR.  He has a possible mild aortic stenosis is, but it is difficult to evaluate as his heart rate was 125 on echo today.    I am seeing him today for followup all events and develop a plan.  Surprisingly, he does not feel too badly.  He is more short of breath than he would expect and when he gets to the top of the stairs, he feels winded, but he can continue walking.  He walked across the skyway today without any difficulty.  He has about a 600- to 700-foot driveway with a slight incline that he can walk up fine without stopping, grab the mail and walked back into the house.  He occasionally gets a little bit of chest pressure on his left side.  This tends to be after eating and more in the evenings and it is not with exertion.  He denies orthopnea or PND, but his wife did notice some facial puffiness, but no peripheral edema.  His abdomen and also seems more swollen.      Prior to taking his metoprolol, his weight was 238 pounds.  Today it is 234 pounds at home and he has noticed increased urination.  Looking back through his Fitbit, it looks like primarily his resting heart rates are in the 60s up until mid October where they went up into the 90s.    SOCIAL HISTORY:  He comes in today with his wife, Emy, who is a retired RN and their son, Luis, who works developing cochlear implants.  The patient is not currently drinking alcohol.  He is a former smoker, about a 36-pack-year history, quit in the 1990s.    PHYSICAL EXAMINATION:    GENERAL:  Well-developed, well-nourished gentleman in no acute distress.  HEENT:  Normocephalic, atraumatic.  HEART:  Irregularly irregular with 2/6 systolic murmur heard best right sternal border and tachycardic.  LUNGS:  Clear, without wheezes, rales, or rhonchi.  EXTREMITIES:  Without peripheral edema.  NECK:  The neck veins are just visible at 35 degrees.  ABDOMEN:  Feels soft and  slightly distended.  SKIN:  Warm and dry.    ASSESSMENT AND PLAN:    1.  New diagnosis of cardiomyopathy.  Differential includes tachycardia or ischemia induced.  Given that it is global, the more likely diagnosis is tachycardia induced.  He does not have a significant history of alcohol or chemotherapy.  I do not have his last thyroid, but that will be completed today.    I discussed this patient with Dr. Gilliam, who last saw him, and we discussed timing of angiogram and cardioversion.  We agree with his abnormal stress test, and although his chest pressure is atypical in its timing, he had nonocclusive disease 11 years ago.  That will start with a coronary angiogram.  He will hold his Eliquis for 2 days prior to that.  I would him to just get minimal fluids prior to the procedure given his cardiomyopathy, probably about 50 mL for 2 hours prior to the case.    He has not had a BMP, a CBC in our system.  We will do that today as well as a TSH with free T4.    Clinically, he appears just borderline hypervolemic primarily with abdominal distention.  We will do just a 3-day dose of torsemide at 5 mg a day.  Hopefully, unload his ventricle and atria to help with rate control as well and he will give us call on Monday and let us know how he is doing.  We can assess if we need to continue it at that time.    We otherwise spoke about heart failure and cardiomyopathy.  In general, the medications we are using will improve his heart going forward.  I did decide to enroll [facility] in [facility], given all his procedures and workup needed going forward.    2.  Paroxysmal atrial fibrillation, clinically remains in AFib today and tachycardic.  I am going to slowly increase his Toprol, so he does not become too hypotensive.  He is currently on 50 mg at bedtime.  We will have him take 25 mg in the morning and 50 mg at bedtime for 5 days and then increase to 50 mg b.i.d.  He remains on Eliquis.  Again, this will be held for 2  days prior to his angiogram.  It will likely then be restarted post angiogram.  He is already set up for a OSCAR-guided cardioversion on 2021, so we will keep that in place.  I am hopeful his heart rate will come down with this.  We also talked about where to go from here, if he reverts to AFib, whether or not we need to use antiarrhythmics or the possibility of EP study down the road.  We agreed we will start with this and go from there.    3.  Ulcerative colitis with 2 rounds of prednisone likely triggering his AFib this fall, although was of course necessary.  We will just need to be cautious with prednisone in the future.    4.  Dyslipidemia, well controlled.  Last lipid panel was done in .  I believe [facility] is [facility].  Will continue atorvastatin for now.    The patient will follow up with me post angiogram and enroll in [facility].  We will further optimize his medications from there.    Thank you for allowing me to participate in this delightful patient's care.    Yue Sequeira PA-C        D: 2022   T: 2022   MT: [id number]    Name:     BUCK SAMANIEGO  MRN:      [id number]        Account:      [id number]   :      1950           Service Date: 2022       Document: [id number]

## 2022-02-25 NOTE — PROGRESS NOTES
Called pt & wife with results from lexiscan. Pt denies chest pain at present but states if he does much exertion he states he gets a chest discomfort. He states over the last six months, he has noticed a decrease in his physical abilities, he feels he is not able to do much physically as he could six months ago. He states he was able to do more six months ago before getting tired.     Pt advised to move up the echo to the next available, today if possible & sees MAYUR after to determine a plan.     Will message Dr. Gilliam with an update. Andrés DONIS

## 2022-02-25 NOTE — TELEPHONE ENCOUNTER
Refer to Dr Del Rosario or his team who is this patients cardiologist. I saw him as a urgent follow up. Thx

## 2022-02-28 ENCOUNTER — TELEPHONE (OUTPATIENT)
Dept: CARDIOLOGY | Facility: CLINIC | Age: 72
End: 2022-02-28

## 2022-02-28 ENCOUNTER — LAB (OUTPATIENT)
Dept: URGENT CARE | Facility: URGENT CARE | Age: 72
End: 2022-02-28
Payer: MEDICARE

## 2022-02-28 DIAGNOSIS — I42.8 CARDIOMYOPATHY, NONISCHEMIC (H): ICD-10-CM

## 2022-02-28 DIAGNOSIS — Z11.59 ENCOUNTER FOR SCREENING FOR OTHER VIRAL DISEASES: ICD-10-CM

## 2022-02-28 LAB — SARS-COV-2 RNA RESP QL NAA+PROBE: NEGATIVE

## 2022-02-28 PROCEDURE — U0005 INFEC AGEN DETEC AMPLI PROBE: HCPCS

## 2022-02-28 PROCEDURE — U0003 INFECTIOUS AGENT DETECTION BY NUCLEIC ACID (DNA OR RNA); SEVERE ACUTE RESPIRATORY SYNDROME CORONAVIRUS 2 (SARS-COV-2) (CORONAVIRUS DISEASE [COVID-19]), AMPLIFIED PROBE TECHNIQUE, MAKING USE OF HIGH THROUGHPUT TECHNOLOGIES AS DESCRIBED BY CMS-2020-01-R: HCPCS

## 2022-02-28 RX ORDER — TORSEMIDE 10 MG/1
TABLET ORAL
Qty: 30 TABLET | Refills: 3 | Status: ON HOLD
Start: 2022-02-28 | End: 2022-03-05

## 2022-02-28 NOTE — TELEPHONE ENCOUNTER
Since he's had some improvement on the 5 mg of torsemide, let's have him continue it indefinitely.  It's a small enough dose that I think the risk of continuing it is low especially since he feels a bit better.    Yue Sequeira PA-C 2/28/2022 12:49 PM

## 2022-02-28 NOTE — TELEPHONE ENCOUNTER
Received call from pt stating that he has lost 1.5 pounds since starting the torsemide on Saturday morning. He states he feels the abdominal distension has improved but not resolved. He states he felt a little fatigued over the weekend with the increased metoprolol but states he feels better today so thinks his body may have adjusted to the increased dose of metoprolol. Pt asking about increasing the metoprolol again on Thursday as recommended but it is the same day as the heart cath. Pt advised he should increase it as recommended as it will not interfere with the heart cath. He states he will stop the Eliquis starting tomorrow for 2 day hold prior to the heart cath. Will message Yue BUTLER to review. Andrés DONIS

## 2022-02-28 NOTE — TELEPHONE ENCOUNTER
Called pt with recommendations from CARRIE Ghosh to continue the torsemide 5 mg daily. Pt verbalized understanding. Andrés DONIS

## 2022-02-28 NOTE — TELEPHONE ENCOUNTER
----- Message from Ashley Mark sent at 2/28/2022 11:32 AM CST -----  Regarding: Corrected Ordering MD for Angio  MRN: [id number]     Location: [facility]     Procedure: Left Heart Cath     Diagnosis: Cardiomyopathy, nonischemic     Procedure Date: 3/3/22     Procedure Time: 9:30am     Patient Arrival Time: 7:30am     Ordering Cardiologist: Dr. Gilliam    Performing Cardiologist: Dr. Solis     Cardiac Assessment Completed: Yes   Date: 2/25/22   Provider: Dr. Solis     Pre-Procedure Labs completed: On Admin     Post Procedure MAYUR appointment scheduled: Yes   Date: 3/10/22   Provider: CARRIE Sequeira     Patient Diabetic on Meds/Insulin: No   Patient on Coumadin/Warfarin:  No   Patient on Pradaxa/Xarelto/Eliquis: Yes-Eliquis     Does Patient have a history of bypass:  No     If yes, when was it done:     If yes, where was it done:       COVID Test Scheduled: 2/28/22@10:15am     Appointment was scheduled: Face to Face     Special instructions:

## 2022-03-02 DIAGNOSIS — R94.39 ABNORMAL CARDIOVASCULAR STRESS TEST: ICD-10-CM

## 2022-03-02 DIAGNOSIS — R07.9 CHEST PAIN: ICD-10-CM

## 2022-03-02 DIAGNOSIS — I48.19 PERSISTENT ATRIAL FIBRILLATION (H): Primary | ICD-10-CM

## 2022-03-02 RX ORDER — POTASSIUM CHLORIDE 1500 MG/1
20 TABLET, EXTENDED RELEASE ORAL
Status: CANCELLED | OUTPATIENT
Start: 2022-03-02

## 2022-03-02 RX ORDER — SODIUM CHLORIDE 9 MG/ML
INJECTION, SOLUTION INTRAVENOUS CONTINUOUS
Status: CANCELLED | OUTPATIENT
Start: 2022-03-02

## 2022-03-02 RX ORDER — ASPIRIN 325 MG
325 TABLET ORAL ONCE
Status: CANCELLED | OUTPATIENT
Start: 2022-03-02 | End: 2022-03-02

## 2022-03-02 RX ORDER — LIDOCAINE 40 MG/G
CREAM TOPICAL
Status: CANCELLED | OUTPATIENT
Start: 2022-03-02

## 2022-03-02 RX ORDER — ASPIRIN 81 MG/1
243 TABLET, CHEWABLE ORAL ONCE
Status: CANCELLED | OUTPATIENT
Start: 2022-03-02

## 2022-03-02 NOTE — PROGRESS NOTES
Called pt with instructions:  1. NPO after midnight  2. take medications in AM w/ sip of water; advised to take  mg Wednesday & Thursday AM  3. Pt has been hold Eliquis since Tuesday as instructed.  4. Denies being diabetic  5. Pt will hold torsemide in the AM  6. Denies dye allergy  7. Advised will need  & responsible adult with them for 24 after procedure.  8. Pt had negative COVID test 2/28/22.   Andrés DONIS

## 2022-03-03 ENCOUNTER — APPOINTMENT (OUTPATIENT)
Dept: CARDIOLOGY | Facility: CLINIC | Age: 72
DRG: 246 | End: 2022-03-03
Attending: INTERNAL MEDICINE
Payer: MEDICARE

## 2022-03-03 ENCOUNTER — HOSPITAL ENCOUNTER (INPATIENT)
Facility: CLINIC | Age: 72
LOS: 2 days | Discharge: HOME | DRG: 246 | End: 2022-03-05
Attending: INTERNAL MEDICINE | Admitting: INTERNAL MEDICINE
Payer: MEDICARE

## 2022-03-03 DIAGNOSIS — I21.11 ST ELEVATION MYOCARDIAL INFARCTION INVOLVING RIGHT CORONARY ARTERY (H): ICD-10-CM

## 2022-03-03 DIAGNOSIS — I42.8 CARDIOMYOPATHY, NONISCHEMIC (H): ICD-10-CM

## 2022-03-03 DIAGNOSIS — I48.19 PERSISTENT ATRIAL FIBRILLATION (H): ICD-10-CM

## 2022-03-03 DIAGNOSIS — N52.9 ERECTILE DYSFUNCTION, UNSPECIFIED ERECTILE DYSFUNCTION TYPE: ICD-10-CM

## 2022-03-03 DIAGNOSIS — R07.9 CHEST PAIN: ICD-10-CM

## 2022-03-03 DIAGNOSIS — R52 PAIN: Primary | ICD-10-CM

## 2022-03-03 DIAGNOSIS — I21.3 ST ELEVATION MI (STEMI) (H): ICD-10-CM

## 2022-03-03 DIAGNOSIS — R94.39 ABNORMAL CARDIOVASCULAR STRESS TEST: ICD-10-CM

## 2022-03-03 PROBLEM — Z98.61 PERCUTANEOUS TRANSLUMINAL CORONARY ANGIOPLASTY STATUS: Status: ACTIVE | Noted: 2022-03-03

## 2022-03-03 LAB
ACT BLD: 169 SECONDS (ref 74–150)
ACT BLD: 211 SECONDS (ref 74–150)
ACT BLD: 254 SECONDS (ref 74–150)
ACT BLD: 262 SECONDS (ref 74–150)
ACT BLD: 275 SECONDS (ref 74–150)
ACT BLD: 288 SECONDS (ref 74–150)
ACT BLD: 288 SECONDS (ref 74–150)
ANION GAP SERPL CALCULATED.3IONS-SCNC: 6 MMOL/L (ref 3–14)
APTT PPP: 28 SECONDS (ref 22–38)
BUN SERPL-MCNC: 22 MG/DL (ref 7–30)
CALCIUM SERPL-MCNC: 8.9 MG/DL (ref 8.5–10.1)
CHLORIDE BLD-SCNC: 108 MMOL/L (ref 94–109)
CHOLEST SERPL-MCNC: 117 MG/DL
CO2 SERPL-SCNC: 25 MMOL/L (ref 20–32)
CREAT SERPL-MCNC: 1.1 MG/DL (ref 0.66–1.25)
ERYTHROCYTE [DISTWIDTH] IN BLOOD BY AUTOMATED COUNT: 12.6 % (ref 10–15)
GFR SERPL CREATININE-BSD FRML MDRD: 73 ML/MIN/1.73M2
GLUCOSE BLD-MCNC: 141 MG/DL (ref 70–99)
HCT VFR BLD AUTO: 47.4 % (ref 40–53)
HDLC SERPL-MCNC: 36 MG/DL
HGB BLD-MCNC: 15.7 G/DL (ref 13.3–17.7)
INR PPP: 1 (ref 0.85–1.15)
LDLC SERPL CALC-MCNC: 55 MG/DL
LVEF ECHO: NORMAL
LVEF ECHO: NORMAL
MAGNESIUM SERPL-MCNC: 2.2 MG/DL (ref 1.6–2.3)
MCH RBC QN AUTO: 29.1 PG (ref 26.5–33)
MCHC RBC AUTO-ENTMCNC: 33.1 G/DL (ref 31.5–36.5)
MCV RBC AUTO: 88 FL (ref 78–100)
NONHDLC SERPL-MCNC: 81 MG/DL
PLATELET # BLD AUTO: 561 10E3/UL (ref 150–450)
POTASSIUM BLD-SCNC: 4.2 MMOL/L (ref 3.4–5.3)
RBC # BLD AUTO: 5.39 10E6/UL (ref 4.4–5.9)
SODIUM SERPL-SCNC: 139 MMOL/L (ref 133–144)
TRIGL SERPL-MCNC: 129 MG/DL
WBC # BLD AUTO: 9.4 10E3/UL (ref 4–11)

## 2022-03-03 PROCEDURE — 5A1223Z PERFORMANCE OF CARDIAC PACING, CONTINUOUS: ICD-10-PCS | Performed by: INTERNAL MEDICINE

## 2022-03-03 PROCEDURE — 99207 PR NO BILLABLE SERVICE THIS VISIT: CPT | Performed by: INTERNAL MEDICINE

## 2022-03-03 PROCEDURE — B2101ZZ FLUOROSCOPY OF SINGLE CORONARY ARTERY USING LOW OSMOLAR CONTRAST: ICD-10-PCS | Performed by: INTERNAL MEDICINE

## 2022-03-03 PROCEDURE — 250N000011 HC RX IP 250 OP 636

## 2022-03-03 PROCEDURE — 250N000013 HC RX MED GY IP 250 OP 250 PS 637: Performed by: INTERNAL MEDICINE

## 2022-03-03 PROCEDURE — C9606 PERC D-E COR REVASC W AMI S: HCPCS | Mod: RC | Performed by: INTERNAL MEDICINE

## 2022-03-03 PROCEDURE — 027035Z DILATION OF CORONARY ARTERY, ONE ARTERY WITH TWO DRUG-ELUTING INTRALUMINAL DEVICES, PERCUTANEOUS APPROACH: ICD-10-PCS | Performed by: INTERNAL MEDICINE

## 2022-03-03 PROCEDURE — 250N000009 HC RX 250: Performed by: INTERNAL MEDICINE

## 2022-03-03 PROCEDURE — C9600 PERC DRUG-EL COR STENT SING: HCPCS | Performed by: INTERNAL MEDICINE

## 2022-03-03 PROCEDURE — 93321 DOPPLER ECHO F-UP/LMTD STD: CPT | Mod: 26 | Performed by: INTERNAL MEDICINE

## 2022-03-03 PROCEDURE — C1725 CATH, TRANSLUMIN NON-LASER: HCPCS | Performed by: INTERNAL MEDICINE

## 2022-03-03 PROCEDURE — 85610 PROTHROMBIN TIME: CPT | Performed by: INTERNAL MEDICINE

## 2022-03-03 PROCEDURE — 93308 TTE F-UP OR LMTD: CPT | Mod: 26 | Performed by: INTERNAL MEDICINE

## 2022-03-03 PROCEDURE — 85730 THROMBOPLASTIN TIME PARTIAL: CPT | Performed by: INTERNAL MEDICINE

## 2022-03-03 PROCEDURE — 93321 DOPPLER ECHO F-UP/LMTD STD: CPT

## 2022-03-03 PROCEDURE — 99223 1ST HOSP IP/OBS HIGH 75: CPT | Performed by: INTERNAL MEDICINE

## 2022-03-03 PROCEDURE — B2111ZZ FLUOROSCOPY OF MULTIPLE CORONARY ARTERIES USING LOW OSMOLAR CONTRAST: ICD-10-PCS | Performed by: INTERNAL MEDICINE

## 2022-03-03 PROCEDURE — 85347 COAGULATION TIME ACTIVATED: CPT

## 2022-03-03 PROCEDURE — 36415 COLL VENOUS BLD VENIPUNCTURE: CPT | Performed by: INTERNAL MEDICINE

## 2022-03-03 PROCEDURE — 93325 DOPPLER ECHO COLOR FLOW MAPG: CPT

## 2022-03-03 PROCEDURE — C1769 GUIDE WIRE: HCPCS | Performed by: INTERNAL MEDICINE

## 2022-03-03 PROCEDURE — 999N000071 HC STATISTIC HEART CATH LAB OR EP LAB

## 2022-03-03 PROCEDURE — 258N000003 HC RX IP 258 OP 636: Performed by: INTERNAL MEDICINE

## 2022-03-03 PROCEDURE — C1753 CATH, INTRAVAS ULTRASOUND: HCPCS | Performed by: INTERNAL MEDICINE

## 2022-03-03 PROCEDURE — 93454 CORONARY ARTERY ANGIO S&I: CPT | Mod: XS

## 2022-03-03 PROCEDURE — 99152 MOD SED SAME PHYS/QHP 5/>YRS: CPT | Performed by: INTERNAL MEDICINE

## 2022-03-03 PROCEDURE — 4A0335C MEASUREMENT OF ARTERIAL FLOW, CORONARY, PERCUTANEOUS APPROACH: ICD-10-PCS | Performed by: INTERNAL MEDICINE

## 2022-03-03 PROCEDURE — C1724 CATH, TRANS ATHEREC,ROTATION: HCPCS | Performed by: INTERNAL MEDICINE

## 2022-03-03 PROCEDURE — 93454 CORONARY ARTERY ANGIO S&I: CPT | Performed by: INTERNAL MEDICINE

## 2022-03-03 PROCEDURE — 250N000011 HC RX IP 250 OP 636: Performed by: INTERNAL MEDICINE

## 2022-03-03 PROCEDURE — C1887 CATHETER, GUIDING: HCPCS | Performed by: INTERNAL MEDICINE

## 2022-03-03 PROCEDURE — 83735 ASSAY OF MAGNESIUM: CPT | Performed by: NURSE PRACTITIONER

## 2022-03-03 PROCEDURE — 99153 MOD SED SAME PHYS/QHP EA: CPT | Mod: XE | Performed by: INTERNAL MEDICINE

## 2022-03-03 PROCEDURE — 210N000002 HC R&B HEART CARE

## 2022-03-03 PROCEDURE — 92978 ENDOLUMINL IVUS OCT C 1ST: CPT | Mod: RC

## 2022-03-03 PROCEDURE — 93571 IV DOP VEL&/PRESS C FLO 1ST: CPT | Mod: 52,LD | Performed by: INTERNAL MEDICINE

## 2022-03-03 PROCEDURE — 36591 DRAW BLOOD OFF VENOUS DEVICE: CPT

## 2022-03-03 PROCEDURE — B240ZZ3 ULTRASONOGRAPHY OF SINGLE CORONARY ARTERY, INTRAVASCULAR: ICD-10-PCS | Performed by: INTERNAL MEDICINE

## 2022-03-03 PROCEDURE — 93325 DOPPLER ECHO COLOR FLOW MAPG: CPT | Mod: 26 | Performed by: INTERNAL MEDICINE

## 2022-03-03 PROCEDURE — C1726 CATH, BAL DIL, NON-VASCULAR: HCPCS | Performed by: INTERNAL MEDICINE

## 2022-03-03 PROCEDURE — B241ZZ3 ULTRASONOGRAPHY OF MULTIPLE CORONARY ARTERIES, INTRAVASCULAR: ICD-10-PCS | Performed by: INTERNAL MEDICINE

## 2022-03-03 PROCEDURE — 250N000013 HC RX MED GY IP 250 OP 250 PS 637: Performed by: STUDENT IN AN ORGANIZED HEALTH CARE EDUCATION/TRAINING PROGRAM

## 2022-03-03 PROCEDURE — 85014 HEMATOCRIT: CPT | Performed by: INTERNAL MEDICINE

## 2022-03-03 PROCEDURE — 3E033XZ INTRODUCTION OF VASOPRESSOR INTO PERIPHERAL VEIN, PERCUTANEOUS APPROACH: ICD-10-PCS | Performed by: INTERNAL MEDICINE

## 2022-03-03 PROCEDURE — C1874 STENT, COATED/COV W/DEL SYS: HCPCS | Performed by: INTERNAL MEDICINE

## 2022-03-03 PROCEDURE — 3E073PZ INTRODUCTION OF PLATELET INHIBITOR INTO CORONARY ARTERY, PERCUTANEOUS APPROACH: ICD-10-PCS | Performed by: INTERNAL MEDICINE

## 2022-03-03 PROCEDURE — 02C03ZZ EXTIRPATION OF MATTER FROM CORONARY ARTERY, ONE ARTERY, PERCUTANEOUS APPROACH: ICD-10-PCS | Performed by: INTERNAL MEDICINE

## 2022-03-03 PROCEDURE — 80061 LIPID PANEL: CPT | Performed by: INTERNAL MEDICINE

## 2022-03-03 PROCEDURE — 272N000001 HC OR GENERAL SUPPLY STERILE: Performed by: INTERNAL MEDICINE

## 2022-03-03 PROCEDURE — 80048 BASIC METABOLIC PNL TOTAL CA: CPT | Performed by: INTERNAL MEDICINE

## 2022-03-03 PROCEDURE — 999N000184 HC STATISTIC TELEMETRY

## 2022-03-03 PROCEDURE — 93005 ELECTROCARDIOGRAM TRACING: CPT

## 2022-03-03 DEVICE — STENT CORONARY DES SYNERGY XD MR US 3.00X38MM H7493941838300: Type: IMPLANTABLE DEVICE | Status: FUNCTIONAL

## 2022-03-03 DEVICE — STENT CORONARY SYNERGY XD MR US 3.0X48MM H7493941848300: Type: IMPLANTABLE DEVICE | Status: FUNCTIONAL

## 2022-03-03 RX ORDER — FLUMAZENIL 0.1 MG/ML
0.2 INJECTION, SOLUTION INTRAVENOUS
Status: ACTIVE | OUTPATIENT
Start: 2022-03-03 | End: 2022-03-03

## 2022-03-03 RX ORDER — ONDANSETRON 2 MG/ML
4 INJECTION INTRAMUSCULAR; INTRAVENOUS EVERY 6 HOURS PRN
Status: DISCONTINUED | OUTPATIENT
Start: 2022-03-03 | End: 2022-03-03

## 2022-03-03 RX ORDER — ASPIRIN 81 MG/1
81 TABLET, CHEWABLE ORAL ONCE
Status: DISCONTINUED | OUTPATIENT
Start: 2022-03-03 | End: 2022-03-03 | Stop reason: CLARIF

## 2022-03-03 RX ORDER — HEPARIN SODIUM 1000 [USP'U]/ML
INJECTION, SOLUTION INTRAVENOUS; SUBCUTANEOUS
Status: DISCONTINUED | OUTPATIENT
Start: 2022-03-03 | End: 2022-03-03 | Stop reason: HOSPADM

## 2022-03-03 RX ORDER — OXYCODONE HYDROCHLORIDE 5 MG/1
5 TABLET ORAL EVERY 4 HOURS PRN
Status: DISCONTINUED | OUTPATIENT
Start: 2022-03-03 | End: 2022-03-03

## 2022-03-03 RX ORDER — SODIUM CHLORIDE 9 MG/ML
INJECTION, SOLUTION INTRAVENOUS CONTINUOUS
Status: DISCONTINUED | OUTPATIENT
Start: 2022-03-03 | End: 2022-03-03 | Stop reason: HOSPADM

## 2022-03-03 RX ORDER — NALOXONE HYDROCHLORIDE 0.4 MG/ML
0.2 INJECTION, SOLUTION INTRAMUSCULAR; INTRAVENOUS; SUBCUTANEOUS
Status: ACTIVE | OUTPATIENT
Start: 2022-03-03 | End: 2022-03-03

## 2022-03-03 RX ORDER — ASPIRIN 325 MG
325 TABLET ORAL ONCE
Status: DISCONTINUED | OUTPATIENT
Start: 2022-03-03 | End: 2022-03-03 | Stop reason: HOSPADM

## 2022-03-03 RX ORDER — EPINEPHRINE IN SOD CHLOR,ISO 1 MG/10 ML
SYRINGE (ML) INTRAVENOUS
Status: DISCONTINUED | OUTPATIENT
Start: 2022-03-03 | End: 2022-03-03 | Stop reason: HOSPADM

## 2022-03-03 RX ORDER — ACETAMINOPHEN 325 MG/1
650 TABLET ORAL EVERY 4 HOURS PRN
Status: DISCONTINUED | OUTPATIENT
Start: 2022-03-03 | End: 2022-03-03

## 2022-03-03 RX ORDER — AMOXICILLIN 250 MG
1 CAPSULE ORAL 2 TIMES DAILY PRN
Status: DISCONTINUED | OUTPATIENT
Start: 2022-03-03 | End: 2022-03-05 | Stop reason: HOSPADM

## 2022-03-03 RX ORDER — NALOXONE HYDROCHLORIDE 0.4 MG/ML
0.4 INJECTION, SOLUTION INTRAMUSCULAR; INTRAVENOUS; SUBCUTANEOUS
Status: ACTIVE | OUTPATIENT
Start: 2022-03-03 | End: 2022-03-03

## 2022-03-03 RX ORDER — OXYCODONE HYDROCHLORIDE 5 MG/1
10 TABLET ORAL EVERY 4 HOURS PRN
Status: DISCONTINUED | OUTPATIENT
Start: 2022-03-03 | End: 2022-03-03

## 2022-03-03 RX ORDER — BISACODYL 10 MG
10 SUPPOSITORY, RECTAL RECTAL DAILY PRN
Status: DISCONTINUED | OUTPATIENT
Start: 2022-03-03 | End: 2022-03-05 | Stop reason: HOSPADM

## 2022-03-03 RX ORDER — METOPROLOL TARTRATE 1 MG/ML
5 INJECTION, SOLUTION INTRAVENOUS
Status: DISCONTINUED | OUTPATIENT
Start: 2022-03-03 | End: 2022-03-05 | Stop reason: HOSPADM

## 2022-03-03 RX ORDER — NALOXONE HYDROCHLORIDE 0.4 MG/ML
0.2 INJECTION, SOLUTION INTRAMUSCULAR; INTRAVENOUS; SUBCUTANEOUS
Status: DISCONTINUED | OUTPATIENT
Start: 2022-03-03 | End: 2022-03-03

## 2022-03-03 RX ORDER — EPTIFIBATIDE 2 MG/ML
INJECTION, SOLUTION INTRAVENOUS
Status: DISCONTINUED | OUTPATIENT
Start: 2022-03-03 | End: 2022-03-03 | Stop reason: HOSPADM

## 2022-03-03 RX ORDER — NITROGLYCERIN 0.4 MG/1
0.4 TABLET SUBLINGUAL EVERY 5 MIN PRN
Status: DISCONTINUED | OUTPATIENT
Start: 2022-03-03 | End: 2022-03-05 | Stop reason: HOSPADM

## 2022-03-03 RX ORDER — PROCHLORPERAZINE MALEATE 5 MG
5 TABLET ORAL EVERY 6 HOURS PRN
Status: DISCONTINUED | OUTPATIENT
Start: 2022-03-03 | End: 2022-03-03

## 2022-03-03 RX ORDER — NALOXONE HYDROCHLORIDE 0.4 MG/ML
0.4 INJECTION, SOLUTION INTRAMUSCULAR; INTRAVENOUS; SUBCUTANEOUS
Status: DISCONTINUED | OUTPATIENT
Start: 2022-03-03 | End: 2022-03-03

## 2022-03-03 RX ORDER — DOPAMINE HYDROCHLORIDE 160 MG/100ML
2-20 INJECTION, SOLUTION INTRAVENOUS CONTINUOUS
Status: DISCONTINUED | OUTPATIENT
Start: 2022-03-03 | End: 2022-03-04

## 2022-03-03 RX ORDER — PROCHLORPERAZINE 25 MG
12.5 SUPPOSITORY, RECTAL RECTAL EVERY 12 HOURS PRN
Status: DISCONTINUED | OUTPATIENT
Start: 2022-03-03 | End: 2022-03-03

## 2022-03-03 RX ORDER — ACETAMINOPHEN 325 MG/1
650 TABLET ORAL EVERY 4 HOURS PRN
Status: DISCONTINUED | OUTPATIENT
Start: 2022-03-03 | End: 2022-03-05 | Stop reason: HOSPADM

## 2022-03-03 RX ORDER — AMOXICILLIN 250 MG
2 CAPSULE ORAL 2 TIMES DAILY PRN
Status: DISCONTINUED | OUTPATIENT
Start: 2022-03-03 | End: 2022-03-05 | Stop reason: HOSPADM

## 2022-03-03 RX ORDER — FENTANYL CITRATE-0.9 % NACL/PF 10 MCG/ML
PLASTIC BAG, INJECTION (ML) INTRAVENOUS
Status: DISCONTINUED | OUTPATIENT
Start: 2022-03-03 | End: 2022-03-03 | Stop reason: HOSPADM

## 2022-03-03 RX ORDER — LIDOCAINE 40 MG/G
CREAM TOPICAL
Status: DISCONTINUED | OUTPATIENT
Start: 2022-03-03 | End: 2022-03-05 | Stop reason: HOSPADM

## 2022-03-03 RX ORDER — CLOPIDOGREL BISULFATE 75 MG/1
TABLET ORAL
Status: DISCONTINUED | OUTPATIENT
Start: 2022-03-03 | End: 2022-03-03 | Stop reason: HOSPADM

## 2022-03-03 RX ORDER — FENTANYL CITRATE 50 UG/ML
25 INJECTION, SOLUTION INTRAMUSCULAR; INTRAVENOUS
Status: DISCONTINUED | OUTPATIENT
Start: 2022-03-03 | End: 2022-03-03

## 2022-03-03 RX ORDER — METOCLOPRAMIDE HYDROCHLORIDE 5 MG/ML
5 INJECTION INTRAMUSCULAR; INTRAVENOUS EVERY 6 HOURS PRN
Status: DISCONTINUED | OUTPATIENT
Start: 2022-03-03 | End: 2022-03-05 | Stop reason: HOSPADM

## 2022-03-03 RX ORDER — FENTANYL CITRATE 50 UG/ML
INJECTION, SOLUTION INTRAMUSCULAR; INTRAVENOUS
Status: DISCONTINUED | OUTPATIENT
Start: 2022-03-03 | End: 2022-03-03 | Stop reason: HOSPADM

## 2022-03-03 RX ORDER — OXYCODONE HYDROCHLORIDE 5 MG/1
5 TABLET ORAL EVERY 4 HOURS PRN
Status: DISCONTINUED | OUTPATIENT
Start: 2022-03-03 | End: 2022-03-04

## 2022-03-03 RX ORDER — ATROPINE SULFATE 0.1 MG/ML
INJECTION INTRAVENOUS
Status: DISCONTINUED | OUTPATIENT
Start: 2022-03-03 | End: 2022-03-03 | Stop reason: HOSPADM

## 2022-03-03 RX ORDER — ONDANSETRON 4 MG/1
4 TABLET, ORALLY DISINTEGRATING ORAL EVERY 6 HOURS PRN
Status: DISCONTINUED | OUTPATIENT
Start: 2022-03-03 | End: 2022-03-03

## 2022-03-03 RX ORDER — SODIUM CHLORIDE 9 MG/ML
INJECTION, SOLUTION INTRAVENOUS CONTINUOUS
Status: DISCONTINUED | OUTPATIENT
Start: 2022-03-03 | End: 2022-03-03

## 2022-03-03 RX ORDER — ASPIRIN 81 MG/1
81 TABLET ORAL DAILY
Status: DISCONTINUED | OUTPATIENT
Start: 2022-03-04 | End: 2022-03-04

## 2022-03-03 RX ORDER — FLUMAZENIL 0.1 MG/ML
0.2 INJECTION, SOLUTION INTRAVENOUS
Status: DISCONTINUED | OUTPATIENT
Start: 2022-03-03 | End: 2022-03-03

## 2022-03-03 RX ORDER — CLOPIDOGREL BISULFATE 75 MG/1
75 TABLET ORAL DAILY
Status: DISCONTINUED | OUTPATIENT
Start: 2022-03-04 | End: 2022-03-05 | Stop reason: HOSPADM

## 2022-03-03 RX ORDER — SODIUM CHLORIDE 9 MG/ML
INJECTION, SOLUTION INTRAVENOUS CONTINUOUS
Status: DISCONTINUED | OUTPATIENT
Start: 2022-03-03 | End: 2022-03-05 | Stop reason: HOSPADM

## 2022-03-03 RX ORDER — ATROPINE SULFATE 0.1 MG/ML
0.5 INJECTION INTRAVENOUS
Status: DISCONTINUED | OUTPATIENT
Start: 2022-03-03 | End: 2022-03-03

## 2022-03-03 RX ORDER — NITROGLYCERIN 5 MG/ML
VIAL (ML) INTRAVENOUS
Status: DISCONTINUED | OUTPATIENT
Start: 2022-03-03 | End: 2022-03-03 | Stop reason: HOSPADM

## 2022-03-03 RX ORDER — HYDRALAZINE HYDROCHLORIDE 20 MG/ML
10 INJECTION INTRAMUSCULAR; INTRAVENOUS EVERY 4 HOURS PRN
Status: DISCONTINUED | OUTPATIENT
Start: 2022-03-03 | End: 2022-03-05 | Stop reason: HOSPADM

## 2022-03-03 RX ORDER — OXYCODONE HYDROCHLORIDE 5 MG/1
10 TABLET ORAL EVERY 4 HOURS PRN
Status: DISCONTINUED | OUTPATIENT
Start: 2022-03-03 | End: 2022-03-04

## 2022-03-03 RX ORDER — POTASSIUM CHLORIDE 1500 MG/1
20 TABLET, EXTENDED RELEASE ORAL
Status: DISCONTINUED | OUTPATIENT
Start: 2022-03-03 | End: 2022-03-03 | Stop reason: HOSPADM

## 2022-03-03 RX ORDER — HYDROMORPHONE HYDROCHLORIDE 1 MG/ML
0.2 INJECTION, SOLUTION INTRAMUSCULAR; INTRAVENOUS; SUBCUTANEOUS
Status: DISCONTINUED | OUTPATIENT
Start: 2022-03-03 | End: 2022-03-04

## 2022-03-03 RX ORDER — SODIUM CHLORIDE 9 MG/ML
75 INJECTION, SOLUTION INTRAVENOUS CONTINUOUS
Status: ACTIVE | OUTPATIENT
Start: 2022-03-03 | End: 2022-03-03

## 2022-03-03 RX ORDER — ASPIRIN 81 MG/1
243 TABLET, CHEWABLE ORAL ONCE
Status: DISCONTINUED | OUTPATIENT
Start: 2022-03-03 | End: 2022-03-03 | Stop reason: HOSPADM

## 2022-03-03 RX ORDER — ATROPINE SULFATE 0.1 MG/ML
0.5 INJECTION INTRAVENOUS
Status: DISPENSED | OUTPATIENT
Start: 2022-03-03 | End: 2022-03-03

## 2022-03-03 RX ORDER — LIDOCAINE 40 MG/G
CREAM TOPICAL
Status: DISCONTINUED | OUTPATIENT
Start: 2022-03-03 | End: 2022-03-03 | Stop reason: HOSPADM

## 2022-03-03 RX ORDER — FENTANYL CITRATE 50 UG/ML
25 INJECTION, SOLUTION INTRAMUSCULAR; INTRAVENOUS
Status: DISCONTINUED | OUTPATIENT
Start: 2022-03-03 | End: 2022-03-05 | Stop reason: HOSPADM

## 2022-03-03 RX ADMIN — SODIUM CHLORIDE: 9 INJECTION, SOLUTION INTRAVENOUS at 07:56

## 2022-03-03 RX ADMIN — DOPAMINE HYDROCHLORIDE 10 MCG/KG/MIN: 160 INJECTION, SOLUTION INTRAVENOUS at 10:05

## 2022-03-03 RX ADMIN — DOPAMINE HYDROCHLORIDE 7.5 MCG/KG/MIN: 160 INJECTION, SOLUTION INTRAVENOUS at 10:27

## 2022-03-03 RX ADMIN — DOPAMINE HYDROCHLORIDE 5 MCG/KG/MIN: 160 INJECTION, SOLUTION INTRAVENOUS at 10:04

## 2022-03-03 RX ADMIN — OXYCODONE HYDROCHLORIDE 5 MG: 5 TABLET ORAL at 14:48

## 2022-03-03 ASSESSMENT — ACTIVITIES OF DAILY LIVING (ADL)
ADLS_ACUITY_SCORE: 5
ADLS_ACUITY_SCORE: 5
ADLS_ACUITY_SCORE: 12
ADLS_ACUITY_SCORE: 5
ADLS_ACUITY_SCORE: 12
ADLS_ACUITY_SCORE: 5

## 2022-03-03 NOTE — H&P
[facility]    History and Physical - Hospitalist Service       Date of Admission:  3/3/2022    Assessment & Plan      Peter Peralta is a 71 year old male with PMH significant for nonischemic cardiomyopathy, recently diagnosed atrial fibrillation, CAD, ulcerative colitis and HLP admitted on 3/3/2022 for an elective coronary angiogram noting a 100% occluded RCA s/p stents x2 RCA m/d complicated by symptomatic hypotension, bradycardia requiring dopamine and epinephrine, temporary pacemaker placement.  Shortly after arrival to CCU, pt developed worsening chest pain, BUE numbness, tingling, severe nausea with EKG noting inferior STEMI.  Pt back to cath lab, received Integrilin, continues on dopamine.  Pt being admitted to CCU IMC status.      3V coronary artery disease s/p rotablator RCA m/d, PTCB, POBA, and stents x2 RCA m/d.  Symptomatic hypotension, bradycardia 2/2 100% RCA occlusion.  Acute biventricular HFrEF with severe biatrial enlargement, EF 26%, possibly 2/2 tachycardia vs ischemia.      Nonischemic cardiomyopathy.  Worsening shortness of breath over past few months.  History systolic murmur.  In 2011, pt with non-flow limiting CAD on angiogram with 50% mid LAD narrowing, 25% circumflex, 25% distal RCA.  Pt recently had a GI follow up appointment in 2/2022 with provider noting irregular heart beat, at that time, pt followed up with Dr. Gilliam.  Cardiology diagnosed pt with PAF with CHADS-VASc of 4, was started on eliquis.  Pt was to have a OSCAR guided cardioverson, nuclear stress test and echocardiogram.  Pt underwent NM lexiscan stress test 2/24/22 noting small area of ischemia in the mid inferolateral segment(s) of the left ventricle ,transmural infarction in the distal inferior and inferoseptal segment(s) of the left ventricle, small area of nontransmural infarction in the apical segment(s) of the left ventricle and LV function severely reduced, with EF 26%.  At that time,  pt was referred for urgent coronary angiogram with Dr. Del Rosario today, 3/3/22.  During coronary angiogram, pt was noted to have 3V valcific disease.  Pt was subsequently noted to have 100% occluded RCA.  At that time, pt became hemodynamically unstable, hypotensive, bradycardic.  Pt was administered atropine, neosynephrine and epinephrine and initiated on dopamine infusion.  A thrombectomy of RCA pulled out red clot that reestablished flow.  RCA was severely narrowed requiring rotablater of RCA m/d, followed by PTCB, POBA to allow passage of stents to m/d RCA.  Temporary pacemaker was also placed during this time.  Pt was transferred to CCU with sheath and temporary pacemaker still in place.  Upon arrival to CCU, pt noted ongoing chest pain since the cath lab, ongoing nausea and BUE numbness/tingling; nursing obtained EKG noting inferior STEMI.  Cardiology was immediately contacted who subsequently activated CODE STEMI.  2nd time in cath lab no obvious cause of chest pain noted.  Pt was administered Integrilin during procedure but was not continued on an infusion.  Pt continues on low dose dopamine at this time.  Temporary pacemaker also removed.  Sheath remains in place for an additional 1+ hour.  - Cardiology consulted, appreciate recommendations  - Per cardiology, pt continued on plavix  - Per cardiology, pt's PTA eliquis to be resumed 3/4/22 evening after seen by Dr. Baez and if groin healing well (has been on hold since 3/1/22)  - Hold PTA Toprol XL 50 mg BID, torsemide 5 mg daily while continues on dopamine; defer resuming to cardiology  - IMC status     Low back pain possibly 2/2 MSK.  - Pt reported low back pain during coronary angiogram and also during previous lexiscan; suspect due to lying on table, will need to monitor to ensure does not require additional workup   - Has PRN APAP, oxycodone, dilaudid    Recently diagnosed persistent atrial fibrillation.  RBBB.  Pt was originally planned for an  "outpatient OSCAR-guided cardioversion.  In setting of abnormal stress test requiring additional work up, cardioversion was deferred and cardiology uptitrating BB as outpatient.    - Holding PTA Toprol XL 50 mg BID while continues on dopamine; defer resuming of BB to cardiology    Mildly prolonged QTc.  QTc 510  - Monitor    Prediabetes.  Hemoglobin A1C 6.3 on 12/30/21  - Encourage healthy diet and active lifestyle      Chronic thrombocytosis suspect 2/2 chronic inflammation in setting of UC.  Pt follows with [facility]; notes no further workup required.   Recent Labs   Lab 03/03/22  0755 02/25/22  1509   * 589*   - Repeat CBC in AM     HLP.  - Continue PTA atorvastatin 40 mg daily    Ulcerative colitis.  - Continue PTA mesalamine; last flare up in October 2021, 3 years prior to that.      FAM.  - Not on CPAP PTA; however, notes has been diagnosed with severe FAM, needing to follow up with outpatient sleep study to be placed on CPAP  - Will order CPAP when sleeping     GERD.  - On Nexium PTA; will continue protonix while inpatient     BPH.  - Not on any medications PTA, noted  - Has straight catheterization orders in place should pt require while on bedrest    Former smoker, 36 pack year history.  - Noted    Obesity, BMI 31.06.  - Encourage active lifestyle and healthy diet     Diet:   ADAT to cardiac diabetic diet   DVT Prophylaxis: Pneumatic Compression Devices  Power Catheter: Not present  Central Lines: PRESENT     Cardiac Monitoring: ACTIVE order. Indication: Post- PCI/Angiogram (24 hours)  Code Status:   Full code, confirmed and discussed with pt and his wife while at pt's bedside    Clinically Significant Risk Factors Present on Admission              # Coagulation Defect: home medication list includes an anticoagulant medication    # Obesity: Estimated body mass index is 31.06 kg/m  as calculated from the following:    Height as of this encounter: 1.854 m (6' 1\").    Weight as of this encounter: " 106.8 kg (235 lb 6.4 oz).      Disposition Plan   Expected Discharge:  2+ days pending additional cardiac workup   Anticipated discharge location:  Awaiting care coordination huddle  Delays:     None noted      The patient's care was discussed with the Attending Physician, Dr. Nixon Fonseca, hospitalist, Bedside Nurse, Patient and Patient's Family.    NIURKA Hsu [facility]  Hospitalist Service  [facility]  Securely message with the Vocera Web Console (learn more here)  Text page via [employer] Paging/Directory         ______________________________________________________________________    Chief Complaint   MOSS, abnormal NM lexiscan stress test    History is obtained from the patient, electronic health record, patient's daughter and patient's spouse    History of Present Illness   Peter Peralta is a 71 year old male with PMH significant for nonischemic cardiomyopathy, recently diagnosed atrial fibrillation, CAD, ulcerative colitis and HLP admitted on 3/3/2022 for an elective coronary angiogram.  In 2011, pt with non-flow limiting CAD on angiogram with 50% mid LAD narrowing, 25% circumflex, 25% distal RCA.  Pt recently had a GI follow up appointment in 2/2022 with provider noting irregular heart beat, at that time, pt followed up with Dr. Gilliam.  Cardiology diagnosed pt with PAF with CHADS-VASc of 4, was started on eliquis.  Pt was to have a OSCAR guided cardioverson, nuclear stress test and echocardiogram.  Pt underwent NM lexiscan stress test 2/24/22 noting small area of ischemia in the mid inferolateral segment(s) of the left ventricle ,transmural infarction in the distal inferior and inferoseptal segment(s) of the left ventricle, small area of nontransmural infarction in the apical segment(s) of the left ventricle and LV function severely reduced, with EF 26%.  At that time, pt was referred for urgent coronary angiogram with Dr. Del Rosario today, 3/3/22.  During coronary angiogram, pt  was noted to have 3V valcific disease.  Pt was subsequently noted to have 100% occluded RCA.  At that time, pt became hemodynamically unstable, hypotensive, bradycardic.  Pt was administered atropine, neosynephrine and epinephrine and initiated on dopamine infusion.  A thrombectomy of RCA pulled out red clot that reestablished flow.  RCA was severely narrowed requiring rotablater of RCA m/d, followed by PTCB, POBA to allow passage of stents to m/d RCA.  Temporary pacemaker was also placed during this time.  Pt was transferred to CCU with sheath and temporary pacemaker still in place.  Upon arrival to CCU, pt noted ongoing chest pain since the cath lab, ongoing nausea; nursing obtained EKG noting inferior STEMI.  Cardiology was immediately contacted who subsequently activated CODE STEMI.  2nd time in cath lab no obvious cause of chest pain noted.  Pt was administered Integrilin during procedure but was not continued on an infusion.  Pt continues on low dose dopamine at this time.  Temporary pacemaker also removed.  Sheath remains in place for an additional 1+ hour.      Upon arrival to pt's bedside, pt lying in bed, awake, alert, in no overt distress.  Pt's wife and son present at bedside as well.  Pt reports no further chest pain or BUE numbness/tingling.  Pt reports ongoing nausea but notes much improved.  Pt reports some ongoing low back pain; notes unable to lie supine at baseline.  Pt reports no diarrhea or constipation.      Review of Systems    The 10 point Review of Systems is negative other than noted in the HPI or here.     Past Medical History    I have reviewed this patient's medical history and updated it with pertinent information if needed.   Past Medical History:   Diagnosis Date     Actinic keratoses     face     Atrial fibrillation (H)     only after adenosine test     BPH (benign prostatic hypertrophy)      Chest pain      Coronary artery disease 2011 2011-Cath Lma 5%, Lad 40-50%, Lcx 30-40%,  Nys52-47% (-FFR of Lad)     ED (erectile dysfunction)      Erectile dysfunction 12/30/2021     Family history of colon cancer 7/6/2012    mother age 65     GERD (gastroesophageal reflux disease)     nexium     Hyperlipidaemia      Hypertension 2/16/2022     IFG (impaired fasting glucose)      Murmur      Obesity, unspecified     Waist size 42, BMI 30.8     Right inguinal hernia 7/1/2014     Thrombocytosis      Ulcerative colitis (H)    - Prediabetes  - HFrEF  - Nonischemic cardiomyopathy  - Persistent atrial fibrillation    Past Surgical History   I have reviewed this patient's surgical history and updated it with pertinent information if needed.  Past Surgical History:   Procedure Laterality Date     CARDIAC NUC POLINA STRESS TEST NL      Normal     CARDIOVERSION  12/2011    Atrial Fib     COLONOSCOPY  1/29/2014    Procedure: COMBINED COLONOSCOPY, SINGLE BIOPSY/POLYPECTOMY BY BIOPSY;  COLONOSCOPY;  Surgeon: Ashtyn Gonzales MD;  Location: [facility]     COLONOSCOPY N/A 7/11/2018    Procedure: COMBINED COLONOSCOPY, SINGLE OR MULTIPLE BIOPSY/POLYPECTOMY BY BIOPSY;  COLONOSCOPY ;  Surgeon: Ashtyn Gonzales MD;  Location: [facility]     CORONARY ANGIOGRAPHY ADULT ORDER  12/2011    1 LMA 5%, LAD 40-50%, LCx 30-40%, RCA 25-30%     ENT SURGERY      Ringing in the ears     HC INJECTION SCLEROSING SOLUTION HEMORRHOID  8/24/2012    Procedure: HEMORRHOID INJECTION SCLEROSING SOLUTION;  Surgeon: Mayito Chow MD;  Location: [facility]     HC TOOTH EXTRACTION W/FORCEP       HERNIA REPAIR  Needed     LASIK  1/2011     ROTATOR CUFF REPAIR RT/LT Left 10/15/2015    RCR Left - Dr. Carvalho     SOFT TISSUE SURGERY  10/2015    Torn rotator cuff       Social History   I have reviewed this patient's social history and updated it with pertinent information if needed.  Social History     Tobacco Use     Smoking status: Former Smoker     Packs/day: 1.50     Years: 24.00     Pack years: 36.00     Types: Cigarettes     Quit date: 1/2/1996     Years  since quittin.1     Smokeless tobacco: Never Used   Substance Use Topics     Alcohol use: Not Currently     Alcohol/week: 0.0 standard drinks     Comment: 1 beer per week     Drug use: No       Family History   I have reviewed this patient's family history and updated it with pertinent information if needed.  Family History   Problem Relation Age of Onset     C.A.D. Father         passed at 52 from MI     Cardiovascular Father      Heart Disease Father      Obesity Father      Myocardial Infarction Father         52 - passed away     Coronary Artery Disease Father      Colon Cancer Mother 65        passed away from colon cancer - lived about 2 mo from       Arthritis Maternal Grandmother      Diabetes Maternal Grandmother      Arthritis Maternal Grandfather      Diabetes Maternal Grandfather      Heart Disease Brother      Diabetes Brother      Coronary Artery Disease Brother      Hypertension Brother      Hyperlipidemia Brother      Coronary Artery Disease Brother         stent placed      Hyperlipidemia Brother      Substance Abuse Paternal Grandfather      Hypertension No family hx of      Cerebrovascular Disease No family hx of      Breast Cancer No family hx of      Prostate Cancer No family hx of      Alcohol/Drug No family hx of      Allergies No family hx of      Alzheimer Disease No family hx of      Circulatory No family hx of      Congenital Anomalies No family hx of      Connective Tissue Disorder No family hx of      Depression No family hx of      Eye Disorder No family hx of      Genetic Disorder No family hx of      Gastrointestinal Disease No family hx of      Genitourinary Problems No family hx of      Gynecology No family hx of      Musculoskeletal Disorder No family hx of      Neurologic Disorder No family hx of      Osteoporosis No family hx of      Psychotic Disorder No family hx of      Respiratory No family hx of      Thyroid Disease No family hx of      Anesthesia Reaction No  family hx of      Blood Disease No family hx of        Prior to Admission Medications   Prior to Admission Medications   Prescriptions Last Dose Informant Patient Reported? Taking?   Calcium-Vitamin D-Vitamin K (CALCIUM + D + K) 750-500-40 MG-UNT-MCG TABS 3/2/2022 at Unknown time  Yes Yes   Sig: Take 2 tablets by mouth daily.   Glucos-Chond-Hyal Ac-Ca Fructo (MOVE FREE JOINT HEALTH ADVANCE) TABS 3/2/2022 at Unknown time  Yes Yes   Sig: Take 1 mg by mouth daily   MULTIVITAMIN TABS   OR 3/2/2022 at Unknown time  No Yes   Si daily   Probiotic Product (PROBIOTIC PO) 3/2/2022 at Unknown time  Yes Yes   Sig: Take by mouth daily   TURMERIC PO 3/2/2022 at Unknown time  Yes Yes   Sig: Take 1,500 mg by mouth daily   VITAMIN D, CHOLECALCIFEROL, PO 3/2/2022 at Unknown time  Yes Yes   Sig: Take 5,000 Units by mouth daily   apixaban ANTICOAGULANT (ELIQUIS ANTICOAGULANT) 5 MG tablet Past Week at Unknown time  No Yes   Sig: Take 1 tablet (5 mg) by mouth 2 times daily   atorvastatin (LIPITOR) 40 MG tablet 3/2/2022 at Unknown time  No Yes   Sig: Take 1 tablet (40 mg) by mouth daily   coenzyme Q-10 (COQ-10) 200 MG CAPS 3/2/2022 at Unknown time  Yes Yes   Sig: Take by mouth 2 times daily    esomeprazole (NEXIUM) 40 MG capsule 3/3/2022 at Unknown time  Yes Yes   Sig: Take 40 mg by mouth every morning (before breakfast) Take 30-60 minutes before eating.   fluticasone (FLONASE) 50 MCG/ACT nasal spray 3/3/2022 at Unknown time  No Yes   Sig: Spray 2 sprays into both nostrils daily   mesalamine (LIALDA) 1.2 g EC tablet 3/2/2022 at Unknown time  Yes Yes   Sig: Take 1,200 mg by mouth daily (with breakfast)   metoprolol succinate ER (TOPROL-XL) 50 MG 24 hr tablet 3/3/2022 at Unknown time  No Yes   Sig: Take 1 tablet (50 mg) by mouth 2 times daily   psyllium (METAMUCIL) 58.6 % POWD 3/2/2022 at Unknown time  Yes Yes   Sig: Take 2 teaspoonful by mouth 2 times daily    sildenafil (VIAGRA) 25 MG tablet Unknown at Unknown time  No Yes   Sig: Take  3 tablets (75 mg) by mouth daily as needed   tadalafil (CIALIS) 10 MG tablet Unknown at Unknown time  No Yes   Sig: Take 1 tablet (10 mg) by mouth daily as needed   torsemide (DEMADEX) 10 MG tablet 3/2/2022 at Unknown time  No Yes   Sig: Take 1/2 pill once a day in the morning      Facility-Administered Medications: None     Allergies   Allergies   Allergen Reactions     No Known Drug Allergies        Physical Exam   Vital Signs: Temp: 97.4  F (36.3  C) Temp src: Oral BP: 125/86 Pulse: 96   Resp: 14 SpO2: 96 % O2 Device: None (Room air) Oxygen Delivery: 2 LPM  Weight: 235 lbs 6.4 oz    General Appearance: Pt lying in bed, awake, alert, in no overt distress, towel around head  Eyes: Nonicteric appearing  HEENT: No facial asymmetry noted  Respiratory: In no apparent respiratory distress, clear to auscultation bilaterally, no crackles or wheezes noted  Cardiovascular: Regular, irregular rate and rhythm, normal S1S2, no obvious murmur noted, no rub or gallop noted  GI: Round, soft, nondistended, nontender to palpation, hypoactive bowel sounds, no obvious guarding or rebound tenderness noted  Skin: Warm, dry, pink; BLE cool to touch, no overt mottling noted  Musculoskeletal: No peripheral edema noted, sheath remains in place in R groin, no obvious hematoma or ecchymosis noted  Neurologic: A/Ox4, clear speech, no focal neuro deficits  Psychiatric: Calm    Data   Data reviewed today: I reviewed all medications, new labs and imaging results over the last 24 hours. I personally reviewed the EKG tracing showing inferior STEMI.    Recent Labs   Lab 03/03/22  0755 02/25/22  1509   WBC 9.4 12.9*   HGB 15.7 15.2   MCV 88 91   * 589*   INR 1.00  --     134   POTASSIUM 4.2 4.8   CHLORIDE 108 105   CO2 25 26   BUN 22 24   CR 1.10 1.13   ANIONGAP 6 3   CAMERON 8.9 9.4   * 112*     Recent Results (from the past 24 hour(s))   Echo Limited   Result Value    LVEF  20-25%    Narrative     [id number]  [id number]  [id number]^LUIS CARLOS^GINA^BARBER     [facility]  Echocardiography Laboratory  [address] [place] [address]     Name: BUCK SAMANIEGO  MRN: [id number]  : 1950  Study Date: 2022 10:09 AM  Age: 71 yrs  Gender: Male  [place] [facility] [place]  Reason For Study: Other, Please Specify in Comments  Ordering Physician: GINA ALDRIDGE  Referring Physician: GINA ALDRIDGE  Performed By: [facility] Adina Borrego     BSA: 2.3 m2  Height: 73 in  Weight: 235 lb  HR: 67  BP: 131/78 mmHg  ______________________________________________________________________________  Procedure  Limited Portable Echo Adult.  ______________________________________________________________________________  Interpretation Summary     Limited study in cath lab for pericardial effusion     Left ventricular systolic function is severely reduced.  Inferior and inferolateral akinesis. Severe global hypokinesis.  The visual ejection fraction is 20-25%.  The right ventricle is mildly dilated.  Severely decreased right ventricular systolic function  There is moderate (2+) mitral regurgitation.  There is no pericardial effusion.  ______________________________________________________________________________  Left Ventricle  The left ventricle is normal in size. Left ventricular systolic function is  severely reduced. The visual ejection fraction is 20-25%. Inferior and  inferolateral akinesis. Severe global hypokinesis.     Right Ventricle  The right ventricle is mildly dilated. Severely decreased right ventricular  systolic function. There is a pacemaker lead in the right ventricle.     Atria  The left atrium is moderately dilated. The right atrium is mildly dilated.     Mitral Valve  There is moderate (2+) mitral regurgitation.     Aortic Valve  No aortic regurgitation is present. No aortic stenosis is present.     Pericardium  There is no pericardial  effusion.  ______________________________________________________________________________  Report approved by: Nini Ramírez 03/03/2022 10:53 AM     ______________________________________________________________________________      Cardiac Catheterization    Addendum: 3/3/2022        Mid RCA lesion is 100% stenosed.    IVUS was performed on the lesion.    Ultrasound (IVUS) was performed.    Prox RCA lesion is 20% stenosed.    IVUS was performed on the lesion.    Ultrasound (IVUS) was performed.    Mid LM lesion is 10% stenosed.    Prox LAD lesion is 20% stenosed.    Mid LAD lesion is 60% stenosed.    Dist LAD lesion is 40% stenosed.    Ramus lesion is 45% stenosed.    Prox Cx lesion is 20% stenosed.     1. Three vessel calcific disease. iFR of Lad suggests non flow limiting. The RI, Lcx have mild disease  2. 100% occluded RCA--Thrombectomy of RCA pulled out red clot and that reestablished flow. The RCA was severely narrowed throughout the mid and distal portion with severe calcification. IVUS shows vessel >3.0mm and 360 degree of calcium.  Rotablator of RCA m/d followed by PTCB and POBA NC done to allow passage of equipment and stents to distal and mid RCA  Pt had profound bradycardia and hypotension but no CP during procedure.  IVUS guided stents x2 (rca m/d) performed with good result and IVUS post shows proximal stent well apposed. IVUS would not pass to distal stent area but by xray excellent result.  3. Unstable hemodynamics until re-established flow/PCI of RCA  REC-keep sheaths in for now. Check PTT 2-4 hours and pull sheaths and pacer if no longer needed. Hold ASA due to concern of triple therapy, continue plavix and restart Eliquis after 3/4/22 if groin heals well  I cannot exclude that patient is hypercoagulable after holding eliquis AND HE HAS ESSENTIAL THOMBOCYTOSIS.        Narrative      Mid RCA lesion is 100% stenosed.    IVUS was performed on the lesion.    Ultrasound (IVUS) was performed.    Prox  RCA lesion is 20% stenosed.    IVUS was performed on the lesion.    Ultrasound (IVUS) was performed.    Mid LM lesion is 10% stenosed.    Prox LAD lesion is 20% stenosed.    Mid LAD lesion is 60% stenosed.    Dist LAD lesion is 40% stenosed.    Ramus lesion is 45% stenosed.    Prox Cx lesion is 20% stenosed.     1. Three vessel calcific disease. iFR of Lad suggests non flow limiting.   The RI, Lcx have mild disease  2. 100% occluded RCA--Thrombectomy of RCA pulled out red clot and that   reestablished flow. The RCA was severely narrowed throughout the mid and   distal portion with severe calcification. IVUS shows vessel >3.0mm and 360   degree of calcium.  Rotablator of RCA m/d followed by PTCB and POBA NC done to allow passage   of equipment and stents to distal and mid RCA  Pt had profound bradycardia and hypotension but no CP during procedure.    IVUS guided stents x2 (rca m/d) performed with good result and IVUS post   shows proximal stent well apposed. IVUS would not pass to distal stent   area but by xray excellent result.  3. Unstable hemodynamics until re-established flow/PCI of RCA  REC-keep sheaths in for now. Check PTT 2-4 hours and pull sheaths and   pacer if no longer needed. Hold ASA due to concern of triple therapy,   continue plavix and restart Eliquis after 3/4/22 if groin heals well   Echocardiogram Limited   Result Value    LVEF  25-30%    Narrative    [id number]  [id number]  [id number]  [id number]^LUIS CARLOS^GINA^BARBER     [facility]  Echocardiography Laboratory  [address] [place] [address]     Name: BUCK SAMANIEGO  MRN: [id number]  : 1950  Study Date: 2022 12:36 PM  Age: 71 yrs  Gender: Male  Patient Location: [facility]  Reason For Study: Pericardial Disease  Ordering Physician: GINA ALDRIDGE  Referring Physician: GINA ALDRIDGE  Performed By: SERENITY Borrego     BSA: 2.3 m2  Height: 73 in  Weight: 235 lb  HR: 63  BP: 125/86  mmHg  ______________________________________________________________________________  Procedure  Limited Portable Echo Adult.  ______________________________________________________________________________  Interpretation Summary     Akinesis of the basal and mid inferior and mid inferolateral walls.  There is mod-severe global hypokinesia of the left ventricle.  Left ventricular systolic function is severely reduced.  The visual ejection fraction is 25-30%.  The right ventricle is moderately dilated.  Moderately decreased right ventricular systolic function  There is mild (1+) mitral regurgitation.  There is no pericardial effusion.     Compared to the echo from earlier today, the LV function looks slightly better  and the MR has decreased from 2+ to 1+  ______________________________________________________________________________  Left Ventricle  Left ventricular systolic function is severely reduced. The visual ejection  fraction is 25-30%. There is mod-severe global hypokinesia of the left  ventricle. Akinesis of the basal and mid inferior and mid inferolateral walls.     Right Ventricle  The right ventricle is moderately dilated. Moderately decreased right  ventricular systolic function.     Mitral Valve  There is mild (1+) mitral regurgitation.     Tricuspid Valve  There is trace tricuspid regurgitation. Right ventricular systolic pressure  could not be approximated due to inadequate tricuspid regurgitation. IVC  diameter and respiratory changes fall into an intermediate range suggesting an  RA pressure of 8 mmHg.     Pericardium  There is no pericardial effusion.     Rhythm  The rhythm was atrial fibrillation with wide QRS rhythm.  ______________________________________________________________________________  Report approved by: Leander Adeel, MDon 03/03/2022 01:01 PM     ______________________________________________________________________________

## 2022-03-03 NOTE — Clinical Note
The first balloon was inserted into the right coronary artery.Max pressure = 16 carlos. Total duration = 26 seconds.     Max pressure = 16 carlos. Total duration = 26 seconds.    Balloon reinflated a second time: Max pressure = 16 carlos. Total duration = 26 seconds.  Balloon reinflated a third time: Max pressure = 16 carlos. Total duration = 26 seconds.  Balloon reinflated a fourth time: Max pressure = 16 carlos. Total duration = 26 seconds.  Balloon reinflated a fourth time: Max pressure = 16 carlos. Total duration = 26 seconds.

## 2022-03-03 NOTE — PROGRESS NOTES
Pt back up from cath lab. Chest pain and nausea improved 2/10 and arm heaviness gone. Cardiologist, Dr. Posey at bedside, advised to wean dopamine as tolerated.  Discussed line removal, agreed with ACT at 3:30 and to pull line if less than 180 per unit protocol.

## 2022-03-03 NOTE — CONSULTS
[facility]    Hematology / Oncology Consultation     Date of Admission:  3/3/2022    Assessment & Plan   1.  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  Platelet count currently is under 5 600.  2.  Coronary disease with cardiomyopathy status post stent placement.  3.  Atrial fibrillation on anticoagulation with Eliquis.    Discussion recommendations:  I do not see need for further investigation regarding his high platelet count, in the future if the platelet count progressively increase i.e. over 1 million will be glad to reevaluate him and consider bone marrow biopsy.  In terms of thrombotic risk reduction he is on anticoagulation and antiplatelets which should be adequate.  He is not anemic at this point and not microcytic therefore I do not see need for evaluation of iron deficiency but to be monitored in the future and if he has microcytosis or anemia I would recommend iron levels and ferritin and correction of any deficiency, iron deficiency can be associated with further increase in platelet count.  I appreciate the opportunity to persuade in the care of Peter Peralta, our team will follow while hospitalized.    Aleksey Corey    Code Status    Full Code    Reason for Consult   Reason for consult: Thrombocytosis    Primary Care Physician   Donell Zuñiga    Chief Complaint     New diagnosis of atrial fibrillation in the outpatient, he had nuclear stress test done in February 2022 which showed small area of ischemia in the mid inferolateral segment of the left ventricle, a transmural infarct in the distal inferior and inferoseptal segments of small transmural inferolateral infarct in the apical segment.  Ejection fraction 26%.  He was admitted for urgent angiogram.  This was done March 3, 2022 with three-vessel coronary disease status post Rotablator RCA M/D,  PTCA B, p.o. BA, and stents x2 RCA M/D.    History of Present Illness   Peter Peralta is a 71 year old male who presents with coronary disease he was admitted to the hospital for angioplasty and had stent placement.  He has history of thrombocytosis and was seen previously by Dr. Huertas in October 2019 for thrombocytosis,    On July 10, 2015 his platelet count was 439,000. In August 2016 the platelet count was 488,000, in July 2017 the platelet count was 532,000, and when most recently checked on August 27, 2019 the platelet count was 553,000 with a white count of 10,500 and hemoglobin 15.4.  He was asymptomatic and no history of thromboembolic events.  He has history of ulcerative colitis.  He was found not to have organomegaly.  JAK2 mutation and CAMERON R mutations were negative and his thrombocytosis was believed to be secondary to colitis and he was not suspected to have myeloproliferative neoplasm and was advised to follow-up on as-needed basis without need for therapy.    He was diagnosed recently with atrial fibrillation and was started on Eliquis, he is in the hospital post angioplasty and currently in the coronary care unit.  Antiplatelets are ordered for tomorrow aspirin and Plavix.    He had ulcerative colitis episode when he was seen at our clinic in 2019 and he indicated that he had another flareup recently.  He does not indicate iron deficiency anemia or treatment for it.    Past Medical History   I have reviewed this patient's medical history and updated it with pertinent information if needed.   Past Medical History:   Diagnosis Date     Actinic keratoses     face     Atrial fibrillation (H)     only after adenosine test     BPH (benign prostatic hypertrophy)      Chest pain      Coronary artery disease 2011 2011-Cath Lma 5%, Lad 40-50%, Lcx 30-40%, Bso63-07% (-FFR of Lad)     ED (erectile dysfunction)      Erectile dysfunction 12/30/2021     Family history of colon cancer 7/6/2012    mother age  65     GERD (gastroesophageal reflux disease)     nexium     Hyperlipidaemia      Hypertension 2022     IFG (impaired fasting glucose)      Murmur      Obesity, unspecified     Waist size 42, BMI 30.8     Right inguinal hernia 2014     Thrombocytosis      Ulcerative colitis (H)        Past Surgical History   I have reviewed this patient's surgical history and updated it with pertinent information if needed.  Past Surgical History:   Procedure Laterality Date     CARDIAC NUC POLINA STRESS TEST NL      Normal     CARDIOVERSION  2011    Atrial Fib     COLONOSCOPY  2014    Procedure: COMBINED COLONOSCOPY, SINGLE BIOPSY/POLYPECTOMY BY BIOPSY;  COLONOSCOPY;  Surgeon: Ashtyn Gonzales MD;  Location: [facility]     COLONOSCOPY N/A 2018    Procedure: COMBINED COLONOSCOPY, SINGLE OR MULTIPLE BIOPSY/POLYPECTOMY BY BIOPSY;  COLONOSCOPY ;  Surgeon: Ashtyn Gonzales MD;  Location: [facility]     CORONARY ANGIOGRAPHY ADULT ORDER  2011    1 LMA 5%, LAD 40-50%, LCx 30-40%, RCA 25-30%     ENT SURGERY      Ringing in the ears     HC INJECTION SCLEROSING SOLUTION HEMORRHOID  2012    Procedure: HEMORRHOID INJECTION SCLEROSING SOLUTION;  Surgeon: Mayito Chow MD;  Location: [facility]     HC TOOTH EXTRACTION W/FORCEP       HERNIA REPAIR  Needed     LASIK  2011     ROTATOR CUFF REPAIR RT/LT Left 10/15/2015    RCR Left - Dr. Carvalho     SOFT TISSUE SURGERY  10/2015    Torn rotator cuff       Prior to Admission Medications   Prior to Admission Medications   Prescriptions Last Dose Informant Patient Reported? Taking?   Calcium-Vitamin D-Vitamin K (CALCIUM + D + K) 750-500-40 MG-UNT-MCG TABS 3/2/2022 at Unknown time  Yes Yes   Sig: Take 2 tablets by mouth daily.   Glucos-Chond-Hyal Ac-Ca Fructo (MOVE FREE JOINT HEALTH ADVANCE) TABS 3/2/2022 at Unknown time  Yes Yes   Sig: Take 1 mg by mouth daily   MULTIVITAMIN TABS   OR 3/2/2022 at Unknown time  No Yes   Si daily   Probiotic Product (PROBIOTIC PO) 3/2/2022 at  Unknown time  Yes Yes   Sig: Take by mouth daily   TURMERIC PO 3/2/2022 at Unknown time  Yes Yes   Sig: Take 1,500 mg by mouth daily   VITAMIN D, CHOLECALCIFEROL, PO 3/2/2022 at Unknown time  Yes Yes   Sig: Take 5,000 Units by mouth daily   apixaban ANTICOAGULANT (ELIQUIS ANTICOAGULANT) 5 MG tablet Past Week at Unknown time  No Yes   Sig: Take 1 tablet (5 mg) by mouth 2 times daily   atorvastatin (LIPITOR) 40 MG tablet 3/2/2022 at Unknown time  No Yes   Sig: Take 1 tablet (40 mg) by mouth daily   coenzyme Q-10 (COQ-10) 200 MG CAPS 3/2/2022 at Unknown time  Yes Yes   Sig: Take by mouth 2 times daily    esomeprazole (NEXIUM) 40 MG capsule 3/3/2022 at Unknown time  Yes Yes   Sig: Take 40 mg by mouth every morning (before breakfast) Take 30-60 minutes before eating.   fluticasone (FLONASE) 50 MCG/ACT nasal spray 3/3/2022 at Unknown time  No Yes   Sig: Spray 2 sprays into both nostrils daily   mesalamine (LIALDA) 1.2 g EC tablet 3/2/2022 at Unknown time  Yes Yes   Sig: Take 1,200 mg by mouth daily (with breakfast)   metoprolol succinate ER (TOPROL-XL) 50 MG 24 hr tablet 3/3/2022 at Unknown time  No Yes   Sig: Take 1 tablet (50 mg) by mouth 2 times daily   psyllium (METAMUCIL) 58.6 % POWD 3/2/2022 at Unknown time  Yes Yes   Sig: Take 2 teaspoonful by mouth 2 times daily    sildenafil (VIAGRA) 25 MG tablet Unknown at Unknown time  No Yes   Sig: Take 3 tablets (75 mg) by mouth daily as needed   tadalafil (CIALIS) 10 MG tablet Unknown at Unknown time  No Yes   Sig: Take 1 tablet (10 mg) by mouth daily as needed   torsemide (DEMADEX) 10 MG tablet 3/2/2022 at Unknown time  No Yes   Sig: Take 1/2 pill once a day in the morning      Facility-Administered Medications: None     Allergies   Allergies   Allergen Reactions     No Known Drug Allergies        Social History   I have reviewed this patient's social history and updated it with pertinent information if needed. Peter Peralta  reports that he quit smoking about 26 years  ago. His smoking use included cigarettes. He has a 36.00 pack-year smoking history. He has never used smokeless tobacco. He reports previous alcohol use. He reports that he does not use drugs.    Family History   I have reviewed this patient's family history and updated it with pertinent information if needed.   Family History   Problem Relation Age of Onset     C.A.D. Father         passed at 52 from MI     Cardiovascular Father      Heart Disease Father      Obesity Father      Myocardial Infarction Father         52 - passed away     Coronary Artery Disease Father      Colon Cancer Mother 65        passed away from colon cancer - lived about 2 mo from  1981     Arthritis Maternal Grandmother      Diabetes Maternal Grandmother      Arthritis Maternal Grandfather      Diabetes Maternal Grandfather      Heart Disease Brother      Diabetes Brother      Coronary Artery Disease Brother      Hypertension Brother      Hyperlipidemia Brother      Coronary Artery Disease Brother         stent placed      Hyperlipidemia Brother      Substance Abuse Paternal Grandfather      Hypertension No family hx of      Cerebrovascular Disease No family hx of      Breast Cancer No family hx of      Prostate Cancer No family hx of      Alcohol/Drug No family hx of      Allergies No family hx of      Alzheimer Disease No family hx of      Circulatory No family hx of      Congenital Anomalies No family hx of      Connective Tissue Disorder No family hx of      Depression No family hx of      Eye Disorder No family hx of      Genetic Disorder No family hx of      Gastrointestinal Disease No family hx of      Genitourinary Problems No family hx of      Gynecology No family hx of      Musculoskeletal Disorder No family hx of      Neurologic Disorder No family hx of      Osteoporosis No family hx of      Psychotic Disorder No family hx of      Respiratory No family hx of      Thyroid Disease No family hx of      Anesthesia Reaction No family  hx of      Blood Disease No family hx of        Review of Systems   The 10 point Review of Systems is negative other than noted in the HPI or here.     Physical Exam   Temp: 97.5  F (36.4  C) Temp src: Oral BP: 116/73 Pulse: 85   Resp: 9 SpO2: 100 % O2 Device: Nasal cannula Oxygen Delivery: 1 LPM  Vital Signs with Ranges  Temp:  [97.4  F (36.3  C)-97.5  F (36.4  C)] 97.5  F (36.4  C)  Pulse:  [67-98] 85  Resp:  [9-16] 9  BP: (101-125)/(71-95) 116/73  SpO2:  [96 %-100 %] 100 %  235 lbs 6.4 oz    Constitutional: Awake, alert, cooperative, no apparent distress, and appears stated age.  HEENT: Unremarkable  Respiratory: No increased work of breathing  Cardiovascular: Irregular, telemetry noted.  GI: No scars, normal bowel sounds, soft, non-distended, non-tender, no masses palpated, no hepatosplenomegaly.  Lymph/Hematologic: No cervical lymphadenopathy and no supraclavicular lymphadenopathy.  Skin: No rash or ecchymosis all extremities bilaterally.  Tone is normal.  Neurologic: Nonfocal alert oriented      Data   Results for orders placed or performed during the hospital encounter of 03/03/22 (from the past 24 hour(s))   EKG 12-lead, tracing only   Result Value Ref Range    Systolic Blood Pressure  mmHg    Diastolic Blood Pressure  mmHg    Ventricular Rate 99 BPM    Atrial Rate 394 BPM    MT Interval  ms    QRS Duration 138 ms     ms    QTc 515 ms    P Axis  degrees    R AXIS 77 degrees    T Axis 1 degrees    Interpretation ECG       Atrial fibrillation  Right bundle branch block  Abnormal ECG  When compared with ECG of 22-DEC-2011 11:34,  Atrial fibrillation has replaced Sinus rhythm  Vent. rate has increased BY  44 BPM  Right bundle branch block is now Present     Basic metabolic panel   Result Value Ref Range    Sodium 139 133 - 144 mmol/L    Potassium 4.2 3.4 - 5.3 mmol/L    Chloride 108 94 - 109 mmol/L    Carbon Dioxide (CO2) 25 20 - 32 mmol/L    Anion Gap 6 3 - 14 mmol/L    Urea Nitrogen 22 7 - 30 mg/dL     Creatinine 1.10 0.66 - 1.25 mg/dL    Calcium 8.9 8.5 - 10.1 mg/dL    Glucose 141 (H) 70 - 99 mg/dL    GFR Estimate 73 >60 mL/min/1.73m2   CBC with platelets   Result Value Ref Range    WBC Count 9.4 4.0 - 11.0 10e3/uL    RBC Count 5.39 4.40 - 5.90 10e6/uL    Hemoglobin 15.7 13.3 - 17.7 g/dL    Hematocrit 47.4 40.0 - 53.0 %    MCV 88 78 - 100 fL    MCH 29.1 26.5 - 33.0 pg    MCHC 33.1 31.5 - 36.5 g/dL    RDW 12.6 10.0 - 15.0 %    Platelet Count 561 (H) 150 - 450 10e3/uL   INR   Result Value Ref Range    INR 1.00 0.85 - 1.15   Partial thromboplastin time   Result Value Ref Range    aPTT 28 22 - 38 Seconds   Lipid Profile   Result Value Ref Range    Cholesterol 117 <200 mg/dL    Triglycerides 129 <150 mg/dL    Direct Measure HDL 36 (L) >=40 mg/dL    LDL Cholesterol Calculated 55 <=100 mg/dL    Non HDL Cholesterol 81 <130 mg/dL    Narrative    Cholesterol  Desirable:  <200 mg/dL    Triglycerides  Normal:  Less than 150 mg/dL  Borderline High:  150-199 mg/dL  High:  200-499 mg/dL  Very High:  Greater than or equal to 500 mg/dL    Direct Measure HDL  Female:  Greater than or equal to 50 mg/dL   Male:  Greater than or equal to 40 mg/dL    LDL Cholesterol  Desirable:  <100mg/dL  Above Desirable:  100-129 mg/dL   Borderline High:  130-159 mg/dL   High:  160-189 mg/dL   Very High:  >= 190 mg/dL    Non HDL Cholesterol  Desirable:  130 mg/dL  Above Desirable:  130-159 mg/dL  Borderline High:  160-189 mg/dL  High:  190-219 mg/dL  Very High:  Greater than or equal to 220 mg/dL   Magnesium   Result Value Ref Range    Magnesium 2.2 1.6 - 2.3 mg/dL   Activated clotting time celite, POCT   Result Value Ref Range    Activated Clotting Time (Celite) POCT 288 (H) 74 - 150 seconds   Echo Limited   Result Value Ref Range    LVEF  20-25%     Narrative    [id number]  [id number]  [id number]^LUIS CARLOS^GINA^BARBER     [facility]  Echocardiography Laboratory  [address] [place] [address]     Name: BUCK SAMANIEGO  E  MRN: [id number]  : 1950  Study Date: 2022 10:09 AM  Age: 71 yrs  Gender: Male  Patient Location: [facility]  Reason For Study: Other, Please Specify in Comments  Ordering Physician: GINA ALDRIDGE  Referring Physician: GINA ALDRIDGE  Performed By: [facility] Adina Borrego     BSA: 2.3 m2  Height: 73 in  Weight: 235 lb  HR: 67  BP: 131/78 mmHg  ______________________________________________________________________________  Procedure  Limited Portable Echo Adult.  ______________________________________________________________________________  Interpretation Summary     Limited study in cath lab for pericardial effusion     Left ventricular systolic function is severely reduced.  Inferior and inferolateral akinesis. Severe global hypokinesis.  The visual ejection fraction is 20-25%.  The right ventricle is mildly dilated.  Severely decreased right ventricular systolic function  There is moderate (2+) mitral regurgitation.  There is no pericardial effusion.  ______________________________________________________________________________  Left Ventricle  The left ventricle is normal in size. Left ventricular systolic function is  severely reduced. The visual ejection fraction is 20-25%. Inferior and  inferolateral akinesis. Severe global hypokinesis.     Right Ventricle  The right ventricle is mildly dilated. Severely decreased right ventricular  systolic function. There is a pacemaker lead in the right ventricle.     Atria  The left atrium is moderately dilated. The right atrium is mildly dilated.     Mitral Valve  There is moderate (2+) mitral regurgitation.     Aortic Valve  No aortic regurgitation is present. No aortic stenosis is present.     Pericardium  There is no pericardial effusion.  ______________________________________________________________________________  Report approved by: Nini Ramírez 2022 10:53 AM      ______________________________________________________________________________      Activated clotting time celite, POCT   Result Value Ref Range    Activated Clotting Time (Celite) POCT 211 (H) 74 - 150 seconds   Activated clotting time celite, POCT   Result Value Ref Range    Activated Clotting Time (Celite) POCT 288 (H) 74 - 150 seconds   Activated clotting time celite, POCT   Result Value Ref Range    Activated Clotting Time (Celite) POCT 275 (H) 74 - 150 seconds   Activated clotting time celite, POCT   Result Value Ref Range    Activated Clotting Time (Celite) POCT 262 (H) 74 - 150 seconds   Cardiac Catheterization    Addendum: 3/3/2022        Mid RCA lesion is 100% stenosed.    IVUS was performed on the lesion.    Ultrasound (IVUS) was performed.    Prox RCA lesion is 20% stenosed.    IVUS was performed on the lesion.    Ultrasound (IVUS) was performed.    Mid LM lesion is 10% stenosed.    Prox LAD lesion is 20% stenosed.    Mid LAD lesion is 60% stenosed.    Dist LAD lesion is 40% stenosed.    Ramus lesion is 45% stenosed.    Prox Cx lesion is 20% stenosed.     1. Three vessel calcific disease. iFR of Lad suggests non flow limiting. The RI, Lcx have mild disease  2. 100% occluded RCA--Thrombectomy of RCA pulled out red clot and that reestablished flow. The RCA was severely narrowed throughout the mid and distal portion with severe calcification. IVUS shows vessel >3.0mm and 360 degree of calcium.  Rotablator of RCA m/d followed by PTCB and POBA NC done to allow passage of equipment and stents to distal and mid RCA  Pt had profound bradycardia and hypotension but no CP during procedure.  IVUS guided stents x2 (rca m/d) performed with good result and IVUS post shows proximal stent well apposed. IVUS would not pass to distal stent area but by xray excellent result.  3. Unstable hemodynamics until re-established flow/PCI of RCA  REC-keep sheaths in for now. Check PTT 2-4 hours and pull sheaths and pacer if no  longer needed. Hold ASA due to concern of triple therapy, continue plavix and restart Eliquis after 3/4/22 if groin heals well  I cannot exclude that patient is hypercoagulable after holding eliquis AND HE HAS ESSENTIAL THOMBOCYTOSIS.        Narrative      Mid RCA lesion is 100% stenosed.    IVUS was performed on the lesion.    Ultrasound (IVUS) was performed.    Prox RCA lesion is 20% stenosed.    IVUS was performed on the lesion.    Ultrasound (IVUS) was performed.    Mid LM lesion is 10% stenosed.    Prox LAD lesion is 20% stenosed.    Mid LAD lesion is 60% stenosed.    Dist LAD lesion is 40% stenosed.    Ramus lesion is 45% stenosed.    Prox Cx lesion is 20% stenosed.     1. Three vessel calcific disease. iFR of Lad suggests non flow limiting.   The RI, Lcx have mild disease  2. 100% occluded RCA--Thrombectomy of RCA pulled out red clot and that   reestablished flow. The RCA was severely narrowed throughout the mid and   distal portion with severe calcification. IVUS shows vessel >3.0mm and 360   degree of calcium.  Rotablator of RCA m/d followed by PTCB and POBA NC done to allow passage   of equipment and stents to distal and mid RCA  Pt had profound bradycardia and hypotension but no CP during procedure.    IVUS guided stents x2 (rca m/d) performed with good result and IVUS post   shows proximal stent well apposed. IVUS would not pass to distal stent   area but by xray excellent result.  3. Unstable hemodynamics until re-established flow/PCI of RCA  REC-keep sheaths in for now. Check PTT 2-4 hours and pull sheaths and   pacer if no longer needed. Hold ASA due to concern of triple therapy,   continue plavix and restart Eliquis after 3/4/22 if groin heals well   EKG 12-lead, tracing only   Result Value Ref Range    Systolic Blood Pressure  mmHg    Diastolic Blood Pressure  mmHg    Ventricular Rate 97 BPM    Atrial Rate 122 BPM    NV Interval  ms    QRS Duration 144 ms     ms    QTc 510 ms    P Axis   degrees    R AXIS 81 degrees    T Axis 42 degrees    Interpretation ECG       Atrial fibrillation  Right bundle branch block  ST elevation consider inferior injury or acute infarct  ** ** ACUTE MI / STEMI ** **  Consider right ventricular involvement in acute inferior infarct  Abnormal ECG  When compared with ECG of 03-MAR-2022 07:39, (unconfirmed)  Inverted T waves have replaced nonspecific T wave abnormality in Inferior leads  T wave inversion no longer evident in Anterior leads     Echocardiogram Limited   Result Value Ref Range    LVEF  25-30%     [facility]    [id number]  [id number]  [id number]  [id number]^LUIS CARLOS^GINA^BARBER     [facility]  Echocardiography Laboratory  [facility] [place] [address]     Name: BUCK SAMANIEGO  MRN: [id number]  : 1950  Study Date: 2022 12:36 PM  Age: 71 yrs  Gender: Male  Patient Location: [facility]  Reason For Study: Pericardial Disease  Ordering Physician: GINA ALDRIDGE  Referring Physician: GINA ALDRIDGE  Performed By: SERENITY Borrego     BSA: 2.3 m2  Height: 73 in  Weight: 235 lb  HR: 63  BP: 125/86 mmHg  ______________________________________________________________________________  Procedure  Limited Portable Echo Adult.  ______________________________________________________________________________  Interpretation Summary     Akinesis of the basal and mid inferior and mid inferolateral walls.  There is mod-severe global hypokinesia of the left ventricle.  Left ventricular systolic function is severely reduced.  The visual ejection fraction is 25-30%.  The right ventricle is moderately dilated.  Moderately decreased right ventricular systolic function  There is mild (1+) mitral regurgitation.  There is no pericardial effusion.     Compared to the echo from earlier today, the LV function looks slightly better  and the MR has decreased from 2+ to 1+  ______________________________________________________________________________  Left  Ventricle  Left ventricular systolic function is severely reduced. The visual ejection  fraction is 25-30%. There is mod-severe global hypokinesia of the left  ventricle. Akinesis of the basal and mid inferior and mid inferolateral walls.     Right Ventricle  The right ventricle is moderately dilated. Moderately decreased right  ventricular systolic function.     Mitral Valve  There is mild (1+) mitral regurgitation.     Tricuspid Valve  There is trace tricuspid regurgitation. Right ventricular systolic pressure  could not be approximated due to inadequate tricuspid regurgitation. IVC  diameter and respiratory changes fall into an intermediate range suggesting an  RA pressure of 8 mmHg.     Pericardium  There is no pericardial effusion.     Rhythm  The rhythm was atrial fibrillation with wide QRS rhythm.  ______________________________________________________________________________  Report approved by: Nini Ramírez 03/03/2022 01:01 PM     ______________________________________________________________________________      Activated clotting time celite, POCT   Result Value Ref Range    Activated Clotting Time (Celite) POCT 254 (H) 74 - 150 seconds   EKG 12-lead, tracing only   Result Value Ref Range    Systolic Blood Pressure  mmHg    Diastolic Blood Pressure  mmHg    Ventricular Rate 97 BPM    Atrial Rate 107 BPM    MS Interval  ms    QRS Duration 146 ms     ms    QTc 520 ms    P Axis  degrees    R AXIS 74 degrees    T Axis -54 degrees    Interpretation ECG       Atrial fibrillation  Right bundle branch block  T wave abnormality, consider inferolateral ischemia  Abnormal ECG  When compared with ECG of 03-MAR-2022 12:19, (unconfirmed)  T wave inversion now evident in Anterolateral leads

## 2022-03-03 NOTE — Clinical Note
The first balloon was inserted into the right coronary artery.Max pressure = 9 carlos. Total duration = 30 seconds.     Max pressure = 9 carlos. Total duration = 30 seconds.    Balloon reinflated a second time: Max pressure = 9 carlos. Total duration = 30 seconds.  Balloon reinflated a third time: Max pressure = 9 carlos. Total duration = 30 seconds.  Balloon reinflated a fourth time: Max pressure = 9 carlos. Total duration = 30 seconds.  Balloon reinflated a fourth time: Max pressure = 12 carlos. Total duration = 30 seconds.

## 2022-03-03 NOTE — PROGRESS NOTES
Care Suites Admission Nursing Note    Patient Information  Name: Peter Peralta  Age: 71 year old  Reason for admission: left heart cath  Care Suites arrival time: 0730    Visitor Information  Name: Emy  Informed of visitor restrictions: Yes  1 visitor allowed per patient   Visitor must screen negative for COVID symptoms   Visitor must wear a mask  Waiting rooms closed to visitors    Patient Admission/Assessment   Pre-procedure assessment complete: Yes  If abnormal assessment/labs, provider notified: N/A  NPO: Yes  Medications held per instructions/orders: Yes  Consent: deferred  If applicable, pregnancy test status: deferred  Patient oriented to room: Yes  Education/questions answered: Yes  Plan/other: proceed as planned discharge to home    Discharge Planning  Discharge name/phone number: Emy [phone]  Overnight post sedation caregiver: Emy  Discharge location: home    Jemal Boyd RN

## 2022-03-03 NOTE — Clinical Note
The first balloon was inserted into the right coronary artery.Max pressure = 8 carlos. Total duration = 15 seconds.     Max pressure = 12 carlos. Total duration = 20 seconds.    Balloon reinflated a second time: Max pressure = 12 carlos. Total duration = 20 seconds.  Balloon reinflated a third time: Max pressure = 14 carlos. Total duration = 30 seconds.  Balloon reinflated a fourth time: Max pressure = 15 carlos. Total duration = 25 seconds.

## 2022-03-03 NOTE — PROGRESS NOTES
Pt arrived post cath lab. Strict bedrest. A-line and venous temp pacer left in and CDI, good distal pulse, toes bilaterally dusky. vitals stable, VVI 60 temp pacer.  Pt on arrival having chest pain, but stated improved from before. Still nauseated and having bilateral arm heaviness.  Vital stable on dopamine at 5mcg/min.  HR a-fib in the 80's occasional PVC's.  obtained standard EKG post procedure, noted sig. elevation in lead II and AVF.  EKG read out acute STEMI, cards on the floor and updated.  Dr. Posey came to pt bedsdie and verbaly ordered a CODE STEMI. Called Dr. garcia in cath lab to update. [facility] also came to bedside and updated. Family already at bedside and updated. Pt transported back to cath lab with [employer].

## 2022-03-03 NOTE — H&P (VIEW-ONLY)
[facility]    History and Physical - Hospitalist Service       Date of Admission:  3/3/2022    Assessment & Plan      Peter Peralta is a 71 year old male with PMH significant for nonischemic cardiomyopathy, recently diagnosed atrial fibrillation, CAD, ulcerative colitis and HLP admitted on 3/3/2022 for an elective coronary angiogram noting a 100% occluded RCA s/p stents x2 RCA m/d complicated by symptomatic hypotension, bradycardia requiring dopamine and epinephrine, temporary pacemaker placement.  Shortly after arrival to CCU, pt developed worsening chest pain, BUE numbness, tingling, severe nausea with EKG noting inferior STEMI.  Pt back to cath lab, received Integrilin, continues on dopamine.  Pt being admitted to CCU IMC status.      3V coronary artery disease s/p rotablator RCA m/d, PTCB, POBA, and stents x2 RCA m/d.  Symptomatic hypotension, bradycardia 2/2 100% RCA occlusion.  Acute biventricular HFrEF with severe biatrial enlargement, EF 26%, possibly 2/2 tachycardia vs ischemia.      Nonischemic cardiomyopathy.  Worsening shortness of breath over past few months.  History systolic murmur.  In 2011, pt with non-flow limiting CAD on angiogram with 50% mid LAD narrowing, 25% circumflex, 25% distal RCA.  Pt recently had a GI follow up appointment in 2/2022 with provider noting irregular heart beat, at that time, pt followed up with Dr. Gilliam.  Cardiology diagnosed pt with PAF with CHADS-VASc of 4, was started on eliquis.  Pt was to have a OSCAR guided cardioverson, nuclear stress test and echocardiogram.  Pt underwent NM lexiscan stress test 2/24/22 noting small area of ischemia in the mid inferolateral segment(s) of the left ventricle ,transmural infarction in the distal inferior and inferoseptal segment(s) of the left ventricle, small area of nontransmural infarction in the apical segment(s) of the left ventricle and LV function severely reduced, with EF 26%.  At that time,  pt was referred for urgent coronary angiogram with Dr. Del Rosario today, 3/3/22.  During coronary angiogram, pt was noted to have 3V valcific disease.  Pt was subsequently noted to have 100% occluded RCA.  At that time, pt became hemodynamically unstable, hypotensive, bradycardic.  Pt was administered atropine, neosynephrine and epinephrine and initiated on dopamine infusion.  A thrombectomy of RCA pulled out red clot that reestablished flow.  RCA was severely narrowed requiring rotablater of RCA m/d, followed by PTCB, POBA to allow passage of stents to m/d RCA.  Temporary pacemaker was also placed during this time.  Pt was transferred to CCU with sheath and temporary pacemaker still in place.  Upon arrival to CCU, pt noted ongoing chest pain since the cath lab, ongoing nausea and BUE numbness/tingling; nursing obtained EKG noting inferior STEMI.  Cardiology was immediately contacted who subsequently activated CODE STEMI.  2nd time in cath lab no obvious cause of chest pain noted.  Pt was administered Integrilin during procedure but was not continued on an infusion.  Pt continues on low dose dopamine at this time.  Temporary pacemaker also removed.  Sheath remains in place for an additional 1+ hour.  - Cardiology consulted, appreciate recommendations  - Per cardiology, pt continued on plavix  - Per cardiology, pt's PTA eliquis to be resumed 3/4/22 evening after seen by Dr. Baez and if groin healing well (has been on hold since 3/1/22)  - Hold PTA Toprol XL 50 mg BID, torsemide 5 mg daily while continues on dopamine; defer resuming to cardiology  - IMC status     Low back pain possibly 2/2 MSK.  - Pt reported low back pain during coronary angiogram and also during previous lexiscan; suspect due to lying on table, will need to monitor to ensure does not require additional workup   - Has PRN APAP, oxycodone, dilaudid    Recently diagnosed persistent atrial fibrillation.  RBBB.  Pt was originally planned for an  "outpatient OSCAR-guided cardioversion.  In setting of abnormal stress test requiring additional work up, cardioversion was deferred and cardiology uptitrating BB as outpatient.    - Holding PTA Toprol XL 50 mg BID while continues on dopamine; defer resuming of BB to cardiology    Mildly prolonged QTc.  QTc 510  - Monitor    Prediabetes.  Hemoglobin A1C 6.3 on 12/30/21  - Encourage healthy diet and active lifestyle      Chronic thrombocytosis suspect 2/2 chronic inflammation in setting of UC.  Pt follows with [facility]; notes no further workup required.   Recent Labs   Lab 03/03/22  0755 02/25/22  1509   * 589*   - Repeat CBC in AM     HLP.  - Continue PTA atorvastatin 40 mg daily    Ulcerative colitis.  - Continue PTA mesalamine; last flare up in October 2021, 3 years prior to that.      FAM.  - Not on CPAP PTA; however, notes has been diagnosed with severe FAM, needing to follow up with outpatient sleep study to be placed on CPAP  - Will order CPAP when sleeping     GERD.  - On Nexium PTA; will continue protonix while inpatient     BPH.  - Not on any medications PTA, noted  - Has straight catheterization orders in place should pt require while on bedrest    Former smoker, 36 pack year history.  - Noted    Obesity, BMI 31.06.  - Encourage active lifestyle and healthy diet     Diet:   ADAT to cardiac diabetic diet   DVT Prophylaxis: Pneumatic Compression Devices  Power Catheter: Not present  Central Lines: PRESENT     Cardiac Monitoring: ACTIVE order. Indication: Post- PCI/Angiogram (24 hours)  Code Status:   Full code, confirmed and discussed with pt and his wife while at pt's bedside    Clinically Significant Risk Factors Present on Admission              # Coagulation Defect: home medication list includes an anticoagulant medication    # Obesity: Estimated body mass index is 31.06 kg/m  as calculated from the following:    Height as of this encounter: 1.854 m (6' 1\").    Weight as of this encounter: " 106.8 kg (235 lb 6.4 oz).      Disposition Plan   Expected Discharge:  2+ days pending additional cardiac workup   Anticipated discharge location:  Awaiting care coordination huddle  Delays:     None noted      The patient's care was discussed with the Attending Physician, Dr. Nixon Fonseca, hospitalist, Bedside Nurse, Patient and Patient's Family.    NIURKA Hsu [facility]  Hospitalist Service  [facility]  Securely message with the Vocera Web Console (learn more here)  Text page via [employer] Paging/Directory         ______________________________________________________________________    Chief Complaint   MOSS, abnormal NM lexiscan stress test    History is obtained from the patient, electronic health record, patient's daughter and patient's spouse    History of Present Illness   Peter Peralta is a 71 year old male with PMH significant for nonischemic cardiomyopathy, recently diagnosed atrial fibrillation, CAD, ulcerative colitis and HLP admitted on 3/3/2022 for an elective coronary angiogram.  In 2011, pt with non-flow limiting CAD on angiogram with 50% mid LAD narrowing, 25% circumflex, 25% distal RCA.  Pt recently had a GI follow up appointment in 2/2022 with provider noting irregular heart beat, at that time, pt followed up with Dr. Gilliam.  Cardiology diagnosed pt with PAF with CHADS-VASc of 4, was started on eliquis.  Pt was to have a OSCAR guided cardioverson, nuclear stress test and echocardiogram.  Pt underwent NM lexiscan stress test 2/24/22 noting small area of ischemia in the mid inferolateral segment(s) of the left ventricle ,transmural infarction in the distal inferior and inferoseptal segment(s) of the left ventricle, small area of nontransmural infarction in the apical segment(s) of the left ventricle and LV function severely reduced, with EF 26%.  At that time, pt was referred for urgent coronary angiogram with Dr. Del Rosario today, 3/3/22.  During coronary angiogram, pt  was noted to have 3V valcific disease.  Pt was subsequently noted to have 100% occluded RCA.  At that time, pt became hemodynamically unstable, hypotensive, bradycardic.  Pt was administered atropine, neosynephrine and epinephrine and initiated on dopamine infusion.  A thrombectomy of RCA pulled out red clot that reestablished flow.  RCA was severely narrowed requiring rotablater of RCA m/d, followed by PTCB, POBA to allow passage of stents to m/d RCA.  Temporary pacemaker was also placed during this time.  Pt was transferred to CCU with sheath and temporary pacemaker still in place.  Upon arrival to CCU, pt noted ongoing chest pain since the cath lab, ongoing nausea; nursing obtained EKG noting inferior STEMI.  Cardiology was immediately contacted who subsequently activated CODE STEMI.  2nd time in cath lab no obvious cause of chest pain noted.  Pt was administered Integrilin during procedure but was not continued on an infusion.  Pt continues on low dose dopamine at this time.  Temporary pacemaker also removed.  Sheath remains in place for an additional 1+ hour.      Upon arrival to pt's bedside, pt lying in bed, awake, alert, in no overt distress.  Pt's wife and son present at bedside as well.  Pt reports no further chest pain or BUE numbness/tingling.  Pt reports ongoing nausea but notes much improved.  Pt reports some ongoing low back pain; notes unable to lie supine at baseline.  Pt reports no diarrhea or constipation.      Review of Systems    The 10 point Review of Systems is negative other than noted in the HPI or here.     Past Medical History    I have reviewed this patient's medical history and updated it with pertinent information if needed.   Past Medical History:   Diagnosis Date     Actinic keratoses     face     Atrial fibrillation (H)     only after adenosine test     BPH (benign prostatic hypertrophy)      Chest pain      Coronary artery disease 2011 2011-Cath Lma 5%, Lad 40-50%, Lcx 30-40%,  Ajt38-17% (-FFR of Lad)     ED (erectile dysfunction)      Erectile dysfunction 12/30/2021     Family history of colon cancer 7/6/2012    mother age 65     GERD (gastroesophageal reflux disease)     nexium     Hyperlipidaemia      Hypertension 2/16/2022     IFG (impaired fasting glucose)      Murmur      Obesity, unspecified     Waist size 42, BMI 30.8     Right inguinal hernia 7/1/2014     Thrombocytosis      Ulcerative colitis (H)    - Prediabetes  - HFrEF  - Nonischemic cardiomyopathy  - Persistent atrial fibrillation    Past Surgical History   I have reviewed this patient's surgical history and updated it with pertinent information if needed.  Past Surgical History:   Procedure Laterality Date     CARDIAC NUC POLINA STRESS TEST NL      Normal     CARDIOVERSION  12/2011    Atrial Fib     COLONOSCOPY  1/29/2014    Procedure: COMBINED COLONOSCOPY, SINGLE BIOPSY/POLYPECTOMY BY BIOPSY;  COLONOSCOPY;  Surgeon: Ashtyn Gonzales MD;  Location: [facility]     COLONOSCOPY N/A 7/11/2018    Procedure: COMBINED COLONOSCOPY, SINGLE OR MULTIPLE BIOPSY/POLYPECTOMY BY BIOPSY;  COLONOSCOPY ;  Surgeon: Ashtyn Gonzales MD;  Location: [facility]     CORONARY ANGIOGRAPHY ADULT ORDER  12/2011    1 LMA 5%, LAD 40-50%, LCx 30-40%, RCA 25-30%     ENT SURGERY      Ringing in the ears     HC INJECTION SCLEROSING SOLUTION HEMORRHOID  8/24/2012    Procedure: HEMORRHOID INJECTION SCLEROSING SOLUTION;  Surgeon: Mayito Chow MD;  Location: [facility]     HC TOOTH EXTRACTION W/FORCEP       HERNIA REPAIR  Needed     LASIK  1/2011     ROTATOR CUFF REPAIR RT/LT Left 10/15/2015    RCR Left - Dr. Carvalho     SOFT TISSUE SURGERY  10/2015    Torn rotator cuff       Social History   I have reviewed this patient's social history and updated it with pertinent information if needed.  Social History     Tobacco Use     Smoking status: Former Smoker     Packs/day: 1.50     Years: 24.00     Pack years: 36.00     Types: Cigarettes     Quit date: 1/2/1996     Years  since quittin.1     Smokeless tobacco: Never Used   Substance Use Topics     Alcohol use: Not Currently     Alcohol/week: 0.0 standard drinks     Comment: 1 beer per week     Drug use: No       Family History   I have reviewed this patient's family history and updated it with pertinent information if needed.  Family History   Problem Relation Age of Onset     C.A.D. Father         passed at 52 from MI     Cardiovascular Father      Heart Disease Father      Obesity Father      Myocardial Infarction Father         52 - passed away     Coronary Artery Disease Father      Colon Cancer Mother 65        passed away from colon cancer - lived about 2 mo from       Arthritis Maternal Grandmother      Diabetes Maternal Grandmother      Arthritis Maternal Grandfather      Diabetes Maternal Grandfather      Heart Disease Brother      Diabetes Brother      Coronary Artery Disease Brother      Hypertension Brother      Hyperlipidemia Brother      Coronary Artery Disease Brother         stent placed      Hyperlipidemia Brother      Substance Abuse Paternal Grandfather      Hypertension No family hx of      Cerebrovascular Disease No family hx of      Breast Cancer No family hx of      Prostate Cancer No family hx of      Alcohol/Drug No family hx of      Allergies No family hx of      Alzheimer Disease No family hx of      Circulatory No family hx of      Congenital Anomalies No family hx of      Connective Tissue Disorder No family hx of      Depression No family hx of      Eye Disorder No family hx of      Genetic Disorder No family hx of      Gastrointestinal Disease No family hx of      Genitourinary Problems No family hx of      Gynecology No family hx of      Musculoskeletal Disorder No family hx of      Neurologic Disorder No family hx of      Osteoporosis No family hx of      Psychotic Disorder No family hx of      Respiratory No family hx of      Thyroid Disease No family hx of      Anesthesia Reaction No  family hx of      Blood Disease No family hx of        Prior to Admission Medications   Prior to Admission Medications   Prescriptions Last Dose Informant Patient Reported? Taking?   Calcium-Vitamin D-Vitamin K (CALCIUM + D + K) 750-500-40 MG-UNT-MCG TABS 3/2/2022 at Unknown time  Yes Yes   Sig: Take 2 tablets by mouth daily.   Glucos-Chond-Hyal Ac-Ca Fructo (MOVE FREE JOINT HEALTH ADVANCE) TABS 3/2/2022 at Unknown time  Yes Yes   Sig: Take 1 mg by mouth daily   MULTIVITAMIN TABS   OR 3/2/2022 at Unknown time  No Yes   Si daily   Probiotic Product (PROBIOTIC PO) 3/2/2022 at Unknown time  Yes Yes   Sig: Take by mouth daily   TURMERIC PO 3/2/2022 at Unknown time  Yes Yes   Sig: Take 1,500 mg by mouth daily   VITAMIN D, CHOLECALCIFEROL, PO 3/2/2022 at Unknown time  Yes Yes   Sig: Take 5,000 Units by mouth daily   apixaban ANTICOAGULANT (ELIQUIS ANTICOAGULANT) 5 MG tablet Past Week at Unknown time  No Yes   Sig: Take 1 tablet (5 mg) by mouth 2 times daily   atorvastatin (LIPITOR) 40 MG tablet 3/2/2022 at Unknown time  No Yes   Sig: Take 1 tablet (40 mg) by mouth daily   coenzyme Q-10 (COQ-10) 200 MG CAPS 3/2/2022 at Unknown time  Yes Yes   Sig: Take by mouth 2 times daily    esomeprazole (NEXIUM) 40 MG capsule 3/3/2022 at Unknown time  Yes Yes   Sig: Take 40 mg by mouth every morning (before breakfast) Take 30-60 minutes before eating.   fluticasone (FLONASE) 50 MCG/ACT nasal spray 3/3/2022 at Unknown time  No Yes   Sig: Spray 2 sprays into both nostrils daily   mesalamine (LIALDA) 1.2 g EC tablet 3/2/2022 at Unknown time  Yes Yes   Sig: Take 1,200 mg by mouth daily (with breakfast)   metoprolol succinate ER (TOPROL-XL) 50 MG 24 hr tablet 3/3/2022 at Unknown time  No Yes   Sig: Take 1 tablet (50 mg) by mouth 2 times daily   psyllium (METAMUCIL) 58.6 % POWD 3/2/2022 at Unknown time  Yes Yes   Sig: Take 2 teaspoonful by mouth 2 times daily    sildenafil (VIAGRA) 25 MG tablet Unknown at Unknown time  No Yes   Sig: Take  3 tablets (75 mg) by mouth daily as needed   tadalafil (CIALIS) 10 MG tablet Unknown at Unknown time  No Yes   Sig: Take 1 tablet (10 mg) by mouth daily as needed   torsemide (DEMADEX) 10 MG tablet 3/2/2022 at Unknown time  No Yes   Sig: Take 1/2 pill once a day in the morning      Facility-Administered Medications: None     Allergies   Allergies   Allergen Reactions     No Known Drug Allergies        Physical Exam   Vital Signs: Temp: 97.4  F (36.3  C) Temp src: Oral BP: 125/86 Pulse: 96   Resp: 14 SpO2: 96 % O2 Device: None (Room air) Oxygen Delivery: 2 LPM  Weight: 235 lbs 6.4 oz    General Appearance: Pt lying in bed, awake, alert, in no overt distress, towel around head  Eyes: Nonicteric appearing  HEENT: No facial asymmetry noted  Respiratory: In no apparent respiratory distress, clear to auscultation bilaterally, no crackles or wheezes noted  Cardiovascular: Regular, irregular rate and rhythm, normal S1S2, no obvious murmur noted, no rub or gallop noted  GI: Round, soft, nondistended, nontender to palpation, hypoactive bowel sounds, no obvious guarding or rebound tenderness noted  Skin: Warm, dry, pink; BLE cool to touch, no overt mottling noted  Musculoskeletal: No peripheral edema noted, sheath remains in place in R groin, no obvious hematoma or ecchymosis noted  Neurologic: A/Ox4, clear speech, no focal neuro deficits  Psychiatric: Calm    Data   Data reviewed today: I reviewed all medications, new labs and imaging results over the last 24 hours. I personally reviewed the EKG tracing showing inferior STEMI.    Recent Labs   Lab 03/03/22  0755 02/25/22  1509   WBC 9.4 12.9*   HGB 15.7 15.2   MCV 88 91   * 589*   INR 1.00  --     134   POTASSIUM 4.2 4.8   CHLORIDE 108 105   CO2 25 26   BUN 22 24   CR 1.10 1.13   ANIONGAP 6 3   CAMERON 8.9 9.4   * 112*     Recent Results (from the past 24 hour(s))   Echo Limited   Result Value    LVEF  20-25%    Narrative     [id number]  [id number]  [id number]^LUIS CARLOS^GINA^BARBER     [facility]  Echocardiography Laboratory  [address] [place] [address]     Name: BUCK SAMANIEGO  MRN: [id number]  : 1950  Study Date: 2022 10:09 AM  Age: 71 yrs  Gender: Male  Patient Location: [facility] [place], [place]  Reason For Study: Other, Please Specify in Comments  Ordering Physician: GINA ALDRIDGE  Referring Physician: GINA ALDRIDGE  Performed By: [facility] Adina Borrego     BSA: 2.3 m2  Height: 73 in  Weight: 235 lb  HR: 67  BP: 131/78 mmHg  ______________________________________________________________________________  Procedure  Limited Portable Echo Adult.  ______________________________________________________________________________  Interpretation Summary     Limited study in cath lab for pericardial effusion     Left ventricular systolic function is severely reduced.  Inferior and inferolateral akinesis. Severe global hypokinesis.  The visual ejection fraction is 20-25%.  The right ventricle is mildly dilated.  Severely decreased right ventricular systolic function  There is moderate (2+) mitral regurgitation.  There is no pericardial effusion.  ______________________________________________________________________________  Left Ventricle  The left ventricle is normal in size. Left ventricular systolic function is  severely reduced. The visual ejection fraction is 20-25%. Inferior and  inferolateral akinesis. Severe global hypokinesis.     Right Ventricle  The right ventricle is mildly dilated. Severely decreased right ventricular  systolic function. There is a pacemaker lead in the right ventricle.     Atria  The left atrium is moderately dilated. The right atrium is mildly dilated.     Mitral Valve  There is moderate (2+) mitral regurgitation.     Aortic Valve  No aortic regurgitation is present. No aortic stenosis is present.     Pericardium  There is no pericardial  effusion.  ______________________________________________________________________________  Report approved by: Nini Ramírez 03/03/2022 10:53 AM     ______________________________________________________________________________      Cardiac Catheterization    Addendum: 3/3/2022        Mid RCA lesion is 100% stenosed.    IVUS was performed on the lesion.    Ultrasound (IVUS) was performed.    Prox RCA lesion is 20% stenosed.    IVUS was performed on the lesion.    Ultrasound (IVUS) was performed.    Mid LM lesion is 10% stenosed.    Prox LAD lesion is 20% stenosed.    Mid LAD lesion is 60% stenosed.    Dist LAD lesion is 40% stenosed.    Ramus lesion is 45% stenosed.    Prox Cx lesion is 20% stenosed.     1. Three vessel calcific disease. iFR of Lad suggests non flow limiting. The RI, Lcx have mild disease  2. 100% occluded RCA--Thrombectomy of RCA pulled out red clot and that reestablished flow. The RCA was severely narrowed throughout the mid and distal portion with severe calcification. IVUS shows vessel >3.0mm and 360 degree of calcium.  Rotablator of RCA m/d followed by PTCB and POBA NC done to allow passage of equipment and stents to distal and mid RCA  Pt had profound bradycardia and hypotension but no CP during procedure.  IVUS guided stents x2 (rca m/d) performed with good result and IVUS post shows proximal stent well apposed. IVUS would not pass to distal stent area but by xray excellent result.  3. Unstable hemodynamics until re-established flow/PCI of RCA  REC-keep sheaths in for now. Check PTT 2-4 hours and pull sheaths and pacer if no longer needed. Hold ASA due to concern of triple therapy, continue plavix and restart Eliquis after 3/4/22 if groin heals well  I cannot exclude that patient is hypercoagulable after holding eliquis AND HE HAS ESSENTIAL THOMBOCYTOSIS.        Narrative      Mid RCA lesion is 100% stenosed.    IVUS was performed on the lesion.    Ultrasound (IVUS) was performed.    Prox  RCA lesion is 20% stenosed.    IVUS was performed on the lesion.    Ultrasound (IVUS) was performed.    Mid LM lesion is 10% stenosed.    Prox LAD lesion is 20% stenosed.    Mid LAD lesion is 60% stenosed.    Dist LAD lesion is 40% stenosed.    Ramus lesion is 45% stenosed.    Prox Cx lesion is 20% stenosed.     1. Three vessel calcific disease. iFR of Lad suggests non flow limiting.   The RI, Lcx have mild disease  2. 100% occluded RCA--Thrombectomy of RCA pulled out red clot and that   reestablished flow. The RCA was severely narrowed throughout the mid and   distal portion with severe calcification. IVUS shows vessel >3.0mm and 360   degree of calcium.  Rotablator of RCA m/d followed by PTCB and POBA NC done to allow passage   of equipment and stents to distal and mid RCA  Pt had profound bradycardia and hypotension but no CP during procedure.    IVUS guided stents x2 (rca m/d) performed with good result and IVUS post   shows proximal stent well apposed. IVUS would not pass to distal stent   area but by xray excellent result.  3. Unstable hemodynamics until re-established flow/PCI of RCA  REC-keep sheaths in for now. Check PTT 2-4 hours and pull sheaths and   pacer if no longer needed. Hold ASA due to concern of triple therapy,   continue plavix and restart Eliquis after 3/4/22 if groin heals well   Echocardiogram Limited   Result Value    LVEF  25-30%    Narrative    [id number]  [id number]  [id number]  [id number]^LUIS CARLOS^GINA^BARBER     [facility]  Echocardiography Laboratory  [address] [place] [address]     Name: BUCK SAMANIEGO  MRN: [id number]  : 1950  Study Date: 2022 12:36 PM  Age: 71 yrs  Gender: Male  Patient Location: [facility]  Reason For Study: Pericardial Disease  Ordering Physician: GINA ALDRIDGE  Referring Physician: GINA ALDRIDGE  Performed By: SERENITY Borrego     BSA: 2.3 m2  Height: 73 in  Weight: 235 lb  HR: 63  BP: 125/86  mmHg  ______________________________________________________________________________  Procedure  Limited Portable Echo Adult.  ______________________________________________________________________________  Interpretation Summary     Akinesis of the basal and mid inferior and mid inferolateral walls.  There is mod-severe global hypokinesia of the left ventricle.  Left ventricular systolic function is severely reduced.  The visual ejection fraction is 25-30%.  The right ventricle is moderately dilated.  Moderately decreased right ventricular systolic function  There is mild (1+) mitral regurgitation.  There is no pericardial effusion.     Compared to the echo from earlier today, the LV function looks slightly better  and the MR has decreased from 2+ to 1+  ______________________________________________________________________________  Left Ventricle  Left ventricular systolic function is severely reduced. The visual ejection  fraction is 25-30%. There is mod-severe global hypokinesia of the left  ventricle. Akinesis of the basal and mid inferior and mid inferolateral walls.     Right Ventricle  The right ventricle is moderately dilated. Moderately decreased right  ventricular systolic function.     Mitral Valve  There is mild (1+) mitral regurgitation.     Tricuspid Valve  There is trace tricuspid regurgitation. Right ventricular systolic pressure  could not be approximated due to inadequate tricuspid regurgitation. IVC  diameter and respiratory changes fall into an intermediate range suggesting an  RA pressure of 8 mmHg.     Pericardium  There is no pericardial effusion.     Rhythm  The rhythm was atrial fibrillation with wide QRS rhythm.  ______________________________________________________________________________  Report approved by: Leander Adeel, MDon 03/03/2022 01:01 PM     ______________________________________________________________________________

## 2022-03-03 NOTE — Clinical Note
The first balloon was inserted into the right coronary artery.Max pressure = 12 carlos. Total duration = 30 seconds.     Max pressure = 12 carlos. Total duration = 30 seconds.    Balloon reinflated a second time: Max pressure = 12 carlos. Total duration = 30 seconds.

## 2022-03-03 NOTE — CONSULTS
Cardio consult  See HP from office and cath lab report  CRITICAL CARE 1 HOURS=   Very complex case pt to be admitted  Discussed with dr dhaliwal and hospitalist   Will call hospitalist  See cath dictation  DR DHALIWAL WILL DETERMINE WHEN SHEATH/PACER COMES OUT  DONT GIVE ELIQUIS UNTIL AT LEAST 3/4/22 PM AND ONLY IF DR DHALIWAL OK IT     Clinically Significant Risk Factors Present on Admission             # Coagulation Defect: home medication list includes an anticoagulant medication      Cardiovascular: Cardiac Arrhythmia: Atrial fibrillation: Longstanding            Hematology/Oncology: Thrombocytopenia Including Purpuric, HIT, & Other Platelet Defects: Coagulation defect, unspecified

## 2022-03-03 NOTE — PROGRESS NOTES
Interventional cardiology    Patient was seen and examined at bedside on 3 separate occasions. Severe ischemic cardiomyopathy and afib with complex RCA intervention today with re-look angiogram due to chest pain/and SUMMER. RCA stents patent on re-look.    Currently he is stable and has no chest pain or dyspnea, only mild nausea. Is is on lowe dose dopamine. Hem/onc has seen. ACT just now back and is <170.  -stop dopamine  -pull sheaths now per protocol. Temp pacer was already removed in the cath lab.  -will continue to follow.   Magdalena Baez MD on 3/3/2022 at 5:16 PM

## 2022-03-03 NOTE — Clinical Note
The first balloon was inserted into the right coronary artery.Max pressure = 15 carlos. Total duration = 20 seconds.     Max pressure = 14 carlos. Total duration = 20 seconds.    Balloon reinflated a second time: Max pressure = 14 carlos. Total duration = 20 seconds.

## 2022-03-03 NOTE — PRE-PROCEDURE
GENERAL PRE-PROCEDURE:   Procedure:  HEART CATHETERIZATION POSSIBLE INTERVENTION  Date/Time:  3/3/2022 8:54 AM    Risks and benefits: Risks, benefits and alternatives were discussed    Consent given by:  Parent  Patient states understanding of procedure being performed: Yes    Patient's understanding of procedure matches consent: Yes    Procedure consent matches procedure scheduled: Yes    Expected level of sedation:  Moderate  Appropriately NPO:  Yes  Lungs:  Lungs clear with good breath sounds bilaterally  Heart:  Normal heart sounds and rate  History & Physical reviewed:  History and physical reviewed and no updates needed  Statement of review:  I have reviewed the lab findings, diagnostic data, medications, and the plan for sedation  RISK BENEFIT INDICATION AND DUAL TRIPLE BLOOD THINNER ISSUES DISCUSSED WITH PT AND WIFE  ALL QUESTIONS ANSWERED   PT WISHES TO PROCEED

## 2022-03-03 NOTE — Clinical Note
The first balloon was inserted into the right coronary artery.Max pressure = 18 carlos. Total duration = 20 seconds.     Max pressure = 18 carlos. Total duration = 20 seconds.    Balloon reinflated a second time: Max pressure = 18 carlos. Total duration = 20 seconds.

## 2022-03-03 NOTE — Clinical Note
Stent deployed in the right coronary artery. Max pressure = 10 carlos. Total duration = 25 seconds. Balloon reinflated a second time: Max pressure = 13 carlos. Total duration = 20 seconds.

## 2022-03-03 NOTE — Clinical Note
Chief Complaint   Patient presents with    Constipation    New Patient Decision made to proceed with intervention. Family notified.

## 2022-03-03 NOTE — PROGRESS NOTES
PATIENT/VISITOR WELLNESS SCREENING    Step 1 Patient Screening    1. In the last month, have you been in contact with someone who was confirmed or suspected to have Coronavirus/COVID-19? No    2. Do you have the following symptoms?  Fever/Chills? No   Cough? No   Shortness of breath? No   New loss of taste or smell? No  Sore throat? No  Muscle or body aches? No  Headaches? No  Fatigue? No  Vomiting or diarrhea? No    Step 2 Visitor Screening    1. Name of Visitor (1 visitor per patient): Emy    2. In the last month, have you been in contact with someone who was confirmed or suspected to have Coronavirus/COVID-19? No    3. Do you have the following symptoms?  Fever/Chills? No   Cough? No   Shortness of breath? No   Skin rash? No   Loss of taste or smell? No  Sore throat? No  Runny or stuffy nose? No  Muscle or body aches? No  Headaches? No  Fatigue? No  Vomiting or diarrhea? No    If the visitor has positive symptoms, notify supervisor/manger  Per policy, the visitor will need to leave the facility     Step 3 Refer to logic grid below for actions    NO SYMPTOM(S)    ACTIONS:  1. Standard rooming process  2. Provider to assess per normal protocol  3. Implement precautions as needed and per guidelines     POSITIVE SYMPTOM(S)  If positive for ANY of the following symptoms: fever, cough, shortness of breath, rash    ACTION:  1. Continue to have the patient wear a mask   2. Room patient as soon as possible  3. Don appropriate PPE when entering room  4. Provider evaluation

## 2022-03-04 ENCOUNTER — APPOINTMENT (OUTPATIENT)
Dept: PHYSICAL THERAPY | Facility: CLINIC | Age: 72
DRG: 246 | End: 2022-03-04
Attending: INTERNAL MEDICINE
Payer: MEDICARE

## 2022-03-04 LAB
ANION GAP SERPL CALCULATED.3IONS-SCNC: 6 MMOL/L (ref 3–14)
BUN SERPL-MCNC: 21 MG/DL (ref 7–30)
CALCIUM SERPL-MCNC: 8.5 MG/DL (ref 8.5–10.1)
CHLORIDE BLD-SCNC: 110 MMOL/L (ref 94–109)
CO2 SERPL-SCNC: 22 MMOL/L (ref 20–32)
CREAT SERPL-MCNC: 1.02 MG/DL (ref 0.66–1.25)
ERYTHROCYTE [DISTWIDTH] IN BLOOD BY AUTOMATED COUNT: 12.8 % (ref 10–15)
GFR SERPL CREATININE-BSD FRML MDRD: 80 ML/MIN/1.73M2
GLUCOSE BLD-MCNC: 109 MG/DL (ref 70–99)
HCT VFR BLD AUTO: 39.1 % (ref 40–53)
HGB BLD-MCNC: 12.9 G/DL (ref 13.3–17.7)
HGB BLD-MCNC: 13 G/DL (ref 13.3–17.7)
MCH RBC QN AUTO: 29.3 PG (ref 26.5–33)
MCHC RBC AUTO-ENTMCNC: 33 G/DL (ref 31.5–36.5)
MCV RBC AUTO: 89 FL (ref 78–100)
PLATELET # BLD AUTO: 405 10E3/UL (ref 150–450)
POTASSIUM BLD-SCNC: 3.7 MMOL/L (ref 3.4–5.3)
RBC # BLD AUTO: 4.4 10E6/UL (ref 4.4–5.9)
SODIUM SERPL-SCNC: 138 MMOL/L (ref 133–144)
TROPONIN I SERPL HS-MCNC: ABNORMAL NG/L
WBC # BLD AUTO: 16.7 10E3/UL (ref 4–11)

## 2022-03-04 PROCEDURE — 250N000013 HC RX MED GY IP 250 OP 250 PS 637: Performed by: INTERNAL MEDICINE

## 2022-03-04 PROCEDURE — 250N000013 HC RX MED GY IP 250 OP 250 PS 637: Performed by: STUDENT IN AN ORGANIZED HEALTH CARE EDUCATION/TRAINING PROGRAM

## 2022-03-04 PROCEDURE — 36415 COLL VENOUS BLD VENIPUNCTURE: CPT | Performed by: INTERNAL MEDICINE

## 2022-03-04 PROCEDURE — 99207 PR APP CREDIT; MD BILLING SHARED VISIT: CPT | Performed by: PHYSICIAN ASSISTANT

## 2022-03-04 PROCEDURE — 99233 SBSQ HOSP IP/OBS HIGH 50: CPT | Performed by: INTERNAL MEDICINE

## 2022-03-04 PROCEDURE — 85014 HEMATOCRIT: CPT | Performed by: NURSE PRACTITIONER

## 2022-03-04 PROCEDURE — 80048 BASIC METABOLIC PNL TOTAL CA: CPT | Performed by: INTERNAL MEDICINE

## 2022-03-04 PROCEDURE — 210N000002 HC R&B HEART CARE

## 2022-03-04 PROCEDURE — 84484 ASSAY OF TROPONIN QUANT: CPT | Performed by: INTERNAL MEDICINE

## 2022-03-04 PROCEDURE — 250N000013 HC RX MED GY IP 250 OP 250 PS 637: Performed by: NURSE PRACTITIONER

## 2022-03-04 PROCEDURE — 97110 THERAPEUTIC EXERCISES: CPT | Mod: GP

## 2022-03-04 PROCEDURE — 93005 ELECTROCARDIOGRAM TRACING: CPT

## 2022-03-04 PROCEDURE — 85018 HEMOGLOBIN: CPT | Performed by: INTERNAL MEDICINE

## 2022-03-04 PROCEDURE — 97530 THERAPEUTIC ACTIVITIES: CPT | Mod: GP

## 2022-03-04 PROCEDURE — 99233 SBSQ HOSP IP/OBS HIGH 50: CPT | Mod: 25 | Performed by: INTERNAL MEDICINE

## 2022-03-04 PROCEDURE — 97161 PT EVAL LOW COMPLEX 20 MIN: CPT | Mod: GP

## 2022-03-04 PROCEDURE — 250N000013 HC RX MED GY IP 250 OP 250 PS 637: Performed by: PHYSICIAN ASSISTANT

## 2022-03-04 RX ORDER — MESALAMINE 1.2 G/1
1200 TABLET, DELAYED RELEASE ORAL
Status: DISCONTINUED | OUTPATIENT
Start: 2022-03-04 | End: 2022-03-04

## 2022-03-04 RX ORDER — TORSEMIDE 10 MG/1
10 TABLET ORAL ONCE
Status: COMPLETED | OUTPATIENT
Start: 2022-03-04 | End: 2022-03-04

## 2022-03-04 RX ORDER — MESALAMINE 1.2 G/1
4.8 TABLET, DELAYED RELEASE ORAL
Status: DISCONTINUED | OUTPATIENT
Start: 2022-03-04 | End: 2022-03-04

## 2022-03-04 RX ORDER — PANTOPRAZOLE SODIUM 40 MG/1
40 TABLET, DELAYED RELEASE ORAL
Status: DISCONTINUED | OUTPATIENT
Start: 2022-03-04 | End: 2022-03-05 | Stop reason: HOSPADM

## 2022-03-04 RX ORDER — ATORVASTATIN CALCIUM 40 MG/1
40 TABLET, FILM COATED ORAL DAILY
Status: DISCONTINUED | OUTPATIENT
Start: 2022-03-04 | End: 2022-03-05 | Stop reason: HOSPADM

## 2022-03-04 RX ORDER — MESALAMINE 1.2 G/1
4.8 TABLET, DELAYED RELEASE ORAL
Status: DISCONTINUED | OUTPATIENT
Start: 2022-03-04 | End: 2022-03-05 | Stop reason: HOSPADM

## 2022-03-04 RX ADMIN — ACETAMINOPHEN 650 MG: 325 TABLET, FILM COATED ORAL at 15:33

## 2022-03-04 RX ADMIN — Medication 1 MG: at 22:29

## 2022-03-04 RX ADMIN — OXYCODONE HYDROCHLORIDE 5 MG: 5 TABLET ORAL at 00:43

## 2022-03-04 RX ADMIN — APIXABAN 5 MG: 5 TABLET, FILM COATED ORAL at 20:41

## 2022-03-04 RX ADMIN — ACETAMINOPHEN 650 MG: 325 TABLET, FILM COATED ORAL at 09:34

## 2022-03-04 RX ADMIN — PANTOPRAZOLE SODIUM 40 MG: 40 TABLET, DELAYED RELEASE ORAL at 11:13

## 2022-03-04 RX ADMIN — ACETAMINOPHEN 650 MG: 325 TABLET, FILM COATED ORAL at 22:29

## 2022-03-04 RX ADMIN — ASPIRIN 81 MG: 81 TABLET, COATED ORAL at 09:04

## 2022-03-04 RX ADMIN — NITROGLYCERIN 0.4 MG: 0.4 TABLET SUBLINGUAL at 19:02

## 2022-03-04 RX ADMIN — MESALAMINE 4.8 G: 1.2 TABLET, DELAYED RELEASE ORAL at 17:23

## 2022-03-04 RX ADMIN — ATORVASTATIN CALCIUM 40 MG: 40 TABLET, FILM COATED ORAL at 20:41

## 2022-03-04 RX ADMIN — TORSEMIDE 10 MG: 10 TABLET ORAL at 09:34

## 2022-03-04 RX ADMIN — CLOPIDOGREL BISULFATE 75 MG: 75 TABLET ORAL at 09:04

## 2022-03-04 RX ADMIN — OXYCODONE HYDROCHLORIDE 5 MG: 5 TABLET ORAL at 05:08

## 2022-03-04 ASSESSMENT — ACTIVITIES OF DAILY LIVING (ADL)
ADLS_ACUITY_SCORE: 5

## 2022-03-04 NOTE — PROGRESS NOTES
SPIRITUAL HEALTH SERVICES Progress Note  Hospital: [facility] Unit: Coronary Care    Visit Summary: Peter shared how thankful he was to be going home soon to his wife and 2 children. He was picking out his lunch and happy to be hungry. He att[facility] [employer] and has great support there as well as at home. We shared reflective conversation which integrated elements of illness and family narratives.    Plan: [facility] remains available for support    Magda Burrows   Intern   Pager: [phone]

## 2022-03-04 NOTE — PLAN OF CARE
A&Ox4, denies CP or SOB. Tele remains AFIB CVR, VSS on RA. Right groin arterial and venous sites soft and flat, CMS intact. Dopamine weaned off, nausea improved, tolerating PO intake. Lower back pain relieved with positioning. Discharge home next couple days.

## 2022-03-04 NOTE — PROGRESS NOTES
[facility]    Cardiology Progress Note     Assessment & Plan   Peter Peralta is a 71 year old male who was admitted on 3/3/2022.     1. Ischemic cardiomyopathy with biventricular heart failure, or mixed ischemic/nonischemic due to history of afib RVR EF 20-25%  Nuc 2/24 EF 26%, transmural infarction in the distal inferior and inferoseptal segment(s) of the left ventricle  -outpatient meds :metoprolol, atorvastatin 40mg daily, torsemide 5mg daily  2. S/p complex PCI RCA yesterday  Atherectomy w/placement of 2 overlapping stents to RCA  Hypotension and bradycardia during the case  Patient had cp and ST elevation after the procedure. Repeat angio showed patent stents, good angiographic result.   3. Paroxysmal atrial fibrillation. Outpatient meds: apixaban and metoprolol xl 50   4. Hypotension and bradycardia, resolved. Related to PCI. Off dopamine since yesterday  5. Thrombocytosis, not severe enough to require intervention at this time. Appreciate hem recs  6. HTN, metoprolol currently held due to hypotension  7. Acute anemia, no sign of active bleeding  8. Ulcerative colitis  9. Erectile dysfunction      Recheck hgb at noon (ordered). If stable and groin is stable resume eliquis    Continue clopidogrel and aspirin today.    Stop aspirin after resuming eliquis    Diuresis: torsemide 10mg PO daily (this is double his usual daily dose due to hypervolemia related to procedure yesterday).  Anticipate he will likely discharge on his previous outpatient dose which he states he has a good response to at home.    Resume metoprolol today or tomorrow. BP still soft, will f/u on Hgb and torsemide dose prior to resuming metoprolol.     Additional titration of goal-directed med therapy will likely be done on outpatient basis.     Cardiac rehab today    May be ready for discharge tomorrow         Magdalena Baez MD    Interval History   Did reasonably well overnight and feels much better. Sheaths out.  "Has some chest discomfort which is persistent and feels like \"indigestion, different than yesterday. Is tired. No back pain or groin pain, though groin is a bit tender. No dyspnea. Feels puffy and is about 2kg up.      Clinically Significant Risk Factors Present on Admission             # Coagulation Defect: home medication list includes an anticoagulant medication      Cardiovascular: Cardiac Arrhythmia: Atrial fibrillation: Chronic  Hypotension, unspecified  Shock: Cardiogenic shock  Hematology/Oncology: Thrombocytopenia Including Purpuric, HIT, & Other Platelet Defects: Coagulation defect, unspecified  Neurology: Encephalopathy: Other encephalopathy was encephalopathic yesterday post-procedure  Systemic: Chronic Fatigue and Other Debilities: Chronic fatigue, unspecified         Physical Exam   Temp: 97.7  F (36.5  C) Temp src: Temporal BP: 102/81 Pulse: 59   Resp: 15 SpO2: 97 % O2 Device: Nasal cannula Oxygen Delivery: 1 LPM  Vitals:    03/03/22 0730 03/04/22 0508   Weight: 106.8 kg (235 lb 6.4 oz) 108.9 kg (240 lb)     Vital Signs with Ranges  Temp:  [97.5  F (36.4  C)-98.3  F (36.8  C)] 97.7  F (36.5  C)  Pulse:  [] 59  Resp:  [0-27] 15  BP: ()/() 102/81  SpO2:  [92 %-100 %] 97 %  I/O last 3 completed shifts:  In: 720 [P.O.:720]  Out: 500 [Urine:500]  Patient Active Problem List   Diagnosis     Family history of coronary artery disease     Hyperlipidemia LDL goal <130     GERD (gastroesophageal reflux disease)     Family history of colon cancer     Ulcerative colitis (H)     Right inguinal hernia     Atrial fibrillation, unspecified type (H) S/P Cardioversion     Hyperlipidemia     Murmur     Nonspecific abnormal results of cardiovascular function study     Chest pain     Elevated blood pressure reading without diagnosis of hypertension     Benign prostatic hyperplasia, presence of lower urinary tract symptoms unspecified, unspecified morphology     Advanced directives, counseling/discussion "     Elevated fasting blood sugar     Hoarseness     BMI > 25     Thrombocytosis     Complete tear of left rotator cuff     Rhinitis     Erectile dysfunction     Percutaneous transluminal coronary angioplasty status       Constitutional: comfortable  Eyes: clear sclera  ENT: Mucous membranes are moist.   Respiratory: clear bilat  Cardiovascular: irregularly irreg  GI: bowel sounds present  Skin: trace edema hand dorsum bilat, ankles/feet bilat. Right groin c/d/i, no hematoma or bruit  Musculoskeletal: grossly normal bulkd and tone  Neurologic: alert, moves all ext  Psychiatric: normal affect    Medications     - MEDICATION INSTRUCTIONS -       - MEDICATION INSTRUCTIONS -       DOPamine Stopped (22 1725)     Percutaneous Coronary Intervention orders placed (this is information for BPA alerting)       sodium chloride         aspirin  81 mg Oral Daily     clopidogrel  75 mg Oral Daily     sodium chloride (PF)  3 mL Intracatheter Q8H     EKG afib, HR 68, RBBB  Tele afib, controlled ventricular response    Data   Results for orders placed or performed during the hospital encounter of 22 (from the past 24 hour(s))   Activated clotting time celite, POCT   Result Value Ref Range    Activated Clotting Time (Celite) POCT 288 (H) 74 - 150 seconds   Echo Limited   Result Value Ref Range    LVEF  20-25%     Narrative    [id number]  [id number]  [id number]  [id number]^LUIS CARLOS^GINA^BARBER     [facility]  Echocardiography Laboratory  [address] [place] [address]     Name: BUCK SAMANIEGO  MRN: [id number]  : 1950  Study Date: 2022 10:09 AM  Age: 71 yrs  Gender: Male  [facility]  Reason For Study: Other, Please Specify in Comments  Ordering Physician: GINA ALDRIDGE  Referring Physician: GINA ALDRIDGE  Performed By: [facility] Adina Borrego     BSA: 2.3 m2  Height: 73 in  Weight: 235 lb  HR: 67  BP: 131/78  mmHg  ______________________________________________________________________________  Procedure  Limited Portable Echo Adult.  ______________________________________________________________________________  Interpretation Summary     Limited study in cath lab for pericardial effusion     Left ventricular systolic function is severely reduced.  Inferior and inferolateral akinesis. Severe global hypokinesis.  The visual ejection fraction is 20-25%.  The right ventricle is mildly dilated.  Severely decreased right ventricular systolic function  There is moderate (2+) mitral regurgitation.  There is no pericardial effusion.  ______________________________________________________________________________  Left Ventricle  The left ventricle is normal in size. Left ventricular systolic function is  severely reduced. The visual ejection fraction is 20-25%. Inferior and  inferolateral akinesis. Severe global hypokinesis.     Right Ventricle  The right ventricle is mildly dilated. Severely decreased right ventricular  systolic function. There is a pacemaker lead in the right ventricle.     Atria  The left atrium is moderately dilated. The right atrium is mildly dilated.     Mitral Valve  There is moderate (2+) mitral regurgitation.     Aortic Valve  No aortic regurgitation is present. No aortic stenosis is present.     Pericardium  There is no pericardial effusion.  ______________________________________________________________________________  Report approved by: Nini Ramírez 03/03/2022 10:53 AM     ______________________________________________________________________________      Activated clotting time celite, POCT   Result Value Ref Range    Activated Clotting Time (Celite) POCT 211 (H) 74 - 150 seconds   Activated clotting time celite, POCT   Result Value Ref Range    Activated Clotting Time (Celite) POCT 288 (H) 74 - 150 seconds   Activated clotting time celite, POCT   Result Value Ref Range    Activated Clotting  Time (Celite) POCT 275 (H) 74 - 150 seconds   Activated clotting time celite, POCT   Result Value Ref Range    Activated Clotting Time (Celite) POCT 262 (H) 74 - 150 seconds   Cardiac Catheterization    Addendum: 3/3/2022        Mid RCA lesion is 100% stenosed.    IVUS was performed on the lesion.    Ultrasound (IVUS) was performed.    Prox RCA lesion is 20% stenosed.    IVUS was performed on the lesion.    Ultrasound (IVUS) was performed.    Mid LM lesion is 10% stenosed.    Prox LAD lesion is 20% stenosed.    Mid LAD lesion is 60% stenosed.    Dist LAD lesion is 40% stenosed.    Ramus lesion is 45% stenosed.    Prox Cx lesion is 20% stenosed.     1. Three vessel calcific disease. iFR of Lad suggests non flow limiting. The RI, Lcx have mild disease  2. 100% occluded RCA--Thrombectomy of RCA pulled out red clot and that reestablished flow. The RCA was severely narrowed throughout the mid and distal portion with severe calcification. IVUS shows vessel >3.0mm and 360 degree of calcium.  Rotablator of RCA m/d followed by PTCB and POBA NC done to allow passage of equipment and stents to distal and mid RCA  Pt had profound bradycardia and hypotension but no CP during procedure.  IVUS guided stents x2 (rca m/d) performed with good result and IVUS post shows proximal stent well apposed. IVUS would not pass to distal stent area but by xray excellent result.  3. Unstable hemodynamics until re-established flow/PCI of RCA  REC-keep sheaths in for now. Check PTT 2-4 hours and pull sheaths and pacer if no longer needed. Hold ASA due to concern of triple therapy, continue plavix and restart Eliquis after 3/4/22 if groin heals well  I cannot exclude that patient is hypercoagulable after holding eliquis AND HE HAS ESSENTIAL THOMBOCYTOSIS.        Narrative      Mid RCA lesion is 100% stenosed.    IVUS was performed on the lesion.    Ultrasound (IVUS) was performed.    Prox RCA lesion is 20% stenosed.    IVUS was performed on the  lesion.    Ultrasound (IVUS) was performed.    Mid LM lesion is 10% stenosed.    Prox LAD lesion is 20% stenosed.    Mid LAD lesion is 60% stenosed.    Dist LAD lesion is 40% stenosed.    Ramus lesion is 45% stenosed.    Prox Cx lesion is 20% stenosed.     1. Three vessel calcific disease. iFR of Lad suggests non flow limiting.   The RI, Lcx have mild disease  2. 100% occluded RCA--Thrombectomy of RCA pulled out red clot and that   reestablished flow. The RCA was severely narrowed throughout the mid and   distal portion with severe calcification. IVUS shows vessel >3.0mm and 360   degree of calcium.  Rotablator of RCA m/d followed by PTCB and POBA NC done to allow passage   of equipment and stents to distal and mid RCA  Pt had profound bradycardia and hypotension but no CP during procedure.    IVUS guided stents x2 (rca m/d) performed with good result and IVUS post   shows proximal stent well apposed. IVUS would not pass to distal stent   area but by xray excellent result.  3. Unstable hemodynamics until re-established flow/PCI of RCA  REC-keep sheaths in for now. Check PTT 2-4 hours and pull sheaths and   pacer if no longer needed. Hold ASA due to concern of triple therapy,   continue plavix and restart Eliquis after 3/4/22 if groin heals well   EKG 12-lead, tracing only   Result Value Ref Range    Systolic Blood Pressure  mmHg    Diastolic Blood Pressure  mmHg    Ventricular Rate 97 BPM    Atrial Rate 122 BPM    IN Interval  ms    QRS Duration 144 ms     ms    QTc 510 ms    P Axis  degrees    R AXIS 81 degrees    T Axis 42 degrees    Interpretation ECG       Atrial fibrillation  Right bundle branch block  ST elevation consider inferior injury or acute infarct  ** ** ACUTE MI / STEMI ** **  Consider right ventricular involvement in acute inferior infarct  Abnormal ECG  When compared with ECG of 03-MAR-2022 07:39, (unconfirmed)  Inverted T waves have replaced nonspecific T wave abnormality in Inferior leads  T  wave inversion no longer evident in Anterior leads     Echocardiogram Limited   Result Value Ref Range    LVEF  25-30%     [facility]    [id number]  [id number]  [id number]  [id number]^LUIS CARLOS^GINA^BARBER     [facility]  Echocardiography Laboratory  [address] [place] [address], [place] [address]     Name: BUCK SAMANIEGO  MRN: [id number]  : 1950  Study Date: 2022 12:36 PM  Age: 71 yrs  Gender: Male  Patient Location: [facility]  Reason For Study: Pericardial Disease  Ordering Physician: GINA ALDRIDGE  Referring Physician: GINA ALDRIDGE  Performed By: [facility] Adina Borrego     BSA: 2.3 m2  Height: 73 in  Weight: 235 lb  HR: 63  BP: 125/86 mmHg  ______________________________________________________________________________  Procedure  Limited Portable Echo Adult.  ______________________________________________________________________________  Interpretation Summary     Akinesis of the basal and mid inferior and mid inferolateral walls.  There is mod-severe global hypokinesia of the left ventricle.  Left ventricular systolic function is severely reduced.  The visual ejection fraction is 25-30%.  The right ventricle is moderately dilated.  Moderately decreased right ventricular systolic function  There is mild (1+) mitral regurgitation.  There is no pericardial effusion.     Compared to the echo from earlier today, the LV function looks slightly better  and the MR has decreased from 2+ to 1+  ______________________________________________________________________________  Left Ventricle  Left ventricular systolic function is severely reduced. The visual ejection  fraction is 25-30%. There is mod-severe global hypokinesia of the left  ventricle. Akinesis of the basal and mid inferior and mid inferolateral walls.     Right Ventricle  The right ventricle is moderately dilated. Moderately decreased right  ventricular systolic function.     Mitral Valve  There is mild (1+) mitral regurgitation.     Tricuspid  Valve  There is trace tricuspid regurgitation. Right ventricular systolic pressure  could not be approximated due to inadequate tricuspid regurgitation. IVC  diameter and respiratory changes fall into an intermediate range suggesting an  RA pressure of 8 mmHg.     Pericardium  There is no pericardial effusion.     Rhythm  The rhythm was atrial fibrillation with wide QRS rhythm.  ______________________________________________________________________________  Report approved by: Nini Ramírez 03/03/2022 01:01 PM     ______________________________________________________________________________      Activated clotting time celite, POCT   Result Value Ref Range    Activated Clotting Time (Celite) POCT 254 (H) 74 - 150 seconds   Cardiac Catheterization    Narrative      Patent RCA stents. RCA anatomy appeared similar to angiogram performed   previously in the day.    IVUS of the RCA did not show any luminal thrombus or stent edge   dissection.    Stable CAD in the LAD and LCX.    Consider obtaining VerifyNow platelet reactivity test in a week.     See full heart catheterization from this AM  By the time pt arrived in cath lab this time his ECG normalized (was   transient ST elevation inferior) and chest pain resolved. Note the nuc GXT   2 weeks ago showed a mostly completed transmural inferior MI with mild   rebecca-infarct ischemia--this on top of a probably afib/tachycardia mediated   CMP.  This patient had heavy clot burden on top of his calcific CAD.  This   patient has essential thrombocytosis. I suspect the Plavix given this AM   with original stenting had not yet reached peak effect at the time of the   CP and then likely there was a clot that resolved by the time pt reached   cath lab for second time  A single dose of intracoronary Integrilin was given this time even though   repeat IVUS did not show clot or dissection etc.    Would recommend Heme consult to determine with ET and large clot burden if   the  platelets should be treated.  Pt will be on plavix and eliquis   (afib)--consider VERIFY NOW test in a few days to test plavix efficacy     EKG 12-lead, tracing only   Result Value Ref Range    Systolic Blood Pressure  mmHg    Diastolic Blood Pressure  mmHg    Ventricular Rate 97 BPM    Atrial Rate 107 BPM    NY Interval  ms    QRS Duration 146 ms     ms    QTc 520 ms    P Axis  degrees    R AXIS 74 degrees    T Axis -54 degrees    Interpretation ECG       Atrial fibrillation  Right bundle branch block  T wave abnormality, consider inferolateral ischemia  Abnormal ECG  When compared with ECG of 03-MAR-2022 12:19, (unconfirmed)  T wave inversion now evident in Anterolateral leads     Activated clotting time celite, POCT   Result Value Ref Range    Activated Clotting Time (Celite) POCT 169 (H) 74 - 150 seconds   Basic metabolic panel   Result Value Ref Range    Sodium 138 133 - 144 mmol/L    Potassium 3.7 3.4 - 5.3 mmol/L    Chloride 110 (H) 94 - 109 mmol/L    Carbon Dioxide (CO2) 22 20 - 32 mmol/L    Anion Gap 6 3 - 14 mmol/L    Urea Nitrogen 21 7 - 30 mg/dL    Creatinine 1.02 0.66 - 1.25 mg/dL    Calcium 8.5 8.5 - 10.1 mg/dL    Glucose 109 (H) 70 - 99 mg/dL    GFR Estimate 80 >60 mL/min/1.73m2   CBC with platelets   Result Value Ref Range    WBC Count 16.7 (H) 4.0 - 11.0 10e3/uL    RBC Count 4.40 4.40 - 5.90 10e6/uL    Hemoglobin 12.9 (L) 13.3 - 17.7 g/dL    Hematocrit 39.1 (L) 40.0 - 53.0 %    MCV 89 78 - 100 fL    MCH 29.3 26.5 - 33.0 pg    MCHC 33.0 31.5 - 36.5 g/dL    RDW 12.8 10.0 - 15.0 %    Platelet Count 405 150 - 450 10e3/uL   Troponin I   Result Value Ref Range    Troponin I High Sensitivity 25,319 (HH) <79 ng/L   EKG 12-lead, tracing only   Result Value Ref Range    Systolic Blood Pressure  mmHg    Diastolic Blood Pressure  mmHg    Ventricular Rate 68 BPM    Atrial Rate 416 BPM    NY Interval  ms    QRS Duration 142 ms     ms    QTc 467 ms    P Axis  degrees    R AXIS 72 degrees    T Axis -82  degrees    Interpretation ECG       Atrial fibrillation  Right bundle branch block  T wave abnormality, consider inferolateral ischemia  Abnormal ECG  When compared with ECG of 03-MAR-2022 14:48, (unconfirmed)  QT has shortened

## 2022-03-04 NOTE — PROGRESS NOTES
03/04/22 0800   Quick Adds   Type of Visit Initial PT Evaluation   Living Environment   People in Home spouse   Current Living Arrangements house   Home Accessibility stairs to enter home   Number of Stairs, Main Entrance 2   Self-Care   Usual Activity Tolerance good   Current Activity Tolerance moderate   Equipment Currently Used at Home none   Fall history within last six months no   General Information   Onset of Illness/Injury or Date of Surgery 03/03/22   Referring Physician Eulalio Del Rosario MD   Patient/Family Therapy Goals Statement (PT) Discharge home   Pertinent History of Current Problem (include personal factors and/or comorbidities that impact the POC) Patient admitted on 3/3/2022 for an elective coronary angiogram noting a 100% occluded RCA s/p stents x2 RCA m/d complicated by symptomatic hypotension, bradycardia requiring dopamine and epinephrine, temporary pacemaker placement.  Shortly after arrival to CCU, pt developed worsening chest pain, BUE numbness, tingling, severe nausea with EKG noting inferior STEMI. Patient with PMH of  nonischemic cardiomyopathy, recently diagnosed atrial fibrillation, CAD, ulcerative colitis and HLP.    Existing Precautions/Restrictions fall   Weight-Bearing Status - LLE full weight-bearing   Weight-Bearing Status - RLE full weight-bearing   Cognition   Orientation Status (Cognition) oriented x 4   Pain Assessment   Patient Currently in Pain No   Posture    Posture Not impaired   Range of Motion (ROM)   Range of Motion ROM is WNL   Strength (Manual Muscle Testing)   Strength (Manual Muscle Testing) strength is WNL   Bed Mobility   Comment, (Bed Mobility) Independent   Transfers   Comment, (Transfers) Independent   Gait/Stairs (Locomotion)   Comment, (Gait/Stairs) Independent   Balance   Balance no deficits were identified   Clinical Impression   Criteria for Skilled Therapeutic Intervention Yes, treatment indicated   PT Diagnosis (PT) Impaired tolerance to activity     Influenced by the following impairments decreased activity tolerance   Functional limitations due to impairments gait, stairs   Clinical Presentation (PT Evaluation Complexity) Stable/Uncomplicated   Clinical Presentation Rationale Based on PMH, current presentation, and social support    Clinical Decision Making (Complexity) low complexity   Planned Therapy Interventions (PT) home exercise program;patient/family education  (progressive exercise program, risk factor modification)   Risk & Benefits of therapy have been explained evaluation/treatment results reviewed;care plan/treatment goals reviewed;risks/benefits reviewed;current/potential barriers reviewed;participants voiced agreement with care plan;participants included;patient   PT Discharge Planning   PT Discharge Recommendation (DC Rec) home with outpatient cardiac rehab   PT Rationale for DC Rec Patient mobilizing/ambulating at baseline, independent. Patient safe to discharge home once medically able. Recommend OP CR to further progress exercise program, monitor vitals with activity, and promote overall heart health.    Total Evaluation Time   Total Evaluation Time (Minutes) 5   Physical Therapy Goals   PT Frequency 2x/day   PT Predicated Duration/Target Date for Goal Attainment 03/06/22   PT Goals Cardiac Phase 1   PT: Understanding of cardiac education to maximize quality of life, condition management, and health outcomes Patient;Verbalize   PT: Perform aerobic activity with stable cardiovascular response continuous;15 minutes;ambulation   PT: Functional/aerobic ambulation tolerance with stable cardiovascular response in order to return to home and community environment Independent;Greater than 300 feet   PT: Navigation of stairs simulating home set up with stable cardiovascular response in order to return to home and community environment Independent;Rail on right

## 2022-03-04 NOTE — CONSULTS
NUTRITION EDUCATION    REASON FOR ASSESSMENT:  Nutrition education on American Heart Association (AHA) Heart Healthy Diet.    NUTRITION HISTORY:  Information obtained from patient at bedside this morning.    Pt states he has moderate UC and has a somewhat limited diet due to this. States he is familiar with low sodium diet though hasn't had any formal education in the past. Watches his sodium intake and has switched to [place]ite Salt when seasoning foods. Also states he has been reducing his caffeine intake 2/2 a fib.   Diet recall ~  Breakfast: 2% greek yogurt w/ berries + whole wheat toast, banana and decaf coffee   Lunch: always a repeat of breakfast   Dinner: Varies. Sunday - Wednesday him and his spouse grill chicken or fish and will have a side salad with it. Other days it will be frozen fish sticks with bread, frozen pizza or [employer] burger with fries and onion rings.     No known food allergies noted.     CURRENT DIET ORDER:  Low Saturated Fat, Na < 2400 mg     NUTRITION DIAGNOSIS:  Food- and nutrition-related knowledge deficit R/t no prior heart healthy diet education as evidenced by pt report and need for overview at bedside this morning      INTERVENTIONS:  Nutrition Prescription:    Recommended AHA Heart Healthy Diet    Implementation:     Nutrition Education (Content):  a) reviewed Heart Healthy Diet guidelines  b) provided heart healthy diet handout    Nutrition Education (Application):  a) Discussed current eating habits and recommended alternative food choices    Anticipate good compliance    Diet Education - refer to Education flowsheet    Goals:    Patient verbalizes understanding of diet by stating 3 interventions to make current eating plan more heart healthy     All of the above goals met during education session    Follow Up/Monitoring:    Provided RD contact information for future questions    Treasure Valle RD, LD

## 2022-03-04 NOTE — PLAN OF CARE
Pt. A&Ox4. R Groin site intact and unchanged this shift. No hematoma or bruit. VSS ex for soft BP. Tele Afib CVR. Low sodium diet. Up with SBA. Back pain controlled with oxycodone. Pt scoring green on the Aggression Stop Light Tool. Plan and discharge pending.

## 2022-03-04 NOTE — PROGRESS NOTES
Chart Check:    Chart reviewed. No new recommendations given stable platelets. He can follow up in clinic PRN. We will sign off. Please let us know if we can be of assistance.    Mello BAH, CNP  [facility]  [phone] (office) or [phone] (cell)

## 2022-03-04 NOTE — PROGRESS NOTES
[facility]    Medicine History and Physical - Hospitalist Service       Date of Admission:  3/3/2022    Assessment & Plan   Peter Peralta is a 71 year old male admitted on 3/3/2022. PMHx ulcerative colitis, pAF, CAD, and ischemic cardiomyopathy who presented for elective urgent cor angio complicated by cardiogenic shock successfully treated with dopamine, epinephrine, temporary PPM, and subsequent complex PCI w/ CHULA x 2 RCA. Shortly after arrival to CCU, pt developed worsening chest pain and EKG showing inferior STEMI; subsequent return to cath lab of which no obvious source found.    Patient admitted to CCU [facility] status for post procedure monitoring and management.     3v-CAD s/p complex PCI with overlapping CHULA x 2 mid and distal RCA (3/3/2022) c/b cardiogenic shock and subsequent inferior STEMI on EKG.  Severe ischemic cardiomyopathy with associated acute on chronic HFrEF.  Pressors discontinued following 2nd heart cath.    * 2/24: Outpatient NM stress revealing ischemia, transmural infarctions, and LVEF 26%.  * 3/3: Cor angio with noted 3v calcific disease, 100% occlusion RCA. During procedure developed hemodynamic instability with hypotension and bradycardia requiring dopamine, epinephrine, temporary and pacemaker placement.  Blood flow restored with RCA thrombectomy. Subsequently rotablator RCA followed by PTCB and POBA to allow for stenting. Shortly after arrival to CCU, pt developed worsening chest pain, BUE paresthesias, severe nausea with EKG noting inferior STEMI.  Pt back to cath lab, no obvious source, received Integrilin.  * TTE 3/3: LVEF 25-30%, akinesis basal and mid inferior / inferolateral walls, moderate severe global hypokinesia, moderate RV dilation and decreased systolic function.    [PTA: atorvastatin 40mg, metoprolol succinate 50mg, and torsemide 5mg]  - [facility] status.  - Appreciate cardiology consult.  - PTA Toprol XL and torsemide held post procedure d/t HoTN.  -  Cont hold beta blocker with ongoing soft blood pressure.  - Torsemide 10mg today d/t hypervolemia from fluids during procedure.  - New ASA and Plavix; however if stable serial hgb today resume eliquis evening 3/4/22 (last dose 3/1/22) and stop ASA morning 3/5 per cardiology.    Musculoskeletal low back pain. - resolved.  - Encouraged weight loss and core strengthening once further recovered from above.     Recently diagnosed persistent atrial fibrillation.  CHADSVASC 4. Outpatient OSCAR-guided cardioversion was being arranged, but delayed after abnormal NM stress. Severe bi-atrial enlargement and mild MR / TR noted on TTE 2/25  - PTA Toprol XL on hold as above.  - Resume Eliquis tonight as above if stable hgb and groin.     Mildly prolonged QTc. QTc 510.  - Monitor     Prediabetes. A1C 6.3 (12/30/21).  - Encourage healthy diet and active lifestyle       Chronic thrombocytosis suspect 2/2 chronic inflammation in setting of UC.  Previously evaluated by [facility] and evaluation consistent with secondary reactive thrombocytosis likely d/t ulcerative colitis.  - Appreciate [facility] heme/onc consult and elucidating above. No further investigation at this point recommended. Would consider re-evaluation and bone marrow biopsy if plt >1 million.     Ulcerative colitis.  Last flare up in October 2021, 3 years prior to that.    - Continue PTA mesalamine.     Severe FAM.  Obesity, BMI 31.06.  Apparently previously diagnosed, but does not have home CPAP.  - CPAP when sleeping.  - Rec OP study.     GERD.  - PTA PPI.     BPH.  Not on any medications PTA.  - Bladder scan and straight catheterization orders in place.     Former smoker, 36 pack year history.  - Noted.     Clinically Significant Risk Factors Present on Admission              # Coagulation Defect: home medication list includes an anticoagulant medication   # Circulatory Shock: currently requiring pressors for blood pressure support   # Obesity: Estimated body mass  "index is 31.66 kg/m  as calculated from the following:    Height as of this encounter: 1.854 m (6' 1\").    Weight as of this encounter: 108.9 kg (240 lb).        Diet: Low Saturated Fat Na <2400 mg    Power Catheter: Not present    DVT Prophylaxis: Pneumatic Compression Devices  Code Status: Full Code    Expected Discharge:    Anticipated discharge location:  Awaiting care coordination huddle  Delays:      The patient's care was discussed with the Attending Physician, Dr. Palm, Bedside Nurse and Patient.    JoAnna K. Barthell, PA-C  Hospitalist Service  [facility]    Patient seen and examined.  Agree with impression and plan.  Discussed with patient and wife -- wife concerned with report of \"heart attack\", explained more of an enzyme leak or demand ischemia, and EF now is 25% by Echo yesterday, and a week ago was 26% by nuclear stress test, so no real change in heart function.  She also was concerned that Narcotics were ordered for pain, explained we can stop those orders and use Tylenol PRN if needed (has no pain currently, but had 7/10 back pain last evening and got oxycodone 5 mg twice during the night, and may have made him woozy.      Jerman Palm MD  Pager: [phone]  Cell Phone:  [phone]      ______________________________________________________________________    Interval History   Complains of mild headache and fatigue. Some dyspnea with ambulating unit this AM. Chest and back discomfort resolved, does have some chronic low back pain. Cardiology recs reviewed.    Data reviewed today: I reviewed all medications, new labs and imaging results over the last 24 hours.    Physical Exam   Vital Signs: Temp: 97.7  F (36.5  C) Temp src: Temporal BP: 94/55 Pulse: 59   Resp: 15 SpO2: 97 % O2 Device: Nasal cannula Oxygen Delivery: 1 LPM  Weight: 240 lbs 0 oz  Constitutional: Appears stated age, no acute distress.  Respiratory: Breath sounds CTA. No increased work of " breathing.  Cardiovascular: Irreg, irreg. No rub or murmur. Trace peripheral edema. Right groin access soft, mildly TTP.  GI: Soft, non-tender, non-distended.  Skin: Warm, dry, no rashes or lesions.    Medications     - MEDICATION INSTRUCTIONS -       - MEDICATION INSTRUCTIONS -       DOPamine Stopped (22 1725)     Percutaneous Coronary Intervention orders placed (this is information for BPA alerting)       sodium chloride         aspirin  81 mg Oral Daily     clopidogrel  75 mg Oral Daily     sodium chloride (PF)  3 mL Intracatheter Q8H       Data   Recent Labs   Lab 22  0539 22  0755 22  1509   WBC 16.7* 9.4 12.9*   HGB 12.9* 15.7 15.2   MCV 89 88 91    561* 589*   INR  --  1.00  --     139 134   POTASSIUM 3.7 4.2 4.8   CHLORIDE 110* 108 105   CO2 22 25 26   BUN 21 22 24   CR 1.02 1.10 1.13   ANIONGAP 6 6 3   CAMERON 8.5 8.9 9.4   * 141* 112*       Imaging:  Recent Results (from the past 24 hour(s))   Echo Limited   Result Value    LVEF  20-25%    Narrative    [id number]  [id number]  [id number]  [id number]^LUIS CARLOS^GINA^BARBER     [facility]  Echocardiography Laboratory  [address] [place] [address]     Name: BUCK SAMANIEGO  MRN: [id number]  : 1950  Study Date: 2022 10:09 AM  Age: 71 yrs  Gender: Male  [place] [facility] [place] For Study: Other, Please Specify in Comments  Ordering Physician: GINA ALDRIDGE  Referring Physician: GINA ALDRIDGE  Performed By: [facility] Adina Borrego     BSA: 2.3 m2  Height: 73 in  Weight: 235 lb  HR: 67  BP: 131/78 mmHg  ______________________________________________________________________________  Procedure  Limited Portable Echo Adult.  ______________________________________________________________________________  Interpretation Summary     Limited study in cath lab for pericardial effusion     Left ventricular systolic function is severely reduced.  Inferior and inferolateral akinesis. Severe global  hypokinesis.  The visual ejection fraction is 20-25%.  The right ventricle is mildly dilated.  Severely decreased right ventricular systolic function  There is moderate (2+) mitral regurgitation.  There is no pericardial effusion.  ______________________________________________________________________________  Left Ventricle  The left ventricle is normal in size. Left ventricular systolic function is  severely reduced. The visual ejection fraction is 20-25%. Inferior and  inferolateral akinesis. Severe global hypokinesis.     Right Ventricle  The right ventricle is mildly dilated. Severely decreased right ventricular  systolic function. There is a pacemaker lead in the right ventricle.     Atria  The left atrium is moderately dilated. The right atrium is mildly dilated.     Mitral Valve  There is moderate (2+) mitral regurgitation.     Aortic Valve  No aortic regurgitation is present. No aortic stenosis is present.     Pericardium  There is no pericardial effusion.  ______________________________________________________________________________  Report approved by: Nini Ramírez 03/03/2022 10:53 AM     ______________________________________________________________________________      Cardiac Catheterization    Addendum: 3/3/2022        Mid RCA lesion is 100% stenosed.    IVUS was performed on the lesion.    Ultrasound (IVUS) was performed.    Prox RCA lesion is 20% stenosed.    IVUS was performed on the lesion.    Ultrasound (IVUS) was performed.    Mid LM lesion is 10% stenosed.    Prox LAD lesion is 20% stenosed.    Mid LAD lesion is 60% stenosed.    Dist LAD lesion is 40% stenosed.    Ramus lesion is 45% stenosed.    Prox Cx lesion is 20% stenosed.     1. Three vessel calcific disease. iFR of Lad suggests non flow limiting. The RI, Lcx have mild disease  2. 100% occluded RCA--Thrombectomy of RCA pulled out red clot and that reestablished flow. The RCA was severely narrowed throughout the mid and distal portion  with severe calcification. IVUS shows vessel >3.0mm and 360 degree of calcium.  Rotablator of RCA m/d followed by PTCB and POBA NC done to allow passage of equipment and stents to distal and mid RCA  Pt had profound bradycardia and hypotension but no CP during procedure.  IVUS guided stents x2 (rca m/d) performed with good result and IVUS post shows proximal stent well apposed. IVUS would not pass to distal stent area but by xray excellent result.  3. Unstable hemodynamics until re-established flow/PCI of RCA  REC-keep sheaths in for now. Check PTT 2-4 hours and pull sheaths and pacer if no longer needed. Hold ASA due to concern of triple therapy, continue plavix and restart Eliquis after 3/4/22 if groin heals well  I cannot exclude that patient is hypercoagulable after holding eliquis AND HE HAS ESSENTIAL THOMBOCYTOSIS.        Narrative      Mid RCA lesion is 100% stenosed.    IVUS was performed on the lesion.    Ultrasound (IVUS) was performed.    Prox RCA lesion is 20% stenosed.    IVUS was performed on the lesion.    Ultrasound (IVUS) was performed.    Mid LM lesion is 10% stenosed.    Prox LAD lesion is 20% stenosed.    Mid LAD lesion is 60% stenosed.    Dist LAD lesion is 40% stenosed.    Ramus lesion is 45% stenosed.    Prox Cx lesion is 20% stenosed.     1. Three vessel calcific disease. iFR of Lad suggests non flow limiting.   The RI, Lcx have mild disease  2. 100% occluded RCA--Thrombectomy of RCA pulled out red clot and that   reestablished flow. The RCA was severely narrowed throughout the mid and   distal portion with severe calcification. IVUS shows vessel >3.0mm and 360   degree of calcium.  Rotablator of RCA m/d followed by PTCB and POBA NC done to allow passage   of equipment and stents to distal and mid RCA  Pt had profound bradycardia and hypotension but no CP during procedure.    IVUS guided stents x2 (rca m/d) performed with good result and IVUS post   shows proximal stent well apposed. IVUS  would not pass to distal stent   area but by xray excellent result.  3. Unstable hemodynamics until re-established flow/PCI of RCA  REC-keep sheaths in for now. Check PTT 2-4 hours and pull sheaths and   pacer if no longer needed. Hold ASA due to concern of triple therapy,   continue plavix and restart Eliquis after 3/4/22 if groin heals well   Echocardiogram Limited   Result Value    LVEF  25-30%    [facility]    [id number]  [id number]  [id number]  [id number]^LUIS CARLOS^GINA^BARBER     [facility]  Echocardiography Laboratory  [address] [place] [address]     Name: BUCK SAMANIEGO  MRN: [id number]  : 1950  Study Date: 2022 12:36 PM  Age: 71 yrs  Gender: Male  Patient Location: [facility]  Reason For Study: Pericardial Disease  Ordering Physician: GINA ALDRIDGE  Referring Physician: GINA ALDRIDGE  Performed By: SERENITY Borrego     BSA: 2.3 m2  Height: 73 in  Weight: 235 lb  HR: 63  BP: 125/86 mmHg  ______________________________________________________________________________  Procedure  Limited Portable Echo Adult.  ______________________________________________________________________________  Interpretation Summary     Akinesis of the basal and mid inferior and mid inferolateral walls.  There is mod-severe global hypokinesia of the left ventricle.  Left ventricular systolic function is severely reduced.  The visual ejection fraction is 25-30%.  The right ventricle is moderately dilated.  Moderately decreased right ventricular systolic function  There is mild (1+) mitral regurgitation.  There is no pericardial effusion.     Compared to the echo from earlier today, the LV function looks slightly better  and the MR has decreased from 2+ to 1+  ______________________________________________________________________________  Left Ventricle  Left ventricular systolic function is severely reduced. The visual ejection  fraction is 25-30%. There is mod-severe global hypokinesia of the  left  ventricle. Akinesis of the basal and mid inferior and mid inferolateral walls.     Right Ventricle  The right ventricle is moderately dilated. Moderately decreased right  ventricular systolic function.     Mitral Valve  There is mild (1+) mitral regurgitation.     Tricuspid Valve  There is trace tricuspid regurgitation. Right ventricular systolic pressure  could not be approximated due to inadequate tricuspid regurgitation. IVC  diameter and respiratory changes fall into an intermediate range suggesting an  RA pressure of 8 mmHg.     Pericardium  There is no pericardial effusion.     Rhythm  The rhythm was atrial fibrillation with wide QRS rhythm.  ______________________________________________________________________________  Report approved by: Nini Ramírez 03/03/2022 01:01 PM     ______________________________________________________________________________      Cardiac Catheterization    Narrative      Patent RCA stents. RCA anatomy appeared similar to angiogram performed   previously in the day.    IVUS of the RCA did not show any luminal thrombus or stent edge   dissection.    Stable CAD in the LAD and LCX.    Consider obtaining VerifyNow platelet reactivity test in a week.     See full heart catheterization from this AM  By the time pt arrived in cath lab this time his ECG normalized (was   transient ST elevation inferior) and chest pain resolved. Note the nuc GXT   2 weeks ago showed a mostly completed transmural inferior MI with mild   rebecca-infarct ischemia--this on top of a probably afib/tachycardia mediated   CMP.  This patient had heavy clot burden on top of his calcific CAD.  This   patient has essential thrombocytosis. I suspect the Plavix given this AM   with original stenting had not yet reached peak effect at the time of the   CP and then likely there was a clot that resolved by the time pt reached   cath lab for second time  A single dose of intracoronary Integrilin was given this time  even though   repeat IVUS did not show clot or dissection etc.    Would recommend Heme consult to determine with ET and large clot burden if   the platelets should be treated.  Pt will be on plavix and eliquis   (afib)--consider VERIFY NOW test in a few days to test plavix efficacy

## 2022-03-05 ENCOUNTER — APPOINTMENT (OUTPATIENT)
Dept: PHYSICAL THERAPY | Facility: CLINIC | Age: 72
DRG: 246 | End: 2022-03-05
Attending: INTERNAL MEDICINE
Payer: MEDICARE

## 2022-03-05 VITALS
RESPIRATION RATE: 18 BRPM | TEMPERATURE: 97.4 F | OXYGEN SATURATION: 97 % | HEIGHT: 73 IN | BODY MASS INDEX: 31.8 KG/M2 | SYSTOLIC BLOOD PRESSURE: 120 MMHG | HEART RATE: 94 BPM | WEIGHT: 239.9 LBS | DIASTOLIC BLOOD PRESSURE: 97 MMHG

## 2022-03-05 PROBLEM — R73.01 ELEVATED FASTING BLOOD SUGAR: Status: RESOLVED | Noted: 2017-06-22 | Resolved: 2022-03-05

## 2022-03-05 PROBLEM — G47.33 OSA (OBSTRUCTIVE SLEEP APNEA): Status: ACTIVE | Noted: 2022-03-05

## 2022-03-05 PROBLEM — R73.03 PREDIABETES: Status: ACTIVE | Noted: 2022-03-05

## 2022-03-05 PROBLEM — R49.0 HOARSENESS: Status: RESOLVED | Noted: 2017-06-22 | Resolved: 2022-03-05

## 2022-03-05 PROBLEM — I21.11 ST ELEVATION MYOCARDIAL INFARCTION INVOLVING RIGHT CORONARY ARTERY (H): Status: ACTIVE | Noted: 2022-03-05

## 2022-03-05 PROBLEM — N52.9 ERECTILE DYSFUNCTION: Status: ACTIVE | Noted: 2021-12-30

## 2022-03-05 PROBLEM — E66.3 OVERWEIGHT (BMI 25.0-29.9): Status: RESOLVED | Noted: 2017-06-22 | Resolved: 2022-03-05

## 2022-03-05 LAB
ANION GAP SERPL CALCULATED.3IONS-SCNC: 6 MMOL/L (ref 3–14)
BUN SERPL-MCNC: 18 MG/DL (ref 7–30)
CALCIUM SERPL-MCNC: 8.6 MG/DL (ref 8.5–10.1)
CHLORIDE BLD-SCNC: 107 MMOL/L (ref 94–109)
CO2 SERPL-SCNC: 22 MMOL/L (ref 20–32)
CREAT SERPL-MCNC: 0.99 MG/DL (ref 0.66–1.25)
ERYTHROCYTE [DISTWIDTH] IN BLOOD BY AUTOMATED COUNT: 12.8 % (ref 10–15)
GFR SERPL CREATININE-BSD FRML MDRD: 82 ML/MIN/1.73M2
GLUCOSE BLD-MCNC: 181 MG/DL (ref 70–99)
HCT VFR BLD AUTO: 38.3 % (ref 40–53)
HGB BLD-MCNC: 12.6 G/DL (ref 13.3–17.7)
MCH RBC QN AUTO: 29.2 PG (ref 26.5–33)
MCHC RBC AUTO-ENTMCNC: 32.9 G/DL (ref 31.5–36.5)
MCV RBC AUTO: 89 FL (ref 78–100)
PLATELET # BLD AUTO: 338 10E3/UL (ref 150–450)
POTASSIUM BLD-SCNC: 3.5 MMOL/L (ref 3.4–5.3)
RBC # BLD AUTO: 4.31 10E6/UL (ref 4.4–5.9)
SODIUM SERPL-SCNC: 135 MMOL/L (ref 133–144)
TROPONIN I SERPL HS-MCNC: ABNORMAL NG/L
WBC # BLD AUTO: 11.3 10E3/UL (ref 4–11)

## 2022-03-05 PROCEDURE — 250N000013 HC RX MED GY IP 250 OP 250 PS 637: Performed by: INTERNAL MEDICINE

## 2022-03-05 PROCEDURE — 250N000013 HC RX MED GY IP 250 OP 250 PS 637: Performed by: NURSE PRACTITIONER

## 2022-03-05 PROCEDURE — 84484 ASSAY OF TROPONIN QUANT: CPT | Performed by: INTERNAL MEDICINE

## 2022-03-05 PROCEDURE — 85027 COMPLETE CBC AUTOMATED: CPT | Performed by: INTERNAL MEDICINE

## 2022-03-05 PROCEDURE — 82310 ASSAY OF CALCIUM: CPT | Performed by: INTERNAL MEDICINE

## 2022-03-05 PROCEDURE — 36415 COLL VENOUS BLD VENIPUNCTURE: CPT | Performed by: INTERNAL MEDICINE

## 2022-03-05 PROCEDURE — 99239 HOSP IP/OBS DSCHRG MGMT >30: CPT | Performed by: INTERNAL MEDICINE

## 2022-03-05 PROCEDURE — 97110 THERAPEUTIC EXERCISES: CPT | Mod: GP

## 2022-03-05 PROCEDURE — 250N000013 HC RX MED GY IP 250 OP 250 PS 637: Performed by: PHYSICIAN ASSISTANT

## 2022-03-05 PROCEDURE — 99232 SBSQ HOSP IP/OBS MODERATE 35: CPT | Mod: 25 | Performed by: INTERNAL MEDICINE

## 2022-03-05 RX ORDER — NITROGLYCERIN 0.4 MG/1
TABLET SUBLINGUAL
Qty: 25 TABLET | Refills: 0 | Status: SHIPPED | OUTPATIENT
Start: 2022-03-05 | End: 2022-05-15

## 2022-03-05 RX ORDER — SILDENAFIL 25 MG/1
75 TABLET, FILM COATED ORAL DAILY PRN
Qty: 90 TABLET | Refills: 0 | Status: SHIPPED | OUTPATIENT
Start: 2022-03-19

## 2022-03-05 RX ORDER — METOPROLOL SUCCINATE 25 MG/1
25 TABLET, EXTENDED RELEASE ORAL DAILY
Status: DISCONTINUED | OUTPATIENT
Start: 2022-03-05 | End: 2022-03-05 | Stop reason: HOSPADM

## 2022-03-05 RX ORDER — ACETAMINOPHEN 325 MG/1
650 TABLET ORAL EVERY 4 HOURS PRN
Refills: 0
Start: 2022-03-05

## 2022-03-05 RX ORDER — METOPROLOL SUCCINATE 50 MG/1
25 TABLET, EXTENDED RELEASE ORAL DAILY
Qty: 180 TABLET | Refills: 3
Start: 2022-03-05 | End: 2022-04-19

## 2022-03-05 RX ORDER — CLOPIDOGREL BISULFATE 75 MG/1
75 TABLET ORAL DAILY
Qty: 30 TABLET | Refills: 3 | Status: SHIPPED | OUTPATIENT
Start: 2022-03-06

## 2022-03-05 RX ORDER — TADALAFIL 10 MG/1
10 TABLET ORAL DAILY PRN
Qty: 60 TABLET | Refills: 0
Start: 2022-03-05 | End: 2022-04-19

## 2022-03-05 RX ADMIN — METOPROLOL SUCCINATE 25 MG: 25 TABLET, EXTENDED RELEASE ORAL at 10:36

## 2022-03-05 RX ADMIN — APIXABAN 5 MG: 5 TABLET, FILM COATED ORAL at 08:06

## 2022-03-05 RX ADMIN — PANTOPRAZOLE SODIUM 40 MG: 40 TABLET, DELAYED RELEASE ORAL at 08:06

## 2022-03-05 RX ADMIN — MESALAMINE 4.8 G: 1.2 TABLET, DELAYED RELEASE ORAL at 08:06

## 2022-03-05 RX ADMIN — SENNOSIDES AND DOCUSATE SODIUM 1 TABLET: 50; 8.6 TABLET ORAL at 08:57

## 2022-03-05 RX ADMIN — CLOPIDOGREL BISULFATE 75 MG: 75 TABLET ORAL at 08:06

## 2022-03-05 ASSESSMENT — ACTIVITIES OF DAILY LIVING (ADL)
ADLS_ACUITY_SCORE: 5

## 2022-03-05 NOTE — DISCHARGE SUMMARY
[facility]    Discharge Summary  Hospitalist    Date of Admission:  3/3/2022  Date of Discharge:  3/5/2022  Discharging Provider: Jerman Palm MD    Discharge Diagnoses   Principal Problem:    NSTEMI -- probable demand ischemia      Thrombocytosis -- probable reactive thrombocytosis 2nd to Ulcerative Colitis     Active Problems:    GERD (gastroesophageal reflux disease)      Ulcerative colitis       PAF -- on Xarelto      Hyperlipidemia LDL goal <70        Erectile dysfunction       Prediabetes -- Hgb A1C 6.3 on 12/30/21      Possible FAM (had screening home test)      History of Present Illness   71 year old male with ischemic cardiomyopathy, who presented for elective treatment of 100% occlusion of RCA. In 2011, pt with non-flow limiting CAD on angiogram with 50% mid LAD narrowing, 25% circumflex, 25% distal RCA.  Pt recently had a GI follow up appointment in 2/2022 with provider noting irregular heart beat, at that time, pt followed up with Cardiology who diagnosed PAF with CHADS-VASc of 4, was started on eliquis.  Pt was to have a OSCAR guided cardioverson, nuclear stress test and echocardiogram.  Pt underwent NM lexiscan stress test 2/24/22 noting small area of ischemia in the mid inferolateral segment(s) of the left ventricle ,transmural infarction in the distal inferior and inferoseptal segment(s) of the left ventricle, small area of nontransmural infarction in the apical segment(s) of the left ventricle and LV function severely reduced, with EF 26%, so elective angiogram scheduled.     Hospital Course   Coronary angiogram showed:  60% mid-LAD, 20% Left Circ, 45% Ramus, and 100% occluded RCA.   He underwent Atherectomy of RCA, and at some point required RCA thrombectomy to re-establish RCA flow, 2 drug-eluding stents placed, and after this process patient became bradycardic and hypotensive, so pressors were added.  A temporary pacemaker was also placed during this time, and  "then transferred to CCU.  Upon arrival to CCU, he complained of chest pain and EKG with inferior ST elevation, and immediately taken back to cath lab but repeat angio showed RCA wide open.  Pt was administered Integrilin during procedure, and continued on low dose dopamine after procedure. He did have an Echo done post-procedure which showed an EF of 25%, essentially unchanged from previous.     On 3/4 his trop was 25K, otherwise doing fine.  Did have another episode of chest pressure the evening of 3/4, given one nitroglycerine, pain gone in 15 min, and trop the next day down to 12K.  He was seen by cardiac rehab, and no chest pain with ambulation.  Did have HR in 40's when sleeping, and his Metoprolol dose was decreased to ER 25 mg once daily.  In retrospect he wondered if his chest pain after the procedure may have been indigestion or anxiety.     He will follow-up in 1 week, and wife will check HR, BP and weight daily, and may increase Metoprolol dose if needed on follow-up.  If wife also said he got a \"[facility]\", and she mentioned Donell Zuñiga was working on scheduling an outpatient sleep study to evaluate possible FAM.       Jerman Jasso MD  Pager: [phone]  Cell Phone:  [phone]       Significant Results and Procedures   As above    Pending Results   These results will be followed up by Dr. Jasso  Unresulted Labs Ordered in the Past 30 Days of this Admission     Date and Time Order Name Status Description    3/5/2022  7:32 AM Troponin I In process           Code Status   Full Code       Primary Care Physician   Donell Zuñiga    Physical Exam   Temp: 97.4  F (36.3  C) Temp src: Temporal BP: (!) 120/97 Pulse: 94   Resp: 18 SpO2: 97 % O2 Device: None (Room air)    Vitals:    03/03/22 0730 03/04/22 0508 03/05/22 0414   Weight: 106.8 kg (235 lb 6.4 oz) 108.9 kg (240 lb) 108.8 kg (239 lb 14.4 oz)     Vital Signs with Ranges  Temp:  [97.4  F (36.3 "  C)-98.6  F (37  C)] 97.4  F (36.3  C)  Pulse:  [] 94  Resp:  [16-18] 18  BP: ()/(47-97) 120/97  SpO2:  [93 %-100 %] 97 %  I/O last 3 completed shifts:  In: -   Out: 1800 [Urine:1800]    Exam on discharge:   Lungs clear  CV with reg S1S2  Neuro -- alert, Ox3    Discharge Disposition   Discharged to home  Condition at discharge: Stable    Consultations This Hospital Stay   NUTRITION SERVICES ADULT IP CONSULT  CARDIAC REHAB IP CONSULT  PHARMACY IP CONSULT  PHARMACY IP CONSULT  HEMATOLOGY & ONCOLOGY IP CONSULT  PHARMACY IP CONSULT  PHARMACY IP CONSULT  CARDIOLOGY IP CONSULT  SMOKING CESSATION PROGRAM IP CONSULT  SMOKING CESSATION PROGRAM IP CONSULT    Time Spent on this Encounter   I spent a total of 35 minutes discharging this patient.     Discharge Orders      CARDIAC REHAB REFERRAL      Cardiac Rehab Referral      Reason aspirin not prescribed from this order set    ON NOAC AND PLAVIX     Reason Lipid Lowering Medications not prescribed from this order set    ALREADY HAS IT     Reason for your hospital stay    Procedure to open up Right Coronary Artery, complicated by chest pain and Demand Ischemic (slight heart attach)     Activity    Your activity upon discharge: activity as tolerated     Discharge Instructions    Call Dr. Jasso if any medical questions at Cell Phone [phone].     Follow-up and recommended labs and tests     Follow up with cardiology in 1 week, check BMP then.  Check Heart Rate, Blood Pressure, and weight daily, and bring readings to clinic appointment.     Diet    Follow this diet upon discharge:       Low Fat Diet, and minimize sweets and try to gradually lose weight because of borderline diabetes.     Discharge Medications   Current Discharge Medication List      START taking these medications    Details   acetaminophen (TYLENOL) 325 MG tablet Take 2 tablets (650 mg) by mouth every 4 hours as needed for mild pain or headaches  Refills: 0    Associated Diagnoses: Pain       clopidogrel (PLAVIX) 75 MG tablet Take 1 tablet (75 mg) by mouth daily  Qty: 30 tablet, Refills: 3    Associated Diagnoses: ST elevation myocardial infarction involving right coronary artery (H)      nitroGLYcerin (NITROSTAT) 0.4 MG sublingual tablet For chest pain place 1 tablet under the tongue every 5 minutes for 3 doses. If symptoms persist 5 minutes after 1st dose call 911.  Qty: 25 tablet, Refills: 0    Associated Diagnoses: ST elevation myocardial infarction involving right coronary artery (H)         CONTINUE these medications which have CHANGED    Details   metoprolol succinate ER (TOPROL-XL) 50 MG 24 hr tablet Take 0.5 tablets (25 mg) by mouth daily  Qty: 180 tablet, Refills: 3    Associated Diagnoses: Persistent atrial fibrillation (H)      sildenafil (VIAGRA) 25 MG tablet Take 3 tablets (75 mg) by mouth daily as needed  Qty: 90 tablet, Refills: 0    Associated Diagnoses: Erectile dysfunction, unspecified erectile dysfunction type      tadalafil (CIALIS) 10 MG tablet Take 1 tablet (10 mg) by mouth daily as needed  Qty: 60 tablet, Refills: 0    Associated Diagnoses: Erectile dysfunction, unspecified erectile dysfunction type         CONTINUE these medications which have NOT CHANGED    Details   apixaban ANTICOAGULANT (ELIQUIS ANTICOAGULANT) 5 MG tablet Take 1 tablet (5 mg) by mouth 2 times daily  Qty: 120 tablet, Refills: 0    Associated Diagnoses: Persistent atrial fibrillation (H)      atorvastatin (LIPITOR) 40 MG tablet Take 1 tablet (40 mg) by mouth daily  Qty: 90 tablet, Refills: 3    Associated Diagnoses: Hyperlipidemia LDL goal <130      Calcium-Vitamin D-Vitamin K (CALCIUM + D + K) 750-500-40 MG-UNT-MCG TABS Take 2 tablets by mouth daily.      coenzyme Q-10 (COQ-10) 200 MG CAPS Take by mouth 2 times daily       esomeprazole (NEXIUM) 40 MG capsule Take 40 mg by mouth every morning (before breakfast) Take 30-60 minutes before eating.      fluticasone (FLONASE) 50 MCG/ACT nasal spray Spray 2 sprays  into both nostrils daily  Qty: 16 g, Refills: 11    Associated Diagnoses: Chronic rhinitis      Glucos-Chond-Hyal Ac-Ca Fructo (MOVE FREE JOINT HEALTH ADVANCE) TABS Take 1 mg by mouth daily      mesalamine (LIALDA) 1.2 g EC tablet Take 4,800 mg by mouth daily (with breakfast)       MULTIVITAMIN TABS   OR 1 daily  Refills: 0      Probiotic Product (PROBIOTIC PO) Take by mouth daily      psyllium (METAMUCIL) 58.6 % POWD Take 2 teaspoonful by mouth 2 times daily       TURMERIC PO Take 1,500 mg by mouth daily      VITAMIN D, CHOLECALCIFEROL, PO Take 5,000 Units by mouth daily         STOP taking these medications       torsemide (DEMADEX) 10 MG tablet Comments:   Reason for Stopping:             Allergies   Allergies   Allergen Reactions     No Known Drug Allergies      Data   Most Recent 3 CBC's:Recent Labs   Lab Test 03/05/22  0855 03/04/22  1219 03/04/22  0539 03/03/22  0755   WBC 11.3*  --  16.7* 9.4   HGB 12.6* 13.0* 12.9* 15.7   MCV 89  --  89 88     --  405 561*      Most Recent 3 BMP's:  Recent Labs   Lab Test 03/05/22  0855 03/04/22  0539 03/03/22  0755    138 139   POTASSIUM 3.5 3.7 4.2   CHLORIDE 107 110* 108   CO2 22 22 25   BUN 18 21 22   CR 0.99 1.02 1.10   ANIONGAP 6 6 6   CAMERON 8.6 8.5 8.9   * 109* 141*     Most Recent 2 LFT's:  Recent Labs   Lab Test 10/06/20  0929 08/27/19  0929   AST 35 26   ALT 70 48   ALKPHOS 66 93   BILITOTAL 0.8 0.9     Most Recent INR's and Anticoagulation Dosing History:  Anticoagulation Dose History     Recent Dosing and Labs Latest Ref Rng & Units 12/22/2011 3/3/2022    INR 0.85 - 1.15 1.02 1.00        Most Recent 3 Troponin's:No lab results found.  Most Recent Cholesterol Panel:  Recent Labs   Lab Test 03/03/22  0755   CHOL 117   LDL 55   HDL 36*   TRIG 129     Most Recent 6 Bacteria Isolates From Any Culture (See EPIC Reports for Culture Details):No lab results found.  Most Recent TSH, T4 and A1c Labs:  Recent Labs   Lab Test 02/25/22  1509 12/30/21  145    TSH 1.05  --    A1C  --  6.3*

## 2022-03-05 NOTE — PLAN OF CARE
Cardiac Rehab Discharge Summary    Reason for therapy discharge:    Discharged to home with outpatient therapy.    Progress towards therapy goal(s). See goals on Care Plan in Epic electronic health record for goal details.  Goals partially met.  Barriers to achieving goals:   discharge from facility.    Therapy recommendation(s):    Continued therapy is recommended.  Rationale/Recommendations:  outpatient cardiac rehab for monitored progression of activity and education to promote overall heart health.

## 2022-03-05 NOTE — PLAN OF CARE
Goal Outcome Evaluation: Pt discharged to home following discharge instructions accompanied by spouse. All personal belongings sent home with pt. Pt denies any pain or discomfort upon discharge. Right groin site CDI. Both PIV removed.

## 2022-03-05 NOTE — DISCHARGE INSTRUCTIONS
Going Home after Coronary Angioplasty or Stent Placement       Name: Peter Peralta  Medical Record Number:  [id number]  Today's Date: March 5, 2022        For 24 hours:         Have an adult stay with you for 24 hours.         Relax and take it easy.         Drink plenty of fluids.         You may eat your normal diet, unless your doctor tells you otherwise.         Do NOT make any important or legal decisions.         Do NOT drive or operate machines at home or at work.         Do NOT drink alcohol.      Do NOT smoke.     Medicines:         If you have begun Plavix (clopidogrel), Effient (prasugrel), or Brilinta (ticagrelor), do not stop taking it until you talk to your heart doctor (cardiologist).         If you are on metformin (Glucophage), do not restart it until you have blood tests (within 2 to 3 days after discharge). When your doctor tells you it is safe, you may restart the metformin.         If you have stopped any other medicines, check with your nurse or provider about when to restart them.    Care of groin site:         Remove the Band-Aid after 24 hours. If there is minor oozing, apply another Band-aid and remove it after 12 hours.          Do NOT take a bath, or use a hot tub or pool for at least 3 days. You may shower.          It is normal to have a small bruise or lump at the site.         Do not scrub the site.         Do not use lotion or powder near the puncture site for 3 days.         For the first 2 days: Do not stoop or squat. When you cough, sneeze or move your bowels, hold your hand over the puncture site and press gently.         Do not lift more than 10 pounds for at least 3 to 5 days.         For 2 days, do NOT have sex or do any heavy exercise.     If you start bleeding from the site in your groin:  Lie down flat and press firmly on the site.  Call your physician immediately, or, come to the emergency room.      Call 911 right away if you have bleeding that is heavy or does not  stop.     Call your doctor if:         You have a large or growing hard lump around the site.         The site is red, swollen, hot or tender.         Blood or fluid is draining from the site.         You have chills or a fever greater than 101 F (38 C).         Your leg or arm turns bluish, feels numb or cool.         You have hives, a rash or unusual itching.     Cardiac Rehabilitation: You should receive a phone call from the Cardiac Rehabilitation Department within the next week.  If you do not receive the call, please contact [facility] at [phone].    ADDITIONAL INSTRUCTIONS:     [facility] :   [phone] (7 days a week)      Cardiology Fellow on call (24 hours per day) at [facility]:   [phone] (ask for Cardiology Fellow on call)      Number where we can reach you:  ____________

## 2022-03-05 NOTE — PLAN OF CARE
Goal Outcome Evaluation: Groin sites CDI. No hematoma or bruit noted. Up and did cardiac rehab x2 today. Has taken tylenol x2 for slight headache. Demadex, protonix and colitis medication given. Afib with CVR. LS clear. Probable discharge tomorrow. On Eliquis, plavix and ASA. A&Ox4.

## 2022-03-05 NOTE — PROGRESS NOTES
[facility]    Cardiology Progress Note     Assessment & Plan   Peter Peralta is a 71 year old male who was admitted on 3/3/2022.     1. Ischemic cardiomyopathy with biventricular heart failure, or mixed ischemic/nonischemic due to history of afib RVR EF 20-25%  Nuc 2/24 EF 26%, transmural infarction in the distal inferior and inferoseptal segment(s) of the left ventricle  -outpatient meds :metoprolol, atorvastatin 40mg daily, torsemide 5mg daily  2. S/p complex PCI RCA yesterday  Atherectomy w/placement of 2 overlapping stents to RCA  Hypotension and bradycardia during the case  Patient had cp and ST elevation after the procedure. Repeat angio showed patent stents, good angiographic result.   3. Paroxysmal atrial fibrillation. Outpatient meds: apixaban and metoprolol xl 50   4. Hypotension and bradycardia, resolved. Related to PCI. Off dopamine since yesterday  5. Thrombocytosis, not severe enough to require intervention at this time. Appreciate hem recs  6. HTN, metoprolol currently held due to hypotension  7. Acute anemia, no sign of active bleeding  8. Ulcerative colitis  9. Erectile dysfunction    Plan:  Continue Plavix for 1 year  Eliquis has been restarted and patient is tolerating this  Restart ASA 81mg in 1 year (after he completes Plavix)  Resume PTA diuretic dose on discharge  Patient was on metoprolol succinate 50mg daily PTA.  Will resume at 1/2 home dose due to softer blood pressures    No chest pain and stable for discharge today    Additional titration of goal-directed med therapy will likely be done on outpatient basis.   Continue Cardiac rehab       Jluis Rosenberg MD    Interval History   Feels well and ready for discharge.     Clinically Significant Risk Factors Present on Admission              Cardiovascular: Cardiac Arrhythmia: Atrial fibrillation: Chronic  Hypotension, unspecified  Shock: Cardiogenic shock  Hematology/Oncology: Thrombocytopenia Including  Purpuric, HIT, & Other Platelet Defects: Coagulation defect, unspecified  Neurology: Encephalopathy: Other encephalopathy was encephalopathic yesterday post-procedure  Systemic: Chronic Fatigue and Other Debilities: Chronic fatigue, unspecified         Physical Exam   Temp: 97.4  F (36.3  C) Temp src: Temporal BP: (!) 120/97 Pulse: 94   Resp: 18 SpO2: 97 % O2 Device: None (Room air)    Vitals:    03/03/22 0730 03/04/22 0508 03/05/22 0414   Weight: 106.8 kg (235 lb 6.4 oz) 108.9 kg (240 lb) 108.8 kg (239 lb 14.4 oz)     Vital Signs with Ranges  Temp:  [97.4  F (36.3  C)-98.6  F (37  C)] 97.4  F (36.3  C)  Pulse:  [] 94  Resp:  [16-18] 18  BP: ()/(47-97) 120/97  SpO2:  [93 %-100 %] 97 %  I/O last 3 completed shifts:  In: -   Out: 1800 [Urine:1800]  Patient Active Problem List   Diagnosis     Family history of coronary artery disease     Hyperlipidemia LDL goal <130     GERD (gastroesophageal reflux disease)     Family history of colon cancer     Ulcerative colitis (H)     Right inguinal hernia     Atrial fibrillation, unspecified type (H) S/P Cardioversion     Hyperlipidemia     Murmur     Nonspecific abnormal results of cardiovascular function study     Chest pain     Elevated blood pressure reading without diagnosis of hypertension     Benign prostatic hyperplasia, presence of lower urinary tract symptoms unspecified, unspecified morphology     Advanced directives, counseling/discussion     Elevated fasting blood sugar     Hoarseness     BMI > 25     Thrombocytosis     Complete tear of left rotator cuff     Rhinitis     Erectile dysfunction     Percutaneous transluminal coronary angioplasty status       Constitutional: comfortable  Eyes: clear sclera  ENT: Mucous membranes are moist.   Respiratory: clear bilat  Cardiovascular: irregularly irreg  GI: bowel sounds present  Skin: trace edema hand dorsum bilat, ankles/feet bilat. Right groin c/d/i, no hematoma or bruit  Musculoskeletal: grossly normal bulkd  and tone  Neurologic: alert, moves all ext  Psychiatric: normal affect    Medications     - MEDICATION INSTRUCTIONS -       - MEDICATION INSTRUCTIONS -       Percutaneous Coronary Intervention orders placed (this is information for BPA alerting)       sodium chloride         apixaban ANTICOAGULANT  5 mg Oral BID     atorvastatin  40 mg Oral Daily     clopidogrel  75 mg Oral Daily     mesalamine  4.8 g Oral Daily with breakfast     pantoprazole  40 mg Oral QAM AC     sodium chloride (PF)  3 mL Intracatheter Q8H     EKG afib, HR 68, RBBB  Tele afib, controlled ventricular response    Data   Results for orders placed or performed during the hospital encounter of 03/03/22 (from the past 24 hour(s))   Hemoglobin   Result Value Ref Range    Hemoglobin 13.0 (L) 13.3 - 17.7 g/dL   EKG 12-lead, tracing only   Result Value Ref Range    Systolic Blood Pressure  mmHg    Diastolic Blood Pressure  mmHg    Ventricular Rate 99 BPM    Atrial Rate 100 BPM    CT Interval  ms    QRS Duration 142 ms     ms    QTc 515 ms    P Axis  degrees    R AXIS 69 degrees    T Axis -71 degrees    Interpretation ECG       Atrial fibrillation with premature ventricular or aberrantly conducted complexes  Right bundle branch block  T wave abnormality, consider inferior ischemia  Abnormal ECG  When compared with ECG of 04-MAR-2022 07:10, (unconfirmed)  Nonspecific T wave abnormality has replaced inverted T waves in Lateral leads

## 2022-03-05 NOTE — PLAN OF CARE
A&OX4, RA, MOSS, denies pain. Tele- Afib CVR-RVR. Up with SBA, VSS. Melatonin and tylenol given this night. On cardiac diet.

## 2022-03-07 ENCOUNTER — TELEPHONE (OUTPATIENT)
Dept: CASE MANAGEMENT | Facility: CLINIC | Age: 72
End: 2022-03-07

## 2022-03-07 ENCOUNTER — PATIENT OUTREACH (OUTPATIENT)
Dept: CARE COORDINATION | Facility: CLINIC | Age: 72
End: 2022-03-07
Payer: MEDICARE

## 2022-03-07 DIAGNOSIS — Z71.89 OTHER SPECIFIED COUNSELING: ICD-10-CM

## 2022-03-07 NOTE — PROGRESS NOTES
Contacted patient's PCP with [facility] and arranged follow up with Dr. Lane for Friday March 11th at 11AM.  H/P, chart notes and discharge summary/AVS faxed to clinic at:  [phone] and patient updated with appointment details.    Lyla Burk RN  Care Coordinator  [facility]  [phone] (text or call)

## 2022-03-07 NOTE — PROGRESS NOTES
Clinic Care Coordination Contact    Background: Care Coordination referral placed from [facility] discharge report for reason of patient meeting criteria for a TCM outreach call by [facility] team.    Assessment: Upon chart review, [facility] Team member will cancel/close the referral for TCM outreach due to reason below:    Patient has a follow up appointment with an appropriate provider today for hospital discharge    Plan: Care Coordination referral for TCM outreach canceled.    Izabella Marks MA  [facility], [facility]

## 2022-03-08 ENCOUNTER — HOSPITAL ENCOUNTER (OUTPATIENT)
Dept: CARDIAC REHAB | Facility: CLINIC | Age: 72
End: 2022-03-08
Attending: INTERNAL MEDICINE
Payer: MEDICARE

## 2022-03-08 DIAGNOSIS — I21.11 ST ELEVATION MYOCARDIAL INFARCTION INVOLVING RIGHT CORONARY ARTERY (H): ICD-10-CM

## 2022-03-08 DIAGNOSIS — R94.39 ABNORMAL CARDIOVASCULAR STRESS TEST: ICD-10-CM

## 2022-03-08 LAB
ATRIAL RATE - MUSE: 107 BPM
ATRIAL RATE - MUSE: 122 BPM
ATRIAL RATE - MUSE: 394 BPM
ATRIAL RATE - MUSE: 416 BPM
DIASTOLIC BLOOD PRESSURE - MUSE: NORMAL MMHG
INTERPRETATION ECG - MUSE: NORMAL
P AXIS - MUSE: NORMAL DEGREES
PR INTERVAL - MUSE: NORMAL MS
QRS DURATION - MUSE: 138 MS
QRS DURATION - MUSE: 142 MS
QRS DURATION - MUSE: 144 MS
QRS DURATION - MUSE: 146 MS
QT - MUSE: 402 MS
QT - MUSE: 402 MS
QT - MUSE: 410 MS
QT - MUSE: 440 MS
QTC - MUSE: 467 MS
QTC - MUSE: 510 MS
QTC - MUSE: 515 MS
QTC - MUSE: 520 MS
R AXIS - MUSE: 72 DEGREES
R AXIS - MUSE: 74 DEGREES
R AXIS - MUSE: 77 DEGREES
R AXIS - MUSE: 81 DEGREES
SYSTOLIC BLOOD PRESSURE - MUSE: NORMAL MMHG
T AXIS - MUSE: -54 DEGREES
T AXIS - MUSE: -82 DEGREES
T AXIS - MUSE: 1 DEGREES
T AXIS - MUSE: 42 DEGREES
VENTRICULAR RATE- MUSE: 68 BPM
VENTRICULAR RATE- MUSE: 97 BPM
VENTRICULAR RATE- MUSE: 97 BPM
VENTRICULAR RATE- MUSE: 99 BPM

## 2022-03-08 PROCEDURE — 93797 PHYS/QHP OP CAR RHAB WO ECG: CPT | Performed by: REHABILITATION PRACTITIONER

## 2022-03-08 PROCEDURE — 93798 PHYS/QHP OP CAR RHAB W/ECG: CPT | Performed by: REHABILITATION PRACTITIONER

## 2022-03-09 ENCOUNTER — TELEPHONE (OUTPATIENT)
Dept: CARDIOLOGY | Facility: CLINIC | Age: 72
End: 2022-03-09
Payer: MEDICARE

## 2022-03-09 LAB
ATRIAL RATE - MUSE: 100 BPM
DIASTOLIC BLOOD PRESSURE - MUSE: NORMAL MMHG
INTERPRETATION ECG - MUSE: NORMAL
P AXIS - MUSE: NORMAL DEGREES
PR INTERVAL - MUSE: NORMAL MS
QRS DURATION - MUSE: 142 MS
QT - MUSE: 402 MS
QTC - MUSE: 515 MS
R AXIS - MUSE: 69 DEGREES
SYSTOLIC BLOOD PRESSURE - MUSE: NORMAL MMHG
T AXIS - MUSE: -71 DEGREES
VENTRICULAR RATE- MUSE: 99 BPM

## 2022-03-09 NOTE — TELEPHONE ENCOUNTER
Return Pt call informed him BMP is ordered for tomorrow no fasting needed. Pt did discuss diabetes said last A1c 6.3. Did inform Pt that means he is diabetic anything above 5.6. JAYDE Way RN

## 2022-03-10 ENCOUNTER — OFFICE VISIT (OUTPATIENT)
Dept: CARDIOLOGY | Facility: CLINIC | Age: 72
End: 2022-03-10
Payer: MEDICARE

## 2022-03-10 ENCOUNTER — LAB (OUTPATIENT)
Dept: LAB | Facility: CLINIC | Age: 72
End: 2022-03-10
Payer: MEDICARE

## 2022-03-10 VITALS
BODY MASS INDEX: 31.62 KG/M2 | OXYGEN SATURATION: 97 % | HEIGHT: 73 IN | WEIGHT: 238.6 LBS | DIASTOLIC BLOOD PRESSURE: 76 MMHG | HEART RATE: 99 BPM | SYSTOLIC BLOOD PRESSURE: 109 MMHG

## 2022-03-10 DIAGNOSIS — D75.839 THROMBOCYTOSIS: ICD-10-CM

## 2022-03-10 DIAGNOSIS — I48.19 PERSISTENT ATRIAL FIBRILLATION (H): ICD-10-CM

## 2022-03-10 DIAGNOSIS — I42.8 CARDIOMYOPATHY, NONISCHEMIC (H): ICD-10-CM

## 2022-03-10 DIAGNOSIS — D75.839 THROMBOCYTOSIS: Primary | ICD-10-CM

## 2022-03-10 LAB
ANION GAP SERPL CALCULATED.3IONS-SCNC: 6 MMOL/L (ref 3–14)
BUN SERPL-MCNC: 17 MG/DL (ref 7–30)
CALCIUM SERPL-MCNC: 9 MG/DL (ref 8.5–10.1)
CHLORIDE BLD-SCNC: 105 MMOL/L (ref 94–109)
CO2 SERPL-SCNC: 27 MMOL/L (ref 20–32)
CREAT SERPL-MCNC: 1.03 MG/DL (ref 0.66–1.25)
GFR SERPL CREATININE-BSD FRML MDRD: 79 ML/MIN/1.73M2
GLUCOSE BLD-MCNC: 126 MG/DL (ref 70–99)
PA ADP BLD-ACNC: 198 PRU
POTASSIUM BLD-SCNC: 4 MMOL/L (ref 3.4–5.3)
SODIUM SERPL-SCNC: 138 MMOL/L (ref 133–144)

## 2022-03-10 PROCEDURE — 99215 OFFICE O/P EST HI 40 MIN: CPT | Performed by: PHYSICIAN ASSISTANT

## 2022-03-10 PROCEDURE — 36415 COLL VENOUS BLD VENIPUNCTURE: CPT | Performed by: PHYSICIAN ASSISTANT

## 2022-03-10 PROCEDURE — 36415 COLL VENOUS BLD VENIPUNCTURE: CPT

## 2022-03-10 PROCEDURE — 85576 BLOOD PLATELET AGGREGATION: CPT

## 2022-03-10 PROCEDURE — 80048 BASIC METABOLIC PNL TOTAL CA: CPT | Performed by: PHYSICIAN ASSISTANT

## 2022-03-10 RX ORDER — LISINOPRIL 2.5 MG/1
2.5 TABLET ORAL AT BEDTIME
Qty: 90 TABLET | Refills: 3 | Status: SHIPPED | OUTPATIENT
Start: 2022-03-10

## 2022-03-10 NOTE — LETTER
3/10/2022    Donell Zuñiga MD  [address] [facility] [address]    RE: Peter Peralta       Dear Colleague,     I had the pleasure of seeing Peter Peralta in the [facility].  [id number]      HPI and Plan:   See dictation    Orders this Visit:  Orders Placed This Encounter   Procedures     Platelet Function P2Y12     Basic metabolic panel     Nutrition Referral     EKG 12-lead complete w/read - Clinics (to be scheduled)     Orders Placed This Encounter   Medications     lisinopril (ZESTRIL) 2.5 MG tablet     Sig: Take 1 tablet (2.5 mg) by mouth At Bedtime     Dispense:  90 tablet     Refill:  3     There are no discontinued medications.      Encounter Diagnoses   Name Primary?     Cardiomyopathy, nonischemic (H)      Thrombocytosis Yes     Persistent atrial fibrillation (H)        CURRENT MEDICATIONS:  Current Outpatient Medications   Medication Sig Dispense Refill     acetaminophen (TYLENOL) 325 MG tablet Take 2 tablets (650 mg) by mouth every 4 hours as needed for mild pain or headaches  0     apixaban ANTICOAGULANT (ELIQUIS ANTICOAGULANT) 5 MG tablet Take 1 tablet (5 mg) by mouth 2 times daily 120 tablet 0     atorvastatin (LIPITOR) 40 MG tablet Take 1 tablet (40 mg) by mouth daily 90 tablet 3     Calcium-Vitamin D-Vitamin K (CALCIUM + D + K) 750-500-40 MG-UNT-MCG TABS Take 2 tablets by mouth daily.       clopidogrel (PLAVIX) 75 MG tablet Take 1 tablet (75 mg) by mouth daily 30 tablet 3     coenzyme Q-10 (COQ-10) 200 MG CAPS Take by mouth 2 times daily        esomeprazole (NEXIUM) 40 MG capsule Take 40 mg by mouth every morning (before breakfast) Take 30-60 minutes before eating.       fluticasone (FLONASE) 50 MCG/ACT nasal spray Spray 2 sprays into both nostrils daily 16 g 11     Glucos-Chond-Hyal Ac-Ca Fructo (MOVE FREE JOINT HEALTH ADVANCE) TABS Take 1 mg by mouth daily       lisinopril (ZESTRIL) 2.5 MG tablet Take 1 tablet (2.5 mg) by mouth At Bedtime 90 tablet 3      mesalamine (LIALDA) 1.2 g EC tablet Take 4,800 mg by mouth daily (with breakfast)        metoprolol succinate ER (TOPROL-XL) 50 MG 24 hr tablet Take 0.5 tablets (25 mg) by mouth daily 180 tablet 3     MULTIVITAMIN TABS   OR 1 daily  0     nitroGLYcerin (NITROSTAT) 0.4 MG sublingual tablet For chest pain place 1 tablet under the tongue every 5 minutes for 3 doses. If symptoms persist 5 minutes after 1st dose call 911. 25 tablet 0     Probiotic Product (PROBIOTIC PO) Take by mouth daily       psyllium (METAMUCIL) 58.6 % POWD Take 2 teaspoonful by mouth 2 times daily        [START ON 3/19/2022] sildenafil (VIAGRA) 25 MG tablet Take 3 tablets (75 mg) by mouth daily as needed 90 tablet 0     tadalafil (CIALIS) 10 MG tablet Take 1 tablet (10 mg) by mouth daily as needed 60 tablet 0     TURMERIC PO Take 1,500 mg by mouth daily       VITAMIN D, CHOLECALCIFEROL, PO Take 5,000 Units by mouth daily         ALLERGIES     Allergies   Allergen Reactions     No Known Drug Allergies        PAST MEDICAL HISTORY:  Past Medical History:   Diagnosis Date     Actinic keratoses     face     Atrial fibrillation (H)     only after adenosine test     BPH (benign prostatic hypertrophy)      Chest pain      Coronary artery disease 2011 2011-Cath Lma 5%, Lad 40-50%, Lcx 30-40%, Hlb14-16% (-FFR of Lad)     ED (erectile dysfunction)      Erectile dysfunction 12/30/2021     Family history of colon cancer 7/6/2012    mother age 65     GERD (gastroesophageal reflux disease)     nexium     Hyperlipidaemia      Hypertension 2/16/2022     IFG (impaired fasting glucose)      Murmur      Obesity, unspecified     Waist size 42, BMI 30.8     Right inguinal hernia 7/1/2014     Thrombocytosis      Ulcerative colitis (H)        PAST SURGICAL HISTORY:  Past Surgical History:   Procedure Laterality Date     CARDIAC NUC POLINA STRESS TEST NL      Normal     CARDIOVERSION  12/2011    Atrial Fib     COLONOSCOPY  1/29/2014    Procedure: COMBINED COLONOSCOPY,  SINGLE BIOPSY/POLYPECTOMY BY BIOPSY;  COLONOSCOPY;  Surgeon: Ashtyn Gonzales MD;  Location:  GI     COLONOSCOPY N/A 7/11/2018    Procedure: COMBINED COLONOSCOPY, SINGLE OR MULTIPLE BIOPSY/POLYPECTOMY BY BIOPSY;  COLONOSCOPY ;  Surgeon: Ashtyn Gonzales MD;  Location:  GI     CORONARY ANGIOGRAPHY ADULT ORDER  12/2011    1 LMA 5%, LAD 40-50%, LCx 30-40%, RCA 25-30%     CV CORONARY ANGIOGRAM N/A 3/3/2022    Procedure: Coronary Angiogram;  Surgeon: Eulalio Del Rosario MD;  Location:  HEART CARDIAC CATH LAB     CV CORONARY ANGIOGRAM N/A 3/3/2022    Procedure: Coronary Angiogram;  Surgeon: Eulalio Del Rosario MD;  Location:  HEART CARDIAC CATH LAB     CV INSTANTANEOUS WAVE-FREE RATIO N/A 3/3/2022    Procedure: Instantaneous Wave-Free Ratio;  Surgeon: Eulalio Del Rosario MD;  Location:  HEART CARDIAC CATH LAB     CV INTRAVASULAR ULTRASOUND N/A 3/3/2022    Procedure: Intravascular Ultrasound;  Surgeon: Eulalio Del Rosario MD;  Location: [facility] CARDIAC CATH LAB     CV INTRAVASULAR ULTRASOUND N/A 3/3/2022    Procedure: Intravascular Ultrasound;  Surgeon: Eulalio Del Rosario MD;  Location: [facility] CARDIAC CATH LAB     CV PCI ATHERECTOMY ORBITAL N/A 3/3/2022    Procedure: Percutaneous Coronary Intervention Atherectomy Rotational;  Surgeon: Eulalio Del Rosario MD;  Location: [facility] CARDIAC CATH LAB     CV TEMPORARY PACEMAKER INSERTION N/A 3/3/2022    Procedure: Temporary Pacemaker Insertion;  Surgeon: Eulalio Del Rosario MD;  Location: [facility] CARDIAC CATH LAB     ENT SURGERY      Ringing in the ears     HC INJECTION SCLEROSING SOLUTION HEMORRHOID  8/24/2012    Procedure: HEMORRHOID INJECTION SCLEROSING SOLUTION;  Surgeon: Mayito Chow MD;  Location:  GI     HC TOOTH EXTRACTION W/FORCEP       HERNIA REPAIR  Needed     LASIK  1/2011     ROTATOR CUFF REPAIR RT/LT Left 10/15/2015    RCR Left - Dr. Carvalho     SOFT TISSUE SURGERY  10/2015    Torn rotator cuff       FAMILY HISTORY:  Family History   Problem  Relation Age of Onset     C.A.D. Father         passed at 52 from MI     Cardiovascular Father      Heart Disease Father      Obesity Father      Myocardial Infarction Father         52 - passed away     Coronary Artery Disease Father      Colon Cancer Mother 65        passed away from colon cancer - lived about 2 mo from dx 1981     Arthritis Maternal Grandmother      Diabetes Maternal Grandmother      Arthritis Maternal Grandfather      Diabetes Maternal Grandfather      Heart Disease Brother      Diabetes Brother      Coronary Artery Disease Brother      Hypertension Brother      Hyperlipidemia Brother      Coronary Artery Disease Brother         stent placed      Hyperlipidemia Brother      Substance Abuse Paternal Grandfather      Hypertension No family hx of      Cerebrovascular Disease No family hx of      Breast Cancer No family hx of      Prostate Cancer No family hx of      Alcohol/Drug No family hx of      Allergies No family hx of      Alzheimer Disease No family hx of      Circulatory No family hx of      Congenital Anomalies No family hx of      Connective Tissue Disorder No family hx of      Depression No family hx of      Eye Disorder No family hx of      Genetic Disorder No family hx of      Gastrointestinal Disease No family hx of      Genitourinary Problems No family hx of      Gynecology No family hx of      Musculoskeletal Disorder No family hx of      Neurologic Disorder No family hx of      Osteoporosis No family hx of      Psychotic Disorder No family hx of      Respiratory No family hx of      Thyroid Disease No family hx of      Anesthesia Reaction No family hx of      Blood Disease No family hx of        SOCIAL HISTORY:  Social History     Socioeconomic History     Marital status:      Spouse name: Emy     Number of children: 2     Years of education: 16     Highest education level: None   Occupational History     Occupation: [employer]     Employer: [employer]   Tobacco  "Use     Smoking status: Former Smoker     Packs/day: 1.50     Years: 24.00     Pack years: 36.00     Types: Cigarettes     Quit date: 1996     Years since quittin.2     Smokeless tobacco: Never Used   Substance and Sexual Activity     Alcohol use: Yes     Alcohol/week: 0.0 standard drinks     Comment: occ     Drug use: No     Sexual activity: Yes     Partners: Female     Birth control/protection: None   Other Topics Concern      Service Yes     Blood Transfusions No     Caffeine Concern No     Comment: 2 cups per day     Occupational Exposure No     Hobby Hazards No     Sleep Concern No     Stress Concern Yes     Weight Concern Yes     Special Diet Yes     Back Care No     Exercise No     Comment: 8,000 steps a day. Fitbit     Bike Helmet No     Comment: n/a     Seat Belt Yes     Self-Exams Yes     Parent/sibling w/ CABG, MI or angioplasty before 65F 55M? Yes     Comment: Father and brother   Social History Narrative    Eats fruits and vegetables every day. He takes extra vitamin D. He was advised to aim for 1200 mg of calcium per day.     Social Determinants of Health     Financial Resource Strain: Not on file   Food Insecurity: Not on file   Transportation Needs: Not on file   Physical Activity: Not on file   Stress: Not on file   Social Connections: Not on file   Intimate Partner Violence: Not on file   Housing Stability: Not on file       Review of Systems:  Skin:  Negative     Eyes:  Positive for glasses  ENT:  Positive for hearing loss  Respiratory:  Positive for dyspnea on exertion  Cardiovascular:  Negative    Gastroenterology: Positive for heartburn  Genitourinary:  Negative    Musculoskeletal:  Negative    Neurologic:  Positive for headaches  Psychiatric:  Negative    Heme/Lymph/Imm:  Negative    Endocrine:  Negative      Physical Exam:  Vitals: /76   Pulse 99   Ht 1.854 m (6' 1\")   Wt 108.2 kg (238 lb 9.6 oz)   SpO2 97%   BMI 31.48 kg/m     Please refer to dictation for " physical exam    Recent Lab Results: all reviewed today  CBC RESULTS:  Lab Results   Component Value Date    WBC 11.3 (H) 03/05/2022    WBC 9.1 10/06/2020    RBC 4.31 (L) 03/05/2022    RBC 5.39 10/06/2020    HGB 12.6 (L) 03/05/2022    HGB 16.3 10/06/2020    HCT 38.3 (L) 03/05/2022    HCT 47.9 10/06/2020    MCV 89 03/05/2022    MCV 89 10/06/2020    MCH 29.2 03/05/2022    MCH 30.2 10/06/2020    MCHC 32.9 03/05/2022    MCHC 34.0 10/06/2020    RDW 12.8 03/05/2022    RDW 13.5 10/06/2020     03/05/2022     (H) 10/06/2020       BMP RESULTS:  Lab Results   Component Value Date     03/10/2022     10/06/2020    POTASSIUM 4.0 03/10/2022    POTASSIUM 4.8 10/06/2020    CHLORIDE 105 03/10/2022    CHLORIDE 105 10/06/2020    CO2 27 03/10/2022    CO2 27 10/06/2020    ANIONGAP 6 03/10/2022    ANIONGAP 5 10/06/2020     (H) 03/10/2022     (H) 10/06/2020    BUN 17 03/10/2022    BUN 15 10/06/2020    CR 1.03 03/10/2022    CR 0.99 10/06/2020    GFRESTIMATED 79 03/10/2022    GFRESTIMATED 78 10/06/2020    GFRESTBLACK 90 10/06/2020    CAMERON 9.0 03/10/2022    CAMERON 9.5 10/06/2020        INR RESULTS:  Lab Results   Component Value Date    INR 1.00 03/03/2022    INR 1.02 12/22/2011           CC  Yue Sequeira PA-C  [address]  [place] [id number]      Thank you for allowing me to participate in the care of your patient.      Sincerely,     Yue Sequeira PA-C     [facility]

## 2022-03-10 NOTE — PROGRESS NOTES
[id number]      HPI and Plan:   See dictation    Orders this Visit:  Orders Placed This Encounter   Procedures     Platelet Function P2Y12     Basic metabolic panel     Nutrition Referral     EKG 12-lead complete w/read - Clinics (to be scheduled)     Orders Placed This Encounter   Medications     lisinopril (ZESTRIL) 2.5 MG tablet     Sig: Take 1 tablet (2.5 mg) by mouth At Bedtime     Dispense:  90 tablet     Refill:  3     There are no discontinued medications.      Encounter Diagnoses   Name Primary?     Cardiomyopathy, nonischemic (H)      Thrombocytosis Yes     Persistent atrial fibrillation (H)        CURRENT MEDICATIONS:  Current Outpatient Medications   Medication Sig Dispense Refill     acetaminophen (TYLENOL) 325 MG tablet Take 2 tablets (650 mg) by mouth every 4 hours as needed for mild pain or headaches  0     apixaban ANTICOAGULANT (ELIQUIS ANTICOAGULANT) 5 MG tablet Take 1 tablet (5 mg) by mouth 2 times daily 120 tablet 0     atorvastatin (LIPITOR) 40 MG tablet Take 1 tablet (40 mg) by mouth daily 90 tablet 3     Calcium-Vitamin D-Vitamin K (CALCIUM + D + K) 750-500-40 MG-UNT-MCG TABS Take 2 tablets by mouth daily.       clopidogrel (PLAVIX) 75 MG tablet Take 1 tablet (75 mg) by mouth daily 30 tablet 3     coenzyme Q-10 (COQ-10) 200 MG CAPS Take by mouth 2 times daily        esomeprazole (NEXIUM) 40 MG capsule Take 40 mg by mouth every morning (before breakfast) Take 30-60 minutes before eating.       fluticasone (FLONASE) 50 MCG/ACT nasal spray Spray 2 sprays into both nostrils daily 16 g 11     Glucos-Chond-Hyal Ac-Ca Fructo (MOVE FREE JOINT HEALTH ADVANCE) TABS Take 1 mg by mouth daily       lisinopril (ZESTRIL) 2.5 MG tablet Take 1 tablet (2.5 mg) by mouth At Bedtime 90 tablet 3     mesalamine (LIALDA) 1.2 g EC tablet Take 4,800 mg by mouth daily (with breakfast)        metoprolol succinate ER (TOPROL-XL) 50 MG 24 hr tablet Take 0.5 tablets (25 mg) by mouth daily 180 tablet 3     MULTIVITAMIN TABS    OR 1 daily  0     nitroGLYcerin (NITROSTAT) 0.4 MG sublingual tablet For chest pain place 1 tablet under the tongue every 5 minutes for 3 doses. If symptoms persist 5 minutes after 1st dose call 911. 25 tablet 0     Probiotic Product (PROBIOTIC PO) Take by mouth daily       psyllium (METAMUCIL) 58.6 % POWD Take 2 teaspoonful by mouth 2 times daily        [START ON 3/19/2022] sildenafil (VIAGRA) 25 MG tablet Take 3 tablets (75 mg) by mouth daily as needed 90 tablet 0     tadalafil (CIALIS) 10 MG tablet Take 1 tablet (10 mg) by mouth daily as needed 60 tablet 0     TURMERIC PO Take 1,500 mg by mouth daily       VITAMIN D, CHOLECALCIFEROL, PO Take 5,000 Units by mouth daily         ALLERGIES     Allergies   Allergen Reactions     No Known Drug Allergies        PAST MEDICAL HISTORY:  Past Medical History:   Diagnosis Date     Actinic keratoses     face     Atrial fibrillation (H)     only after adenosine test     BPH (benign prostatic hypertrophy)      Chest pain      Coronary artery disease 2011 2011-Cath Lma 5%, Lad 40-50%, Lcx 30-40%, Xlh13-50% (-FFR of Lad)     ED (erectile dysfunction)      Erectile dysfunction 12/30/2021     Family history of colon cancer 7/6/2012    mother age 65     GERD (gastroesophageal reflux disease)     nexium     Hyperlipidaemia      Hypertension 2/16/2022     IFG (impaired fasting glucose)      Murmur      Obesity, unspecified     Waist size 42, BMI 30.8     Right inguinal hernia 7/1/2014     Thrombocytosis      Ulcerative colitis (H)        PAST SURGICAL HISTORY:  Past Surgical History:   Procedure Laterality Date     CARDIAC NUC POLINA STRESS TEST NL      Normal     CARDIOVERSION  12/2011    Atrial Fib     COLONOSCOPY  1/29/2014    Procedure: COMBINED COLONOSCOPY, SINGLE BIOPSY/POLYPECTOMY BY BIOPSY;  COLONOSCOPY;  Surgeon: Ashtyn Gonzales MD;  Location:  GI     COLONOSCOPY N/A 7/11/2018    Procedure: COMBINED COLONOSCOPY, SINGLE OR MULTIPLE BIOPSY/POLYPECTOMY BY BIOPSY;   COLONOSCOPY ;  Surgeon: Ashtyn Gonzales MD;  Location:  GI     CORONARY ANGIOGRAPHY ADULT ORDER  12/2011    1 LMA 5%, LAD 40-50%, LCx 30-40%, RCA 25-30%     CV CORONARY ANGIOGRAM N/A 3/3/2022    Procedure: Coronary Angiogram;  Surgeon: Eulalio Del Rosario MD;  Location:  HEART CARDIAC CATH LAB     CV CORONARY ANGIOGRAM N/A 3/3/2022    Procedure: Coronary Angiogram;  Surgeon: Eulalio Del Rosario MD;  Location:  HEART CARDIAC CATH LAB     CV INSTANTANEOUS WAVE-FREE RATIO N/A 3/3/2022    Procedure: Instantaneous Wave-Free Ratio;  Surgeon: Eulalio Del Rosario MD;  Location: [facility] CARDIAC CATH LAB     CV INTRAVASULAR ULTRASOUND N/A 3/3/2022    Procedure: Intravascular Ultrasound;  Surgeon: Eulalio Del Rosario MD;  Location:  HEART CARDIAC CATH LAB     CV INTRAVASULAR ULTRASOUND N/A 3/3/2022    Procedure: Intravascular Ultrasound;  Surgeon: Eulalio Del Rosario MD;  Location: [facility] CARDIAC CATH LAB     CV PCI ATHERECTOMY ORBITAL N/A 3/3/2022    Procedure: Percutaneous Coronary Intervention Atherectomy Rotational;  Surgeon: Eulalio Del Rosario MD;  Location: [facility] CARDIAC CATH LAB     CV TEMPORARY PACEMAKER INSERTION N/A 3/3/2022    Procedure: Temporary Pacemaker Insertion;  Surgeon: Eulalio Del Rosario MD;  Location: [facility] CARDIAC CATH LAB     ENT SURGERY      Ringing in the ears     HC INJECTION SCLEROSING SOLUTION HEMORRHOID  8/24/2012    Procedure: HEMORRHOID INJECTION SCLEROSING SOLUTION;  Surgeon: Mayito Chow MD;  Location:  GI     HC TOOTH EXTRACTION W/FORCEP       HERNIA REPAIR  Needed     LASIK  1/2011     ROTATOR CUFF REPAIR RT/LT Left 10/15/2015    RCR Left - Dr. Carvalho     SOFT TISSUE SURGERY  10/2015    Torn rotator cuff       FAMILY HISTORY:  Family History   Problem Relation Age of Onset     C.A.D. Father         passed at 52 from MI     Cardiovascular Father      Heart Disease Father      Obesity Father      Myocardial Infarction Father         52 - passed away     Coronary Artery  Disease Father      Colon Cancer Mother 65        passed away from colon cancer - lived about 2 mo from dx      Arthritis Maternal Grandmother      Diabetes Maternal Grandmother      Arthritis Maternal Grandfather      Diabetes Maternal Grandfather      Heart Disease Brother      Diabetes Brother      Coronary Artery Disease Brother      Hypertension Brother      Hyperlipidemia Brother      Coronary Artery Disease Brother         stent placed      Hyperlipidemia Brother      Substance Abuse Paternal Grandfather      Hypertension No family hx of      Cerebrovascular Disease No family hx of      Breast Cancer No family hx of      Prostate Cancer No family hx of      Alcohol/Drug No family hx of      Allergies No family hx of      Alzheimer Disease No family hx of      Circulatory No family hx of      Congenital Anomalies No family hx of      Connective Tissue Disorder No family hx of      Depression No family hx of      Eye Disorder No family hx of      Genetic Disorder No family hx of      Gastrointestinal Disease No family hx of      Genitourinary Problems No family hx of      Gynecology No family hx of      Musculoskeletal Disorder No family hx of      Neurologic Disorder No family hx of      Osteoporosis No family hx of      Psychotic Disorder No family hx of      Respiratory No family hx of      Thyroid Disease No family hx of      Anesthesia Reaction No family hx of      Blood Disease No family hx of        SOCIAL HISTORY:  Social History     Socioeconomic History     Marital status:      Spouse name: Emy     Number of children: 2     Years of education: 16     Highest education level: None   Occupational History     Occupation: [employer]     Employer: [employer]   Tobacco Use     Smoking status: Former Smoker     Packs/day: 1.50     Years: 24.00     Pack years: 36.00     Types: Cigarettes     Quit date: 1996     Years since quittin.2     Smokeless tobacco: Never Used   Substance  "and Sexual Activity     Alcohol use: Yes     Alcohol/week: 0.0 standard drinks     Comment: occ     Drug use: No     Sexual activity: Yes     Partners: Female     Birth control/protection: None   Other Topics Concern      Service Yes     Blood Transfusions No     Caffeine Concern No     Comment: 2 cups per day     Occupational Exposure No     Hobby Hazards No     Sleep Concern No     Stress Concern Yes     Weight Concern Yes     Special Diet Yes     Back Care No     Exercise No     Comment: 8,000 steps a day. Fitbit     Bike Helmet No     Comment: n/a     Seat Belt Yes     Self-Exams Yes     Parent/sibling w/ CABG, MI or angioplasty before 65F 55M? Yes     Comment: Father and brother   Social History Narrative    Eats fruits and vegetables every day. He takes extra vitamin D. He was advised to aim for 1200 mg of calcium per day.     Social Determinants of Health     Financial Resource Strain: Not on file   Food Insecurity: Not on file   Transportation Needs: Not on file   Physical Activity: Not on file   Stress: Not on file   Social Connections: Not on file   Intimate Partner Violence: Not on file   Housing Stability: Not on file       Review of Systems:  Skin:  Negative     Eyes:  Positive for glasses  ENT:  Positive for hearing loss  Respiratory:  Positive for dyspnea on exertion  Cardiovascular:  Negative    Gastroenterology: Positive for heartburn  Genitourinary:  Negative    Musculoskeletal:  Negative    Neurologic:  Positive for headaches  Psychiatric:  Negative    Heme/Lymph/Imm:  Negative    Endocrine:  Negative      Physical Exam:  Vitals: /76   Pulse 99   Ht 1.854 m (6' 1\")   Wt 108.2 kg (238 lb 9.6 oz)   SpO2 97%   BMI 31.48 kg/m     Please refer to dictation for physical exam    Recent Lab Results: all reviewed today  CBC RESULTS:  Lab Results   Component Value Date    WBC 11.3 (H) 03/05/2022    WBC 9.1 10/06/2020    RBC 4.31 (L) 03/05/2022    RBC 5.39 10/06/2020    HGB 12.6 (L) " 03/05/2022    HGB 16.3 10/06/2020    HCT 38.3 (L) 03/05/2022    HCT 47.9 10/06/2020    MCV 89 03/05/2022    MCV 89 10/06/2020    MCH 29.2 03/05/2022    MCH 30.2 10/06/2020    MCHC 32.9 03/05/2022    MCHC 34.0 10/06/2020    RDW 12.8 03/05/2022    RDW 13.5 10/06/2020     03/05/2022     (H) 10/06/2020       BMP RESULTS:  Lab Results   Component Value Date     03/10/2022     10/06/2020    POTASSIUM 4.0 03/10/2022    POTASSIUM 4.8 10/06/2020    CHLORIDE 105 03/10/2022    CHLORIDE 105 10/06/2020    CO2 27 03/10/2022    CO2 27 10/06/2020    ANIONGAP 6 03/10/2022    ANIONGAP 5 10/06/2020     (H) 03/10/2022     (H) 10/06/2020    BUN 17 03/10/2022    BUN 15 10/06/2020    CR 1.03 03/10/2022    CR 0.99 10/06/2020    GFRESTIMATED 79 03/10/2022    GFRESTIMATED 78 10/06/2020    GFRESTBLACK 90 10/06/2020    CAMERON 9.0 03/10/2022    CAMERON 9.5 10/06/2020        INR RESULTS:  Lab Results   Component Value Date    INR 1.00 03/03/2022    INR 1.02 12/22/2011           ANDER Sequeira, PA-C  [address]  [place] [id number]

## 2022-03-10 NOTE — RESULT ENCOUNTER NOTE
Will review or did review during clinic visit.  Please see progress note for plan.  Yue Sequeira PA-C 3/10/2022 10:37 AM

## 2022-03-10 NOTE — PROGRESS NOTES
Service Date: 03/10/2022    PRIMARY CARDIOLOGIST:  Dr. Del Rosario.  Also, recently seen by Dr. Yon Modi.    REASON FOR VISIT:  Angiogram followup, cardiomyopathy, systolic heart failure.    HISTORY OF PRESENT ILLNESS:  Mr. Peralta is a delightful 71-year-old gentleman with past medical history significant for the followin.  Coronary artery disease with initial angiogram in  showing a 50% mid LAD, 25% circumflex, 25% distal RCA with negative FFR of the LAD.  He was recently diagnosed with a new cardiomyopathy and had abnormal stress test with infarct and rebecca-infarct ischemia on the RCA and underwent coronary angiogram on 2022.  This showed a completely occluded mid RCA with thrombectomy of his RCA, pulling out red clot to reestablish flow.  There was severe narrowing though through the mid and distal portion requiring rotablader and drug-eluting stent x2.  He had profound bradycardia and hypotension but no chest pain during the procedure.  His hemodynamics were unstable until they reestablished flow in the RCA.  His LAD was non-flow limiting and the circ and the ramus had minimal disease.  2.  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  3.  Dyslipidemia, on atorvastatin.  4.  New diagnosis of atrial fibrillation, which was noted by the patient's gastroenterologist in early followup in February.  5.  Ulcerative ulcerative colitis with 2 rounds of prednisone this fall.  6.  Erectile dysfunction.  7.  Elevated platelets with Oncology consult while hospitalized, suggesting that it was reactive thrombocytosis secondary to ulcerative colitis with no further workup suggested.    I met Nathan a couple of weeks ago after he was undergoing workup for a new diagnosis of AFib.  This likely started after 2 courses of prednisone in the fall.  As part of the workup, Dr. Kade Modi ordered a stress test as well as OSCAR-guided cardioversion.  The stress test was abnormal  and led to the events above.  Unfortunately, during his hospitalization for his angiogram, he developed ST elevation post-procedure and went back for a second look on his angiogram.  His stents were found to be patent.  He was given intracoronary Integrilin.  The thought was that he potentially was not responding to the antiplatelet enough from the morning.  He was then kept overnight, diuresed gently and sent home the next day.  His troponins did peak at 25,000.    He comes in today for followup of these events.  He notes that last summer he could walk 5 miles at a 3 mile per hour pace.  This cut back down to about 3 miles in the fall and now he is just walking through his driveway. Some of this he felt was due to GI issues but now recognizes that he has really had progressive dyspnea on exertion over the last several months.  He notes that after his angiogram he does feel a little bit better.  He is less short of breath.  He is less fatigued, but he has not really been able to push himself as he has been inside.  He notes his groin is healing okay, but it is fairly tender.  There is a large area of ecchymosis that is improving.  He denies ana laura orthopnea or PND.  He has done a home sleep study and he likely has fairly significant both central and obstructive sleep apnea and [facility]  Today we go through the angiogram in detail, looking at both sets of imaging as well as the 2 echocardiograms he had in the hospital that showed an EF stable at 25%-30%.    SOCIAL HISTORY:  He comes in today with his wife, Emy.  She is a retired RN.  Their son, Luis, came in last time.  They have had a lot of stress in their life.  Their daughter has long COVID syndrome.  They had a toddler who had COVID, and apparently, Luis has some medical concerns.  The patient does not drink alcohol.  He is a former smoker, 30 some pack year history, quit in the 1990s.    PHYSICAL EXAMINATION:    GENERAL:   Well-developed, well-nourished gentleman in no acute distress.  HEENT:  Normocephalic, atraumatic.  HEART:  Irregularly irregular.  I do not appreciate murmur, rub or gallop.  LUNGS:  Clear, without wheezes, rales, or rhonchi.  EXTREMITIES:  Without peripheral edema.  Neck veins are flat at 30 degrees.  Right femoral access has about a 10 x 10 cm area of ecchymosis and about 3 x 2 cm likely sub-hematoma.  He has 2+ femoral pulse without bruit.    ASSESSMENT AND PLAN:    1.  Coronary artery disease with recent complex intervention to an occluded RCA requiring rotablader and ST elevation on EKG post without significant chest pain.  This was complicated by hypotension requiring pressors as well as bradycardia.  For this reason, his beta blocker was cut back.  He is appropriately on Plavix.  Dr. Del Rosario suggested a P2Y12 platelet inhibition test and that will be completed today.  With his thrombocytosis, we want to be sure that everything is working okay, especially since he had bright red clot in his RCA.  At this point, he has no chest pain, although I do not suspect he would considering how severe this lesion was and he did not have much chest pain at all.  I suspect dyspnea on exertion may be his anginal equivalent.  2.  Recent diagnosis of AFib with history of RVR, now bradycardic during hospitalization and beta blocker cut back to 25 mg a day.  Heart rates are reasonably controlled here and better controlled at cardiac rehab from baseline of about 80 going up to 120.  We do suspect that this has caused tachycardia-induced cardiomyopathy and the plan is for OSCAR-guided cardioversion.  This is scheduled for the 03/22.  Risks and benefits of this were discussed including esophageal tear, bruising, bleeding, stroke, heart attack, bradycardia, temporary or permanent pacemaker up to and including death.  The patient and his wife are agreeable to proceed.  We will have him back in 1 week after that for an EKG and clinic  visit.  3.  Cardiomyopathy with chronic systolic heart failure, not requiring diuretics at this time.  I am hopeful that he will reverse remodel when he gets in sinus rhythm, but to aid that, I am going to add lisinopril 2.5 mg a day at bedtime.  He will remain on the Toprol 25.  We will work on spironolactone later.  We will likely do an early look at his cardiomyopathy to see how far we have to push medications.  4.  Likely sleep apnea[facility].  I strongly recommend intervention if indicated.  We discussed today that AFib and sleep apnea go hand in hand.  5.  Ulcerative colitis with 2 rounds of prednisone potentially contributing to his AFib.  I did do a referral to Dietary so he can work through ulcerative colitis diagnosis, low sodium with heart failure, as well as prediabetes.  6.  Prediabetes. Would consider Jardiance down the road depending on how we do with other medications.  7.  Dyslipidemia, excellent control.  We will continue atorvastatin.  8.  Thrombocytosis, seen by Hematology.  No further workup recommended.    Thank you for allowing me to participate in this delightful patient's care.    60 minutes was spent in counseling and coordination of care, including review of studies mentioned above.    Yue Sequeira PA-C        D: 03/10/2022   T: 03/10/2022   MT: [email or web address]    Name:     BUCK SAMANIEGO  MRN:      [id number]        Account:      [id number]   :      1950           Service Date: 03/10/2022       Document: [id number]

## 2022-03-10 NOTE — PATIENT INSTRUCTIONS
Call [facility] nurse for any questions or concerns Mon-Fri 8am-4pm:                                                #[phone]                                       For concerns after hours:                                               #[phone]     1: Medication changes: start taking lisinopril 2.5 mg once a day before bed.      2: Plan from today: do your OSCAR and cardioversion.  I'll see you about a week after that with an ECG.      3: Lab results: see attached; labs look great.    Component      Latest Ref Rng & Units 3/10/2022   Sodium      133 - 144 mmol/L 138   Potassium      3.4 - 5.3 mmol/L 4.0   Chloride      94 - 109 mmol/L 105   Carbon Dioxide      20 - 32 mmol/L 27   Anion Gap      3 - 14 mmol/L 6   Urea Nitrogen      7 - 30 mg/dL 17   Creatinine      0.66 - 1.25 mg/dL 1.03   Calcium      8.5 - 10.1 mg/dL 9.0   Glucose      70 - 99 mg/dL 126 (H)   GFR Estimate      >60 mL/min/1.73m2 79

## 2022-03-14 ENCOUNTER — HOSPITAL ENCOUNTER (OUTPATIENT)
Dept: CARDIAC REHAB | Facility: CLINIC | Age: 72
Discharge: HOME | End: 2022-03-14
Attending: INTERNAL MEDICINE
Payer: MEDICARE

## 2022-03-14 PROCEDURE — 93798 PHYS/QHP OP CAR RHAB W/ECG: CPT | Performed by: OCCUPATIONAL THERAPIST

## 2022-03-16 ENCOUNTER — HOSPITAL ENCOUNTER (OUTPATIENT)
Dept: CARDIAC REHAB | Facility: CLINIC | Age: 72
Discharge: HOME | End: 2022-03-16
Attending: INTERNAL MEDICINE
Payer: MEDICARE

## 2022-03-16 ENCOUNTER — TELEPHONE (OUTPATIENT)
Dept: CARDIOLOGY | Facility: CLINIC | Age: 72
End: 2022-03-16
Payer: MEDICARE

## 2022-03-16 PROCEDURE — 93798 PHYS/QHP OP CAR RHAB W/ECG: CPT

## 2022-03-16 NOTE — TELEPHONE ENCOUNTER
Pt called for cardioversion instructions for 3/22/22. Informed him have no instructions but will get them and he will be called before his cardioversion. JAYDE Way RN

## 2022-03-18 ENCOUNTER — LAB (OUTPATIENT)
Dept: URGENT CARE | Facility: URGENT CARE | Age: 72
End: 2022-03-18
Payer: MEDICARE

## 2022-03-18 DIAGNOSIS — Z11.59 ENCOUNTER FOR SCREENING FOR OTHER VIRAL DISEASES: ICD-10-CM

## 2022-03-18 PROCEDURE — U0003 INFECTIOUS AGENT DETECTION BY NUCLEIC ACID (DNA OR RNA); SEVERE ACUTE RESPIRATORY SYNDROME CORONAVIRUS 2 (SARS-COV-2) (CORONAVIRUS DISEASE [COVID-19]), AMPLIFIED PROBE TECHNIQUE, MAKING USE OF HIGH THROUGHPUT TECHNOLOGIES AS DESCRIBED BY CMS-2020-01-R: HCPCS

## 2022-03-18 PROCEDURE — U0005 INFEC AGEN DETEC AMPLI PROBE: HCPCS

## 2022-03-19 LAB — SARS-COV-2 RNA RESP QL NAA+PROBE: NEGATIVE

## 2022-03-21 ENCOUNTER — TELEPHONE (OUTPATIENT)
Dept: CARDIOLOGY | Facility: CLINIC | Age: 72
End: 2022-03-21
Payer: MEDICARE

## 2022-03-21 NOTE — TELEPHONE ENCOUNTER
Spoke with patient regarding OSCAR and Cardioversion scheduled for tomorrow  Pt has been on eliquis and has not missed any doses.  Pt is also on plavix and will continue to take it as usual  Pt also on metoprolol which he takes in the morning and will take tomorrow morning with a sip of water  Pt will not eat after midnight  Pt will arrive at [facility] at 0630 for 0830 procedures  Pt denies loose teeth or any swallowing difficulties  Pt will have his wife drive him home and stay with him for 24hours.  Pt is aware he will be given mild/mod sedation and his throat will be numbed to pass the OSCAR probe  He has no other questions

## 2022-03-22 ENCOUNTER — HOSPITAL ENCOUNTER (OUTPATIENT)
Dept: CARDIOLOGY | Facility: CLINIC | Age: 72
Discharge: HOME | End: 2022-03-22
Attending: INTERNAL MEDICINE
Payer: MEDICARE

## 2022-03-22 ENCOUNTER — ANESTHESIA EVENT (OUTPATIENT)
Dept: SURGERY | Facility: CLINIC | Age: 72
End: 2022-03-22
Payer: MEDICARE

## 2022-03-22 ENCOUNTER — HOSPITAL ENCOUNTER (OUTPATIENT)
Dept: MEDSURG UNIT | Facility: CLINIC | Age: 72
Discharge: HOME | End: 2022-03-22
Attending: INTERNAL MEDICINE
Payer: MEDICARE

## 2022-03-22 ENCOUNTER — HOSPITAL ENCOUNTER (OUTPATIENT)
Facility: CLINIC | Age: 72
Discharge: HOME | End: 2022-03-22
Admitting: INTERNAL MEDICINE
Payer: MEDICARE

## 2022-03-22 ENCOUNTER — ANESTHESIA (OUTPATIENT)
Dept: SURGERY | Facility: CLINIC | Age: 72
End: 2022-03-22
Payer: MEDICARE

## 2022-03-22 VITALS
DIASTOLIC BLOOD PRESSURE: 81 MMHG | HEART RATE: 90 BPM | TEMPERATURE: 97.7 F | WEIGHT: 231 LBS | OXYGEN SATURATION: 95 % | RESPIRATION RATE: 14 BRPM | BODY MASS INDEX: 30.62 KG/M2 | SYSTOLIC BLOOD PRESSURE: 106 MMHG | HEIGHT: 73 IN

## 2022-03-22 DIAGNOSIS — I48.19 PERSISTENT ATRIAL FIBRILLATION (H): ICD-10-CM

## 2022-03-22 DIAGNOSIS — I48.91 ATRIAL FIBRILLATION, UNSPECIFIED TYPE (H): Primary | ICD-10-CM

## 2022-03-22 DIAGNOSIS — I48.91 ATRIAL FIBRILLATION, UNSPECIFIED TYPE (H): ICD-10-CM

## 2022-03-22 LAB
LVEF ECHO: NORMAL
MAGNESIUM SERPL-MCNC: 2.2 MG/DL (ref 1.6–2.3)
POTASSIUM BLD-SCNC: 3.9 MMOL/L (ref 3.4–5.3)

## 2022-03-22 PROCEDURE — 93005 ELECTROCARDIOGRAM TRACING: CPT

## 2022-03-22 PROCEDURE — 92960 CARDIOVERSION ELECTRIC EXT: CPT

## 2022-03-22 PROCEDURE — 36415 COLL VENOUS BLD VENIPUNCTURE: CPT | Performed by: INTERNAL MEDICINE

## 2022-03-22 PROCEDURE — 93312 ECHO TRANSESOPHAGEAL: CPT | Mod: 26 | Performed by: INTERNAL MEDICINE

## 2022-03-22 PROCEDURE — 99152 MOD SED SAME PHYS/QHP 5/>YRS: CPT | Performed by: INTERNAL MEDICINE

## 2022-03-22 PROCEDURE — 250N000011 HC RX IP 250 OP 636: Performed by: INTERNAL MEDICINE

## 2022-03-22 PROCEDURE — 258N000003 HC RX IP 258 OP 636: Performed by: INTERNAL MEDICINE

## 2022-03-22 PROCEDURE — 999N000054 HC STATISTIC EKG NON-CHARGEABLE

## 2022-03-22 PROCEDURE — 250N000011 HC RX IP 250 OP 636: Performed by: NURSE ANESTHETIST, CERTIFIED REGISTERED

## 2022-03-22 PROCEDURE — 93325 DOPPLER ECHO COLOR FLOW MAPG: CPT

## 2022-03-22 PROCEDURE — 250N000013 HC RX MED GY IP 250 OP 250 PS 637: Performed by: INTERNAL MEDICINE

## 2022-03-22 PROCEDURE — 250N000009 HC RX 250: Performed by: INTERNAL MEDICINE

## 2022-03-22 PROCEDURE — 999N000184 HC STATISTIC TELEMETRY

## 2022-03-22 PROCEDURE — 250N000009 HC RX 250: Performed by: NURSE ANESTHETIST, CERTIFIED REGISTERED

## 2022-03-22 PROCEDURE — 999N000010 HC STATISTIC ANES STAT CODE-CRNA PER MINUTE

## 2022-03-22 PROCEDURE — 83735 ASSAY OF MAGNESIUM: CPT | Performed by: INTERNAL MEDICINE

## 2022-03-22 PROCEDURE — 93325 DOPPLER ECHO COLOR FLOW MAPG: CPT | Mod: 26 | Performed by: INTERNAL MEDICINE

## 2022-03-22 PROCEDURE — 84132 ASSAY OF SERUM POTASSIUM: CPT | Performed by: INTERNAL MEDICINE

## 2022-03-22 PROCEDURE — 93320 DOPPLER ECHO COMPLETE: CPT | Mod: 26 | Performed by: INTERNAL MEDICINE

## 2022-03-22 PROCEDURE — 370N000017 HC ANESTHESIA TECHNICAL FEE, PER MIN

## 2022-03-22 RX ORDER — ATROPINE SULFATE 0.1 MG/ML
.5-1 INJECTION INTRAVENOUS
Status: DISCONTINUED | OUTPATIENT
Start: 2022-03-22 | End: 2022-03-22 | Stop reason: HOSPADM

## 2022-03-22 RX ORDER — GLYCOPYRROLATE 0.2 MG/ML
0.1 INJECTION, SOLUTION INTRAMUSCULAR; INTRAVENOUS ONCE
Status: COMPLETED | OUTPATIENT
Start: 2022-03-22 | End: 2022-03-22

## 2022-03-22 RX ORDER — METOPROLOL TARTRATE 1 MG/ML
2.5-5 INJECTION, SOLUTION INTRAVENOUS
Status: DISCONTINUED | OUTPATIENT
Start: 2022-03-22 | End: 2022-03-22 | Stop reason: HOSPADM

## 2022-03-22 RX ORDER — LIDOCAINE 50 MG/G
OINTMENT TOPICAL ONCE
Status: COMPLETED | OUTPATIENT
Start: 2022-03-22 | End: 2022-03-22

## 2022-03-22 RX ORDER — NALOXONE HYDROCHLORIDE 0.4 MG/ML
0.2 INJECTION, SOLUTION INTRAMUSCULAR; INTRAVENOUS; SUBCUTANEOUS
Status: DISCONTINUED | OUTPATIENT
Start: 2022-03-22 | End: 2022-03-22 | Stop reason: HOSPADM

## 2022-03-22 RX ORDER — FENTANYL CITRATE 50 UG/ML
INJECTION, SOLUTION INTRAMUSCULAR; INTRAVENOUS PRN
Status: COMPLETED | OUTPATIENT
Start: 2022-03-22 | End: 2022-03-22

## 2022-03-22 RX ORDER — MAGNESIUM SULFATE HEPTAHYDRATE 40 MG/ML
2 INJECTION, SOLUTION INTRAVENOUS
Status: DISCONTINUED | OUTPATIENT
Start: 2022-03-22 | End: 2022-03-22 | Stop reason: HOSPADM

## 2022-03-22 RX ORDER — FLUMAZENIL 0.1 MG/ML
0.2 INJECTION, SOLUTION INTRAVENOUS
Status: DISCONTINUED | OUTPATIENT
Start: 2022-03-22 | End: 2022-03-22 | Stop reason: HOSPADM

## 2022-03-22 RX ORDER — PROPOFOL 10 MG/ML
INJECTION, EMULSION INTRAVENOUS PRN
Status: DISCONTINUED | OUTPATIENT
Start: 2022-03-22 | End: 2022-03-22

## 2022-03-22 RX ORDER — LIDOCAINE HYDROCHLORIDE 40 MG/ML
1.5 SOLUTION TOPICAL ONCE
Status: COMPLETED | OUTPATIENT
Start: 2022-03-22 | End: 2022-03-22

## 2022-03-22 RX ORDER — ETOMIDATE 2 MG/ML
INJECTION INTRAVENOUS PRN
Status: DISCONTINUED | OUTPATIENT
Start: 2022-03-22 | End: 2022-03-22

## 2022-03-22 RX ORDER — FENTANYL CITRATE 50 UG/ML
25 INJECTION, SOLUTION INTRAMUSCULAR; INTRAVENOUS
Status: DISCONTINUED | OUTPATIENT
Start: 2022-03-22 | End: 2022-03-22 | Stop reason: HOSPADM

## 2022-03-22 RX ORDER — NALOXONE HYDROCHLORIDE 0.4 MG/ML
0.4 INJECTION, SOLUTION INTRAMUSCULAR; INTRAVENOUS; SUBCUTANEOUS
Status: DISCONTINUED | OUTPATIENT
Start: 2022-03-22 | End: 2022-03-22 | Stop reason: HOSPADM

## 2022-03-22 RX ORDER — POTASSIUM CHLORIDE 1500 MG/1
20 TABLET, EXTENDED RELEASE ORAL
Status: DISCONTINUED | OUTPATIENT
Start: 2022-03-22 | End: 2022-03-22 | Stop reason: HOSPADM

## 2022-03-22 RX ORDER — SODIUM CHLORIDE 9 MG/ML
1000 INJECTION, SOLUTION INTRAVENOUS CONTINUOUS
Status: DISCONTINUED | OUTPATIENT
Start: 2022-03-22 | End: 2022-03-22 | Stop reason: HOSPADM

## 2022-03-22 RX ORDER — POTASSIUM CHLORIDE 1500 MG/1
40 TABLET, EXTENDED RELEASE ORAL
Status: DISCONTINUED | OUTPATIENT
Start: 2022-03-22 | End: 2022-03-22 | Stop reason: HOSPADM

## 2022-03-22 RX ORDER — SODIUM CHLORIDE 9 MG/ML
INJECTION, SOLUTION INTRAVENOUS CONTINUOUS
Status: DISCONTINUED | OUTPATIENT
Start: 2022-03-22 | End: 2022-03-22 | Stop reason: HOSPADM

## 2022-03-22 RX ORDER — DEXTROSE MONOHYDRATE 25 G/50ML
9.5 INJECTION, SOLUTION INTRAVENOUS
Status: DISCONTINUED | OUTPATIENT
Start: 2022-03-22 | End: 2022-03-22 | Stop reason: HOSPADM

## 2022-03-22 RX ADMIN — FENTANYL CITRATE 25 MCG: 50 INJECTION, SOLUTION INTRAMUSCULAR; INTRAVENOUS at 08:54

## 2022-03-22 RX ADMIN — GLYCOPYRROLATE 0.1 MG: 0.4 INJECTION INTRAMUSCULAR; INTRAVENOUS at 08:28

## 2022-03-22 RX ADMIN — MIDAZOLAM 1 MG: 1 INJECTION INTRAMUSCULAR; INTRAVENOUS at 08:53

## 2022-03-22 RX ADMIN — ETOMIDATE 6 MG: 2 INJECTION INTRAVENOUS at 09:25

## 2022-03-22 RX ADMIN — FENTANYL CITRATE 25 MCG: 50 INJECTION, SOLUTION INTRAMUSCULAR; INTRAVENOUS at 08:49

## 2022-03-22 RX ADMIN — FENTANYL CITRATE 25 MCG: 50 INJECTION, SOLUTION INTRAMUSCULAR; INTRAVENOUS at 08:51

## 2022-03-22 RX ADMIN — SODIUM CHLORIDE: 9 INJECTION, SOLUTION INTRAVENOUS at 07:20

## 2022-03-22 RX ADMIN — POTASSIUM CHLORIDE 20 MEQ: 1500 TABLET, EXTENDED RELEASE ORAL at 07:38

## 2022-03-22 RX ADMIN — PROPOFOL 10 MG: 10 INJECTION, EMULSION INTRAVENOUS at 09:25

## 2022-03-22 RX ADMIN — TOPICAL ANESTHETIC 0.5 ML: 200 SPRAY DENTAL; PERIODONTAL at 08:40

## 2022-03-22 RX ADMIN — MIDAZOLAM 1 MG: 1 INJECTION INTRAMUSCULAR; INTRAVENOUS at 08:49

## 2022-03-22 RX ADMIN — LIDOCAINE HYDROCHLORIDE 1.5 ML: 40 SOLUTION TOPICAL at 08:35

## 2022-03-22 RX ADMIN — MIDAZOLAM 1 MG: 1 INJECTION INTRAMUSCULAR; INTRAVENOUS at 08:51

## 2022-03-22 ASSESSMENT — ENCOUNTER SYMPTOMS: DYSRHYTHMIAS: 0

## 2022-03-22 ASSESSMENT — LIFESTYLE VARIABLES: TOBACCO_USE: 0

## 2022-03-22 NOTE — ANESTHESIA POSTPROCEDURE EVALUATION
Patient: Peter Peralta    Procedure: Procedure(s):  ANESTHESIA, FOR CARDIOVERSION       Anesthesia Type:  MAC    Note:  Disposition: Inpatient   Postop Pain Control: Uneventful            Sign Out: Well controlled pain   PONV: No   Neuro/Psych: Uneventful            Sign Out: Acceptable/Baseline neuro status   Airway/Respiratory: Uneventful            Sign Out: Acceptable/Baseline resp. status   CV/Hemodynamics: Uneventful            Sign Out: Acceptable CV status; No obvious hypovolemia; No obvious fluid overload   Other NRE: NONE   DID A NON-ROUTINE EVENT OCCUR?            Last vitals:  Vitals Value Taken Time   BP     Temp     Pulse     Resp     SpO2         Electronically Signed By: Rachelle Arnett  March 22, 2022  1:48 PM

## 2022-03-22 NOTE — PRE-PROCEDURE
GENERAL PRE-PROCEDURE:   Procedure:  OSCAR, cardioversion  Date/Time:  3/22/2022 8:46 AM    Verbal consent obtained?: Yes    Written consent obtained?: Yes    Risks and benefits: Risks, benefits and alternatives were discussed    Consent given by:  Patient  Patient states understanding of procedure being performed: Yes    Patient's understanding of procedure matches consent: Yes    Procedure consent matches procedure scheduled: Yes    Appropriately NPO:  Yes  Mallampati  :  Grade 2- soft palate, base of uvula, tonsillar pillars, and portion of posterior pharyngeal wall visible  Lungs:  Lungs clear with good breath sounds bilaterally  Heart:  Normal heart sounds and rate and a-fib  History & Physical reviewed:  History and physical reviewed and no updates needed  Statement of review:  I have reviewed the lab findings, diagnostic data, medications, and the plan for sedation

## 2022-03-22 NOTE — DISCHARGE INSTRUCTIONS
OSCAR  (Transesophageal Echocardiogram)  with Cardioversion Discharge Instructions    After you go home:      Have an adult stay with you for 6 hours.       For 24 hours - due to the sedation you received:    Relax and take it easy.    Do NOT make any important or legal decisions.    Do NOT drive or operate machines at home or at work.    Do NOT drink alcohol.    Diet:      You may resume your normal diet, but no scratchy foods for two days.    If your throat is sore, eat cold, bland or soft foods.    You may have heartburn if the tube used in the exam entered your stomach.  If so:   - Do not eat acidic and spicy foods.   - Do not eat three hours before bedtime.  Clear liquids are okay.   - When lying down, use two pillows to raise your head.    Medicines:      Take your medications, including blood thinners, unless your provider tells you not to.    If you have stopped any medicines, check with your provider about when to restart them.    You may take Tylenol (Acetaminophen) if your throat is sore.    You may take antacids if you have heartburn.      Follow Up Appointments:      Follow up with your cardiologist at [facility] of patient preference as instructed.    Follow up with your primary care provider as needed.    If you ve had a cardioversion:    The skin on your chest or back may feel tender for 48 hours.  If your skin is tender, you may:    Use a cold pack on the site. Never use ice directly on your skin. Use the cold pack for 20 minutes. Remove it for at least 30 minutes before re-using.    Apply 1% hydrocortisone cream to the skin (sold at drug stores)    Take Advil (Ibuprofen) or Tylenol (Acetaminophen).      Call the clinic if:      You have heartburn that is severe or lasts more than 72 hours.    You have a sore throat that feels worse after 72 hours.    You have shortness of breath, neck pain, chest pain, fever, chills, coughing up blood, or other unusual signs.    Call your cardiologist right  away if you have an irregular heartbeat, shortness of breath or feel dizzy.    Questions or concerns      [facility] [place]:    [phone] UMP (7 days a week)

## 2022-03-22 NOTE — PROCEDURES
Cardioversion Note    Informed consent was signed by the patient.  A timeout was taken.  OSCAR was performed immediately prior which confirmed no left atrial appendage thrombus.      Anesthesia was present for cardioversion, see separate documentation for their sedation.    Baseline rhythm:  Atrial fibrillation with RBBB, HR 88  Synchronized cardioversion performed at 150J, repeated at 200 J and repeated at 200 J with pad repositioning without successful conversion to sinus rhythm.    Post-DCCV rhythm: Atrial fibrillation with HR  bpm      No complications.  Continue current medications including eliquis without interruption  Follow up as scheduled 3/29/22 with Yue BUTLER as scheduled.     Meryl Delgado MD [facility]  March 22, 2022

## 2022-03-22 NOTE — ANESTHESIA PREPROCEDURE EVALUATION
Anesthesia Pre-Procedure Evaluation    Patient: Peter Peralta   MRN: [id number] : 1950        Procedure : Procedure(s):  ANESTHESIA, FOR CARDIOVERSION          Past Medical History:   Diagnosis Date     Actinic keratoses     face     Atrial fibrillation (H)     only after adenosine test     BPH (benign prostatic hypertrophy)      Chest pain      Coronary artery disease 2011-Cath Lma 5%, Lad 40-50%, Lcx 30-40%, Qac81-32% (-FFR of Lad)     ED (erectile dysfunction)      Erectile dysfunction 2021     Family history of colon cancer 2012    mother age 65     GERD (gastroesophageal reflux disease)     nexium     Hyperlipidaemia      Hypertension 2022     IFG (impaired fasting glucose)      Murmur      Obesity, unspecified     Waist size 42, BMI 30.8     Right inguinal hernia 2014     Thrombocytosis      Ulcerative colitis (H)       Past Surgical History:   Procedure Laterality Date     CARDIAC NUC POLINA STRESS TEST NL      Normal     CARDIOVERSION  2011    Atrial Fib     COLONOSCOPY  2014    Procedure: COMBINED COLONOSCOPY, SINGLE BIOPSY/POLYPECTOMY BY BIOPSY;  COLONOSCOPY;  Surgeon: Ashtyn Gonzales MD;  Location:  GI     COLONOSCOPY N/A 2018    Procedure: COMBINED COLONOSCOPY, SINGLE OR MULTIPLE BIOPSY/POLYPECTOMY BY BIOPSY;  COLONOSCOPY ;  Surgeon: Ashtyn Gonzales MD;  Location:  GI     CORONARY ANGIOGRAPHY ADULT ORDER  2011    1 LMA 5%, LAD 40-50%, LCx 30-40%, RCA 25-30%     CV CORONARY ANGIOGRAM N/A 3/3/2022    Procedure: Coronary Angiogram;  Surgeon: Eulalio Del Rosario MD;  Location: [facility] CARDIAC CATH LAB     CV CORONARY ANGIOGRAM N/A 3/3/2022    Procedure: Coronary Angiogram;  Surgeon: Eulalio Del Rosario MD;  Location: [facility] CARDIAC CATH LAB     CV INSTANTANEOUS WAVE-FREE RATIO N/A 3/3/2022    Procedure: Instantaneous Wave-Free Ratio;  Surgeon: Eulalio Del Rosario MD;  Location: [facility] CARDIAC CATH LAB     CV INTRAVASULAR ULTRASOUND N/A  3/3/2022    Procedure: Intravascular Ultrasound;  Surgeon: Eulalio Del Rosario MD;  Location:  HEART CARDIAC CATH LAB     CV INTRAVASULAR ULTRASOUND N/A 3/3/2022    Procedure: Intravascular Ultrasound;  Surgeon: Eulalio Del Rosario MD;  Location:  HEART CARDIAC CATH LAB     CV PCI ATHERECTOMY ORBITAL N/A 3/3/2022    Procedure: Percutaneous Coronary Intervention Atherectomy Rotational;  Surgeon: Eulalio Del Rosario MD;  Location:  HEART CARDIAC CATH LAB     CV TEMPORARY PACEMAKER INSERTION N/A 3/3/2022    Procedure: Temporary Pacemaker Insertion;  Surgeon: Eulalio Del Rosario MD;  Location:  HEART CARDIAC CATH LAB     ENT SURGERY      Ringing in the ears     HC INJECTION SCLEROSING SOLUTION HEMORRHOID  2012    Procedure: HEMORRHOID INJECTION SCLEROSING SOLUTION;  Surgeon: Mayito Chow MD;  Location:  GI     HC TOOTH EXTRACTION W/FORCEP       HERNIA REPAIR  Needed     LASIK  2011     ROTATOR CUFF REPAIR RT/LT Left 10/15/2015    RCR Left - Dr. Carvalho     SOFT TISSUE SURGERY  10/2015    Torn rotator cuff      Allergies   Allergen Reactions     No Known Drug Allergies       Social History     Tobacco Use     Smoking status: Former Smoker     Packs/day: 1.50     Years: 24.00     Pack years: 36.00     Types: Cigarettes     Quit date: 1996     Years since quittin.2     Smokeless tobacco: Never Used   Substance Use Topics     Alcohol use: Yes     Alcohol/week: 0.0 standard drinks     Comment: occ      Wt Readings from Last 1 Encounters:   22 104.8 kg (231 lb)        Anesthesia Evaluation   Pt has had prior anesthetic.     History of anesthetic complications       ROS/MED HX  ENT/Pulmonary:     (+) sleep apnea,  (-) tobacco use and asthma   Neurologic:       Cardiovascular:     (+) hypertension--CAD --stent-CHF (EF 20-25%) Irregular Heartbeat/Palpitations, valvular problems/murmurs mod to severe MR.  (-) arrhythmias and ICD   METS/Exercise Tolerance:     Hematologic:       Musculoskeletal:        GI/Hepatic:     (+) GERD, Asymptomatic on medication,     Renal/Genitourinary:       Endo:     (+) Obesity,  (-) Type II DM and thyroid disease   Psychiatric/Substance Use:       Infectious Disease:       Malignancy:       Other:            Physical Exam    Airway        Mallampati: II   TM distance: > 3 FB   Neck ROM: full   Mouth opening: > 3 cm    Respiratory Devices and Support         Dental  no notable dental history         Cardiovascular   cardiovascular exam normal          Pulmonary   pulmonary exam normal                OUTSIDE LABS:  CBC:   Lab Results   Component Value Date    WBC 11.3 (H) 03/05/2022    WBC 16.7 (H) 03/04/2022    HGB 12.6 (L) 03/05/2022    HGB 13.0 (L) 03/04/2022    HCT 38.3 (L) 03/05/2022    HCT 39.1 (L) 03/04/2022     03/05/2022     03/04/2022     BMP:   Lab Results   Component Value Date     03/10/2022     03/05/2022    POTASSIUM 3.9 03/22/2022    POTASSIUM 4.0 03/10/2022    CHLORIDE 105 03/10/2022    CHLORIDE 107 03/05/2022    CO2 27 03/10/2022    CO2 22 03/05/2022    BUN 17 03/10/2022    BUN 18 03/05/2022    CR 1.03 03/10/2022    CR 0.99 03/05/2022     (H) 03/10/2022     (H) 03/05/2022     COAGS:   Lab Results   Component Value Date    PTT 28 03/03/2022    INR 1.00 03/03/2022     POC: No results found for: BGM, HCG, HCGS  HEPATIC:   Lab Results   Component Value Date    ALBUMIN 3.9 10/06/2020    PROTTOTAL 8.0 10/06/2020    ALT 70 10/06/2020    AST 35 10/06/2020    ALKPHOS 66 10/06/2020    BILITOTAL 0.8 10/06/2020     OTHER:   Lab Results   Component Value Date    A1C 6.3 (H) 12/30/2021    CAMERON 9.0 03/10/2022    MAG 2.2 03/22/2022    TSH 1.05 02/25/2022    CRP <2.9 06/22/2017    SED 10 06/22/2017       Anesthesia Plan    ASA Status:  3      Anesthesia Type: MAC.   Induction: Intravenous.           Consents    Anesthesia Plan(s) and associated risks, benefits, and realistic alternatives discussed. Questions answered and  patient/representative(s) expressed understanding.    - Discussed:     - Discussed with:  Patient         Postoperative Care    Pain management: IV analgesics.        Comments:                Rcahelle Arnett

## 2022-03-22 NOTE — SEDATION DOCUMENTATION
OSCAR completed and ok to proceed with cardioversion.  Given a total of 3 mg versed and 75mcg fentanyl for sedation.  Cardioversion attempted at 150, 200, & 200 Joules- unsuccessful- pt remains in afib.  Monitor.

## 2022-03-22 NOTE — PROGRESS NOTES
Care Suites Discharge Nursing Note    Patient Information  Name: Peter Peralta  Age: 71 year old    Discharge Education:  Discharge instructions reviewed: Yes  Additional education/resources provided: n/a  Patient/patient representative verbalizes understanding: Yes  Patient discharging on new medications: No  Medication education completed: N/A    Discharge Plans:   Discharge location: home  Discharge ride contacted: Yes  Approximate discharge time: 1050    Discharge Criteria:  Discharge criteria met and vital signs stable: Yes    Patient Belongs:  Patient belongings returned to patient: Yes    Анна Estrada RN

## 2022-03-22 NOTE — ANESTHESIA CARE TRANSFER NOTE
Patient: Peter Peralta    Procedure: Procedure(s):  ANESTHESIA, FOR CARDIOVERSION       Diagnosis: A-fib (H) [I48.91]  Diagnosis Additional Information: No value filed.    Anesthesia Type:   MAC     Note:    Oropharynx: oropharynx clear of all foreign objects  Level of Consciousness: awake  Oxygen Supplementation: nasal cannula  Level of Supplemental Oxygen (L/min / FiO2): 2  Independent Airway: airway patency satisfactory and stable  Dentition: dentition unchanged  Vital Signs Stable: post-procedure vital signs reviewed and stable  Report to RN Given: handoff report given  Patient transferred to: Telemetry/Step Down Unit    Handoff Report: Identifed the Patient, Identified the Reponsible Provider, Reviewed the pertinent medical history, Discussed the surgical course, Reviewed Intra-OP anesthesia mangement and issues during anesthesia, Set expectations for post-procedure period and Allowed opportunity for questions and acknowledgement of understanding      Vitals:  Vitals Value Taken Time   /84 03/22/22 0933   Temp     Pulse 111 03/22/22 0933   Resp 14 03/22/22 0933   SpO2 99 % 03/22/22 0933       Electronically Signed By: NIURKA Ackerman CRNA  March 22, 2022  9:42 AM

## 2022-03-22 NOTE — INTERVAL H&P NOTE
"I have reviewed the surgical (or preoperative) H&P that is linked to this encounter, and examined the patient. There are no significant changes    Clinical Conditions Present on Arrival:  SECTIONPRESENTONADMISSIONBEGIN@                # Coagulation Defect: home medication list includes an anticoagulant medication  # Platelet Defect: home medication list includes an antiplatelet medication   # Obesity: Estimated body mass index is 30.48 kg/m  as calculated from the following:    Height as of this encounter: 1.854 m (6' 1\").    Weight as of this encounter: 104.8 kg (231 lb).       "

## 2022-03-23 ENCOUNTER — TELEPHONE (OUTPATIENT)
Dept: FAMILY MEDICINE | Facility: CLINIC | Age: 72
End: 2022-03-23
Payer: MEDICARE

## 2022-03-23 DIAGNOSIS — I49.9 CARDIAC ARRHYTHMIA, UNSPECIFIED CARDIAC ARRHYTHMIA TYPE: Primary | ICD-10-CM

## 2022-03-23 LAB
ATRIAL RATE - MUSE: 111 BPM
ATRIAL RATE - MUSE: 416 BPM
DIASTOLIC BLOOD PRESSURE - MUSE: NORMAL MMHG
DIASTOLIC BLOOD PRESSURE - MUSE: NORMAL MMHG
INTERPRETATION ECG - MUSE: NORMAL
INTERPRETATION ECG - MUSE: NORMAL
P AXIS - MUSE: NORMAL DEGREES
P AXIS - MUSE: NORMAL DEGREES
PR INTERVAL - MUSE: NORMAL MS
PR INTERVAL - MUSE: NORMAL MS
QRS DURATION - MUSE: 142 MS
QRS DURATION - MUSE: 148 MS
QT - MUSE: 406 MS
QT - MUSE: 428 MS
QTC - MUSE: 507 MS
QTC - MUSE: 517 MS
R AXIS - MUSE: 37 DEGREES
R AXIS - MUSE: 75 DEGREES
SYSTOLIC BLOOD PRESSURE - MUSE: NORMAL MMHG
SYSTOLIC BLOOD PRESSURE - MUSE: NORMAL MMHG
T AXIS - MUSE: -44 DEGREES
T AXIS - MUSE: -61 DEGREES
VENTRICULAR RATE- MUSE: 88 BPM
VENTRICULAR RATE- MUSE: 94 BPM

## 2022-03-23 NOTE — TELEPHONE ENCOUNTER
S/w pt and gave Dr. Zuñiga's reply below.    Pt states understanding.    Catia BERNABE RN  EP Triage

## 2022-03-23 NOTE — TELEPHONE ENCOUNTER
S/w pt who states the referral is for Cardiology at [facility].  States there is a Heart Rhythm program at [facility] they would like to try.  Pt had an attempted cardioversion at [facility] yesterday.  [facility] attempted 3 times without success of converting out of A-fib.    Catia BERNABE RN  EP Triage

## 2022-03-23 NOTE — TELEPHONE ENCOUNTER
Dr. Zuñiga- Patient is calling to get a referral to [facility] to their Heart Rhythm program.Patient's insurance does not need a referral but [place] is requesting one. Patient had a cardioversion yesterday and see Dr. Hayward at [facility]. Emy the wife said they cannot get his heart into a regular beat. They would like to get into this program at [place]. Please advise. Thanks    Nini Livingston-referral rep

## 2022-03-23 NOTE — TELEPHONE ENCOUNTER
Since it is outside of [facility], need specification of referral.  Please check on if it is cardiology     thx

## 2022-03-28 ENCOUNTER — HOSPITAL ENCOUNTER (OUTPATIENT)
Dept: CARDIAC REHAB | Facility: CLINIC | Age: 72
Discharge: HOME | End: 2022-03-28
Attending: INTERNAL MEDICINE
Payer: MEDICARE

## 2022-03-28 PROCEDURE — 93798 PHYS/QHP OP CAR RHAB W/ECG: CPT

## 2022-03-29 ENCOUNTER — OFFICE VISIT (OUTPATIENT)
Dept: CARDIOLOGY | Facility: CLINIC | Age: 72
End: 2022-03-29
Payer: MEDICARE

## 2022-03-29 VITALS
WEIGHT: 233 LBS | HEART RATE: 64 BPM | HEIGHT: 73 IN | OXYGEN SATURATION: 97 % | DIASTOLIC BLOOD PRESSURE: 76 MMHG | BODY MASS INDEX: 30.88 KG/M2 | SYSTOLIC BLOOD PRESSURE: 108 MMHG

## 2022-03-29 DIAGNOSIS — Z79.899 ON AMIODARONE THERAPY: ICD-10-CM

## 2022-03-29 DIAGNOSIS — I48.19 PERSISTENT ATRIAL FIBRILLATION (H): ICD-10-CM

## 2022-03-29 DIAGNOSIS — Z92.29 HX OF AMIODARONE THERAPY: Primary | ICD-10-CM

## 2022-03-29 DIAGNOSIS — R06.09 DOE (DYSPNEA ON EXERTION): ICD-10-CM

## 2022-03-29 PROCEDURE — 99214 OFFICE O/P EST MOD 30 MIN: CPT | Performed by: PHYSICIAN ASSISTANT

## 2022-03-29 PROCEDURE — 93000 ELECTROCARDIOGRAM COMPLETE: CPT | Mod: 76 | Performed by: PHYSICIAN ASSISTANT

## 2022-03-29 RX ORDER — AMIODARONE HYDROCHLORIDE 200 MG/1
TABLET ORAL
Qty: 104 TABLET | Refills: 3 | Status: ON HOLD | OUTPATIENT
Start: 2022-03-29 | End: 2022-05-19

## 2022-03-29 NOTE — PROGRESS NOTES
[id number]      HPI and Plan:   See dictation    Orders this Visit:  Orders Placed This Encounter   Procedures     XR Chest 2 Views     EKG 12-lead complete w/read - Clinics (performed today)     EKG 12-lead complete w/read - Clinics (to be scheduled)     Pulmonary function test     Orders Placed This Encounter   Medications     amiodarone (PACERONE) 200 MG tablet     Sig: Take 1 pill twice a day for 7 days, then decrease to 1 pill once a day.     Dispense:  104 tablet     Refill:  3     There are no discontinued medications.      Encounter Diagnoses   Name Primary?     Persistent atrial fibrillation (H)      Hx of amiodarone therapy Yes     On amiodarone therapy      MOSS (dyspnea on exertion)        CURRENT MEDICATIONS:  Current Outpatient Medications   Medication Sig Dispense Refill     acetaminophen (TYLENOL) 325 MG tablet Take 2 tablets (650 mg) by mouth every 4 hours as needed for mild pain or headaches  0     amiodarone (PACERONE) 200 MG tablet Take 1 pill twice a day for 7 days, then decrease to 1 pill once a day. 104 tablet 3     apixaban ANTICOAGULANT (ELIQUIS ANTICOAGULANT) 5 MG tablet Take 1 tablet (5 mg) by mouth 2 times daily 120 tablet 0     atorvastatin (LIPITOR) 40 MG tablet Take 1 tablet (40 mg) by mouth daily 90 tablet 3     Calcium-Vitamin D-Vitamin K (CALCIUM + D + K) 750-500-40 MG-UNT-MCG TABS Take 2 tablets by mouth daily.       clopidogrel (PLAVIX) 75 MG tablet Take 1 tablet (75 mg) by mouth daily 30 tablet 3     coenzyme Q-10 (COQ-10) 200 MG CAPS Take by mouth 2 times daily        esomeprazole (NEXIUM) 40 MG capsule Take 40 mg by mouth every morning (before breakfast) Take 30-60 minutes before eating.       fluticasone (FLONASE) 50 MCG/ACT nasal spray Spray 2 sprays into both nostrils daily 16 g 11     Glucos-Chond-Hyal Ac-Ca Fructo (MOVE FREE JOINT HEALTH ADVANCE) TABS Take 1 mg by mouth daily       lisinopril (ZESTRIL) 2.5 MG tablet Take 1 tablet (2.5 mg) by mouth At Bedtime 90 tablet 3      mesalamine (LIALDA) 1.2 g EC tablet Take 4,800 mg by mouth daily (with breakfast)        metoprolol succinate ER (TOPROL-XL) 50 MG 24 hr tablet Take 0.5 tablets (25 mg) by mouth daily 180 tablet 3     MULTIVITAMIN TABS   OR 1 daily  0     nitroGLYcerin (NITROSTAT) 0.4 MG sublingual tablet For chest pain place 1 tablet under the tongue every 5 minutes for 3 doses. If symptoms persist 5 minutes after 1st dose call 911. 25 tablet 0     Probiotic Product (PROBIOTIC PO) Take by mouth daily       psyllium (METAMUCIL) 58.6 % POWD Take 2 teaspoonful by mouth 2 times daily        sildenafil (VIAGRA) 25 MG tablet Take 3 tablets (75 mg) by mouth daily as needed 90 tablet 0     TURMERIC PO Take 1,500 mg by mouth daily       VITAMIN D, CHOLECALCIFEROL, PO Take 5,000 Units by mouth daily       tadalafil (CIALIS) 10 MG tablet Take 1 tablet (10 mg) by mouth daily as needed 60 tablet 0       ALLERGIES     Allergies   Allergen Reactions     No Known Drug Allergies        PAST MEDICAL HISTORY:  Past Medical History:   Diagnosis Date     Actinic keratoses     face     Atrial fibrillation (H)     only after adenosine test     BPH (benign prostatic hypertrophy)      Chest pain      Coronary artery disease 2011 2011-Cath Lma 5%, Lad 40-50%, Lcx 30-40%, Wur39-54% (-FFR of Lad)     ED (erectile dysfunction)      Erectile dysfunction 12/30/2021     Family history of colon cancer 7/6/2012    mother age 65     GERD (gastroesophageal reflux disease)     nexium     Hyperlipidaemia      Hypertension 2/16/2022     IFG (impaired fasting glucose)      Murmur      Obesity, unspecified     Waist size 42, BMI 30.8     Right inguinal hernia 7/1/2014     Thrombocytosis      Ulcerative colitis (H)        PAST SURGICAL HISTORY:  Past Surgical History:   Procedure Laterality Date     ANESTHESIA CARDIOVERSION N/A 3/22/2022    Procedure: ANESTHESIA, FOR CARDIOVERSION;  Surgeon: GENERIC ANESTHESIA PROVIDER;  Location: [facility] OR     CARDIAC NUC POLINA STRESS TEST  NL      Normal     CARDIOVERSION  12/2011    Atrial Fib     COLONOSCOPY  1/29/2014    Procedure: COMBINED COLONOSCOPY, SINGLE BIOPSY/POLYPECTOMY BY BIOPSY;  COLONOSCOPY;  Surgeon: Ashtyn Gonzales MD;  Location:  GI     COLONOSCOPY N/A 7/11/2018    Procedure: COMBINED COLONOSCOPY, SINGLE OR MULTIPLE BIOPSY/POLYPECTOMY BY BIOPSY;  COLONOSCOPY ;  Surgeon: Ashtyn Gonzales MD;  Location:  GI     CORONARY ANGIOGRAPHY ADULT ORDER  12/2011    1 LMA 5%, LAD 40-50%, LCx 30-40%, RCA 25-30%     CV CORONARY ANGIOGRAM N/A 3/3/2022    Procedure: Coronary Angiogram;  Surgeon: Eulalio Del Rosario MD;  Location:  HEART CARDIAC CATH LAB     CV CORONARY ANGIOGRAM N/A 3/3/2022    Procedure: Coronary Angiogram;  Surgeon: Eulalio Del Rosario MD;  Location:  HEART CARDIAC CATH LAB     CV INSTANTANEOUS WAVE-FREE RATIO N/A 3/3/2022    Procedure: Instantaneous Wave-Free Ratio;  Surgeon: Eulalio Del Rosario MD;  Location:  HEART CARDIAC CATH LAB     CV INTRAVASULAR ULTRASOUND N/A 3/3/2022    Procedure: Intravascular Ultrasound;  Surgeon: Eulalio Del Rosario MD;  Location:  HEART CARDIAC CATH LAB     CV INTRAVASULAR ULTRASOUND N/A 3/3/2022    Procedure: Intravascular Ultrasound;  Surgeon: Eulalio Del Rosario MD;  Location: [facility] CARDIAC CATH LAB     CV PCI ATHERECTOMY ORBITAL N/A 3/3/2022    Procedure: Percutaneous Coronary Intervention Atherectomy Rotational;  Surgeon: Eulalio Del Rosario MD;  Location:  HEART CARDIAC CATH LAB     CV TEMPORARY PACEMAKER INSERTION N/A 3/3/2022    Procedure: Temporary Pacemaker Insertion;  Surgeon: Eulalio Del Rosario MD;  Location: [facility] CARDIAC CATH LAB     ENT SURGERY      Ringing in the ears     HC INJECTION SCLEROSING SOLUTION HEMORRHOID  8/24/2012    Procedure: HEMORRHOID INJECTION SCLEROSING SOLUTION;  Surgeon: Mayito Chow MD;  Location:  GI     HC TOOTH EXTRACTION W/FORCEP       HERNIA REPAIR  Needed     LASIK  1/2011     ROTATOR CUFF REPAIR RT/LT Left 10/15/2015    RCR  Left - Dr. Carvalho     SOFT TISSUE SURGERY  10/2015    Torn rotator cuff       FAMILY HISTORY:  Family History   Problem Relation Age of Onset     C.A.D. Father         passed at 52 from MI     Cardiovascular Father      Heart Disease Father      Obesity Father      Myocardial Infarction Father         52 - passed away     Coronary Artery Disease Father      Colon Cancer Mother 65        passed away from colon cancer - lived about 2 mo from dx 1981     Arthritis Maternal Grandmother      Diabetes Maternal Grandmother      Arthritis Maternal Grandfather      Diabetes Maternal Grandfather      Heart Disease Brother      Diabetes Brother      Coronary Artery Disease Brother      Hypertension Brother      Hyperlipidemia Brother      Coronary Artery Disease Brother         stent placed      Hyperlipidemia Brother      Substance Abuse Paternal Grandfather      Hypertension No family hx of      Cerebrovascular Disease No family hx of      Breast Cancer No family hx of      Prostate Cancer No family hx of      Alcohol/Drug No family hx of      Allergies No family hx of      Alzheimer Disease No family hx of      Circulatory No family hx of      Congenital Anomalies No family hx of      Connective Tissue Disorder No family hx of      Depression No family hx of      Eye Disorder No family hx of      Genetic Disorder No family hx of      Gastrointestinal Disease No family hx of      Genitourinary Problems No family hx of      Gynecology No family hx of      Musculoskeletal Disorder No family hx of      Neurologic Disorder No family hx of      Osteoporosis No family hx of      Psychotic Disorder No family hx of      Respiratory No family hx of      Thyroid Disease No family hx of      Anesthesia Reaction No family hx of      Blood Disease No family hx of        SOCIAL HISTORY:  Social History     Socioeconomic History     Marital status:      Spouse name: Emy     Number of children: 2     Years of education: 16      Highest education level: Not on file   Occupational History     Occupation: [employer]     Employer: [id number]   Tobacco Use     Smoking status: Former Smoker     Packs/day: 1.50     Years: 24.00     Pack years: 36.00     Types: Cigarettes     Quit date: 1996     Years since quittin.2     Smokeless tobacco: Never Used   Substance and Sexual Activity     Alcohol use: Yes     Alcohol/week: 0.0 standard drinks     Comment: occ     Drug use: No     Sexual activity: Yes     Partners: Female     Birth control/protection: None   Other Topics Concern      Service Yes     Blood Transfusions No     Caffeine Concern No     Comment: 2 cups per day     Occupational Exposure No     Hobby Hazards No     Sleep Concern No     Stress Concern Yes     Weight Concern Yes     Special Diet Yes     Back Care No     Exercise No     Comment: 8,000 steps a day. Fitbit     Bike Helmet No     Comment: n/a     Seat Belt Yes     Self-Exams Yes     Parent/sibling w/ CABG, MI or angioplasty before 65F 55M? Yes     Comment: Father and brother   Social History Narrative    Eats fruits and vegetables every day. He takes extra vitamin D. He was advised to aim for 1200 mg of calcium per day.     Social Determinants of Health     Financial Resource Strain: Not on file   Food Insecurity: Not on file   Transportation Needs: Not on file   Physical Activity: Not on file   Stress: Not on file   Social Connections: Not on file   Intimate Partner Violence: Not on file   Housing Stability: Not on file       Review of Systems:  Skin:  Negative     Eyes:  Positive for glasses  ENT:  Positive for hearing loss  Respiratory:  Positive for dyspnea on exertion  Cardiovascular:  Negative    Gastroenterology: Positive for heartburn  Genitourinary:  Negative    Musculoskeletal:  Negative    Neurologic:  Positive for headaches  Psychiatric:  Negative    Heme/Lymph/Imm:  Negative    Endocrine:  Negative      Physical Exam:  Vitals: /76    "Pulse 64   Ht 1.854 m (6' 1\")   Wt 105.7 kg (233 lb)   SpO2 97%   BMI 30.74 kg/m     Please refer to dictation for physical exam    Recent Lab Results: all reviewed today  CBC RESULTS:  Lab Results   Component Value Date    WBC 11.3 (H) 03/05/2022    WBC 9.1 10/06/2020    RBC 4.31 (L) 03/05/2022    RBC 5.39 10/06/2020    HGB 12.6 (L) 03/05/2022    HGB 16.3 10/06/2020    HCT 38.3 (L) 03/05/2022    HCT 47.9 10/06/2020    MCV 89 03/05/2022    MCV 89 10/06/2020    MCH 29.2 03/05/2022    MCH 30.2 10/06/2020    MCHC 32.9 03/05/2022    MCHC 34.0 10/06/2020    RDW 12.8 03/05/2022    RDW 13.5 10/06/2020     03/05/2022     (H) 10/06/2020       BMP RESULTS:  Lab Results   Component Value Date     03/10/2022     10/06/2020    POTASSIUM 3.9 03/22/2022    POTASSIUM 4.8 10/06/2020    CHLORIDE 105 03/10/2022    CHLORIDE 105 10/06/2020    CO2 27 03/10/2022    CO2 27 10/06/2020    ANIONGAP 6 03/10/2022    ANIONGAP 5 10/06/2020     (H) 03/10/2022     (H) 10/06/2020    BUN 17 03/10/2022    BUN 15 10/06/2020    CR 1.03 03/10/2022    CR 0.99 10/06/2020    GFRESTIMATED 79 03/10/2022    GFRESTIMATED 78 10/06/2020    GFRESTBLACK 90 10/06/2020    CAMERON 9.0 03/10/2022    CAMERON 9.5 10/06/2020        INR RESULTS:  Lab Results   Component Value Date    INR 1.00 03/03/2022    INR 1.02 12/22/2011           CC  No referring provider defined for this encounter.      "

## 2022-03-29 NOTE — LETTER
3/29/2022    Donell Zuñiga MD  [address] [facility] [address]    RE: Peter Peralta       Dear Colleague,     I had the [facility].  [id number]      HPI and Plan:   See dictation    Orders this Visit:  Orders Placed This Encounter   Procedures     XR Chest 2 Views     EKG 12-lead complete w/read - Clinics (performed today)     EKG 12-lead complete w/read - Clinics (to be scheduled)     Pulmonary function test     Orders Placed This Encounter   Medications     amiodarone (PACERONE) 200 MG tablet     Sig: Take 1 pill twice a day for 7 days, then decrease to 1 pill once a day.     Dispense:  104 tablet     Refill:  3     There are no discontinued medications.      Encounter Diagnoses   Name Primary?     Persistent atrial fibrillation (H)      Hx of amiodarone therapy Yes     On amiodarone therapy      MOSS (dyspnea on exertion)        CURRENT MEDICATIONS:  Current Outpatient Medications   Medication Sig Dispense Refill     acetaminophen (TYLENOL) 325 MG tablet Take 2 tablets (650 mg) by mouth every 4 hours as needed for mild pain or headaches  0     amiodarone (PACERONE) 200 MG tablet Take 1 pill twice a day for 7 days, then decrease to 1 pill once a day. 104 tablet 3     apixaban ANTICOAGULANT (ELIQUIS ANTICOAGULANT) 5 MG tablet Take 1 tablet (5 mg) by mouth 2 times daily 120 tablet 0     atorvastatin (LIPITOR) 40 MG tablet Take 1 tablet (40 mg) by mouth daily 90 tablet 3     Calcium-Vitamin D-Vitamin K (CALCIUM + D + K) 750-500-40 MG-UNT-MCG TABS Take 2 tablets by mouth daily.       clopidogrel (PLAVIX) 75 MG tablet Take 1 tablet (75 mg) by mouth daily 30 tablet 3     coenzyme Q-10 (COQ-10) 200 MG CAPS Take by mouth 2 times daily        esomeprazole (NEXIUM) 40 MG capsule Take 40 mg by mouth every morning (before breakfast) Take 30-60 minutes before eating.       fluticasone (FLONASE) 50 MCG/ACT nasal spray Spray 2 sprays into both nostrils  daily 16 g 11     Glucos-Chond-Hyal Ac-Ca Fructo (MOVE FREE JOINT HEALTH ADVANCE) TABS Take 1 mg by mouth daily       lisinopril (ZESTRIL) 2.5 MG tablet Take 1 tablet (2.5 mg) by mouth At Bedtime 90 tablet 3     mesalamine (LIALDA) 1.2 g EC tablet Take 4,800 mg by mouth daily (with breakfast)        metoprolol succinate ER (TOPROL-XL) 50 MG 24 hr tablet Take 0.5 tablets (25 mg) by mouth daily 180 tablet 3     MULTIVITAMIN TABS   OR 1 daily  0     nitroGLYcerin (NITROSTAT) 0.4 MG sublingual tablet For chest pain place 1 tablet under the tongue every 5 minutes for 3 doses. If symptoms persist 5 minutes after 1st dose call 911. 25 tablet 0     Probiotic Product (PROBIOTIC PO) Take by mouth daily       psyllium (METAMUCIL) 58.6 % POWD Take 2 teaspoonful by mouth 2 times daily        sildenafil (VIAGRA) 25 MG tablet Take 3 tablets (75 mg) by mouth daily as needed 90 tablet 0     TURMERIC PO Take 1,500 mg by mouth daily       VITAMIN D, CHOLECALCIFEROL, PO Take 5,000 Units by mouth daily       tadalafil (CIALIS) 10 MG tablet Take 1 tablet (10 mg) by mouth daily as needed 60 tablet 0       ALLERGIES     Allergies   Allergen Reactions     No Known Drug Allergies        PAST MEDICAL HISTORY:  Past Medical History:   Diagnosis Date     Actinic keratoses     face     Atrial fibrillation (H)     only after adenosine test     BPH (benign prostatic hypertrophy)      Chest pain      Coronary artery disease 2011 2011-Cath Lma 5%, Lad 40-50%, Lcx 30-40%, Qyt12-83% (-FFR of Lad)     ED (erectile dysfunction)      Erectile dysfunction 12/30/2021     Family history of colon cancer 7/6/2012    mother age 65     GERD (gastroesophageal reflux disease)     nexium     Hyperlipidaemia      Hypertension 2/16/2022     IFG (impaired fasting glucose)      Murmur      Obesity, unspecified     Waist size 42, BMI 30.8     Right inguinal hernia 7/1/2014     Thrombocytosis      Ulcerative colitis (H)        PAST SURGICAL HISTORY:  Past Surgical  History:   Procedure Laterality Date     ANESTHESIA CARDIOVERSION N/A 3/22/2022    Procedure: ANESTHESIA, FOR CARDIOVERSION;  Surgeon: GENERIC ANESTHESIA PROVIDER;  Location:  OR     CARDIAC NUC POLINA STRESS TEST NL      Normal     CARDIOVERSION  12/2011    Atrial Fib     COLONOSCOPY  1/29/2014    Procedure: COMBINED COLONOSCOPY, SINGLE BIOPSY/POLYPECTOMY BY BIOPSY;  COLONOSCOPY;  Surgeon: Ashtyn Gonzales MD;  Location:  GI     COLONOSCOPY N/A 7/11/2018    Procedure: COMBINED COLONOSCOPY, SINGLE OR MULTIPLE BIOPSY/POLYPECTOMY BY BIOPSY;  COLONOSCOPY ;  Surgeon: Ashtyn Gonzales MD;  Location:  GI     CORONARY ANGIOGRAPHY ADULT ORDER  12/2011    1 LMA 5%, LAD 40-50%, LCx 30-40%, RCA 25-30%     CV CORONARY ANGIOGRAM N/A 3/3/2022    Procedure: Coronary Angiogram;  Surgeon: Eulalio Del Rosario MD;  Location:  HEART CARDIAC CATH LAB     CV CORONARY ANGIOGRAM N/A 3/3/2022    Procedure: Coronary Angiogram;  Surgeon: Eulalio Del Rosario MD;  Location:  HEART CARDIAC CATH LAB     CV INSTANTANEOUS WAVE-FREE RATIO N/A 3/3/2022    Procedure: Instantaneous Wave-Free Ratio;  Surgeon: Eulalio Del Rosario MD;  Location:  HEART CARDIAC CATH LAB     CV INTRAVASULAR ULTRASOUND N/A 3/3/2022    Procedure: Intravascular Ultrasound;  Surgeon: Eulalio Del Rosario MD;  Location:  HEART CARDIAC CATH LAB     CV INTRAVASULAR ULTRASOUND N/A 3/3/2022    Procedure: Intravascular Ultrasound;  Surgeon: Eulalio Del Rosario MD;  Location:  HEART CARDIAC CATH LAB     CV PCI ATHERECTOMY ORBITAL N/A 3/3/2022    Procedure: Percutaneous Coronary Intervention Atherectomy Rotational;  Surgeon: Eulalio Del Rosario MD;  Location:  HEART CARDIAC CATH LAB     CV TEMPORARY PACEMAKER INSERTION N/A 3/3/2022    Procedure: Temporary Pacemaker Insertion;  Surgeon: Eulalio Del Rosario MD;  Location:  HEART CARDIAC CATH LAB     ENT SURGERY      Ringing in the ears     HC INJECTION SCLEROSING SOLUTION HEMORRHOID  8/24/2012    Procedure: HEMORRHOID  INJECTION SCLEROSING SOLUTION;  Surgeon: Mayito Chow MD;  Location: [facility] GI     HC TOOTH EXTRACTION W/FORCEP       HERNIA REPAIR  Needed     LASIK  1/2011     ROTATOR CUFF REPAIR RT/LT Left 10/15/2015    RCR Left - Dr. Carvalho     SOFT TISSUE SURGERY  10/2015    Torn rotator cuff       FAMILY HISTORY:  Family History   Problem Relation Age of Onset     C.A.D. Father         passed at 52 from MI     Cardiovascular Father      Heart Disease Father      Obesity Father      Myocardial Infarction Father         52 - passed away     Coronary Artery Disease Father      Colon Cancer Mother 65        passed away from colon cancer - lived about 2 mo from dx 1981     Arthritis Maternal Grandmother      Diabetes Maternal Grandmother      Arthritis Maternal Grandfather      Diabetes Maternal Grandfather      Heart Disease Brother      Diabetes Brother      Coronary Artery Disease Brother      Hypertension Brother      Hyperlipidemia Brother      Coronary Artery Disease Brother         stent placed      Hyperlipidemia Brother      Substance Abuse Paternal Grandfather      Hypertension No family hx of      Cerebrovascular Disease No family hx of      Breast Cancer No family hx of      Prostate Cancer No family hx of      Alcohol/Drug No family hx of      Allergies No family hx of      Alzheimer Disease No family hx of      Circulatory No family hx of      Congenital Anomalies No family hx of      Connective Tissue Disorder No family hx of      Depression No family hx of      Eye Disorder No family hx of      Genetic Disorder No family hx of      Gastrointestinal Disease No family hx of      Genitourinary Problems No family hx of      Gynecology No family hx of      Musculoskeletal Disorder No family hx of      Neurologic Disorder No family hx of      Osteoporosis No family hx of      Psychotic Disorder No family hx of      Respiratory No family hx of      Thyroid Disease No family hx of      Anesthesia Reaction No family hx of       Blood Disease No family hx of        SOCIAL HISTORY:  Social History     Socioeconomic History     Marital status:      Spouse name: Emy     Number of children: 2     Years of education: 16     Highest education level: Not on file   Occupational History     Occupation: [employer]     Employer: [employer]   Tobacco Use     Smoking status: Former Smoker     Packs/day: 1.50     Years: 24.00     Pack years: 36.00     Types: Cigarettes     Quit date: 1996     Years since quittin.2     Smokeless tobacco: Never Used   Substance and Sexual Activity     Alcohol use: Yes     Alcohol/week: 0.0 standard drinks     Comment: occ     Drug use: No     Sexual activity: Yes     Partners: Female     Birth control/protection: None   Other Topics Concern      Service Yes     Blood Transfusions No     Caffeine Concern No     Comment: 2 cups per day     Occupational Exposure No     Hobby Hazards No     Sleep Concern No     Stress Concern Yes     Weight Concern Yes     Special Diet Yes     Back Care No     Exercise No     Comment: 8,000 steps a day. Fitbit     Bike Helmet No     Comment: n/a     Seat Belt Yes     Self-Exams Yes     Parent/sibling w/ CABG, MI or angioplasty before 65F 55M? Yes     Comment: Father and brother   Social History Narrative    Eats fruits and vegetables every day. He takes extra vitamin D. He was advised to aim for 1200 mg of calcium per day.     Social Determinants of Health     Financial Resource Strain: Not on file   Food Insecurity: Not on file   Transportation Needs: Not on file   Physical Activity: Not on file   Stress: Not on file   Social Connections: Not on file   Intimate Partner Violence: Not on file   Housing Stability: Not on file       Review of Systems:  Skin:  Negative     Eyes:  Positive for glasses  ENT:  Positive for hearing loss  Respiratory:  Positive for dyspnea on exertion  Cardiovascular:  Negative    Gastroenterology: Positive for  "heartburn  Genitourinary:  Negative    Musculoskeletal:  Negative    Neurologic:  Positive for headaches  Psychiatric:  Negative    Heme/Lymph/Imm:  Negative    Endocrine:  Negative      Physical Exam:  Vitals: /76   Pulse 64   Ht 1.854 m (6' 1\")   Wt 105.7 kg (233 lb)   SpO2 97%   BMI 30.74 kg/m     Please refer to dictation for physical exam    Recent Lab Results: all reviewed today  CBC RESULTS:  Lab Results   Component Value Date    WBC 11.3 (H) 03/05/2022    WBC 9.1 10/06/2020    RBC 4.31 (L) 03/05/2022    RBC 5.39 10/06/2020    HGB 12.6 (L) 03/05/2022    HGB 16.3 10/06/2020    HCT 38.3 (L) 03/05/2022    HCT 47.9 10/06/2020    MCV 89 03/05/2022    MCV 89 10/06/2020    MCH 29.2 03/05/2022    MCH 30.2 10/06/2020    MCHC 32.9 03/05/2022    MCHC 34.0 10/06/2020    RDW 12.8 03/05/2022    RDW 13.5 10/06/2020     03/05/2022     (H) 10/06/2020       BMP RESULTS:  Lab Results   Component Value Date     03/10/2022     10/06/2020    POTASSIUM 3.9 03/22/2022    POTASSIUM 4.8 10/06/2020    CHLORIDE 105 03/10/2022    CHLORIDE 105 10/06/2020    CO2 27 03/10/2022    CO2 27 10/06/2020    ANIONGAP 6 03/10/2022    ANIONGAP 5 10/06/2020     (H) 03/10/2022     (H) 10/06/2020    BUN 17 03/10/2022    BUN 15 10/06/2020    CR 1.03 03/10/2022    CR 0.99 10/06/2020    GFRESTIMATED 79 03/10/2022    GFRESTIMATED 78 10/06/2020    GFRESTBLACK 90 10/06/2020    CAMERON 9.0 03/10/2022    CAMERON 9.5 10/06/2020        INR RESULTS:  Lab Results   Component Value Date    INR 1.00 03/03/2022    INR 1.02 12/22/2011       CC  No referring provider defined for this encounter.    Thank you for allowing me to participate in the care of your patient.      Sincerely,     DOUGIE Rahman [facility]  "

## 2022-03-29 NOTE — PATIENT INSTRUCTIONS
Call [facility] nurse for any questions or concerns Mon-Fri 8am-4pm:                                                #[phone]                                       For concerns after hours:                                               #[phone]     1: Medication changes: start taking amiodarone 200 mg twice a day for 7 days.  Then go to 200 mg once a day.   Continue other medications.      2: Plan from today: we'll do a chest xray and breathing tests for monitoring for amiodarone.    We'll have you see Dr. Del Rosario back and see if you're in a normal rhythm.

## 2022-03-29 NOTE — PROGRESS NOTES
Service Date: 2022    CLINIC VISIT     PRIMARY CARDIOLOGISTS:  Eulalio Del Rosario MD.  Also recently seen by Yon Modi MD.    REASON FOR VISIT:  Recent diagnosis of systolic heart failure, AFib, coronary artery disease.    HISTORY OF PRESENT ILLNESS:  Mr. Peralta is a delightful, 71-year-old gentleman with a past medical history significant for the followin.  Coronary artery disease with initial angiogram in  showing negative nonocclusive disease.  He was diagnosed with a new cardiomyopathy in February and had an abnormal stress test and went for angiogram .  This showed an occluded RCA, and he underwent thrombectomy of his RCA, pulling out a red clot to reestablish flow.  There was severe narrowing through the mid and distal portion requiring rotablator and drug-eluting stent x2.  He had profound bradycardia and hypotension, but no chest pain during the procedure.  He had unstable hemodynamics until they reestablished flow.  LAD was non-flow limiting, and the circ and ramus had minimal disease.  He did require a relook angiogram, as he had ST changes post angiogram, but this showed normal flow.  2.  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  3.  Dyslipidemia, on atorvastatin.  4.  New diagnosis AFib, which was noted by the patient's gastroenterologist in followup in February.  5.  Ulcerative colitis with 2 rounds of prednisone in the fall of .  6.  Erectile dysfunction.  7.  Elevated platelets with Oncology consult in the hospital suggesting it was reactive thrombocytosis secondary to UC.    I had met Nathan in February while he was undergoing workup. Given his abnormal stress test, we sent him for an angiogram with the above-noted results.  He then underwent a OSCAR-guided cardioversion.  Unfortunately, with 3 shocks, he did not cardiovert, and he is here today for followup.    He comes in, and he feels about the same.  He denies chest pain,  shortness of breath, orthopnea or PND.  He is not lightheaded or dizzy.  He still does not note his palpitations.  He has mild indigestion.  He does not have any chest pain.  He has been attending cardiac rehab regularly.  He has been really taking it easy at home, but is doing some walking at home and doing about an 18 minute mile.    SOCIAL HISTORY:  He comes in today with his wife, Emy; she is a retired RN.  Their son, Luis, came in last time as well.  Their daughter has long COVID syndrome, and they had a daughter who also had COVID.  The patient does not drink alcohol.  He is a former smoker, 30 pack year history, quit in the 1990s.    PHYSICAL EXAMINATION:    GENERAL:  Well-developed, well-nourished gentleman in no acute distress.  HEENT:  Normocephalic and atraumatic.  HEART:  Irregularly irregular.  I do not appreciate a murmur, rub or gallop.  LUNGS:  Clear without wheezes, rales or rhonchi.   EXTREMITIES:  Without peripheral edema.  Neck veins are flat at 30 degrees.    IMAGING:  EKG done today shows AFib with a heart rate of 109 with a right bundle branch block.    ASSESSMENT AND PLAN:    1.  Ongoing AFib with heart rates initially controlled, but increasing with activity with bradycardia during his hospitalization for his stenting, so he is now on a low dose of Toprol at 25 mg a day.  Unfortunately, he did not cardiovert despite 3 attempts, nor did he have left atrial appendage thrombus.  At this point, since we think his cardiomyopathy is likely tachycardia induced, I think it is worthwhile to do another attempt at maintaining sinus rhythm.  We discussed today options of rate control, antiarrhythmic with repeat cardioversion, versus PVI.  Today we are going to start amiodarone 200 mg b.i.d. for a week, then go down to 200 mg a day with a repeat EKG when he sees Dr. Del Rosario.  If he is in sinus rhythm, of course, we will not need to do anything more.  Otherwise, we will defer to Dr. Del Rosario if he would  like to attempt to get him in sinus rhythm again.  He does have moderately enlarged bilateral atria, making this more difficult.  2.  Cardiomyopathy with chronic systolic heart failure, not requiring diuretics.  We have only been able to get him on 2.5 mg of lisinopril and 25 mg of Toprol.  He does have some lower blood pressures at home, and I will not push further at this time. We will optimize as best we can after his visit with Dr. Del Rosario.  3.  Sleep apnea with restless legs.  We will defer to the [facility].  4.  Ulcerative colitis with 2 rounds of prednisone this fall, likely contributing to his AFib per GI.  5.  Prediabetes.  Can consider Jardiance down the road.  6.  Dyslipidemia, excellent control.  7.  Thrombocytosis, seen by Hematology and felt to be reactive.  8.  Coronary artery disease with complex intervention of occluded RCA, requiring rotablator and stenting.  I did complete a platelet inhibition test that suggests this is normal.  He has not had chest pain now nor did he have chest pain prior, and his groin is now well healed.    We will have the patient see Dr. Del Rosario as scheduled.  He will call sooner with concerns.    Yue Sequeira PA-C        D: 2022   T: 2022   MT: lyly    Name:     BUCK SAMANIEGO  MRN:      [id number]        Account:      [id number]   :      1950           Service Date: 2022       Document: [id number]

## 2022-03-30 ENCOUNTER — HOSPITAL ENCOUNTER (OUTPATIENT)
Dept: CARDIAC REHAB | Facility: CLINIC | Age: 72
Discharge: HOME | End: 2022-03-30
Attending: INTERNAL MEDICINE
Payer: MEDICARE

## 2022-03-30 PROCEDURE — 93798 PHYS/QHP OP CAR RHAB W/ECG: CPT | Performed by: REHABILITATION PRACTITIONER

## 2022-04-04 ENCOUNTER — HOSPITAL ENCOUNTER (OUTPATIENT)
Dept: CARDIAC REHAB | Facility: CLINIC | Age: 72
Discharge: HOME | End: 2022-04-04
Attending: INTERNAL MEDICINE
Payer: MEDICARE

## 2022-04-04 PROCEDURE — 93798 PHYS/QHP OP CAR RHAB W/ECG: CPT

## 2022-04-06 ENCOUNTER — HOSPITAL ENCOUNTER (OUTPATIENT)
Dept: CARDIAC REHAB | Facility: CLINIC | Age: 72
Discharge: HOME | End: 2022-04-06
Attending: INTERNAL MEDICINE
Payer: MEDICARE

## 2022-04-06 ENCOUNTER — HOSPITAL ENCOUNTER (OUTPATIENT)
Dept: GENERAL RADIOLOGY | Facility: CLINIC | Age: 72
Discharge: HOME | End: 2022-04-06
Attending: PHYSICIAN ASSISTANT | Admitting: PHYSICIAN ASSISTANT
Payer: MEDICARE

## 2022-04-06 DIAGNOSIS — Z79.899 ON AMIODARONE THERAPY: ICD-10-CM

## 2022-04-06 PROCEDURE — 93798 PHYS/QHP OP CAR RHAB W/ECG: CPT

## 2022-04-06 PROCEDURE — 71046 X-RAY EXAM CHEST 2 VIEWS: CPT

## 2022-04-11 ENCOUNTER — HOSPITAL ENCOUNTER (OUTPATIENT)
Dept: CARDIAC REHAB | Facility: CLINIC | Age: 72
Discharge: HOME | End: 2022-04-11
Attending: INTERNAL MEDICINE
Payer: MEDICARE

## 2022-04-11 PROCEDURE — 93798 PHYS/QHP OP CAR RHAB W/ECG: CPT

## 2022-04-13 ENCOUNTER — LAB (OUTPATIENT)
Dept: LAB | Facility: CLINIC | Age: 72
End: 2022-04-13
Payer: MEDICARE

## 2022-04-13 ENCOUNTER — HOSPITAL ENCOUNTER (OUTPATIENT)
Dept: CARDIAC REHAB | Facility: CLINIC | Age: 72
Discharge: HOME | End: 2022-04-13
Attending: INTERNAL MEDICINE
Payer: MEDICARE

## 2022-04-13 DIAGNOSIS — I42.8 CARDIOMYOPATHY, NONISCHEMIC (H): ICD-10-CM

## 2022-04-13 LAB
ANION GAP SERPL CALCULATED.3IONS-SCNC: 4 MMOL/L (ref 3–14)
BUN SERPL-MCNC: 28 MG/DL (ref 7–30)
CALCIUM SERPL-MCNC: 9.3 MG/DL (ref 8.5–10.1)
CHLORIDE BLD-SCNC: 107 MMOL/L (ref 94–109)
CO2 SERPL-SCNC: 25 MMOL/L (ref 20–32)
CREAT SERPL-MCNC: 1.17 MG/DL (ref 0.66–1.25)
GFR SERPL CREATININE-BSD FRML MDRD: 67 ML/MIN/1.73M2
GLUCOSE BLD-MCNC: 93 MG/DL (ref 70–99)
POTASSIUM BLD-SCNC: 4.4 MMOL/L (ref 3.4–5.3)
SODIUM SERPL-SCNC: 136 MMOL/L (ref 133–144)

## 2022-04-13 PROCEDURE — 93798 PHYS/QHP OP CAR RHAB W/ECG: CPT

## 2022-04-13 PROCEDURE — 36415 COLL VENOUS BLD VENIPUNCTURE: CPT

## 2022-04-13 PROCEDURE — 80048 BASIC METABOLIC PNL TOTAL CA: CPT

## 2022-04-19 ENCOUNTER — OFFICE VISIT (OUTPATIENT)
Dept: CARDIOLOGY | Facility: CLINIC | Age: 72
End: 2022-04-19
Attending: INTERNAL MEDICINE
Payer: MEDICARE

## 2022-04-19 VITALS
HEART RATE: 65 BPM | DIASTOLIC BLOOD PRESSURE: 69 MMHG | SYSTOLIC BLOOD PRESSURE: 114 MMHG | BODY MASS INDEX: 30.35 KG/M2 | HEIGHT: 73 IN | WEIGHT: 229 LBS

## 2022-04-19 DIAGNOSIS — I25.5 ISCHEMIC CARDIOMYOPATHY: ICD-10-CM

## 2022-04-19 DIAGNOSIS — I48.19 PERSISTENT ATRIAL FIBRILLATION (H): Primary | ICD-10-CM

## 2022-04-19 DIAGNOSIS — I34.0 NONRHEUMATIC MITRAL VALVE REGURGITATION: ICD-10-CM

## 2022-04-19 PROCEDURE — 93000 ELECTROCARDIOGRAM COMPLETE: CPT | Performed by: INTERNAL MEDICINE

## 2022-04-19 PROCEDURE — 99215 OFFICE O/P EST HI 40 MIN: CPT | Performed by: INTERNAL MEDICINE

## 2022-04-19 RX ORDER — METOPROLOL SUCCINATE 50 MG/1
25 TABLET, EXTENDED RELEASE ORAL DAILY
Qty: 180 TABLET | Refills: 3 | Status: ON HOLD | OUTPATIENT
Start: 2022-04-19 | End: 2022-05-19

## 2022-04-19 NOTE — PROGRESS NOTES
Service Date: 04/19/2022    CARDIOLOGY OFFICE NOTE    OFFICE NOTE:  Peter Peralta is a very pleasant, 71-year-old gentleman.  Coincidentally, his wife is a nurse, and she is one of our patients, also. THIS IS A COMPLEX STORY.  I last saw the patient in 2019.  At that time, we identified no more than mild to moderate 3 vessel coronary artery disease with preserved ejection fraction.  His last stress test back then was normal with sinus rhythm.  He went 10 minutes on a Callum protocol with no EKG changes and occasional PVCs.  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  We were treating him for his cholesterol panel, which was normal.  He did have impaired fasting glucose or metabolic syndrome, and we talked extensively about that, and we referred him back to his internist, and we also talked about erectile dysfunction.  I had recommended he come back in 3 years for a repeat stress test and an office visit.  Unfortunately, many things occurred. In retrospect, in 10/2021, he started noticing shortness of breath and exercise intolerance.  There is no way to know exactly what happened after that, but he was referred in after I believe his gastroenterologist, who sees him for ulcerative colitis, noted he was in atrial fib.  He saw my partner Dr. Yon Modi in 02/2022, and it showed that he had rapid atrial fib, and he did not feel any of it; Dr. Kade Modi's note is in the chart. He was going to be evaluated for obstructive sleep apnea. Because of the new atrial fib of unknown duration, he was referred for a sleep apnea evaluation, which is still pending.  He had blood work, including potassium and TSH, and a followup stress test. His blood pressure was noted to be slightly high in that visit, and he was started on metoprolol.  An echocardiogram and a nuclear test were ordered.  The nuclear stress test showed that he had had a heart attack  sometime before 02/2022.  It reported mostly transmural infarct in the distal inferior wall with a small area of ischemia.  The ejection fraction was listed at 26%.  There was no ischemia in the anterior wall.  He was noted to have a right bundle branch block on his EKG.  An echocardiogram was then performed, which showed ejection fraction 25%-30%, severe global hypokinesis, septal motion consistent with conduction disturbance, mildly dilated right ventricle with moderate decreased RV systolic function and severe biatrial enlargement.  They reported only mild MR and mild aortic valve stenosis.  He went on to a heart catheterization in 03/2022, which I did.  He had no more than mild to moderate left coronary artery disease, but he had a totally occluded right coronary artery, which I was able to open. The fact that I could pass the wire easily had suggested it was probably a relatively recent thrombus.  We did flow reserve with an iFR of the LAD, which was about a 60% narrowing, and it was a normal flow. In retrospect, my suspicion is that he had ongoing risk factors, including possible hypertension and possible sleep apnea, and I am wondering if he went into atrial fib in 10/2021 and developed a tachycardia-induced cardiomyopathy.  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  I then wondered if he had an atrial fib clot, which embolized down the right coronary artery and occluded it sometime before February. It is notable that he had a followup echo, including a transesophageal echo, about a month ago.  The transesophageal echo reports EF 20%-25%, global hypokinesis, severe decreased RV systolic function, moderate biatrial enlargement, no atrial shunt and 3+ mitral regurgitation.  They do not comment on the aortic  valve being any more than sclerotic.  Therefore, it is hard to know how much of this cardiomyopathy might be tachycardia-induced cardiomyopathy and how much might be from an inferior infarct, but the fact that it was a globally abnormal left ventricle, including the right ventricle, of course, with the right coronary occluded, makes me wonder if some of it is a tachycardia-induced cardiomyopathy.  He was started on amiodarone and low-dose beta blocker.     He is still today going too fast. His heart rate at rest is 93.  I had him walk in the [place], and it was 140.  I looked at his Apple watch, and some of the readings are called sinus rhythm, but I do not think they are sinus rhythm, and so I think he has been in atrial fib this entire time, although the EKG today looks more like possible fib/flutter than classic atrial fib, which is what it looked like previously. I am going to recommend he see our electrophysiologist in the next couple of weeks.  My guess is they are going to simply recommend continuing the beta blockers I am doing, although I increased the dose from 25 a day to 25 mg b.i.d., and I told him to hold if the heart rate gets below 55-60. My guess is they will plan an electrical cardioversion.  I do not know if trying to ablate what looks like flutter now might simply be that the amiodarone converted fib towards flutter, but we will ask them for their input.  I will get an echocardiogram in about a month, but that might still be too soon to determine if some of this is tachycardia-induced cardiomyopathy, which is improved.  It is bothersome that the RV and LV are both bad, and there is so much mitral insufficiency, although clinically I barely hear a systolic murmur, and he does not have that much in the way of symptoms.  He will stay on his anticoagulation.  I still think at some point he should see Hematology because I do not know if we can totally just assume that the thrombocytosis is related to  ulcerative colitis as a reaction.  They may want to do a DNA analysis, but we will let them decide that later. In the interim, we will, of course, want to keep him on amiodarone and his Eliquis.  He is not currently on aspirin, but he is on Plavix, which is appropriate given that he has a new stent, and we want some antiplatelet besides antithrombotic drugs on board.  We will see him back soon with an electrophysiologist and an EKG, and we will see him back in about a month with an echocardiogram and an office visit with General Cardiology.    Today's visit was 67 minutes.    cc:  Donell Zuñ[place] [address]    Eulalio Del Rosario MD        D: 2022   T: 2022   MT: lyly    Name:     BUCK SAMANIEGO  MRN:      [id number]        Account:      [id number]   :      1950           Service Date: 2022       Document: [id number]

## 2022-04-19 NOTE — LETTER
4/19/2022    Donell Zuñiga MD  [address] [facility] [address]    RE: Peter Peralta       Dear Colleague,     I had the pleasure of seeing Peter Peralta in the [facility].  HPI and Plan:   See dictation    Orders Placed This Encounter   Procedures     Follow-Up with Cardiology EP     Follow-Up with Cardiology     EKG 12-lead complete w/read - Clinics (performed today)     Echocardiogram Complete     Orders Placed This Encounter   Medications     UNABLE TO FIND     Sig: daily MEDICATION NAME: Move Free     metoprolol succinate ER (TOPROL-XL) 50 MG 24 hr tablet     Sig: Take 0.5 tablets (25 mg) by mouth daily Take 1/2 tab (25mg) twice a day. Hold for HR <55-60     Dispense:  180 tablet     Refill:  3     Medications Discontinued During This Encounter   Medication Reason     coenzyme Q-10 (COQ-10) 200 MG CAPS Stopped by Patient     TURMERIC PO Stopped by Patient     Glucos-Chond-Hyal Ac-Ca Fructo (MOVE FREE [employer] HEALTH ADVANCE) TABS Stopped by Patient     tadalafil (CIALIS) 10 MG tablet Stopped by Patient     metoprolol succinate ER (TOPROL-XL) 50 MG 24 hr tablet Reorder         Encounter Diagnoses   Name Primary?     Persistent atrial fibrillation (H) Yes     Ischemic cardiomyopathy      Nonrheumatic mitral valve regurgitation        CURRENT MEDICATIONS:  Current Outpatient Medications   Medication Sig Dispense Refill     acetaminophen (TYLENOL) 325 MG tablet Take 2 tablets (650 mg) by mouth every 4 hours as needed for mild pain or headaches  0     amiodarone (PACERONE) 200 MG tablet Take 1 pill twice a day for 7 days, then decrease to 1 pill once a day. 104 tablet 3     apixaban ANTICOAGULANT (ELIQUIS ANTICOAGULANT) 5 MG tablet Take 1 tablet (5 mg) by mouth 2 times daily 120 tablet 0     atorvastatin (LIPITOR) 40 MG tablet Take 1 tablet (40 mg) by mouth daily 90 tablet 3     Calcium-Vitamin D-Vitamin K (CALCIUM + D + K) 750-500-40 MG-UNT-MCG TABS Take 2 tablets by mouth daily.        clopidogrel (PLAVIX) 75 MG tablet Take 1 tablet (75 mg) by mouth daily 30 tablet 3     esomeprazole (NEXIUM) 40 MG DR capsule Take 40 mg by mouth every morning (before breakfast) Take 30-60 minutes before eating.       fluticasone (FLONASE) 50 MCG/ACT nasal spray Spray 2 sprays into both nostrils daily 16 g 11     lisinopril (ZESTRIL) 2.5 MG tablet Take 1 tablet (2.5 mg) by mouth At Bedtime 90 tablet 3     mesalamine (LIALDA) 1.2 g EC tablet Take 4,800 mg by mouth daily (with breakfast)        metoprolol succinate ER (TOPROL-XL) 50 MG 24 hr tablet Take 0.5 tablets (25 mg) by mouth daily Take 1/2 tab (25mg) twice a day. Hold for HR <55-60 180 tablet 3     MULTIVITAMIN TABS   OR 1 daily  0     nitroGLYcerin (NITROSTAT) 0.4 MG sublingual tablet For chest pain place 1 tablet under the tongue every 5 minutes for 3 doses. If symptoms persist 5 minutes after 1st dose call 911. 25 tablet 0     Probiotic Product (PROBIOTIC PO) Take by mouth daily       psyllium (METAMUCIL) 58.6 % POWD Take 2 teaspoonful by mouth 2 times daily        sildenafil (VIAGRA) 25 MG tablet Take 3 tablets (75 mg) by mouth daily as needed 90 tablet 0     UNABLE TO FIND daily MEDICATION NAME: Move Free       VITAMIN D, CHOLECALCIFEROL, PO Take 5,000 Units by mouth daily         ALLERGIES     Allergies   Allergen Reactions     No Known Drug Allergies        PAST MEDICAL HISTORY:  Past Medical History:   Diagnosis Date     Actinic keratoses     face     Atrial fibrillation (H)     only after adenosine test     BPH (benign prostatic hypertrophy)      Chest pain      Coronary artery disease 2011 2011-Cath Lma 5%, Lad 40-50%, Lcx 30-40%, Qab13-37% (-FFR of Lad)     ED (erectile dysfunction)      Erectile dysfunction 12/30/2021     Family history of colon cancer 7/6/2012    mother age 65     GERD (gastroesophageal reflux disease)     nexium     Hyperlipidaemia      Hypertension 2/16/2022     IFG (impaired fasting glucose)      Murmur      Obesity,  unspecified     Waist size 42, BMI 30.8     Right inguinal hernia 7/1/2014     Thrombocytosis      Ulcerative colitis (H)        PAST SURGICAL HISTORY:  Past Surgical History:   Procedure Laterality Date     ANESTHESIA CARDIOVERSION N/A 3/22/2022    Procedure: ANESTHESIA, FOR CARDIOVERSION;  Surgeon: GENERIC ANESTHESIA PROVIDER;  Location:  OR     CARDIAC NUC POLINA STRESS TEST NL      Normal     CARDIOVERSION  12/2011    Atrial Fib     COLONOSCOPY  1/29/2014    Procedure: COMBINED COLONOSCOPY, SINGLE BIOPSY/POLYPECTOMY BY BIOPSY;  COLONOSCOPY;  Surgeon: Ashtyn Gonzales MD;  Location:  GI     COLONOSCOPY N/A 7/11/2018    Procedure: COMBINED COLONOSCOPY, SINGLE OR MULTIPLE BIOPSY/POLYPECTOMY BY BIOPSY;  COLONOSCOPY ;  Surgeon: Ashtyn Gonzales MD;  Location:  GI     CORONARY ANGIOGRAPHY ADULT ORDER  12/2011    1 LMA 5%, LAD 40-50%, LCx 30-40%, RCA 25-30%     CV CORONARY ANGIOGRAM N/A 3/3/2022    Procedure: Coronary Angiogram;  Surgeon: Eulalio Del Rosario MD;  Location:  HEART CARDIAC CATH LAB     CV CORONARY ANGIOGRAM N/A 3/3/2022    Procedure: Coronary Angiogram;  Surgeon: Eulalio Del Rosario MD;  Location:  HEART CARDIAC CATH LAB     CV INSTANTANEOUS WAVE-FREE RATIO N/A 3/3/2022    Procedure: Instantaneous Wave-Free Ratio;  Surgeon: Eulalio Del Rosario MD;  Location:  HEART CARDIAC CATH LAB     CV INTRAVASULAR ULTRASOUND N/A 3/3/2022    Procedure: Intravascular Ultrasound;  Surgeon: Eulalio Del Rosario MD;  Location:  HEART CARDIAC CATH LAB     CV INTRAVASULAR ULTRASOUND N/A 3/3/2022    Procedure: Intravascular Ultrasound;  Surgeon: Eulalio Del Rosario MD;  Location: [facility] CARDIAC CATH LAB     CV PCI ATHERECTOMY ORBITAL N/A 3/3/2022    Procedure: Percutaneous Coronary Intervention Atherectomy Rotational;  Surgeon: Eulalio Del Rosario MD;  Location:  HEART CARDIAC CATH LAB     CV TEMPORARY PACEMAKER INSERTION N/A 3/3/2022    Procedure: Temporary Pacemaker Insertion;  Surgeon: Eulalio Del Rosario  MD;  Location:  HEART CARDIAC CATH LAB     ENT SURGERY      Ringing in the ears     HC INJECTION SCLEROSING SOLUTION HEMORRHOID  8/24/2012    Procedure: HEMORRHOID INJECTION SCLEROSING SOLUTION;  Surgeon: Mayito Chow MD;  Location:  GI     HC TOOTH EXTRACTION W/FORCEP       HERNIA REPAIR  Needed     LASIK  1/2011     ROTATOR CUFF REPAIR RT/LT Left 10/15/2015    RCR Left - Dr. Carvalho     SOFT TISSUE SURGERY  10/2015    Torn rotator cuff       FAMILY HISTORY:  Family History   Problem Relation Age of Onset     C.A.D. Father         passed at 52 from MI     Cardiovascular Father      Heart Disease Father      Obesity Father      Myocardial Infarction Father         52 - passed away     Coronary Artery Disease Father      Colon Cancer Mother 65        passed away from colon cancer - lived about 2 mo from dx 1981     Arthritis Maternal Grandmother      Diabetes Maternal Grandmother      Arthritis Maternal Grandfather      Diabetes Maternal Grandfather      Heart Disease Brother      Diabetes Brother      Coronary Artery Disease Brother      Hypertension Brother      Hyperlipidemia Brother      Coronary Artery Disease Brother         stent placed      Hyperlipidemia Brother      Substance Abuse Paternal Grandfather      Hypertension No family hx of      Cerebrovascular Disease No family hx of      Breast Cancer No family hx of      Prostate Cancer No family hx of      Alcohol/Drug No family hx of      Allergies No family hx of      Alzheimer Disease No family hx of      Circulatory No family hx of      Congenital Anomalies No family hx of      Connective Tissue Disorder No family hx of      Depression No family hx of      Eye Disorder No family hx of      Genetic Disorder No family hx of      Gastrointestinal Disease No family hx of      Genitourinary Problems No family hx of      Gynecology No family hx of      Musculoskeletal Disorder No family hx of      Neurologic Disorder No family hx of      Osteoporosis No  family hx of      Psychotic Disorder No family hx of      Respiratory No family hx of      Thyroid Disease No family hx of      Anesthesia Reaction No family hx of      Blood Disease No family hx of        SOCIAL HISTORY:  Social History     Socioeconomic History     Marital status:      Spouse name: Emy     Number of children: 2     Years of education: 16     Highest education level: None   Occupational History     Occupation: [employer]     Employer: [employer]   Tobacco Use     Smoking status: Former Smoker     Packs/day: 1.50     Years: 24.00     Pack years: 36.00     Types: Cigarettes     Quit date: 1996     Years since quittin.3     Smokeless tobacco: Never Used   Substance and Sexual Activity     Alcohol use: Yes     Alcohol/week: 0.0 standard drinks     Comment: occ     Drug use: No     Sexual activity: Yes     Partners: Female     Birth control/protection: None   Other Topics Concern      Service Yes     Blood Transfusions No     Caffeine Concern No     Comment: 2 cups per day     Occupational Exposure No     Hobby Hazards No     Sleep Concern No     Stress Concern Yes     Weight Concern Yes     Special Diet Yes     Back Care No     Exercise No     Comment: 8,000 steps a day. Fitbit     Bike Helmet No     Comment: n/a     Seat Belt Yes     Self-Exams Yes     Parent/sibling w/ CABG, MI or angioplasty before 65F 55M? Yes     Comment: Father and brother   Social History Narrative    Eats fruits and vegetables every day. He takes extra vitamin D. He was advised to aim for 1200 mg of calcium per day.       Review of Systems:  Skin:  Negative     Eyes:  Negative    ENT:  Negative    Respiratory:  Positive for sleep apnea  Cardiovascular:  Negative    Gastroenterology: Positive for heartburn  Genitourinary:  Negative    Musculoskeletal:  Positive for neck pain  Neurologic:  Negative    Psychiatric:  Negative    Heme/Lymph/Imm:  Negative    Endocrine:  Negative      Physical  "Exam:  Vitals: /69 (BP Location: Left arm, Cuff Size: Adult Large)   Pulse 65   Ht 1.854 m (6' 1\")   Wt 103.9 kg (229 lb)   BMI 30.21 kg/m      Constitutional:  cooperative, alert and oriented, well developed, well nourished, in no acute distress        Skin:  warm and dry to the touch, no apparent skin lesions or masses noted          Head:  normocephalic, no masses or lesions        Eyes:  pupils equal and round, conjunctivae and lids unremarkable, sclera white, no xanthalasma, EOMS intact, no nystagmus        Lymph:No Cervical lymphadenopathy present;No thyromegaly     ENT:           Neck:  carotid pulses are full and equal bilaterally, JVP normal, no carotid bruit        Respiratory:  normal breath sounds, clear to auscultation, normal A-P diameter, normal symmetry, normal respiratory excursion, no use of accessory muscles         Cardiac:   irregularly irregular rhythm            after fernandes walk, min if any syst murmur (known MR)  pulses full and equal, no bruits auscultated                                        GI:  abdomen soft, non-tender, BS normoactive, no mass, no HSM, no bruits        Extremities and Muscular Skeletal:  no deformities, clubbing, cyanosis, erythema observed;no edema              Neurological:  no gross motor deficits        Psych:  Alert and Oriented x 3      Recent Lab Results:  LIPID RESULTS:  Lab Results   Component Value Date    CHOL 117 03/03/2022    CHOL 139 10/06/2020    HDL 36 (L) 03/03/2022    HDL 41 10/06/2020    LDL 55 03/03/2022    LDL 69 10/06/2020    TRIG 129 03/03/2022    TRIG 117 02/09/2022    TRIG 146 10/06/2020    CHOLHDLRATIO 2.8 07/10/2015       LIVER ENZYME RESULTS:  Lab Results   Component Value Date    AST 35 10/06/2020    ALT 70 10/06/2020       CBC RESULTS:  Lab Results   Component Value Date    WBC 11.3 (H) 03/05/2022    WBC 9.1 10/06/2020    RBC 4.31 (L) 03/05/2022    RBC 5.39 10/06/2020    HGB 12.6 (L) 03/05/2022    HGB 16.3 10/06/2020    HCT " 38.3 (L) 03/05/2022    HCT 47.9 10/06/2020    MCV 89 03/05/2022    MCV 89 10/06/2020    MCH 29.2 03/05/2022    MCH 30.2 10/06/2020    MCHC 32.9 03/05/2022    MCHC 34.0 10/06/2020    RDW 12.8 03/05/2022    RDW 13.5 10/06/2020     03/05/2022     (H) 10/06/2020       BMP RESULTS:  Lab Results   Component Value Date     04/13/2022     10/06/2020    POTASSIUM 4.4 04/13/2022    POTASSIUM 4.8 10/06/2020    CHLORIDE 107 04/13/2022    CHLORIDE 105 10/06/2020    CO2 25 04/13/2022    CO2 27 10/06/2020    ANIONGAP 4 04/13/2022    ANIONGAP 5 10/06/2020    GLC 93 04/13/2022     (H) 10/06/2020    BUN 28 04/13/2022    BUN 15 10/06/2020    CR 1.17 04/13/2022    CR 0.99 10/06/2020    GFRESTIMATED 67 04/13/2022    GFRESTIMATED 78 10/06/2020    GFRESTBLACK 90 10/06/2020    CAMERON 9.3 04/13/2022    CAMERON 9.5 10/06/2020        A1C RESULTS:  Lab Results   Component Value Date    A1C 6.3 (H) 12/30/2021    A1C 5.6 06/22/2017       INR RESULTS:  Lab Results   Component Value Date    INR 1.00 03/03/2022    INR 1.02 12/22/2011           CC  Yon Gilliam MD  [address]  [place] [id number]    Thank you for allowing me to participate in the care of your patient.      Sincerely,     Eulalio Del Rosario MD     [facility]  cc:   Yon Gilliam MD  [address]  [place] [id number]

## 2022-04-19 NOTE — PROGRESS NOTES
HPI and Plan:   See dictation    Orders Placed This Encounter   Procedures     Follow-Up with Cardiology EP     Follow-Up with Cardiology     EKG 12-lead complete w/read - Clinics (performed today)     Echocardiogram Complete     Orders Placed This Encounter   Medications     UNABLE TO FIND     Sig: daily MEDICATION NAME: Move Free     metoprolol succinate ER (TOPROL-XL) 50 MG 24 hr tablet     Sig: Take 0.5 tablets (25 mg) by mouth daily Take 1/2 tab (25mg) twice a day. Hold for HR <55-60     Dispense:  180 tablet     Refill:  3     Medications Discontinued During This Encounter   Medication Reason     coenzyme Q-10 (COQ-10) 200 MG CAPS Stopped by Patient     TURMERIC PO Stopped by Patient     Glucos-Chond-Hyal Ac-Ca Fructo (MOVE FREE Veronica ADVANCE) TABS Stopped by Patient     tadalafil (CIALIS) 10 MG tablet Stopped by Patient     metoprolol succinate ER (TOPROL-XL) 50 MG 24 hr tablet Reorder         Encounter Diagnoses   Name Primary?     Persistent atrial fibrillation (H) Yes     Ischemic cardiomyopathy      Nonrheumatic mitral valve regurgitation        CURRENT MEDICATIONS:  Current Outpatient Medications   Medication Sig Dispense Refill     acetaminophen (TYLENOL) 325 MG tablet Take 2 tablets (650 mg) by mouth every 4 hours as needed for mild pain or headaches  0     amiodarone (PACERONE) 200 MG tablet Take 1 pill twice a day for 7 days, then decrease to 1 pill once a day. 104 tablet 3     apixaban ANTICOAGULANT (ELIQUIS ANTICOAGULANT) 5 MG tablet Take 1 tablet (5 mg) by mouth 2 times daily 120 tablet 0     atorvastatin (LIPITOR) 40 MG tablet Take 1 tablet (40 mg) by mouth daily 90 tablet 3     Calcium-Vitamin D-Vitamin K (CALCIUM + D + K) 750-500-40 MG-UNT-MCG TABS Take 2 tablets by mouth daily.       clopidogrel (PLAVIX) 75 MG tablet Take 1 tablet (75 mg) by mouth daily 30 tablet 3     esomeprazole (NEXIUM) 40 MG DR capsule Take 40 mg by mouth every morning (before breakfast) Take 30-60 minutes before  eating.       fluticasone (FLONASE) 50 MCG/ACT nasal spray Spray 2 sprays into both nostrils daily 16 g 11     lisinopril (ZESTRIL) 2.5 MG tablet Take 1 tablet (2.5 mg) by mouth At Bedtime 90 tablet 3     mesalamine (LIALDA) 1.2 g EC tablet Take 4,800 mg by mouth daily (with breakfast)        metoprolol succinate ER (TOPROL-XL) 50 MG 24 hr tablet Take 0.5 tablets (25 mg) by mouth daily Take 1/2 tab (25mg) twice a day. Hold for HR <55-60 180 tablet 3     MULTIVITAMIN TABS   OR 1 daily  0     nitroGLYcerin (NITROSTAT) 0.4 MG sublingual tablet For chest pain place 1 tablet under the tongue every 5 minutes for 3 doses. If symptoms persist 5 minutes after 1st dose call 911. 25 tablet 0     Probiotic Product (PROBIOTIC PO) Take by mouth daily       psyllium (METAMUCIL) 58.6 % POWD Take 2 teaspoonful by mouth 2 times daily        sildenafil (VIAGRA) 25 MG tablet Take 3 tablets (75 mg) by mouth daily as needed 90 tablet 0     UNABLE TO FIND daily MEDICATION NAME: Move Free       VITAMIN D, CHOLECALCIFEROL, PO Take 5,000 Units by mouth daily         ALLERGIES     Allergies   Allergen Reactions     No Known Drug Allergies        PAST MEDICAL HISTORY:  Past Medical History:   Diagnosis Date     Actinic keratoses     face     Atrial fibrillation (H)     only after adenosine test     BPH (benign prostatic hypertrophy)      Chest pain      Coronary artery disease 2011 2011-Cath Lma 5%, Lad 40-50%, Lcx 30-40%, Olb31-44% (-FFR of Lad)     ED (erectile dysfunction)      Erectile dysfunction 12/30/2021     Family history of colon cancer 7/6/2012    mother age 65     GERD (gastroesophageal reflux disease)     nexium     Hyperlipidaemia      Hypertension 2/16/2022     IFG (impaired fasting glucose)      Murmur      Obesity, unspecified     Waist size 42, BMI 30.8     Right inguinal hernia 7/1/2014     Thrombocytosis      Ulcerative colitis (H)        PAST SURGICAL HISTORY:  Past Surgical History:   Procedure Laterality Date      ANESTHESIA CARDIOVERSION N/A 3/22/2022    Procedure: ANESTHESIA, FOR CARDIOVERSION;  Surgeon: GENERIC ANESTHESIA PROVIDER;  Location:  OR     CARDIAC NUC POLINA STRESS TEST NL      Normal     CARDIOVERSION  12/2011    Atrial Fib     COLONOSCOPY  1/29/2014    Procedure: COMBINED COLONOSCOPY, SINGLE BIOPSY/POLYPECTOMY BY BIOPSY;  COLONOSCOPY;  Surgeon: Ashtyn Gonzales MD;  Location:  GI     COLONOSCOPY N/A 7/11/2018    Procedure: COMBINED COLONOSCOPY, SINGLE OR MULTIPLE BIOPSY/POLYPECTOMY BY BIOPSY;  COLONOSCOPY ;  Surgeon: Ashtyn Gonzales MD;  Location:  GI     CORONARY ANGIOGRAPHY ADULT ORDER  12/2011    1 LMA 5%, LAD 40-50%, LCx 30-40%, RCA 25-30%     CV CORONARY ANGIOGRAM N/A 3/3/2022    Procedure: Coronary Angiogram;  Surgeon: Eulalio Del Rosario MD;  Location:  [facility]     CV CORONARY ANGIOGRAM N/A 3/3/2022    Procedure: Coronary Angiogram;  Surgeon: Eulalio Del Rosario MD;  Location:  [facility]     CV INSTANTANEOUS WAVE-FREE RATIO N/A 3/3/2022    Procedure: Instantaneous Wave-Free Ratio;  Surgeon: Eulalio Del Rosario MD;  Location:  [facility]     CV INTRAVASULAR ULTRASOUND N/A 3/3/2022    Procedure: Intravascular Ultrasound;  Surgeon: Eulalio Del Rosario MD;  Location:  [facility]     CV INTRAVASULAR ULTRASOUND N/A 3/3/2022    Procedure: Intravascular Ultrasound;  Surgeon: Eulalio Del Rosario MD;  Location:  [facility]     CV PCI ATHERECTOMY ORBITAL N/A 3/3/2022    Procedure: Percutaneous Coronary Intervention Atherectomy Rotational;  Surgeon: Eulalio Del Rosario MD;  Location:  [facility]     CV TEMPORARY PACEMAKER INSERTION N/A 3/3/2022    Procedure: Temporary Pacemaker Insertion;  Surgeon: Eulalio Del Rosario MD;  Location:  [facility]     ENT SURGERY      Ringing in the ears     HC INJECTION SCLEROSING SOLUTION HEMORRHOID  8/24/2012    Procedure: HEMORRHOID INJECTION SCLEROSING SOLUTION;  Surgeon:  Mayito Chow MD;  Location:  GI     HC TOOTH EXTRACTION W/FORCEP       HERNIA REPAIR  Needed     LASIK  1/2011     ROTATOR CUFF REPAIR RT/LT Left 10/15/2015    RCR Left - Dr. Carvalho     SOFT TISSUE SURGERY  10/2015    Torn rotator cuff       FAMILY HISTORY:  Family History   Problem Relation Age of Onset     C.A.D. Father         passed at 52 from MI     Cardiovascular Father      Heart Disease Father      Obesity Father      Myocardial Infarction Father         52 - passed away     Coronary Artery Disease Father      Colon Cancer Mother 65        passed away from colon cancer - lived about 2 mo from dx 1981     Arthritis Maternal Grandmother      Diabetes Maternal Grandmother      Arthritis Maternal Grandfather      Diabetes Maternal Grandfather      Heart Disease Brother      Diabetes Brother      Coronary Artery Disease Brother      Hypertension Brother      Hyperlipidemia Brother      Coronary Artery Disease Brother         stent placed      Hyperlipidemia Brother      Substance Abuse Paternal Grandfather      Hypertension No family hx of      Cerebrovascular Disease No family hx of      Breast Cancer No family hx of      Prostate Cancer No family hx of      Alcohol/Drug No family hx of      Allergies No family hx of      Alzheimer Disease No family hx of      Circulatory No family hx of      Congenital Anomalies No family hx of      Connective Tissue Disorder No family hx of      Depression No family hx of      Eye Disorder No family hx of      Genetic Disorder No family hx of      Gastrointestinal Disease No family hx of      Genitourinary Problems No family hx of      Gynecology No family hx of      Musculoskeletal Disorder No family hx of      Neurologic Disorder No family hx of      Osteoporosis No family hx of      Psychotic Disorder No family hx of      Respiratory No family hx of      Thyroid Disease No family hx of      Anesthesia Reaction No family hx of      Blood Disease No family hx of   "      SOCIAL HISTORY:  Social History     Socioeconomic History     Marital status:      Spouse name: Emy     Number of children: 2     Years of education: 16     Highest education level: None   Occupational History     Occupation: [employer]     Employer: [employer]   Tobacco Use     Smoking status: Former Smoker     Packs/day: 1.50     Years: 24.00     Pack years: 36.00     Types: Cigarettes     Quit date: 1996     Years since quittin.3     Smokeless tobacco: Never Used   Substance and Sexual Activity     Alcohol use: Yes     Alcohol/week: 0.0 standard drinks     Comment: occ     Drug use: No     Sexual activity: Yes     Partners: Female     Birth control/protection: None   Other Topics Concern      Service Yes     Blood Transfusions No     Caffeine Concern No     Comment: 2 cups per day     Occupational Exposure No     Hobby Hazards No     Sleep Concern No     Stress Concern Yes     Weight Concern Yes     Special Diet Yes     Back Care No     Exercise No     Comment: 8,000 steps a day. Fitbit     Bike Helmet No     Comment: n/a     Seat Belt Yes     Self-Exams Yes     Parent/sibling w/ CABG, MI or angioplasty before 65F 55M? Yes     Comment: Father and brother   Social History Narrative    Eats fruits and vegetables every day. He takes extra vitamin D. He was advised to aim for 1200 mg of calcium per day.       Review of Systems:  Skin:  Negative     Eyes:  Negative    ENT:  Negative    Respiratory:  Positive for sleep apnea  Cardiovascular:  Negative    Gastroenterology: Positive for heartburn  Genitourinary:  Negative    Musculoskeletal:  Positive for neck pain  Neurologic:  Negative    Psychiatric:  Negative    Heme/Lymph/Imm:  Negative    Endocrine:  Negative      Physical Exam:  Vitals: /69 (BP Location: Left arm, Cuff Size: Adult Large)   Pulse 65   Ht 1.854 m (6' 1\")   Wt 103.9 kg (229 lb)   BMI 30.21 kg/m      Constitutional:  cooperative, alert and oriented, " well developed, well nourished, in no acute distress        Skin:  warm and dry to the touch, no apparent skin lesions or masses noted          Head:  normocephalic, no masses or lesions        Eyes:  pupils equal and round, conjunctivae and lids unremarkable, sclera white, no xanthalasma, EOMS intact, no nystagmus        Lymph:No Cervical lymphadenopathy present;No thyromegaly     ENT:           Neck:  carotid pulses are full and equal bilaterally, JVP normal, no carotid bruit        Respiratory:  normal breath sounds, clear to auscultation, normal A-P diameter, normal symmetry, normal respiratory excursion, no use of accessory muscles         Cardiac:   irregularly irregular rhythm            after fernandes walk, min if any syst murmur (known MR)  pulses full and equal, no bruits auscultated                                        GI:  abdomen soft, non-tender, BS normoactive, no mass, no HSM, no bruits        Extremities and Muscular Skeletal:  no deformities, clubbing, cyanosis, erythema observed;no edema              Neurological:  no gross motor deficits        Psych:  Alert and Oriented x 3      Recent Lab Results:  LIPID RESULTS:  Lab Results   Component Value Date    CHOL 117 03/03/2022    CHOL 139 10/06/2020    HDL 36 (L) 03/03/2022    HDL 41 10/06/2020    LDL 55 03/03/2022    LDL 69 10/06/2020    TRIG 129 03/03/2022    TRIG 117 02/09/2022    TRIG 146 10/06/2020    CHOLHDLRATIO 2.8 07/10/2015       LIVER ENZYME RESULTS:  Lab Results   Component Value Date    AST 35 10/06/2020    ALT 70 10/06/2020       CBC RESULTS:  Lab Results   Component Value Date    WBC 11.3 (H) 03/05/2022    WBC 9.1 10/06/2020    RBC 4.31 (L) 03/05/2022    RBC 5.39 10/06/2020    HGB 12.6 (L) 03/05/2022    HGB 16.3 10/06/2020    HCT 38.3 (L) 03/05/2022    HCT 47.9 10/06/2020    MCV 89 03/05/2022    MCV 89 10/06/2020    MCH 29.2 03/05/2022    MCH 30.2 10/06/2020    MCHC 32.9 03/05/2022    MCHC 34.0 10/06/2020    RDW 12.8 03/05/2022     RDW 13.5 10/06/2020     03/05/2022     (H) 10/06/2020       BMP RESULTS:  Lab Results   Component Value Date     04/13/2022     10/06/2020    POTASSIUM 4.4 04/13/2022    POTASSIUM 4.8 10/06/2020    CHLORIDE 107 04/13/2022    CHLORIDE 105 10/06/2020    CO2 25 04/13/2022    CO2 27 10/06/2020    ANIONGAP 4 04/13/2022    ANIONGAP 5 10/06/2020    GLC 93 04/13/2022     (H) 10/06/2020    BUN 28 04/13/2022    BUN 15 10/06/2020    CR 1.17 04/13/2022    CR 0.99 10/06/2020    GFRESTIMATED 67 04/13/2022    GFRESTIMATED 78 10/06/2020    GFRESTBLACK 90 10/06/2020    CAMERON 9.3 04/13/2022    CAMERON 9.5 10/06/2020        A1C RESULTS:  Lab Results   Component Value Date    A1C 6.3 (H) 12/30/2021    A1C 5.6 06/22/2017       INR RESULTS:  Lab Results   Component Value Date    INR 1.00 03/03/2022    INR 1.02 12/22/2011           CC  Yon Gilliam MD  [id number]  [place] [id number]

## 2022-04-20 ENCOUNTER — HOSPITAL ENCOUNTER (OUTPATIENT)
Dept: CARDIAC REHAB | Facility: CLINIC | Age: 72
Discharge: HOME | End: 2022-04-20
Attending: INTERNAL MEDICINE
Payer: MEDICARE

## 2022-04-20 PROCEDURE — 93798 PHYS/QHP OP CAR RHAB W/ECG: CPT

## 2022-04-21 ENCOUNTER — OFFICE VISIT (OUTPATIENT)
Dept: CARDIOLOGY | Facility: CLINIC | Age: 72
End: 2022-04-21
Payer: MEDICARE

## 2022-04-21 VITALS
OXYGEN SATURATION: 99 % | SYSTOLIC BLOOD PRESSURE: 122 MMHG | BODY MASS INDEX: 30.35 KG/M2 | HEIGHT: 73 IN | DIASTOLIC BLOOD PRESSURE: 76 MMHG | HEART RATE: 64 BPM | WEIGHT: 229 LBS

## 2022-04-21 DIAGNOSIS — I25.5 ISCHEMIC CARDIOMYOPATHY: ICD-10-CM

## 2022-04-21 DIAGNOSIS — Z11.59 ENCOUNTER FOR SCREENING FOR OTHER VIRAL DISEASES: Primary | ICD-10-CM

## 2022-04-21 DIAGNOSIS — I34.0 NONRHEUMATIC MITRAL VALVE REGURGITATION: ICD-10-CM

## 2022-04-21 DIAGNOSIS — I48.19 PERSISTENT ATRIAL FIBRILLATION (H): Primary | ICD-10-CM

## 2022-04-21 PROCEDURE — 99205 OFFICE O/P NEW HI 60 MIN: CPT | Performed by: INTERNAL MEDICINE

## 2022-04-21 PROCEDURE — 93000 ELECTROCARDIOGRAM COMPLETE: CPT | Performed by: INTERNAL MEDICINE

## 2022-04-21 NOTE — LETTER
4/21/2022    Donell Zuñiga MD  [address] [facility] [address]    RE: Peter Peralta       Dear Colleague,     I had the pleasure of seeing Peter Peralta in the [facility].  Electrophysiology/ Clinic Note         H&P and Plan:     REASON FOR VISIT: Electrophysiology evaluation.      HISTORY OF PRESENT ILLNESS: Patient is a pleasant 71-year-old gentleman with a history of hypertension, hyperlipidemia, and persistent A. 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    Patient presented with heart failure symptoms in the beginning of this year.  He was initially evaluated by Dr. Kade Modi.  He was found to be in persistent atrial fibrillation and to have severe LV dysfunction.  Coronary angiography showed occluded RCA, and he underwent PCI with thrombectomy of the RCA.    He underwent an attempt of cardioversion (3/22), which was unsuccessful.  He was then started on amiodarone, and was referred here for second opinion.    Today, he informs he is doing well.  Continues to have complains of fatigue and dyspnea on exertion.  He denies any other symptoms such as chest pain, lightheadedness, near-syncope or syncope.    EKG is compatible with typical atrial flutter.  He has underlying right bundle branch block.  Previous imaging studies showed moderate biatrial enlargement.      PREVIOUS STUDIES (personally reviewed):  -Echo (2/25/2022): Severe LV dysfunction.  EF of 25-30%.  Global hypokinesis.  There was mild MR/TR.  -Cath (3/3/2022): Total occluded RCA.  Mild to moderate disease in the LAD/circumflex.  PCI to RCA with thrombectomy and Rotablator.  -OSCAR (3/22/2022): EF of 20 to 25%.  There were moderate to severe MR.      ASSESSMENT AND PLAN:   1.    Persistent A. fib/typical atrial flutter/LV dysfunction (possible tachycardia mediated cardiomyopathy).    I had an extensive  "discussion with patient regarding management of atrial arrhythmias.  He failed cardioversion and therapy with AV node agents.  His current on amiodarone.  He is not a great candidate for antiarrhythmics overall due to LV dysfunction and abnormal EKG at baseline.  Amiodarone is not the best medication long-term.  Therefore, I believe he may benefit from ablation.     I had an extensive discussion regarding ablation procedure.  He understands that the success rate for flutter ablation is very good, however the success rate for A. fib ablation will be around 50-60%.  He also understands that is a 3-4% risk of cases or procedure.    After extensive discussion, he will like to attempt ablation.  I recommend the following:  -Schedule ablation for 5/12.  -Chest CT prior to ablation procedure.  -OSCAR at the table prior to ablation.   Teodoro Jones MD    Physical Exam:  Vitals: /76 (BP Location: Right arm, Cuff Size: Adult Large)   Pulse 64   Ht 1.854 m (6' 1\")   Wt 103.9 kg (229 lb)   SpO2 99%   BMI 30.21 kg/m      Constitutional:  AAO x3.  Pt is in NAD.  HEAD: normocephalic.  SKIN: Skin normal color, texture and turgor with no lesions or eruptions.  Eyes: PERRL, EOMI.  ENT:  Supple, normal JVP. No lymphadenopathy or thyroid enlargement.  Chest:  CTAB.  Cardiac:  RRR, normal  S1 and S2.  No murmurs rubs or gallop.   Abdomen:  Normal BS.  Soft, non-tender and non-distended.  No rebound or guarding.    Extremities:  Pedious pulses palpable B/L.  No LE edema noticed.   Neurological: Strength and sensation grossly symmetric and intact throughout.       CURRENT MEDICATIONS:  Current Outpatient Medications   Medication Sig Dispense Refill     acetaminophen (TYLENOL) 325 MG tablet Take 2 tablets (650 mg) by mouth every 4 hours as needed for mild pain or headaches  0     amiodarone (PACERONE) 200 MG tablet Take 1 pill twice a day for 7 days, then decrease to 1 pill once a day. 104 tablet 3     apixaban ANTICOAGULANT " (ELIQUIS ANTICOAGULANT) 5 MG tablet Take 1 tablet (5 mg) by mouth 2 times daily 120 tablet 0     atorvastatin (LIPITOR) 40 MG tablet Take 1 tablet (40 mg) by mouth daily 90 tablet 3     Calcium-Vitamin D-Vitamin K (CALCIUM + D + K) 750-500-40 MG-UNT-MCG TABS Take 2 tablets by mouth daily.       clopidogrel (PLAVIX) 75 MG tablet Take 1 tablet (75 mg) by mouth daily 30 tablet 3     esomeprazole (NEXIUM) 40 MG DR capsule Take 40 mg by mouth every morning (before breakfast) Take 30-60 minutes before eating.       fluticasone (FLONASE) 50 MCG/ACT nasal spray Spray 2 sprays into both nostrils daily 16 g 11     lisinopril (ZESTRIL) 2.5 MG tablet Take 1 tablet (2.5 mg) by mouth At Bedtime 90 tablet 3     mesalamine (LIALDA) 1.2 g EC tablet Take 4,800 mg by mouth daily (with breakfast)        metoprolol succinate ER (TOPROL-XL) 50 MG 24 hr tablet Take 0.5 tablets (25 mg) by mouth daily Take 1/2 tab (25mg) twice a day. Hold for HR <55-60 180 tablet 3     MULTIVITAMIN TABS   OR 1 daily  0     nitroGLYcerin (NITROSTAT) 0.4 MG sublingual tablet For chest pain place 1 tablet under the tongue every 5 minutes for 3 doses. If symptoms persist 5 minutes after 1st dose call 911. 25 tablet 0     Probiotic Product (PROBIOTIC PO) Take by mouth daily       psyllium (METAMUCIL) 58.6 % POWD Take 2 teaspoonful by mouth 2 times daily        sildenafil (VIAGRA) 25 MG tablet Take 3 tablets (75 mg) by mouth daily as needed 90 tablet 0     UNABLE TO FIND daily MEDICATION NAME: Move Free       VITAMIN D, CHOLECALCIFEROL, PO Take 5,000 Units by mouth daily         ALLERGIES     Allergies   Allergen Reactions     No Known Drug Allergies        PAST MEDICAL HISTORY:  Past Medical History:   Diagnosis Date     Actinic keratoses     face     Atrial fibrillation (H)     only after adenosine test     BPH (benign prostatic hypertrophy)      Chest pain      Coronary artery disease 2011 2011-Cath Lma 5%, Lad 40-50%, Lcx 30-40%, Iot99-69% (-FFR of Lad)      ED (erectile dysfunction)      Erectile dysfunction 12/30/2021     Family history of colon cancer 7/6/2012    mother age 65     GERD (gastroesophageal reflux disease)     nexium     Hyperlipidaemia      Hypertension 2/16/2022     IFG (impaired fasting glucose)      Murmur      Obesity, unspecified     Waist size 42, BMI 30.8     Right inguinal hernia 7/1/2014     Thrombocytosis      Ulcerative colitis (H)        PAST SURGICAL HISTORY:  Past Surgical History:   Procedure Laterality Date     ANESTHESIA CARDIOVERSION N/A 3/22/2022    Procedure: ANESTHESIA, FOR CARDIOVERSION;  Surgeon: GENERIC ANESTHESIA PROVIDER;  Location:  OR     CARDIAC NUC POLINA STRESS TEST NL      Normal     CARDIOVERSION  12/2011    Atrial Fib     COLONOSCOPY  1/29/2014    Procedure: COMBINED COLONOSCOPY, SINGLE BIOPSY/POLYPECTOMY BY BIOPSY;  COLONOSCOPY;  Surgeon: Ashtyn Gonzales MD;  Location:  GI     COLONOSCOPY N/A 7/11/2018    Procedure: COMBINED COLONOSCOPY, SINGLE OR MULTIPLE BIOPSY/POLYPECTOMY BY BIOPSY;  COLONOSCOPY ;  Surgeon: Ashtyn Gonzales MD;  Location:  GI     CORONARY ANGIOGRAPHY ADULT ORDER  12/2011    1 LMA 5%, LAD 40-50%, LCx 30-40%, RCA 25-30%     CV CORONARY ANGIOGRAM N/A 3/3/2022    Procedure: Coronary Angiogram;  Surgeon: Eulalio Del Rosario MD;  Location:  HEART CARDIAC CATH LAB     CV CORONARY ANGIOGRAM N/A 3/3/2022    Procedure: Coronary Angiogram;  Surgeon: Eulalio Del Rosario MD;  Location:  HEART CARDIAC CATH LAB     CV INSTANTANEOUS WAVE-FREE RATIO N/A 3/3/2022    Procedure: Instantaneous Wave-Free Ratio;  Surgeon: Eulalio Del Rosario MD;  Location:  HEART CARDIAC CATH LAB     CV INTRAVASULAR ULTRASOUND N/A 3/3/2022    Procedure: Intravascular Ultrasound;  Surgeon: Eulalio Del Rosario MD;  Location:  HEART CARDIAC CATH LAB     CV INTRAVASULAR ULTRASOUND N/A 3/3/2022    Procedure: Intravascular Ultrasound;  Surgeon: Eulalio Del Rosario MD;  Location:  HEART CARDIAC CATH LAB     CV PCI  ATHERECTOMY ORBITAL N/A 3/3/2022    Procedure: Percutaneous Coronary Intervention Atherectomy Rotational;  Surgeon: Eulalio Del Rosario MD;  Location:  HEART CARDIAC CATH LAB     CV TEMPORARY PACEMAKER INSERTION N/A 3/3/2022    Procedure: Temporary Pacemaker Insertion;  Surgeon: Eulalio Del Rosario MD;  Location:  HEART CARDIAC CATH LAB     ENT SURGERY      Ringing in the ears     HC INJECTION SCLEROSING SOLUTION HEMORRHOID  8/24/2012    Procedure: HEMORRHOID INJECTION SCLEROSING SOLUTION;  Surgeon: Mayito Chow MD;  Location:  GI     HC TOOTH EXTRACTION W/FORCEP       HERNIA REPAIR  Needed     LASIK  1/2011     ROTATOR CUFF REPAIR RT/LT Left 10/15/2015    RCR Left - Dr. Carvalho     SOFT TISSUE SURGERY  10/2015    Torn rotator cuff       FAMILY HISTORY:  Family History   Problem Relation Age of Onset     C.A.D. Father         passed at 52 from MI     Cardiovascular Father      Heart Disease Father      Obesity Father      Myocardial Infarction Father         52 - passed away     Coronary Artery Disease Father      Colon Cancer Mother 65        passed away from colon cancer - lived about 2 mo from  1981     Arthritis Maternal Grandmother      Diabetes Maternal Grandmother      Arthritis Maternal Grandfather      Diabetes Maternal Grandfather      Heart Disease Brother      Diabetes Brother      Coronary Artery Disease Brother      Hypertension Brother      Hyperlipidemia Brother      Coronary Artery Disease Brother         stent placed      Hyperlipidemia Brother      Substance Abuse Paternal Grandfather      Hypertension No family hx of      Cerebrovascular Disease No family hx of      Breast Cancer No family hx of      Prostate Cancer No family hx of      Alcohol/Drug No family hx of      Allergies No family hx of      Alzheimer Disease No family hx of      Circulatory No family hx of      Congenital Anomalies No family hx of      Connective Tissue Disorder No family hx of      Depression No family hx of       Eye Disorder No family hx of      Genetic Disorder No family hx of      Gastrointestinal Disease No family hx of      Genitourinary Problems No family hx of      Gynecology No family hx of      Musculoskeletal Disorder No family hx of      Neurologic Disorder No family hx of      Osteoporosis No family hx of      Psychotic Disorder No family hx of      Respiratory No family hx of      Thyroid Disease No family hx of      Anesthesia Reaction No family hx of      Blood Disease No family hx of        SOCIAL HISTORY:  Social History     Socioeconomic History     Marital status:      Spouse name: Emy     Number of children: 2     Years of education: 16     Highest education level: None   Occupational History     Occupation: [employer]     Employer: [employer]   Tobacco Use     Smoking status: Former Smoker     Packs/day: 1.50     Years: 24.00     Pack years: 36.00     Types: Cigarettes     Quit date: 1996     Years since quittin.3     Smokeless tobacco: Never Used   Substance and Sexual Activity     Alcohol use: Yes     Alcohol/week: 0.0 standard drinks     Comment: occ     Drug use: No     Sexual activity: Yes     Partners: Female     Birth control/protection: None   Other Topics Concern      Service Yes     Blood Transfusions No     Caffeine Concern No     Comment: 2 cups per day     Occupational Exposure No     Hobby Hazards No     Sleep Concern No     Stress Concern Yes     Weight Concern Yes     Special Diet Yes     Back Care No     Exercise No     Comment: 8,000 steps a day. Fitbit     Bike Helmet No     Comment: n/a     Seat Belt Yes     Self-Exams Yes     Parent/sibling w/ CABG, MI or angioplasty before 65F 55M? Yes     Comment: Father and brother   Social History Narrative    Eats fruits and vegetables every day. He takes extra vitamin D. He was advised to aim for 1200 mg of calcium per day.       Review of Systems:  Skin:  Negative     Eyes:  Positive for glasses  ENT:   Negative    Respiratory:  Positive for sleep apnea  Cardiovascular:       Gastroenterology: Positive for heartburn  Genitourinary:  Negative    Musculoskeletal:  Positive for neck pain  Neurologic:  Negative    Psychiatric:  Negative    Heme/Lymph/Imm:  Negative    Endocrine:  Negative        Recent Lab Results:  LIPID RESULTS:  Lab Results   Component Value Date    CHOL 117 03/03/2022    CHOL 139 10/06/2020    HDL 36 (L) 03/03/2022    HDL 41 10/06/2020    LDL 55 03/03/2022    LDL 69 10/06/2020    TRIG 129 03/03/2022    TRIG 117 02/09/2022    TRIG 146 10/06/2020    CHOLHDLRATIO 2.8 07/10/2015       LIVER ENZYME RESULTS:  Lab Results   Component Value Date    AST 35 10/06/2020    ALT 70 10/06/2020       CBC RESULTS:  Lab Results   Component Value Date    WBC 11.3 (H) 03/05/2022    WBC 9.1 10/06/2020    RBC 4.31 (L) 03/05/2022    RBC 5.39 10/06/2020    HGB 12.6 (L) 03/05/2022    HGB 16.3 10/06/2020    HCT 38.3 (L) 03/05/2022    HCT 47.9 10/06/2020    MCV 89 03/05/2022    MCV 89 10/06/2020    MCH 29.2 03/05/2022    MCH 30.2 10/06/2020    MCHC 32.9 03/05/2022    MCHC 34.0 10/06/2020    RDW 12.8 03/05/2022    RDW 13.5 10/06/2020     03/05/2022     (H) 10/06/2020       BMP RESULTS:  Lab Results   Component Value Date     04/13/2022     10/06/2020    POTASSIUM 4.4 04/13/2022    POTASSIUM 4.8 10/06/2020    CHLORIDE 107 04/13/2022    CHLORIDE 105 10/06/2020    CO2 25 04/13/2022    CO2 27 10/06/2020    ANIONGAP 4 04/13/2022    ANIONGAP 5 10/06/2020    GLC 93 04/13/2022     (H) 10/06/2020    BUN 28 04/13/2022    BUN 15 10/06/2020    CR 1.17 04/13/2022    CR 0.99 10/06/2020    GFRESTIMATED 67 04/13/2022    GFRESTIMATED 78 10/06/2020    GFRESTBLACK 90 10/06/2020    CAMERON 9.3 04/13/2022    CAMERON 9.5 10/06/2020        A1C RESULTS:  Lab Results   Component Value Date    A1C 6.3 (H) 12/30/2021    A1C 5.6 06/22/2017       INR RESULTS:  Lab Results   Component Value Date    INR 1.00 03/03/2022    INR 1.02  12/22/2011         ECHOCARDIOGRAM  No results found for this or any previous visit (from the past 8760 hour(s)).      Orders Placed This Encounter   Procedures     EKG 12-lead complete w/read - Clinics (performed today)     No orders of the defined types were placed in this encounter.    There are no discontinued medications.      Encounter Diagnoses   Name Primary?     Persistent atrial fibrillation (H) Yes     Ischemic cardiomyopathy      Nonrheumatic mitral valve regurgitation          CC  Eulalio Del Rosario MD  [address]  [place] [address]    Thank you for allowing me to participate in the care of your patient.      Sincerely,     Teodoro Jones MD     [facility]

## 2022-04-21 NOTE — PROGRESS NOTES
Electrophysiology/ Clinic Note         H&P and Plan:     REASON FOR VISIT: Electrophysiology evaluation.      HISTORY OF PRESENT ILLNESS: Patient is a pleasant 71-year-old gentleman with a history of hypertension, hyperlipidemia, and persistent A. 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    Patient presented with heart failure symptoms in the beginning of this year.  He was initially evaluated by Dr. Kade Modi.  He was found to be in persistent atrial fibrillation and to have severe LV dysfunction.  Coronary angiography showed occluded RCA, and he underwent PCI with thrombectomy of the RCA.    He underwent an attempt of cardioversion (3/22), which was unsuccessful.  He was then started on amiodarone, and was referred here for second opinion.    Today, he informs he is doing well.  Continues to have complains of fatigue and dyspnea on exertion.  He denies any other symptoms such as chest pain, lightheadedness, near-syncope or syncope.    EKG is compatible with typical atrial flutter.  He has underlying right bundle branch block.  Previous imaging studies showed moderate biatrial enlargement.      PREVIOUS STUDIES (personally reviewed):  -Echo (2/25/2022): Severe LV dysfunction.  EF of 25-30%.  Global hypokinesis.  There was mild MR/TR.  -Cath (3/3/2022): Total occluded RCA.  Mild to moderate disease in the LAD/circumflex.  PCI to RCA with thrombectomy and Rotablator.  -OSCAR (3/22/2022): EF of 20 to 25%.  There were moderate to severe MR.      ASSESSMENT AND PLAN:   1.    Persistent A. fib/typical atrial flutter/LV dysfunction (possible tachycardia mediated cardiomyopathy).    I had an extensive discussion with patient regarding management of atrial arrhythmias.  He failed cardioversion and therapy with AV node agents.  His current on amiodarone.  He is not a great candidate for antiarrhythmics overall due to LV  "dysfunction and abnormal EKG at baseline.  Amiodarone is not the best medication long-term.  Therefore, I believe he may benefit from ablation.     I had an extensive discussion regarding ablation procedure.  He understands that the success rate for flutter ablation is very good, however the success rate for A. fib ablation will be around 50-60%.  He also understands that is a 3-4% risk of cases or procedure.    After extensive discussion, he will like to attempt ablation.  I recommend the following:  -Schedule ablation for 5/12.  -Chest CT prior to ablation procedure.  -OSCAR at the table prior to ablation.   Teodoro Jones MD    Physical Exam:  Vitals: /76 (BP Location: Right arm, Cuff Size: Adult Large)   Pulse 64   Ht 1.854 m (6' 1\")   Wt 103.9 kg (229 lb)   SpO2 99%   BMI 30.21 kg/m      Constitutional:  AAO x3.  Pt is in NAD.  HEAD: normocephalic.  SKIN: Skin normal color, texture and turgor with no lesions or eruptions.  Eyes: PERRL, EOMI.  ENT:  Supple, normal JVP. No lymphadenopathy or thyroid enlargement.  Chest:  CTAB.  Cardiac:  RRR, normal  S1 and S2.  No murmurs rubs or gallop.   Abdomen:  Normal BS.  Soft, non-tender and non-distended.  No rebound or guarding.    Extremities:  Pedious pulses palpable B/L.  No LE edema noticed.   Neurological: Strength and sensation grossly symmetric and intact throughout.       CURRENT MEDICATIONS:  Current Outpatient Medications   Medication Sig Dispense Refill     acetaminophen (TYLENOL) 325 MG tablet Take 2 tablets (650 mg) by mouth every 4 hours as needed for mild pain or headaches  0     amiodarone (PACERONE) 200 MG tablet Take 1 pill twice a day for 7 days, then decrease to 1 pill once a day. 104 tablet 3     apixaban ANTICOAGULANT (ELIQUIS ANTICOAGULANT) 5 MG tablet Take 1 tablet (5 mg) by mouth 2 times daily 120 tablet 0     atorvastatin (LIPITOR) 40 MG tablet Take 1 tablet (40 mg) by mouth daily 90 tablet 3     Calcium-Vitamin D-Vitamin K (CALCIUM + " D + K) 750-500-40 MG-UNT-MCG TABS Take 2 tablets by mouth daily.       clopidogrel (PLAVIX) 75 MG tablet Take 1 tablet (75 mg) by mouth daily 30 tablet 3     esomeprazole (NEXIUM) 40 MG DR capsule Take 40 mg by mouth every morning (before breakfast) Take 30-60 minutes before eating.       fluticasone (FLONASE) 50 MCG/ACT nasal spray Spray 2 sprays into both nostrils daily 16 g 11     lisinopril (ZESTRIL) 2.5 MG tablet Take 1 tablet (2.5 mg) by mouth At Bedtime 90 tablet 3     mesalamine (LIALDA) 1.2 g EC tablet Take 4,800 mg by mouth daily (with breakfast)        metoprolol succinate ER (TOPROL-XL) 50 MG 24 hr tablet Take 0.5 tablets (25 mg) by mouth daily Take 1/2 tab (25mg) twice a day. Hold for HR <55-60 180 tablet 3     MULTIVITAMIN TABS   OR 1 daily  0     nitroGLYcerin (NITROSTAT) 0.4 MG sublingual tablet For chest pain place 1 tablet under the tongue every 5 minutes for 3 doses. If symptoms persist 5 minutes after 1st dose call 911. 25 tablet 0     Probiotic Product (PROBIOTIC PO) Take by mouth daily       psyllium (METAMUCIL) 58.6 % POWD Take 2 teaspoonful by mouth 2 times daily        sildenafil (VIAGRA) 25 MG tablet Take 3 tablets (75 mg) by mouth daily as needed 90 tablet 0     UNABLE TO FIND daily MEDICATION NAME: Move Free       VITAMIN D, CHOLECALCIFEROL, PO Take 5,000 Units by mouth daily         ALLERGIES     Allergies   Allergen Reactions     No Known Drug Allergies        PAST MEDICAL HISTORY:  Past Medical History:   Diagnosis Date     Actinic keratoses     face     Atrial fibrillation (H)     only after adenosine test     BPH (benign prostatic hypertrophy)      Chest pain      Coronary artery disease 2011 2011-Cath Lma 5%, Lad 40-50%, Lcx 30-40%, Fof64-76% (-FFR of Lad)     ED (erectile dysfunction)      Erectile dysfunction 12/30/2021     Family history of colon cancer 7/6/2012    mother age 65     GERD (gastroesophageal reflux disease)     nexium     Hyperlipidaemia      Hypertension  2/16/2022     IFG (impaired fasting glucose)      Murmur      Obesity, unspecified     Waist size 42, BMI 30.8     Right inguinal hernia 7/1/2014     Thrombocytosis      Ulcerative colitis (H)        PAST SURGICAL HISTORY:  Past Surgical History:   Procedure Laterality Date     ANESTHESIA CARDIOVERSION N/A 3/22/2022    Procedure: ANESTHESIA, FOR CARDIOVERSION;  Surgeon: GENERIC ANESTHESIA PROVIDER;  Location:  OR     CARDIAC NUC POLINA STRESS TEST NL      Normal     CARDIOVERSION  12/2011    Atrial Fib     COLONOSCOPY  1/29/2014    Procedure: COMBINED COLONOSCOPY, SINGLE BIOPSY/POLYPECTOMY BY BIOPSY;  COLONOSCOPY;  Surgeon: Ashtyn Gonzales MD;  Location:  GI     COLONOSCOPY N/A 7/11/2018    Procedure: COMBINED COLONOSCOPY, SINGLE OR MULTIPLE BIOPSY/POLYPECTOMY BY BIOPSY;  COLONOSCOPY ;  Surgeon: Ashtyn Gonzales MD;  Location:  GI     CORONARY ANGIOGRAPHY ADULT ORDER  12/2011    1 LMA 5%, LAD 40-50%, LCx 30-40%, RCA 25-30%     CV CORONARY ANGIOGRAM N/A 3/3/2022    Procedure: Coronary Angiogram;  Surgeon: Eulalio Del Rosario MD;  Location:  [facility]     CV CORONARY ANGIOGRAM N/A 3/3/2022    Procedure: Coronary Angiogram;  Surgeon: Eulalio Del Rosario MD;  Location:  [facility]     CV INSTANTANEOUS WAVE-FREE RATIO N/A 3/3/2022    Procedure: Instantaneous Wave-Free Ratio;  Surgeon: Eulalio Del Rosario MD;  Location:  [facility]     CV INTRAVASULAR ULTRASOUND N/A 3/3/2022    Procedure: Intravascular Ultrasound;  Surgeon: Eulalio Del Rosario MD;  Location:  [facility]     CV INTRAVASULAR ULTRASOUND N/A 3/3/2022    Procedure: Intravascular Ultrasound;  Surgeon: Eulalio Del Rosario MD;  Location:  [facility]     CV PCI ATHERECTOMY ORBITAL N/A 3/3/2022    Procedure: Percutaneous Coronary Intervention Atherectomy Rotational;  Surgeon: Eulalio Del Rosario MD;  Location:  [facility]     CV TEMPORARY PACEMAKER INSERTION N/A 3/3/2022     Procedure: Temporary Pacemaker Insertion;  Surgeon: Eulalio Del Rosario MD;  Location:  [facility]     ENT SURGERY      Ringing in the ears     HC INJECTION SCLEROSING SOLUTION HEMORRHOID  8/24/2012    Procedure: HEMORRHOID INJECTION SCLEROSING SOLUTION;  Surgeon: Mayito Chow MD;  Location:  GI     HC TOOTH EXTRACTION W/FORCEP       HERNIA REPAIR  Needed     LASIK  1/2011     ROTATOR CUFF REPAIR RT/LT Left 10/15/2015    RCR Left - Dr. Carvalho     SOFT TISSUE SURGERY  10/2015    Torn rotator cuff       FAMILY HISTORY:  Family History   Problem Relation Age of Onset     C.A.D. Father         passed at 52 from MI     Cardiovascular Father      Heart Disease Father      Obesity Father      Myocardial Infarction Father         52 - passed away     Coronary Artery Disease Father      Colon Cancer Mother 65        passed away from colon cancer - lived about 2 mo from  1981     Arthritis Maternal Grandmother      Diabetes Maternal Grandmother      Arthritis Maternal Grandfather      Diabetes Maternal Grandfather      Heart Disease Brother      Diabetes Brother      Coronary Artery Disease Brother      Hypertension Brother      Hyperlipidemia Brother      Coronary Artery Disease Brother         stent placed      Hyperlipidemia Brother      Substance Abuse Paternal Grandfather      Hypertension No family hx of      Cerebrovascular Disease No family hx of      Breast Cancer No family hx of      Prostate Cancer No family hx of      Alcohol/Drug No family hx of      Allergies No family hx of      Alzheimer Disease No family hx of      Circulatory No family hx of      Congenital Anomalies No family hx of      Connective Tissue Disorder No family hx of      Depression No family hx of      Eye Disorder No family hx of      Genetic Disorder No family hx of      Gastrointestinal Disease No family hx of      Genitourinary Problems No family hx of      Gynecology No family hx of      Musculoskeletal Disorder No  family hx of      Neurologic Disorder No family hx of      Osteoporosis No family hx of      Psychotic Disorder No family hx of      Respiratory No family hx of      Thyroid Disease No family hx of      Anesthesia Reaction No family hx of      Blood Disease No family hx of        SOCIAL HISTORY:  Social History     Socioeconomic History     Marital status:      Spouse name: Emy     Number of children: 2     Years of education: 16     Highest education level: None   Occupational History     Occupation: [employer]     Employer: [employer]   Tobacco Use     Smoking status: Former Smoker     Packs/day: 1.50     Years: 24.00     Pack years: 36.00     Types: Cigarettes     Quit date: 1996     Years since quittin.3     Smokeless tobacco: Never Used   Substance and Sexual Activity     Alcohol use: Yes     Alcohol/week: 0.0 standard drinks     Comment: occ     Drug use: No     Sexual activity: Yes     Partners: Female     Birth control/protection: None   Other Topics Concern      Service Yes     Blood Transfusions No     Caffeine Concern No     Comment: 2 cups per day     Occupational Exposure No     Hobby Hazards No     Sleep Concern No     Stress Concern Yes     Weight Concern Yes     Special Diet Yes     Back Care No     Exercise No     Comment: 8,000 steps a day. Fitbit     Bike Helmet No     Comment: n/a     Seat Belt Yes     Self-Exams Yes     Parent/sibling w/ CABG, MI or angioplasty before 65F 55M? Yes     Comment: Father and brother   Social History Narrative    Eats fruits and vegetables every day. He takes extra vitamin D. He was advised to aim for 1200 mg of calcium per day.       Review of Systems:  Skin:  Negative     Eyes:  Positive for glasses  ENT:  Negative    Respiratory:  Positive for sleep apnea  Cardiovascular:       Gastroenterology: Positive for heartburn  Genitourinary:  Negative    Musculoskeletal:  Positive for neck pain  Neurologic:  Negative    Psychiatric:   Negative    Heme/Lymph/Imm:  Negative    Endocrine:  Negative        Recent Lab Results:  LIPID RESULTS:  Lab Results   Component Value Date    CHOL 117 03/03/2022    CHOL 139 10/06/2020    HDL 36 (L) 03/03/2022    HDL 41 10/06/2020    LDL 55 03/03/2022    LDL 69 10/06/2020    TRIG 129 03/03/2022    TRIG 117 02/09/2022    TRIG 146 10/06/2020    CHOLHDLRATIO 2.8 07/10/2015       LIVER ENZYME RESULTS:  Lab Results   Component Value Date    AST 35 10/06/2020    ALT 70 10/06/2020       CBC RESULTS:  Lab Results   Component Value Date    WBC 11.3 (H) 03/05/2022    WBC 9.1 10/06/2020    RBC 4.31 (L) 03/05/2022    RBC 5.39 10/06/2020    HGB 12.6 (L) 03/05/2022    HGB 16.3 10/06/2020    HCT 38.3 (L) 03/05/2022    HCT 47.9 10/06/2020    MCV 89 03/05/2022    MCV 89 10/06/2020    MCH 29.2 03/05/2022    MCH 30.2 10/06/2020    MCHC 32.9 03/05/2022    MCHC 34.0 10/06/2020    RDW 12.8 03/05/2022    RDW 13.5 10/06/2020     03/05/2022     (H) 10/06/2020       BMP RESULTS:  Lab Results   Component Value Date     04/13/2022     10/06/2020    POTASSIUM 4.4 04/13/2022    POTASSIUM 4.8 10/06/2020    CHLORIDE 107 04/13/2022    CHLORIDE 105 10/06/2020    CO2 25 04/13/2022    CO2 27 10/06/2020    ANIONGAP 4 04/13/2022    ANIONGAP 5 10/06/2020    GLC 93 04/13/2022     (H) 10/06/2020    BUN 28 04/13/2022    BUN 15 10/06/2020    CR 1.17 04/13/2022    CR 0.99 10/06/2020    GFRESTIMATED 67 04/13/2022    GFRESTIMATED 78 10/06/2020    GFRESTBLACK 90 10/06/2020    CAMERON 9.3 04/13/2022    CAMERON 9.5 10/06/2020        A1C RESULTS:  Lab Results   Component Value Date    A1C 6.3 (H) 12/30/2021    A1C 5.6 06/22/2017       INR RESULTS:  Lab Results   Component Value Date    INR 1.00 03/03/2022    INR 1.02 12/22/2011         ECHOCARDIOGRAM  No results found for this or any previous visit (from the past 8760 hour(s)).      Orders Placed This Encounter   Procedures     EKG 12-lead complete w/read - Clinics (performed today)      No orders of the defined types were placed in this encounter.    There are no discontinued medications.      Encounter Diagnoses   Name Primary?     Persistent atrial fibrillation (H) Yes     Ischemic cardiomyopathy      Nonrheumatic mitral valve regurgitation          CC  Eulalio Del Rosario MD  [id number]  [place] [phone]

## 2022-04-27 ENCOUNTER — TELEPHONE (OUTPATIENT)
Dept: CARDIOLOGY | Facility: CLINIC | Age: 72
End: 2022-04-27
Payer: MEDICARE

## 2022-04-29 ENCOUNTER — HOSPITAL ENCOUNTER (OUTPATIENT)
Dept: CARDIOLOGY | Facility: CLINIC | Age: 72
Discharge: HOME | End: 2022-04-29
Attending: INTERNAL MEDICINE | Admitting: INTERNAL MEDICINE
Payer: MEDICARE

## 2022-04-29 DIAGNOSIS — I48.19 PERSISTENT ATRIAL FIBRILLATION (H): ICD-10-CM

## 2022-04-29 PROCEDURE — G1004 CDSM NDSC: HCPCS

## 2022-04-29 PROCEDURE — 250N000011 HC RX IP 250 OP 636: Performed by: INTERNAL MEDICINE

## 2022-04-29 PROCEDURE — 75572 CT HRT W/3D IMAGE: CPT | Mod: 26 | Performed by: INTERNAL MEDICINE

## 2022-04-29 PROCEDURE — G1004 CDSM NDSC: HCPCS | Performed by: INTERNAL MEDICINE

## 2022-04-29 RX ORDER — DIPHENHYDRAMINE HCL 25 MG
25 CAPSULE ORAL
Status: DISCONTINUED | OUTPATIENT
Start: 2022-04-29 | End: 2022-04-30 | Stop reason: HOSPADM

## 2022-04-29 RX ORDER — METHYLPREDNISOLONE SODIUM SUCCINATE 125 MG/2ML
125 INJECTION, POWDER, LYOPHILIZED, FOR SOLUTION INTRAMUSCULAR; INTRAVENOUS
Status: DISCONTINUED | OUTPATIENT
Start: 2022-04-29 | End: 2022-04-30 | Stop reason: HOSPADM

## 2022-04-29 RX ORDER — ONDANSETRON 2 MG/ML
4 INJECTION INTRAMUSCULAR; INTRAVENOUS
Status: DISCONTINUED | OUTPATIENT
Start: 2022-04-29 | End: 2022-04-30 | Stop reason: HOSPADM

## 2022-04-29 RX ORDER — DIPHENHYDRAMINE HYDROCHLORIDE 50 MG/ML
25-50 INJECTION INTRAMUSCULAR; INTRAVENOUS
Status: DISCONTINUED | OUTPATIENT
Start: 2022-04-29 | End: 2022-04-30 | Stop reason: HOSPADM

## 2022-04-29 RX ORDER — IOPAMIDOL 755 MG/ML
200 INJECTION, SOLUTION INTRAVASCULAR ONCE
Status: COMPLETED | OUTPATIENT
Start: 2022-04-29 | End: 2022-04-29

## 2022-04-29 RX ORDER — ACYCLOVIR 200 MG/1
0-1 CAPSULE ORAL
Status: DISCONTINUED | OUTPATIENT
Start: 2022-04-29 | End: 2022-04-30 | Stop reason: HOSPADM

## 2022-04-29 RX ADMIN — IOPAMIDOL 100 ML: 755 INJECTION, SOLUTION INTRAVENOUS at 11:52

## 2022-04-29 ASSESSMENT — SLEEP AND FATIGUE QUESTIONNAIRES
HOW LIKELY ARE YOU TO NOD OFF OR FALL ASLEEP WHILE SITTING INACTIVE IN A PUBLIC PLACE: WOULD NEVER DOZE
HOW LIKELY ARE YOU TO NOD OFF OR FALL ASLEEP WHILE SITTING AND TALKING TO SOMEONE: WOULD NEVER DOZE
HOW LIKELY ARE YOU TO NOD OFF OR FALL ASLEEP WHILE WATCHING TV: WOULD NEVER DOZE
HOW LIKELY ARE YOU TO NOD OFF OR FALL ASLEEP WHILE LYING DOWN TO REST IN THE AFTERNOON WHEN CIRCUMSTANCES PERMIT: SLIGHT CHANCE OF DOZING
HOW LIKELY ARE YOU TO NOD OFF OR FALL ASLEEP WHILE SITTING QUIETLY AFTER LUNCH WITHOUT ALCOHOL: WOULD NEVER DOZE
HOW LIKELY ARE YOU TO NOD OFF OR FALL ASLEEP WHILE SITTING AND READING: WOULD NEVER DOZE
HOW LIKELY ARE YOU TO NOD OFF OR FALL ASLEEP WHEN YOU ARE A PASSENGER IN A CAR FOR AN HOUR WITHOUT A BREAK: WOULD NEVER DOZE
HOW LIKELY ARE YOU TO NOD OFF OR FALL ASLEEP IN A CAR, WHILE STOPPED FOR A FEW MINUTES IN TRAFFIC: WOULD NEVER DOZE

## 2022-05-06 ENCOUNTER — VIRTUAL VISIT (OUTPATIENT)
Dept: SLEEP MEDICINE | Facility: CLINIC | Age: 72
End: 2022-05-06
Attending: FAMILY MEDICINE
Payer: MEDICARE

## 2022-05-06 VITALS — HEIGHT: 73 IN | BODY MASS INDEX: 29.42 KG/M2 | WEIGHT: 222 LBS

## 2022-05-06 DIAGNOSIS — I48.91 ATRIAL FIBRILLATION, UNSPECIFIED TYPE (H): ICD-10-CM

## 2022-05-06 DIAGNOSIS — R35.1 NOCTURIA: ICD-10-CM

## 2022-05-06 DIAGNOSIS — G47.30 OBSERVED SLEEP APNEA: Primary | ICD-10-CM

## 2022-05-06 DIAGNOSIS — R94.30 CARDIAC LV EJECTION FRACTION 21-30%: ICD-10-CM

## 2022-05-06 PROCEDURE — 99205 OFFICE O/P NEW HI 60 MIN: CPT | Mod: 95 | Performed by: INTERNAL MEDICINE

## 2022-05-06 RX ORDER — ZOLPIDEM TARTRATE 5 MG/1
TABLET ORAL
Qty: 1 TABLET | Refills: 0 | Status: SHIPPED | OUTPATIENT
Start: 2022-05-06 | End: 2022-05-15

## 2022-05-06 NOTE — PATIENT INSTRUCTIONS
"[facility]            MY TREATMENT INFORMATION FOR SLEEP APNEA-  Peter Peralta    DOCTOR : Eliza Sellers MD    Am I having a sleep study at a sleep center?  --->Due to normal delays, you will be contacted within 2-4 weeks to schedule    Am I having a home sleep study?  --->Watch the video for the device you are using:    -/drop off device-   https://www.Typerings.com.com/watch?v=yGGFBdELGhk    -Disposable device sent out require phone/computer application-   https://www.Typerings.com.com/watch?v=BCce_vbiwxE      Frequently asked questions:  1. What is Obstructive Sleep Apnea (FAM)? FAM is the most common type of sleep apnea. Apnea means, \"without breath.\"  Apnea is most often caused by narrowing or collapse of the upper airway as muscles relax during sleep.   Almost everyone has occasional apneas. Most people with sleep apnea have had brief interruptions at night frequently for many years.  The severity of sleep apnea is related to how frequent and severe the events are.   2. What are the consequences of FAM? Symptoms include: feeling sleepy during the day, snoring loudly, gasping or stopping of breathing, trouble sleeping, and occasionally morning headaches or heartburn at night.  Sleepiness can be serious and even increase the risk of falling asleep while driving. Other health consequences may include development of high blood pressure and other cardiovascular disease in persons who are susceptible. Untreated FAM  can contribute to heart disease, stroke and diabetes.   3. What are the treatment options? In most situations, sleep apnea is a lifelong disease that must be managed with daily therapy. Medications are not effective for sleep apnea and surgery is generally not considered until other therapies have been tried. Your treatment is your choice . Continuous Positive Airway (CPAP) works right away and is the therapy that is effective in nearly everyone. An oral device " to hold your jaw forward is usually the next most reliable option. Other options include postioning devices (to keep you off your back), weight loss, and surgery including a tongue pacing device. There is more detail about some of these options below.  4. Are my sleep studies covered by insurance? Although we will request verification of coverage, we advise you also check in advance of the study to ensure there is coverage.    Important tips for those choosing CPAP and similar devices   Know your equipment:  CPAP is continuous positive airway pressure that prevents obstructive sleep apnea by keeping the throat from collapsing while you are sleeping. In most cases, the device is  smart  and can slowly self-adjusts if your throat collapses and keeps a record every day of how well you are treated-this information is available to you and your care team.  BPAP is bilevel positive airway pressure that keeps your throat open and also assists each breath with a pressure boost to maintain adequate breathing.  Special kinds of BPAP are used in patients who have inadequate breathing from lung or heart disease. In most cases, the device is  smart  and can slowly self-adjusts to assist breathing. Like CPAP, the device keeps a record of how well you are treated.  Your mask is your connection to the device. You get to choose what feels most comfortable and the staff will help to make sure if fits. Here: are some examples of the different masks that are available:       Key points to remember on your journey with sleep apnea:  Sleep study.  PAP devices often need to be adjusted during a sleep study to show that they are effective and adjusted right.  Good tips to remember: Try wearing just the mask during a quiet time during the day so your body adapts to wearing it. A humidifier is recommended for comfort in most cases to prevent drying of your nose and throat. Allergy medication from your provider may help you if you are having  nasal congestion.  Getting settled-in. It takes more than one night for most of us to get used to wearing a mask. Try wearing just the mask during a quiet time during the day so your body adapts to wearing it. A humidifier is recommended for comfort in most cases. Our team will work with you carefully on the first day and will be in contact within 4 days and again at 2 and 4 weeks for advice and remote device adjustments. Your therapy is evaluated by the device each day.   Use it every night. The more you are able to sleep naturally for 7-8 hours, the more likely you will have good sleep and to prevent health risks or symptoms from sleep apnea. Even if you use it 4 hours it helps. Occasionally all of us are unable to use a medical therapy, in sleep apnea, it is not dangerous to miss one night.   Communicate. Call our skilled team on the number provided on the first day if your visit for problems that make it difficult to wear the device. Over 2 out of 3 patients can learn to wear the device long-term with help from our team. Remember to call our team or your sleep providers if you are unable to wear the device as we may have other solutions for those who cannot adapt to mask CPAP therapy. It is recommended that you sleep your sleep provider within the first 3 months and yearly after that if you are not having problems.   Use it for your health. We encourage use of CPAP masks during daytime quiet periods to allow your face and brain to adapt to the sensation of CPAP so that it will be a more natural sensation to awaken to at night or during naps. This can be very useful during the first few weeks or months of adapting to CPAP though it does not help medically to wear CPAP during wakefulness and  should not be used as a strategy just to meet guidelines.  Take care of your equipment. Make sure you clean your mask and tubing using directions every day and that your filter and mask are replaced as recommended or if they  are not working.     BESIDES CPAP, WHAT OTHER THERAPIES ARE THERE?    Positioning Device  Positioning devices are generally used when sleep apnea is mild and only occurs on your back.This example shows a pillow that straps around the waist. It may be appropriate for those whose sleep study shows milder sleep apnea that occurs primarily when lying flat on one's back. Preliminary studies have shown benefit but effectiveness at home may need to be verified by a home sleep test. These devices are generally not covered by medical insurance.  Examples of devices that maintain sleeping on the back to prevent snoring and mild sleep apnea.    Belt type body positioner  http://[employer]Spootr/    Electronic reminder  http://nightshifttherapy.com/  http://www.Cubikal.Spootr.au/      Oral Appliance  What is oral appliance therapy?  An oral appliance device fits on your teeth at night like a retainer used after having braces. The device is made by a specialized dentist and requires several visits over 1-2 months before a manufactured device is made to fit your teeth and is adjusted to prevent your sleep apnea. Once an oral device is working properly, snoring should be improved. A home sleep test may be recommended at that time if to determine whether the sleep apnea is adequately treated.       Some things to remember:  -Oral devices are often, but not always, covered by your medical insurance. Be sure to check with your insurance provider.   -If you are referred for oral therapy, you will be given a list of specialized dentists to consider or you may choose to visit the Web site of the American Academy of Dental Sleep Medicine  -Oral devices are less likely to work if you have severe sleep apnea or are extremely overweight.     More detailed information  An oral appliance is a small acrylic device that fits over the upper and lower teeth  (similar to a retainer or a mouth guard). This device slightly moves jaw forward, which moves  the base of the tongue forward, opens the airway, improves breathing for effective treat snoring and obstructive sleep apnea in perhaps 7 out of 10 people .  The best working devices are custom-made by a dental device  after a mold is made of the teeth 1, 2, 3.  When is an oral appliance indicated?  Oral appliance therapy is recommended as a first-line treatment for patients with primary snoring, mild sleep apnea, and for patients with moderate sleep apnea who prefer appliance therapy to use of CPAP4, 5. Severity of sleep apnea is determined by sleep testing and is based on the number of respiratory events per hour of sleep.   How successful is oral appliance therapy?  The success rate of oral appliance therapy in patients with mild sleep apnea is 75-80% while in patients with moderate sleep apnea it is 50-70%. The chance of success in patients with severe sleep apnea is 40-50%. The research also shows that oral appliances have a beneficial effect on the cardiovascular health of FAM patients at the same magnitude as CPAP therapy7.  Oral appliances should be a second-line treatment in cases of severe sleep apnea, but if not completely successful then a combination therapy utilizing CPAP plus oral appliance therapy may be effective. Oral appliances tend to be effective in a broad range of patients although studies show that the patients who have the highest success are females, younger patients, those with milder disease, and less severe obesity. 3, 6.   Finding a dentist that practices dental sleep medicine  Specific training is available through the American Academy of Dental Sleep Medicine for dentists interested in working in the field of sleep. To find a dentist who is educated in the field of sleep and the use of oral appliances, near you, visit the Web site of the American Academy of Dental Sleep Medicine.    References  1. evans Ivory al. Objectively measured vs self-reported compliance during  oral appliance therapy for sleep-disordered breathing. Chest 2013; 144(5): 4557-4513.  2. Merary, et al. Objective measurement of compliance during oral appliance therapy for sleep-disordered breathing. Thorax 2013; 68(1): 91-96.  3. Anant et al. Mandibular advancement devices in 620 men and women with FAM and snoring: tolerability and predictors of treatment success. Chest 2004; 125: 0167-7120.  4. Mandy, et al. Oral appliances for snoring and FAM: a review. Sleep 2006; 29: 244-262.  5. Yogesh et al. Oral appliance treatment for FAM: an update. J Clin Sleep Med 2014; 10(2): 215-227.  6. Lizett et al. Predictors of OSAH treatment outcome. J Dent Res 2007; 86: 3250-2679.      Weight Loss:    Weight loss is a long-term strategy that may improve sleep apnea in some patients.    Weight management is a personal decision and the decision should be based on your interest and the potential benefits.  If you are interested in exploring weight loss strategies, the following discussion covers the impact on weight loss on sleep apnea and the approaches that may be successful.    Being overweight does not necessarily mean you will have health consequences.  Those who have BMI over 35 or over 27 with existing medical conditions carries greater risk.   Weight loss decreases severity of sleep apnea in most people with obesity. For those with mild obesity who have developed snoring with weight gain, even 15-30 pound weight loss can improve and occasionally eliminate sleep apnea.  Structured and life-long dietary and health habits are necessary to lose weight and keep healthier weight levels.     Though there may be significant health benefits from weight loss, long-term weight loss is very difficult to achieve- studies show success with dietary management in less than 10% of people. In addition, substantial weight loss may require years of dietary control and may be difficult if patients have severe obesity. In  these cases, surgical management may be considered.  Finally, older individuals who have tolerated obesity without health complications may be less likely to benefit from weight loss strategies.      [unfilled]    Surgery:    Surgery for obstructive sleep apnea is considered generally only when other therapies fail to work. Surgery may be discussed with you if you are having a difficult time tolerating CPAP and or when there is an abnormal structure that requires surgical correction.  Nose and throat surgeries often enlarge the airway to prevent collapse.  Most of these surgeries create pain for 1-2 weeks and up to half of the most common surgeries are not effective throughout life.  You should carefully discuss the benefits and drawbacks to surgery with your sleep provider and surgeon to determine if it is the best solution for you.   More information  Surgery for FAM is directed at areas that are responsible for narrowing or complete obstruction of the airway during sleep.  There are a wide range of procedures available to enlarge and/or stabilize the airway to prevent blockage of breathing in the three major areas where it can occur: the palate, tongue, and nasal regions.  Successful surgical treatment depends on the accurate identification of the factors responsible for obstructive sleep apnea in each person.  A personalized approach is required because there is no single treatment that works well for everyone.  Because of anatomic variation, consultation with an examination by a sleep surgeon is a critical first step in determining what surgical options are best for each patient.  In some cases, examination during sedation may be recommended in order to guide the selection of procedures.  Patients will be counseled about risks and benefits as well as the typical recovery course after surgery. Surgery is typically not a cure for a person s FAM.  However, surgery will often significantly improve one s FAM  severity (termed  success rate ).  Even in the absence of a cure, surgery will decrease the cardiovascular risk associated with OSA7; improve overall quality of life8 (sleepiness, functionality, sleep quality, etc).      Palate Procedures:  Patients with FAM often have narrowing of their airway in the region of their tonsils and uvula.  The goals of palate procedures are to widen the airway in this region as well as to help the tissues resist collapse.  Modern palate procedure techniques focus on tissue conservation and soft tissue rearrangement, rather than tissue removal.  Often the uvula is preserved in this procedure. Residual sleep apnea is common in patient after pharyngoplasty with an average reduction in sleep apnea events of 33%2.      Tongue Procedures:  ExamWhile patients are awake, the muscles that surround the throat are active and keep this region open for breathing. These muscles relax during sleep, allowing the tongue and other structures to collapse and block breathing.  There are several different tongue procedures available.  Selection of a tongue base procedure depends on characteristics seen on physical exam.  Generally, procedures are aimed at removing bulky tissues in this area or preventing the back of the tongue from falling back during sleep.  Success rates for tongue surgery range from 50-62%3.    Hypoglossal Nerve Stimulation:  Hypoglossal nerve stimulation has recently received approval from the [place] Food and Drug Administration for the treatment of obstructive sleep apnea.  This is based on research showing that the system was safe and effective in treating sleep apnea6.  Results showed that the median AHI score decreased 68%, from 29.3 to 9.0. This therapy uses an implant system that senses breathing patterns and delivers mild stimulation to airway muscles, which keeps the airway open during sleep.  The system consists of three fully implanted components: a small generator  (similar in size to a pacemaker), a breathing sensor, and a stimulation lead.  Using a small handheld remote, a patient turns the therapy on before bed and off upon awakening.    Candidates for this device must be greater than 22 years of age, have moderate to severe FAM (AHI between 20-65), BMI less than 32, have tried CPAP/oral appliance without success, and have appropriate upper airway anatomy (determined by a sleep endoscopy performed by Dr. Baez).    Hypoglossal Nerve Stimulation Pathway:    The sleep surgeon s office will work with the patient through the insurance prior-authorization process (including communications and appeals).    Nasal Procedures:  Nasal obstruction can interfere with nasal breathing during the day and night.  Studies have shown that relief of nasal obstruction can improve the ability of some patients to tolerate positive airway pressure therapy for obstructive sleep apnea1.  Treatment options include medications such as nasal saline, topical corticosteroid and antihistamine sprays, and oral medications such as antihistamines or decongestants. Non-surgical treatments can include external nasal dilators for selected patients. If these are not successful by themselves, surgery can improve the nasal airway either alone or in combination with these other options.      Combination Procedures:  Combination of surgical procedures and other treatments may be recommended, particularly if patients have more than one area of narrowing or persistent positional disease.  The success rate of combination surgery ranges from 66-80%2,3.    References  Ganesh TAYLOR. The Role of the Nose in Snoring and Obstructive Sleep Apnoea: An Update.  Eur Arch Otorhinolaryngol. 2011; 268: 1365-73.   Marcia SM; Dewey JA; Salty JR; Pallanch JF; Regla MADDOX; Dean MERCEDES; Xin MANCIA. Surgical modifications of the upper airway for obstructive sleep apnea in adults: a systematic review and meta-analysis. SLEEP  2010;33(10):1397-7268. Brittney MORENO. Hypopharyngeal surgery in obstructive sleep apnea: an evidence-based medicine review.  Arch Otolaryngol Head Neck Surg. 2006 Feb;132(2):206-13.  Roque YKRUPA, Chantel Y, Tony ROACH. The efficacy of anatomically based multilevel surgery for obstructive sleep apnea. Otolaryngol Head Neck Surg. 2003 Oct;129(4):327-35.  Kezirian E, Goldberg A. Hypopharyngeal Surgery in Obstructive Sleep Apnea: An Evidence-Based Medicine Review. Arch Otolaryngol Head Neck Surg. 2006 Feb;132(2):206-13.  Matthieu PJ et al. Upper-Airway Stimulation for Obstructive Sleep Apnea.  N Engl J Med. 2014 Jan 9;370(2):139-49.  Mesfin Y et al. Increased Incidence of Cardiovascular Disease in Middle-aged Men with Obstructive Sleep Apnea. Am J Respir Crit Care Med; 2002 166: 159-165  Barrera EM et al. Studying Life Effects and Effectiveness of Palatopharyngoplasty (SLEEP) study: Subjective Outcomes of Isolated Uvulopalatopharyngoplasty. Otolaryngol Head Neck Surg. 2011; 144: 623-631.        WHAT IF I ONLY HAVE SNORING?    Mandibular advancement devices, lateral sleep positioning, long-term weight loss and treatment of nasal allergies have been shown to improve snoring.  Exercising tongue muscles with a game (https://www.ncbi.nlm.nih.gov/pubmed/02029425) or stimulating the tongue during the day with a device (https://doi.org/10.3390/ngl76387349) have improved snoring in some individuals.    Remember to Drive Safe... Drive Alive     Sleep health profoundly affects your health, mood, and your safety.  Thirty three percent of the population (one in three of us) is not getting enough sleep and many have a sleep disorder. Not getting enough sleep or having an untreated / undertreated sleep condition may make us sleepy without even knowing it. In fact, our driving could be dramatically impaired due to our sleep health. As your provider, here are some things I would like you to know about driving:     Here are some warning signs for  impairment and dangerous drowsy driving:              -Having been awake more than 16 hours               -Looking tired               -Eyelid drooping              -Head nodding (it could be too late at this point)              -Driving for more than 30 minutes     Some things you could do to make the driving safer if you are experiencing some drowsiness:              -Stop driving and rest              -Call for transportation              -Make sure your sleep disorder is adequately treated     Some things that have been shown NOT to work when experiencing drowsiness while driving:              -Turning on the radio              -Opening windows              -Eating any  distracting  /  entertaining  foods (e.g., sunflower seeds, candy, or any other)              -Talking on the phone      Your decision may not only impact your life, but also the life of others. Please, remember to drive safe for yourself and all of us.

## 2022-05-06 NOTE — LETTER
5/6/2022         RE: Peetr Peralta  [address]   [facility] [address]        Dear Colleague,    Thank you for referring your patient, Peter Peralta, to the [facility]. Please see a copy of my visit note below.    Nathan is a 71 year old who is being evaluated via a billable video visit.      How would you like to obtain your AVS? MyChart  If the video visit is dropped, the invitation should be resent by: Send to e-mail at: [email or web address]  Will anyone else be joining your video visit? No      Video Start Time: 1:01 PM  Video-Visit Details    Type of service:  Video Visit    Video End Time:1:46 PM    Originating Location (pt. Location): Home    Distant Location (provider location):  [facility]     Platform used for Video Visit: [employer]     Chief complaint: Consultation requested by Donell Zuñiga MD for evaluation of possible sleep disordered breathing    History of Present Illness: 71-year-old gentleman with history of coronary artery disease and hypertension.  He was found to have atrial fibrillation and heart failure earlier this year.  Patient is recently referred for evaluation of possible sleep disordered breathing due to observed apneas noted by his wife.  This started in the last 8 months or so.  She has noticed this particularly towards morning-shallow breathing and apnea.  She notes that it is quite frightening.  Snoring is not particularly loud.  He only occasionally will wake up with a sense of gasping.  Does have dry mouth.  He also believes he breathes through his mouth due to history of deviated septum and sinus issues.  He sleeps on a flat bed.  Tends to sleep on his stomach and side.    Typical bedtimes around 1130 PM.  He will watch TV for about an hour prior,  during that time he does tend to move his legs a lot but denies suffering a lot of restlessness and needing to get up and walk around.  He has been told that he moves his  limbs at night sometimes.  Once he falls asleep he tends to wake up 2-3 times at night to go to the bathroom.  Usually he can return to sleep rather quickly.  He is usually up for the day by 745.  He is not taking any naps during the day.  He drinks about 2 cups of coffee in the morning and 1 decaf by noon.    He is not using any prescribed or over-the-counter sleep aids.  He does take Tylenol for arthritis before bed.  No history of bruxism, sleepwalking, sleep eating, nightmares, dream enactment behavior.    [facility] WatchPat home sleep apnea test.  He reports that that showed severe apnea.  This was followed up by an in lab test that did not show significant sleep apnea.  He has concerns about the evaluation due to the confliciting results.  He recalls he slept about 4 hours in the sleep lab.  He had difficulty initiating sleep and then found it difficult to to return to sleep after getting up to go to the bathroom.    He has ablation scheduled next week for artrial arrhyhtmia  He is not currently suffering any lower extremity edema.  He is taking some iron that was recommended [facility] for possible restless legs, periodic limb movements.    [place] Sleepiness Scale   Sitting and reading: Would never doze   Watching TV: Would never doze   Sitting, inactive in a public place (e.g. a theatre or a meeting): Would never doze   As a passenger in a car for an hour without a break: Would never doze   Lying down to rest in the afternoon when circumstances permit: Slight chance of dozing   Sitting and talking to someone: Would never doze   Sitting quietly after a lunch without alcohol: Would never doze   In a car, while stopped for a few minutes in traffic: Would never doze   Total score - [place]: 1   (Less than 10 normal)    Insomnia Severity Scale  ROSIO Total Score: 11  Total score categories:  0-7 = No clinically significant insomnia   8-14 = Subthreshold  insomnia   15-21 = Clinical insomnia (moderate severity)  22-28 = Clinical insomnia (severe)    STOP-BANG  Loud Snore   0  Excessively Tired/Sleepy   0  Observed apnea   1  Hypertension   1  BMI> 35 kg/m2   0  Age >50   1  Neck >16 in/40cm   0  Male Gender   1  Total =   4  (0-2 low, 3-4 intermediate, 5-8 high risk of FAM)      Past Medical History:   Diagnosis Date     Actinic keratoses     face     Atrial fibrillation (H)     only after adenosine test     BPH (benign prostatic hypertrophy)      Chest pain      Coronary artery disease 2011 2011-Cath Lma 5%, Lad 40-50%, Lcx 30-40%, Wyr82-50% (-FFR of Lad)     ED (erectile dysfunction)      Erectile dysfunction 12/30/2021     Family history of colon cancer 7/6/2012    mother age 65     GERD (gastroesophageal reflux disease)     nexium     Hyperlipidaemia      Hypertension 2/16/2022     IFG (impaired fasting glucose)      Murmur      Obesity, unspecified     Waist size 42, BMI 30.8     Right inguinal hernia 7/1/2014     Thrombocytosis      Ulcerative colitis (H)        Allergies   Allergen Reactions     No Known Drug Allergies        Current Outpatient Medications   Medication     acetaminophen (TYLENOL) 325 MG tablet     amiodarone (PACERONE) 200 MG tablet     apixaban ANTICOAGULANT (ELIQUIS ANTICOAGULANT) 5 MG tablet     atorvastatin (LIPITOR) 40 MG tablet     Calcium-Vitamin D-Vitamin K (CALCIUM + D + K) 750-500-40 MG-UNT-MCG TABS     clopidogrel (PLAVIX) 75 MG tablet     esomeprazole (NEXIUM) 40 MG DR capsule     fluticasone (FLONASE) 50 MCG/ACT nasal spray     lisinopril (ZESTRIL) 2.5 MG tablet     mesalamine (LIALDA) 1.2 g EC tablet     metoprolol succinate ER (TOPROL-XL) 50 MG 24 hr tablet     MULTIVITAMIN TABS   OR     nitroGLYcerin (NITROSTAT) 0.4 MG sublingual tablet     Probiotic Product (PROBIOTIC PO)     psyllium (METAMUCIL) 58.6 % POWD     sildenafil (VIAGRA) 25 MG tablet     UNABLE TO FIND     VITAMIN D, CHOLECALCIFEROL, PO     No current  facility-administered medications for this visit.       Social History     Socioeconomic History     Marital status:      Spouse name: Emy     Number of children: 2     Years of education: 16     Highest education level: Not on file   Occupational History     Occupation: [employer]     Employer: [employer]   Tobacco Use     Smoking status: Former Smoker     Packs/day: 1.50     Years: 24.00     Pack years: 36.00     Types: Cigarettes     Quit date: 1996     Years since quittin.3     Smokeless tobacco: Never Used   Substance and Sexual Activity     Alcohol use: Yes     Alcohol/week: 0.0 standard drinks     Comment: occ     Drug use: No     Sexual activity: Yes     Partners: Female     Birth control/protection: None   Other Topics Concern      Service Yes     Blood Transfusions No     Caffeine Concern No     Comment: 2 cups per day     Occupational Exposure No     Hobby Hazards No     Sleep Concern No     Stress Concern Yes     Weight Concern Yes     Special Diet Yes     Back Care No     Exercise No     Comment: 8,000 steps a day. Fitbit     Bike Helmet No     Comment: n/a     Seat Belt Yes     Self-Exams Yes     Parent/sibling w/ CABG, MI or angioplasty before 65F 55M? Yes     Comment: Father and brother   Social History Narrative    Eats fruits and vegetables every day. He takes extra vitamin D. He was advised to aim for 1200 mg of calcium per day.     Social Determinants of Health     Financial Resource Strain: Not on file   Food Insecurity: Not on file   Transportation Needs: Not on file   Physical Activity: Not on file   Stress: Not on file   Social Connections: Not on file   Intimate Partner Violence: Not on file   Housing Stability: Not on file       Family History   Problem Relation Age of Onset     C.A.D. Father         passed at 52 from MI     Cardiovascular Father      Heart Disease Father      Obesity Father      Myocardial Infarction Father         52 - passed away      "Coronary Artery Disease Father      Colon Cancer Mother 65        passed away from colon cancer - lived about 2 mo from dx 1981     Arthritis Maternal Grandmother      Diabetes Maternal Grandmother      Arthritis Maternal Grandfather      Diabetes Maternal Grandfather      Heart Disease Brother      Diabetes Brother      Coronary Artery Disease Brother      Hypertension Brother      Hyperlipidemia Brother      Coronary Artery Disease Brother         stent placed      Hyperlipidemia Brother      Substance Abuse Paternal Grandfather      Hypertension No family hx of      Cerebrovascular Disease No family hx of      Breast Cancer No family hx of      Prostate Cancer No family hx of      Alcohol/Drug No family hx of      Allergies No family hx of      Alzheimer Disease No family hx of      Circulatory No family hx of      Congenital Anomalies No family hx of      Connective Tissue Disorder No family hx of      Depression No family hx of      Eye Disorder No family hx of      Genetic Disorder No family hx of      Gastrointestinal Disease No family hx of      Genitourinary Problems No family hx of      Gynecology No family hx of      Musculoskeletal Disorder No family hx of      Neurologic Disorder No family hx of      Osteoporosis No family hx of      Psychotic Disorder No family hx of      Respiratory No family hx of      Thyroid Disease No family hx of      Anesthesia Reaction No family hx of      Blood Disease No family hx of          EXAM:  Ht 1.854 m (6' 1\")   Wt 100.7 kg (222 lb)   BMI 29.29 kg/m    GENERAL: Alert and no distress  EYES: Eyes grossly normal to inspection.  No discharge or erythema, or obvious scleral/conjunctival abnormalities.  RESP: No audible wheeze, cough, or visible cyanosis.  No visible retractions or increased work of breathing.    SKIN: Visible skin clear. No significant rash, abnormal pigmentation or lesions.  NEURO: Cranial nerves grossly intact.  Mentation and speech appropriate for " age.  PSYCH: Mentation appears normal, affect normal, judgement and insight intact, normal speech and appearance well-groomed.       OSCAR 3/22/2022:  Interpretation Summary   1. Left ventricular systolic function is severely reduced. The visual ejection  fraction is 20-25%.  2. There is severe global hypokinesia of the left ventricle.  3. Severely decreased right ventricular systolic function.  4. There is moderately severe (3+) mitral regurgitation. The etiology appears  functional related to poor leaflet coaptation.  5. There is no color Doppler evidence of an atrial shunt. A contrast injection  (Bubble Study) was performed that was negative for flow across the interatrial  septum.  6. No thrombus is detected in the left atrial appendage.    TSH   Date Value Ref Range Status   02/25/2022 1.05 0.40 - 4.00 mU/L Final   06/22/2017 0.96 0.40 - 4.00 mU/L Final           ASSESSMENT:  71-year-old gentleman with history of hypertension, atrial fibrillation, heart failure with ejection fraction 20 to 25% and severe mitral regurgitation.  He has observed apneas by his wife and contradictory sleep study results at outside center.  He is at increased risk for both obstructive and central sleep apnea.  WatchPat  testing certainly can be invalid if the patient was in atrial fibrillation at the time.  [facility] however do not have those results for review and patient questions validity.    PLAN:  Recommended in lab PSG with a sleep blade and use of bedside urinal to maximize sleep time.  Patient is encouraged to try to sleep in his usual positions.  We discussed the pathophysiology of obstructive sleep apnea as well as central sleep apnea with Cheyne-Levin.  We discussed treatment options including CPAP and even potential for use of supplemental oxygen in certain cases.  I do not think medication is warranted for restless leg syndrome at this time.  Patient is agreeable  with this plan.  I will be contacting him with results of my the sleep study via [facility] when they come available.  We have also [facility] for [facility].      73 minutes spent on the date of the encounter doing chart review, history and exam, documentation and further activities per the note    Eliza Sellers M.D.  Pulmonary/Critical Care/Sleep Medicine    [facility]   Floor 1, [address]   [address] [place] [address]   Appointments: [phone]    The above note was dictated using voice recognition software and may include typographical errors. Please contact the author for any clarifications.                Again, thank you for allowing me to participate in the care of your patient.        Sincerely,        Eliza Sellers MD

## 2022-05-06 NOTE — PROGRESS NOTES
Nathan is a 71 year old who is being evaluated via a billable video visit.      How would you like to obtain your AVS? MyChart  If the video visit is dropped, the invitation should be resent by: Send to e-mail at: [email or web address]  Will anyone else be joining your video visit? No      Video Start Time: 1:01 PM  Video-Visit Details    Type of service:  Video Visit    Video End Time:1:46 PM    Originating Location (pt. Location): Home    Distant Location (provider location):  [facility]     Platform used for Video Visit: DillonAgent Panda     Chief complaint: Consultation requested by Donell Zuñiga MD for evaluation of possible sleep disordered breathing    History of Present Illness: 71-year-old gentleman with history of coronary artery disease and hypertension.  He was found to have atrial fibrillation and heart failure earlier this year.  Patient is recently referred for evaluation of possible sleep disordered breathing due to observed apneas noted by his wife.  This started in the last 8 months or so.  She has noticed this particularly towards morning-shallow breathing and apnea.  She notes that it is quite frightening.  Snoring is not particularly loud.  He only occasionally will wake up with a sense of gasping.  Does have dry mouth.  He also believes he breathes through his mouth due to history of deviated septum and sinus issues.  He sleeps on a flat bed.  Tends to sleep on his stomach and side.    Typical bedtimes around 1130 PM.  He will watch TV for about an hour prior,  during that time he does tend to move his legs a lot but denies suffering a lot of restlessness and needing to get up and walk around.  He has been told that he moves his limbs at night sometimes.  Once he falls asleep he tends to wake up 2-3 times at night to go to the bathroom.  Usually he can return to sleep rather quickly.  He is usually up for the day by 745.  He is not taking any naps during the day.  He drinks about 2  cups of coffee in the morning and 1 decaf by noon.    He is not using any prescribed or over-the-counter sleep aids.  He does take Tylenol for arthritis before bed.  No history of bruxism, sleepwalking, sleep eating, nightmares, dream enactment behavior.    [facility] WatchPat home sleep apnea test.  He reports that that showed severe apnea.  This was followed up by an in lab test that did not show significant sleep apnea.  He has concerns about the evaluation due to the confliciting results.  He recalls he slept about 4 hours in the sleep lab.  He had difficulty initiating sleep and then found it difficult to to return to sleep after getting up to go to the bathroom.    He has ablation scheduled next week for artrial arrhyhtmia  He is not currently suffering any lower extremity edema.  He is taking some iron that was recommended [facility] sleep for possible restless legs, periodic limb movements.    Cromona Sleepiness Scale   Sitting and reading: Would never doze   Watching TV: Would never doze   Sitting, inactive in a public place (e.g. a theatre or a meeting): Would never doze   As a passenger in a car for an hour without a break: Would never doze   Lying down to rest in the afternoon when circumstances permit: Slight chance of dozing   Sitting and talking to someone: Would never doze   Sitting quietly after a lunch without alcohol: Would never doze   In a car, while stopped for a few minutes in traffic: Would never doze   Total score - Cromona: 1   (Less than 10 normal)    Insomnia Severity Scale  ROSIO Total Score: 11  Total score categories:  0-7 = No clinically significant insomnia   8-14 = Subthreshold insomnia   15-21 = Clinical insomnia (moderate severity)  22-28 = Clinical insomnia (severe)    STOP-BANG  Loud Snore   0  Excessively Tired/Sleepy   0  Observed apnea   1  Hypertension   1  BMI> 35 kg/m2   0  Age >50   1  Neck >16 in/40cm   0  Male Gender    1  Total =   4  (0-2 low, 3-4 intermediate, 5-8 high risk of FAM)      Past Medical History:   Diagnosis Date     Actinic keratoses     face     Atrial fibrillation (H)     only after adenosine test     BPH (benign prostatic hypertrophy)      Chest pain      Coronary artery disease 2011 2011-Cath Lma 5%, Lad 40-50%, Lcx 30-40%, Unz54-56% (-FFR of Lad)     ED (erectile dysfunction)      Erectile dysfunction 12/30/2021     Family history of colon cancer 7/6/2012    mother age 65     GERD (gastroesophageal reflux disease)     nexium     Hyperlipidaemia      Hypertension 2/16/2022     IFG (impaired fasting glucose)      Murmur      Obesity, unspecified     Waist size 42, BMI 30.8     Right inguinal hernia 7/1/2014     Thrombocytosis      Ulcerative colitis (H)        Allergies   Allergen Reactions     No Known Drug Allergies        Current Outpatient Medications   Medication     acetaminophen (TYLENOL) 325 MG tablet     amiodarone (PACERONE) 200 MG tablet     apixaban ANTICOAGULANT (ELIQUIS ANTICOAGULANT) 5 MG tablet     atorvastatin (LIPITOR) 40 MG tablet     Calcium-Vitamin D-Vitamin K (CALCIUM + D + K) 750-500-40 MG-UNT-MCG TABS     clopidogrel (PLAVIX) 75 MG tablet     esomeprazole (NEXIUM) 40 MG DR capsule     fluticasone (FLONASE) 50 MCG/ACT nasal spray     lisinopril (ZESTRIL) 2.5 MG tablet     mesalamine (LIALDA) 1.2 g EC tablet     metoprolol succinate ER (TOPROL-XL) 50 MG 24 hr tablet     MULTIVITAMIN TABS   OR     nitroGLYcerin (NITROSTAT) 0.4 MG sublingual tablet     Probiotic Product (PROBIOTIC PO)     psyllium (METAMUCIL) 58.6 % POWD     sildenafil (VIAGRA) 25 MG tablet     UNABLE TO FIND     VITAMIN D, CHOLECALCIFEROL, PO     No current facility-administered medications for this visit.       Social History     Socioeconomic History     Marital status:      Spouse name: Emy     Number of children: 2     Years of education: 16     Highest education level: Not on file   Occupational History      Occupation: [employer]     Employer: [employer]   Tobacco Use     Smoking status: Former Smoker     Packs/day: 1.50     Years: 24.00     Pack years: 36.00     Types: Cigarettes     Quit date: 1996     Years since quittin.3     Smokeless tobacco: Never Used   Substance and Sexual Activity     Alcohol use: Yes     Alcohol/week: 0.0 standard drinks     Comment: occ     Drug use: No     Sexual activity: Yes     Partners: Female     Birth control/protection: None   Other Topics Concern      Service Yes     Blood Transfusions No     Caffeine Concern No     Comment: 2 cups per day     Occupational Exposure No     Hobby Hazards No     Sleep Concern No     Stress Concern Yes     Weight Concern Yes     Special Diet Yes     Back Care No     Exercise No     Comment: 8,000 steps a day. Fitbit     Bike Helmet No     Comment: n/a     Seat Belt Yes     Self-Exams Yes     Parent/sibling w/ CABG, MI or angioplasty before 65F 55M? Yes     Comment: Father and brother   Social History Narrative    Eats fruits and vegetables every day. He takes extra vitamin D. He was advised to aim for 1200 mg of calcium per day.     Social Determinants of Health     Financial Resource Strain: Not on file   Food Insecurity: Not on file   Transportation Needs: Not on file   Physical Activity: Not on file   Stress: Not on file   Social Connections: Not on file   Intimate Partner Violence: Not on file   Housing Stability: Not on file       Family History   Problem Relation Age of Onset     C.A.D. Father         passed at 52 from MI     Cardiovascular Father      Heart Disease Father      Obesity Father      Myocardial Infarction Father         52 - passed away     Coronary Artery Disease Father      Colon Cancer Mother 65        passed away from colon cancer - lived about 2 mo from dx      Arthritis Maternal Grandmother      Diabetes Maternal Grandmother      Arthritis Maternal Grandfather      Diabetes Maternal Grandfather       "Heart Disease Brother      Diabetes Brother      Coronary Artery Disease Brother      Hypertension Brother      Hyperlipidemia Brother      Coronary Artery Disease Brother         stent placed      Hyperlipidemia Brother      Substance Abuse Paternal Grandfather      Hypertension No family hx of      Cerebrovascular Disease No family hx of      Breast Cancer No family hx of      Prostate Cancer No family hx of      Alcohol/Drug No family hx of      Allergies No family hx of      Alzheimer Disease No family hx of      Circulatory No family hx of      Congenital Anomalies No family hx of      Connective Tissue Disorder No family hx of      Depression No family hx of      Eye Disorder No family hx of      Genetic Disorder No family hx of      Gastrointestinal Disease No family hx of      Genitourinary Problems No family hx of      Gynecology No family hx of      Musculoskeletal Disorder No family hx of      Neurologic Disorder No family hx of      Osteoporosis No family hx of      Psychotic Disorder No family hx of      Respiratory No family hx of      Thyroid Disease No family hx of      Anesthesia Reaction No family hx of      Blood Disease No family hx of          EXAM:  Ht 1.854 m (6' 1\")   Wt 100.7 kg (222 lb)   BMI 29.29 kg/m    GENERAL: Alert and no distress  EYES: Eyes grossly normal to inspection.  No discharge or erythema, or obvious scleral/conjunctival abnormalities.  RESP: No audible wheeze, cough, or visible cyanosis.  No visible retractions or increased work of breathing.    SKIN: Visible skin clear. No significant rash, abnormal pigmentation or lesions.  NEURO: Cranial nerves grossly intact.  Mentation and speech appropriate for age.  PSYCH: Mentation appears normal, affect normal, judgement and insight intact, normal speech and appearance well-groomed.       OSCAR 3/22/2022:  Interpretation Summary   1. Left ventricular systolic function is severely reduced. The visual ejection  fraction is 20-25%.  2. " There is severe global hypokinesia of the left ventricle.  3. Severely decreased right ventricular systolic function.  4. There is moderately severe (3+) mitral regurgitation. The etiology appears  functional related to poor leaflet coaptation.  5. There is no color Doppler evidence of an atrial shunt. A contrast injection  (Bubble Study) was performed that was negative for flow across the interatrial  septum.  6. No thrombus is detected in the left atrial appendage.    TSH   Date Value Ref Range Status   02/25/2022 1.05 0.40 - 4.00 mU/L Final   06/22/2017 0.96 0.40 - 4.00 mU/L Final           ASSESSMENT:  71-year-old gentleman with history of hypertension, atrial fibrillation, heart failure with ejection fraction 20 to 25% and severe mitral regurgitation.  He has observed apneas by his wife and contradictory sleep study results at outside center.  He is at increased risk for both obstructive and central sleep apnea.  WatchPat  testing certainly can be invalid if the patient was in atrial fibrillation at the time.  [facility] a [facility] is [facility] however do not have those results for review and patient questions validity.    PLAN:  Recommended in lab PSG with a sleep blade and use of bedside urinal to maximize sleep time.  Patient is encouraged to try to sleep in his usual positions.  We discussed the pathophysiology of obstructive sleep apnea as well as central sleep apnea with Cheyne-Levin.  We discussed treatment options including CPAP and even potential for use of supplemental oxygen in certain cases.  I do not think medication is warranted for restless leg syndrome at this time.  Patient is agreeable with this plan.  I will be contacting him with results of my the sleep study via [facility] when they come available.  We have also [facility] the [facility] for [facility].      73 minutes spent on the date of the encounter doing chart review, history and exam, documentation and  further activities per the note    Eliza Sellers M.D.  Pulmonary/Critical Care/Sleep Medicine    [facility]   Floor 1, [address]   [address] [place], [place] [address]. [place] [address]   Appointments: [phone]    The above note was dictated using voice recognition software and may include typographical errors. Please contact the author for any clarifications.

## 2022-05-09 ENCOUNTER — LAB (OUTPATIENT)
Dept: URGENT CARE | Facility: URGENT CARE | Age: 72
End: 2022-05-09
Payer: MEDICARE

## 2022-05-09 ENCOUNTER — CARE COORDINATION (OUTPATIENT)
Dept: SLEEP MEDICINE | Facility: CLINIC | Age: 72
End: 2022-05-09

## 2022-05-09 DIAGNOSIS — Z11.59 ENCOUNTER FOR SCREENING FOR OTHER VIRAL DISEASES: ICD-10-CM

## 2022-05-09 LAB — SARS-COV-2 RNA RESP QL NAA+PROBE: NEGATIVE

## 2022-05-09 PROCEDURE — U0005 INFEC AGEN DETEC AMPLI PROBE: HCPCS

## 2022-05-09 PROCEDURE — U0003 INFECTIOUS AGENT DETECTION BY NUCLEIC ACID (DNA OR RNA); SEVERE ACUTE RESPIRATORY SYNDROME CORONAVIRUS 2 (SARS-COV-2) (CORONAVIRUS DISEASE [COVID-19]), AMPLIFIED PROBE TECHNIQUE, MAKING USE OF HIGH THROUGHPUT TECHNOLOGIES AS DESCRIBED BY CMS-2020-01-R: HCPCS

## 2022-05-09 NOTE — PROGRESS NOTES
[facility]. Scanned into Procedures. Patient had a sleep consult on 5/6/22 with Dr. Sellers. Joyce Nicolas CMA

## 2022-05-11 ENCOUNTER — TELEPHONE (OUTPATIENT)
Dept: CARDIOLOGY | Facility: CLINIC | Age: 72
End: 2022-05-11
Payer: MEDICARE

## 2022-05-11 ENCOUNTER — ANESTHESIA EVENT (OUTPATIENT)
Dept: CARDIOLOGY | Facility: CLINIC | Age: 72
DRG: 273 | End: 2022-05-11
Payer: MEDICARE

## 2022-05-11 DIAGNOSIS — I48.0 PAROXYSMAL ATRIAL FIBRILLATION (H): Primary | ICD-10-CM

## 2022-05-11 RX ORDER — SODIUM CHLORIDE, SODIUM LACTATE, POTASSIUM CHLORIDE, CALCIUM CHLORIDE 600; 310; 30; 20 MG/100ML; MG/100ML; MG/100ML; MG/100ML
INJECTION, SOLUTION INTRAVENOUS CONTINUOUS
Status: CANCELLED | OUTPATIENT
Start: 2022-05-11

## 2022-05-11 RX ORDER — LIDOCAINE 40 MG/G
CREAM TOPICAL
Status: CANCELLED | OUTPATIENT
Start: 2022-05-11

## 2022-05-11 ASSESSMENT — LIFESTYLE VARIABLES: TOBACCO_USE: 1

## 2022-05-11 ASSESSMENT — COPD QUESTIONNAIRES: COPD: 0

## 2022-05-11 NOTE — TELEPHONE ENCOUNTER
5/11/22 Called pt regarding Afib Ablation tomorrow . Notified of arrival time ( 630 am) , NPO after midnight with sips of water for am meds. Discussed meds to be held ( OTC vitamins and supplements )  and that patient will need a  for ride home and someone to stay with him for 24 hours . Informed COVID test was negative and he should continue to quarantine until tomorrow. Pt expressed understanding and has no further questions at this time.  Serafin 1220 pm

## 2022-05-11 NOTE — ANESTHESIA PREPROCEDURE EVALUATION
Anesthesia Pre-Procedure Evaluation    Patient: Peter Peralta   MRN: [id number] : 1950        Procedure : Procedure(s):  Ablation Focal Atrial Fibrillation [[id number]]          Past Medical History:   Diagnosis Date     Actinic keratoses     face     Atrial fibrillation (H)     only after adenosine test     BPH (benign prostatic hypertrophy)      Chest pain      Coronary artery disease 2011-Cath Lma 5%, Lad 40-50%, Lcx 30-40%, Qik45-04% (-FFR of Lad)     ED (erectile dysfunction)      Erectile dysfunction 2021     Family history of colon cancer 2012    mother age 65     GERD (gastroesophageal reflux disease)     nexium     Hyperlipidaemia      Hypertension 2022     IFG (impaired fasting glucose)      Murmur      Obesity, unspecified     Waist size 42, BMI 30.8     Right inguinal hernia 2014     Thrombocytosis      Ulcerative colitis (H)       Past Surgical History:   Procedure Laterality Date     ANESTHESIA CARDIOVERSION N/A 3/22/2022    Procedure: ANESTHESIA, FOR CARDIOVERSION;  Surgeon: GENERIC ANESTHESIA PROVIDER;  Location:  OR     CARDIAC NUC POLINA STRESS TEST NL      Normal     CARDIOVERSION  2011    Atrial Fib     COLONOSCOPY  2014    Procedure: COMBINED COLONOSCOPY, SINGLE BIOPSY/POLYPECTOMY BY BIOPSY;  COLONOSCOPY;  Surgeon: Ashtyn Gonzales MD;  Location:  GI     COLONOSCOPY N/A 2018    Procedure: COMBINED COLONOSCOPY, SINGLE OR MULTIPLE BIOPSY/POLYPECTOMY BY BIOPSY;  COLONOSCOPY ;  Surgeon: Ashtyn Gonzales MD;  Location:  GI     CORONARY ANGIOGRAPHY ADULT ORDER  2011    1 LMA 5%, LAD 40-50%, LCx 30-40%, RCA 25-30%     CV CORONARY ANGIOGRAM N/A 3/3/2022    Procedure: Coronary Angiogram;  Surgeon: Eulalio Del Rosario MD;  Location: [facility] CARDIAC CATH LAB     CV CORONARY ANGIOGRAM N/A 3/3/2022    Procedure: Coronary Angiogram;  Surgeon: Eulalio Del Rosario MD;  Location:  HEART CARDIAC CATH LAB     CV INSTANTANEOUS WAVE-FREE RATIO N/A  3/3/2022    Procedure: Instantaneous Wave-Free Ratio;  Surgeon: Eulalio Del Rosario MD;  Location:  HEART CARDIAC CATH LAB     CV INTRAVASULAR ULTRASOUND N/A 3/3/2022    Procedure: Intravascular Ultrasound;  Surgeon: Eulalio Del Rosario MD;  Location:  HEART CARDIAC CATH LAB     CV INTRAVASULAR ULTRASOUND N/A 3/3/2022    Procedure: Intravascular Ultrasound;  Surgeon: Eulalio Del Rosario MD;  Location:  HEART CARDIAC CATH LAB     CV PCI ATHERECTOMY ORBITAL N/A 3/3/2022    Procedure: Percutaneous Coronary Intervention Atherectomy Rotational;  Surgeon: Eulalio Del Rosario MD;  Location:  HEART CARDIAC CATH LAB     CV TEMPORARY PACEMAKER INSERTION N/A 3/3/2022    Procedure: Temporary Pacemaker Insertion;  Surgeon: Eulalio Del Rosario MD;  Location:  HEART CARDIAC CATH LAB     ENT SURGERY      Ringing in the ears     HC INJECTION SCLEROSING SOLUTION HEMORRHOID  2012    Procedure: HEMORRHOID INJECTION SCLEROSING SOLUTION;  Surgeon: Mayito hCow MD;  Location:  GI     HC TOOTH EXTRACTION W/FORCEP       HERNIA REPAIR  Needed     LASIK  2011     ROTATOR CUFF REPAIR RT/LT Left 10/15/2015    RCR Left - Dr. Carvalho     SOFT TISSUE SURGERY  10/2015    Torn rotator cuff      Allergies   Allergen Reactions     No Known Drug Allergies       Social History     Tobacco Use     Smoking status: Former Smoker     Packs/day: 1.50     Years: 24.00     Pack years: 36.00     Types: Cigarettes     Quit date: 1996     Years since quittin.3     Smokeless tobacco: Never Used   Substance Use Topics     Alcohol use: Yes     Alcohol/week: 0.0 standard drinks     Comment: occ      Wt Readings from Last 1 Encounters:   22 100.7 kg (222 lb)        Anesthesia Evaluation   Pt has had prior anesthetic.     No history of anesthetic complications       ROS/MED HX  ENT/Pulmonary:     (+) sleep apnea, mild, doesn't use CPAP, allergic rhinitis, tobacco use, Past use,  (-) asthma and COPD   Neurologic:  - neg neurologic ROS      Cardiovascular:     (+) Dyslipidemia hypertension--CAD -past MI -stent-2011. 2 Drug Eluting Stent. valvular problems/murmurs type: MR Previous cardiac testing   Echo: Date: 3/22 Results:  Procedure  Complete OSCAR Adult. OSCAR Probe serial #[id number] was used during the procedure.  The heart rate, respiratory rate and response to care were monitored  throughout the procedure with the assistance of the nurse.  ______________________________________________________________________________  Interpretation Summary     1. Left ventricular systolic function is severely reduced. The visual ejection  fraction is 20-25%.  2. There is severe global hypokinesia of the left ventricle.  3. Severely decreased right ventricular systolic function.  4. There is moderately severe (3+) mitral regurgitation. The etiology appears  functional related to poor leaflet coaptation.  5. There is no color Doppler evidence of an atrial shunt. A contrast injection  (Bubble Study) was performed that was negative for flow across the interatrial  septum.  6. No thrombus is detected in the left atrial appendage.  Stress Test: Date: 2/22 Results:    The nuclear stress test is abnormal.     There is a small area of ischemia in the mid inferolateral segment(s) of the left ventricle.     There is transmural infarction in the distal inferior and inferoseptal segment(s) of the left ventricle.     There is a small area of nontransmural infarction in the apical segment(s) of the left ventricle.     Left ventricular function is severely reduced.     The left ventricular ejection fraction at stress is 26%.     A prior study was conducted on 3/30/2015.  This study has changes noted when compared with the prior study.  ECG Reviewed:  Date: Results:    Cath:  Date: 3/22 Results:    Patent RCA stents. RCA anatomy appeared similar to angiogram performed previously in the day.    IVUS of the RCA did not show any luminal thrombus or stent edge dissection.    Stable CAD  in the LAD and LCX.    Consider obtaining VerifyNow platelet reactivity test in a week.     (-) murmur   METS/Exercise Tolerance:     Hematologic:       Musculoskeletal:       GI/Hepatic:     (+) GERD, Asymptomatic on medication, Inflammatory bowel disease,     Renal/Genitourinary:     (+) BPH,  (-) renal disease   Endo: Comment: Pre-DM    (+) Obesity,     Psychiatric/Substance Use:  - neg psychiatric ROS     Infectious Disease:       Malignancy:       Other:            Physical Exam    Airway        Mallampati: III   TM distance: > 3 FB   Neck ROM: full   Mouth opening: > 3 cm    Respiratory Devices and Support         Dental  no notable dental history         Cardiovascular   cardiovascular exam normal       Rhythm and rate: regular (-) no murmur    Pulmonary   pulmonary exam normal        breath sounds clear to auscultation           OUTSIDE LABS:  CBC:   Lab Results   Component Value Date    WBC 11.3 (H) 03/05/2022    WBC 16.7 (H) 03/04/2022    HGB 12.6 (L) 03/05/2022    HGB 13.0 (L) 03/04/2022    HCT 38.3 (L) 03/05/2022    HCT 39.1 (L) 03/04/2022     03/05/2022     03/04/2022     BMP:   Lab Results   Component Value Date     04/13/2022     03/10/2022    POTASSIUM 4.4 04/13/2022    POTASSIUM 3.9 03/22/2022    CHLORIDE 107 04/13/2022    CHLORIDE 105 03/10/2022    CO2 25 04/13/2022    CO2 27 03/10/2022    BUN 28 04/13/2022    BUN 17 03/10/2022    CR 1.17 04/13/2022    CR 1.03 03/10/2022    GLC 93 04/13/2022     (H) 03/10/2022     COAGS:   Lab Results   Component Value Date    PTT 28 03/03/2022    INR 1.00 03/03/2022     POC: No results found for: BGM, HCG, HCGS  HEPATIC:   Lab Results   Component Value Date    ALBUMIN 3.9 10/06/2020    PROTTOTAL 8.0 10/06/2020    ALT 70 10/06/2020    AST 35 10/06/2020    ALKPHOS 66 10/06/2020    BILITOTAL 0.8 10/06/2020     OTHER:   Lab Results   Component Value Date    A1C 6.3 (H) 12/30/2021    CAMERON 9.3 04/13/2022    MAG 2.2 03/22/2022    TSH 1.05  02/25/2022    CRP <2.9 06/22/2017    SED 10 06/22/2017       Anesthesia Plan    ASA Status:  3   NPO Status:  NPO Appropriate    Anesthesia Type: General.     - Airway: ETT   Induction: Intravenous, Propofol.   Maintenance: Balanced.        Consents    Anesthesia Plan(s) and associated risks, benefits, and realistic alternatives discussed. Questions answered and patient/representative(s) expressed understanding.    - Discussed:     - Discussed with:  Patient      - Extended Intubation/Ventilatory Support Discussed: No.      - Patient is DNR/DNI Status: No    Use of blood products discussed: No .     Postoperative Care    Pain management: Multi-modal analgesia.   PONV prophylaxis: Ondansetron (or other 5HT-3), Dexamethasone or Solumedrol     Comments:    Other Comments: Patient is counseled on the anesthesia plan and relevant anesthesia procedures including all risks and benefits. All patient questions were answered.             Carlos Castelan MD

## 2022-05-12 ENCOUNTER — ANESTHESIA (OUTPATIENT)
Dept: CARDIOLOGY | Facility: CLINIC | Age: 72
DRG: 273 | End: 2022-05-12
Payer: MEDICARE

## 2022-05-12 ENCOUNTER — HOSPITAL ENCOUNTER (OUTPATIENT)
Dept: CARDIOLOGY | Facility: CLINIC | Age: 72
Discharge: HOME | DRG: 273 | End: 2022-05-12
Attending: INTERNAL MEDICINE
Payer: MEDICARE

## 2022-05-12 ENCOUNTER — HOSPITAL ENCOUNTER (OUTPATIENT)
Facility: CLINIC | Age: 72
Discharge: HOME | DRG: 273 | End: 2022-05-12
Admitting: INTERNAL MEDICINE
Payer: MEDICARE

## 2022-05-12 VITALS
DIASTOLIC BLOOD PRESSURE: 75 MMHG | HEIGHT: 73 IN | SYSTOLIC BLOOD PRESSURE: 109 MMHG | HEART RATE: 96 BPM | WEIGHT: 224.7 LBS | BODY MASS INDEX: 29.78 KG/M2 | OXYGEN SATURATION: 98 % | RESPIRATION RATE: 20 BRPM | TEMPERATURE: 97.6 F

## 2022-05-12 DIAGNOSIS — I48.19 PERSISTENT ATRIAL FIBRILLATION (H): ICD-10-CM

## 2022-05-12 DIAGNOSIS — I48.0 PAROXYSMAL ATRIAL FIBRILLATION (H): ICD-10-CM

## 2022-05-12 LAB
ACT BLD: 135 SECONDS (ref 74–150)
ACT BLD: 182 SECONDS (ref 74–150)
ACT BLD: 297 SECONDS (ref 74–150)
ACT BLD: 301 SECONDS (ref 74–150)
ACT BLD: 309 SECONDS (ref 74–150)
ANION GAP SERPL CALCULATED.3IONS-SCNC: 7 MMOL/L (ref 3–14)
BUN SERPL-MCNC: 22 MG/DL (ref 7–30)
CALCIUM SERPL-MCNC: 8.8 MG/DL (ref 8.5–10.1)
CHLORIDE BLD-SCNC: 106 MMOL/L (ref 94–109)
CO2 SERPL-SCNC: 25 MMOL/L (ref 20–32)
CREAT SERPL-MCNC: 1.05 MG/DL (ref 0.66–1.25)
ERYTHROCYTE [DISTWIDTH] IN BLOOD BY AUTOMATED COUNT: 13.7 % (ref 10–15)
GFR SERPL CREATININE-BSD FRML MDRD: 77 ML/MIN/1.73M2
GLUCOSE BLD-MCNC: 118 MG/DL (ref 70–99)
HCT VFR BLD AUTO: 45.5 % (ref 40–53)
HGB BLD-MCNC: 14.8 G/DL (ref 13.3–17.7)
LVEF ECHO: NORMAL
MCH RBC QN AUTO: 29.1 PG (ref 26.5–33)
MCHC RBC AUTO-ENTMCNC: 32.5 G/DL (ref 31.5–36.5)
MCV RBC AUTO: 89 FL (ref 78–100)
PLATELET # BLD AUTO: 494 10E3/UL (ref 150–450)
POTASSIUM BLD-SCNC: 4.2 MMOL/L (ref 3.4–5.3)
RBC # BLD AUTO: 5.09 10E6/UL (ref 4.4–5.9)
SODIUM SERPL-SCNC: 138 MMOL/L (ref 133–144)
WBC # BLD AUTO: 6.7 10E3/UL (ref 4–11)

## 2022-05-12 PROCEDURE — 93005 ELECTROCARDIOGRAM TRACING: CPT

## 2022-05-12 PROCEDURE — 36415 COLL VENOUS BLD VENIPUNCTURE: CPT | Performed by: INTERNAL MEDICINE

## 2022-05-12 PROCEDURE — 250N000011 HC RX IP 250 OP 636: Performed by: ANESTHESIOLOGY

## 2022-05-12 PROCEDURE — 93655 ICAR CATH ABLTJ DSCRT ARRHYT: CPT | Performed by: INTERNAL MEDICINE

## 2022-05-12 PROCEDURE — 4A023CZ MEASUREMENT OF CARDIAC RATE, PERCUTANEOUS APPROACH: ICD-10-PCS | Performed by: INTERNAL MEDICINE

## 2022-05-12 PROCEDURE — 258N000003 HC RX IP 258 OP 636: Performed by: ANESTHESIOLOGY

## 2022-05-12 PROCEDURE — 93312 ECHO TRANSESOPHAGEAL: CPT | Mod: 26 | Performed by: INTERNAL MEDICINE

## 2022-05-12 PROCEDURE — C1732 CATH, EP, DIAG/ABL, 3D/VECT: HCPCS | Performed by: INTERNAL MEDICINE

## 2022-05-12 PROCEDURE — 272N000001 HC OR GENERAL SUPPLY STERILE: Performed by: INTERNAL MEDICINE

## 2022-05-12 PROCEDURE — 02K83ZZ MAP CONDUCTION MECHANISM, PERCUTANEOUS APPROACH: ICD-10-PCS | Performed by: INTERNAL MEDICINE

## 2022-05-12 PROCEDURE — C1894 INTRO/SHEATH, NON-LASER: HCPCS | Performed by: INTERNAL MEDICINE

## 2022-05-12 PROCEDURE — C1733 CATH, EP, OTHR THAN COOL-TIP: HCPCS | Performed by: INTERNAL MEDICINE

## 2022-05-12 PROCEDURE — C1759 CATH, INTRA ECHOCARDIOGRAPHY: HCPCS | Performed by: INTERNAL MEDICINE

## 2022-05-12 PROCEDURE — 4A023FZ MEASUREMENT OF CARDIAC RHYTHM, PERCUTANEOUS APPROACH: ICD-10-PCS | Performed by: INTERNAL MEDICINE

## 2022-05-12 PROCEDURE — 250N000025 HC SEVOFLURANE, PER MIN: Performed by: INTERNAL MEDICINE

## 2022-05-12 PROCEDURE — B24CZZ3 ULTRASONOGRAPHY OF PERICARDIUM, INTRAVASCULAR: ICD-10-PCS | Performed by: INTERNAL MEDICINE

## 2022-05-12 PROCEDURE — C1730 CATH, EP, 19 OR FEW ELECT: HCPCS | Performed by: INTERNAL MEDICINE

## 2022-05-12 PROCEDURE — 93656 COMPRE EP EVAL ABLTJ ATR FIB: CPT | Performed by: INTERNAL MEDICINE

## 2022-05-12 PROCEDURE — 4A0234Z MEASUREMENT OF CARDIAC ELECTRICAL ACTIVITY, PERCUTANEOUS APPROACH: ICD-10-PCS | Performed by: INTERNAL MEDICINE

## 2022-05-12 PROCEDURE — 999N000184 HC STATISTIC TELEMETRY

## 2022-05-12 PROCEDURE — 85014 HEMATOCRIT: CPT | Performed by: INTERNAL MEDICINE

## 2022-05-12 PROCEDURE — 02583ZZ DESTRUCTION OF CONDUCTION MECHANISM, PERCUTANEOUS APPROACH: ICD-10-PCS | Performed by: INTERNAL MEDICINE

## 2022-05-12 PROCEDURE — 999N000054 HC STATISTIC EKG NON-CHARGEABLE

## 2022-05-12 PROCEDURE — 93325 DOPPLER ECHO COLOR FLOW MAPG: CPT | Mod: 26 | Performed by: INTERNAL MEDICINE

## 2022-05-12 PROCEDURE — 36591 DRAW BLOOD OFF VENOUS DEVICE: CPT

## 2022-05-12 PROCEDURE — 999N000071 HC STATISTIC HEART CATH LAB OR EP LAB

## 2022-05-12 PROCEDURE — C1769 GUIDE WIRE: HCPCS | Performed by: INTERNAL MEDICINE

## 2022-05-12 PROCEDURE — 258N000003 HC RX IP 258 OP 636: Performed by: INTERNAL MEDICINE

## 2022-05-12 PROCEDURE — 93325 DOPPLER ECHO COLOR FLOW MAPG: CPT

## 2022-05-12 PROCEDURE — 80048 BASIC METABOLIC PNL TOTAL CA: CPT | Performed by: INTERNAL MEDICINE

## 2022-05-12 PROCEDURE — 250N000009 HC RX 250: Performed by: ANESTHESIOLOGY

## 2022-05-12 PROCEDURE — 93010 ELECTROCARDIOGRAM REPORT: CPT | Mod: 76 | Performed by: INTERNAL MEDICINE

## 2022-05-12 PROCEDURE — 370N000017 HC ANESTHESIA TECHNICAL FEE, PER MIN: Performed by: INTERNAL MEDICINE

## 2022-05-12 PROCEDURE — 250N000011 HC RX IP 250 OP 636: Performed by: INTERNAL MEDICINE

## 2022-05-12 PROCEDURE — C1887 CATHETER, GUIDING: HCPCS | Performed by: INTERNAL MEDICINE

## 2022-05-12 PROCEDURE — 93320 DOPPLER ECHO COMPLETE: CPT | Mod: 26 | Performed by: INTERNAL MEDICINE

## 2022-05-12 PROCEDURE — 85347 COAGULATION TIME ACTIVATED: CPT

## 2022-05-12 RX ORDER — OXYCODONE HYDROCHLORIDE 5 MG/1
5 TABLET ORAL EVERY 4 HOURS PRN
Status: DISCONTINUED | OUTPATIENT
Start: 2022-05-12 | End: 2022-05-12 | Stop reason: HOSPADM

## 2022-05-12 RX ORDER — NALOXONE HYDROCHLORIDE 0.4 MG/ML
0.2 INJECTION, SOLUTION INTRAMUSCULAR; INTRAVENOUS; SUBCUTANEOUS
Status: DISCONTINUED | OUTPATIENT
Start: 2022-05-12 | End: 2022-05-12 | Stop reason: HOSPADM

## 2022-05-12 RX ORDER — LIDOCAINE 40 MG/G
CREAM TOPICAL
Status: DISCONTINUED | OUTPATIENT
Start: 2022-05-12 | End: 2022-05-12 | Stop reason: HOSPADM

## 2022-05-12 RX ORDER — EPHEDRINE SULFATE 50 MG/ML
INJECTION, SOLUTION INTRAMUSCULAR; INTRAVENOUS; SUBCUTANEOUS PRN
Status: DISCONTINUED | OUTPATIENT
Start: 2022-05-12 | End: 2022-05-12

## 2022-05-12 RX ORDER — MIDAZOLAM HCL IN 0.9 % NACL/PF 1 MG/ML
.5-6 PLASTIC BAG, INJECTION (ML) INTRAVENOUS CONTINUOUS PRN
Status: DISCONTINUED | OUTPATIENT
Start: 2022-05-12 | End: 2022-05-12 | Stop reason: HOSPADM

## 2022-05-12 RX ORDER — FENTANYL CITRATE 50 UG/ML
INJECTION, SOLUTION INTRAMUSCULAR; INTRAVENOUS PRN
Status: DISCONTINUED | OUTPATIENT
Start: 2022-05-12 | End: 2022-05-12

## 2022-05-12 RX ORDER — HEPARIN SODIUM 200 [USP'U]/100ML
100-500 INJECTION, SOLUTION INTRAVENOUS CONTINUOUS PRN
Status: DISCONTINUED | OUTPATIENT
Start: 2022-05-12 | End: 2022-05-12 | Stop reason: HOSPADM

## 2022-05-12 RX ORDER — DEXAMETHASONE SODIUM PHOSPHATE 4 MG/ML
INJECTION, SOLUTION INTRA-ARTICULAR; INTRALESIONAL; INTRAMUSCULAR; INTRAVENOUS; SOFT TISSUE PRN
Status: DISCONTINUED | OUTPATIENT
Start: 2022-05-12 | End: 2022-05-12

## 2022-05-12 RX ORDER — OXYCODONE AND ACETAMINOPHEN 5; 325 MG/1; MG/1
1 TABLET ORAL EVERY 4 HOURS PRN
Status: DISCONTINUED | OUTPATIENT
Start: 2022-05-12 | End: 2022-05-12 | Stop reason: HOSPADM

## 2022-05-12 RX ORDER — NALOXONE HYDROCHLORIDE 0.4 MG/ML
0.4 INJECTION, SOLUTION INTRAMUSCULAR; INTRAVENOUS; SUBCUTANEOUS
Status: DISCONTINUED | OUTPATIENT
Start: 2022-05-12 | End: 2022-05-12 | Stop reason: HOSPADM

## 2022-05-12 RX ORDER — HYDROMORPHONE HYDROCHLORIDE 1 MG/ML
0.5 INJECTION, SOLUTION INTRAMUSCULAR; INTRAVENOUS; SUBCUTANEOUS EVERY 5 MIN PRN
Status: DISCONTINUED | OUTPATIENT
Start: 2022-05-12 | End: 2022-05-12 | Stop reason: HOSPADM

## 2022-05-12 RX ORDER — DOBUTAMINE HYDROCHLORIDE 200 MG/100ML
5-40 INJECTION INTRAVENOUS CONTINUOUS PRN
Status: DISCONTINUED | OUTPATIENT
Start: 2022-05-12 | End: 2022-05-12 | Stop reason: HOSPADM

## 2022-05-12 RX ORDER — HEPARIN SODIUM 200 [USP'U]/100ML
100-600 INJECTION, SOLUTION INTRAVENOUS CONTINUOUS PRN
Status: DISCONTINUED | OUTPATIENT
Start: 2022-05-12 | End: 2022-05-12 | Stop reason: HOSPADM

## 2022-05-12 RX ORDER — ONDANSETRON 2 MG/ML
4 INJECTION INTRAMUSCULAR; INTRAVENOUS EVERY 30 MIN PRN
Status: DISCONTINUED | OUTPATIENT
Start: 2022-05-12 | End: 2022-05-12 | Stop reason: HOSPADM

## 2022-05-12 RX ORDER — SODIUM CHLORIDE, SODIUM LACTATE, POTASSIUM CHLORIDE, CALCIUM CHLORIDE 600; 310; 30; 20 MG/100ML; MG/100ML; MG/100ML; MG/100ML
INJECTION, SOLUTION INTRAVENOUS CONTINUOUS
Status: DISCONTINUED | OUTPATIENT
Start: 2022-05-12 | End: 2022-05-12 | Stop reason: HOSPADM

## 2022-05-12 RX ORDER — FENTANYL CITRATE 50 UG/ML
25 INJECTION, SOLUTION INTRAMUSCULAR; INTRAVENOUS EVERY 5 MIN PRN
Status: DISCONTINUED | OUTPATIENT
Start: 2022-05-12 | End: 2022-05-12 | Stop reason: HOSPADM

## 2022-05-12 RX ORDER — LIDOCAINE HYDROCHLORIDE 20 MG/ML
INJECTION, SOLUTION INFILTRATION; PERINEURAL PRN
Status: DISCONTINUED | OUTPATIENT
Start: 2022-05-12 | End: 2022-05-12

## 2022-05-12 RX ORDER — CEFAZOLIN SODIUM 1 G/3ML
1 INJECTION, POWDER, FOR SOLUTION INTRAMUSCULAR; INTRAVENOUS
Status: DISCONTINUED | OUTPATIENT
Start: 2022-05-12 | End: 2022-05-12 | Stop reason: HOSPADM

## 2022-05-12 RX ORDER — DEXMEDETOMIDINE HYDROCHLORIDE 4 UG/ML
INJECTION, SOLUTION INTRAVENOUS PRN
Status: DISCONTINUED | OUTPATIENT
Start: 2022-05-12 | End: 2022-05-12

## 2022-05-12 RX ORDER — PROPOFOL 10 MG/ML
INJECTION, EMULSION INTRAVENOUS PRN
Status: DISCONTINUED | OUTPATIENT
Start: 2022-05-12 | End: 2022-05-12

## 2022-05-12 RX ORDER — ONDANSETRON 2 MG/ML
INJECTION INTRAMUSCULAR; INTRAVENOUS PRN
Status: DISCONTINUED | OUTPATIENT
Start: 2022-05-12 | End: 2022-05-12

## 2022-05-12 RX ORDER — GLYCOPYRROLATE 0.2 MG/ML
INJECTION, SOLUTION INTRAMUSCULAR; INTRAVENOUS PRN
Status: DISCONTINUED | OUTPATIENT
Start: 2022-05-12 | End: 2022-05-12

## 2022-05-12 RX ORDER — PROTAMINE SULFATE 10 MG/ML
INJECTION, SOLUTION INTRAVENOUS
Status: DISCONTINUED | OUTPATIENT
Start: 2022-05-12 | End: 2022-05-12 | Stop reason: HOSPADM

## 2022-05-12 RX ORDER — NEOSTIGMINE METHYLSULFATE 1 MG/ML
VIAL (ML) INJECTION PRN
Status: DISCONTINUED | OUTPATIENT
Start: 2022-05-12 | End: 2022-05-12

## 2022-05-12 RX ORDER — HEPARIN SODIUM 1000 [USP'U]/ML
INJECTION, SOLUTION INTRAVENOUS; SUBCUTANEOUS
Status: DISCONTINUED | OUTPATIENT
Start: 2022-05-12 | End: 2022-05-12 | Stop reason: HOSPADM

## 2022-05-12 RX ORDER — MULTIVIT WITH MINERALS/LUTEIN
250 TABLET ORAL DAILY
COMMUNITY
End: 2022-05-23

## 2022-05-12 RX ORDER — ONDANSETRON 4 MG/1
4 TABLET, ORALLY DISINTEGRATING ORAL EVERY 30 MIN PRN
Status: DISCONTINUED | OUTPATIENT
Start: 2022-05-12 | End: 2022-05-12 | Stop reason: HOSPADM

## 2022-05-12 RX ORDER — FERROUS SULFATE 325(65) MG
325 TABLET ORAL
COMMUNITY
End: 2022-05-23

## 2022-05-12 RX ADMIN — PHENYLEPHRINE HYDROCHLORIDE 100 MCG: 10 INJECTION INTRAVENOUS at 08:37

## 2022-05-12 RX ADMIN — PHENYLEPHRINE HYDROCHLORIDE 100 MCG: 10 INJECTION INTRAVENOUS at 09:04

## 2022-05-12 RX ADMIN — ONDANSETRON 4 MG: 2 INJECTION INTRAMUSCULAR; INTRAVENOUS at 08:34

## 2022-05-12 RX ADMIN — FENTANYL CITRATE 25 MCG: 50 INJECTION, SOLUTION INTRAMUSCULAR; INTRAVENOUS at 09:29

## 2022-05-12 RX ADMIN — Medication 10 MG: at 09:41

## 2022-05-12 RX ADMIN — PROPOFOL 130 MG: 10 INJECTION, EMULSION INTRAVENOUS at 08:33

## 2022-05-12 RX ADMIN — SODIUM CHLORIDE, POTASSIUM CHLORIDE, SODIUM LACTATE AND CALCIUM CHLORIDE: 600; 310; 30; 20 INJECTION, SOLUTION INTRAVENOUS at 07:43

## 2022-05-12 RX ADMIN — HYDROMORPHONE HYDROCHLORIDE 0.5 MG: 1 INJECTION, SOLUTION INTRAMUSCULAR; INTRAVENOUS; SUBCUTANEOUS at 12:14

## 2022-05-12 RX ADMIN — PHENYLEPHRINE HYDROCHLORIDE 200 MCG: 10 INJECTION INTRAVENOUS at 09:14

## 2022-05-12 RX ADMIN — GLYCOPYRROLATE 0.8 MG: 0.2 INJECTION, SOLUTION INTRAMUSCULAR; INTRAVENOUS at 10:56

## 2022-05-12 RX ADMIN — MIDAZOLAM 1 MG: 1 INJECTION INTRAMUSCULAR; INTRAVENOUS at 08:49

## 2022-05-12 RX ADMIN — MIDAZOLAM 1 MG: 1 INJECTION INTRAMUSCULAR; INTRAVENOUS at 08:33

## 2022-05-12 RX ADMIN — ROCURONIUM BROMIDE 50 MG: 50 INJECTION, SOLUTION INTRAVENOUS at 08:35

## 2022-05-12 RX ADMIN — PHENYLEPHRINE HYDROCHLORIDE 100 MCG: 10 INJECTION INTRAVENOUS at 08:34

## 2022-05-12 RX ADMIN — PHENYLEPHRINE HYDROCHLORIDE 0.2 MCG/KG/MIN: 10 INJECTION INTRAVENOUS at 08:50

## 2022-05-12 RX ADMIN — DEXAMETHASONE SODIUM PHOSPHATE 4 MG: 4 INJECTION, SOLUTION INTRA-ARTICULAR; INTRALESIONAL; INTRAMUSCULAR; INTRAVENOUS; SOFT TISSUE at 08:33

## 2022-05-12 RX ADMIN — LIDOCAINE HYDROCHLORIDE 100 MG: 20 INJECTION, SOLUTION INFILTRATION; PERINEURAL at 08:33

## 2022-05-12 RX ADMIN — Medication 5 MG: at 10:56

## 2022-05-12 RX ADMIN — PROPOFOL 20 MG: 10 INJECTION, EMULSION INTRAVENOUS at 09:29

## 2022-05-12 RX ADMIN — Medication 5 MG: at 09:51

## 2022-05-12 RX ADMIN — Medication 20 MCG: at 08:51

## 2022-05-12 RX ADMIN — FENTANYL CITRATE 50 MCG: 50 INJECTION, SOLUTION INTRAMUSCULAR; INTRAVENOUS at 08:56

## 2022-05-12 RX ADMIN — FENTANYL CITRATE 25 MCG: 50 INJECTION, SOLUTION INTRAMUSCULAR; INTRAVENOUS at 08:33

## 2022-05-12 NOTE — INTERVAL H&P NOTE
"I have reviewed the surgical (or preoperative) H&P that is linked to this encounter, and examined the patient. There are no significant changes    Clinical Conditions Present on Arrival:  Clinically Significant Risk Factors Present on Admission                 # Coagulation Defect: home medication list includes an anticoagulant medication  # Platelet Defect: home medication list includes an antiplatelet medication  # Overweight: Estimated body mass index is 29.65 kg/m  as calculated from the following:    Height as of this encounter: 1.854 m (6' 1\").    Weight as of this encounter: 101.9 kg (224 lb 11.2 oz).       "

## 2022-05-12 NOTE — PRE-PROCEDURE
GENERAL PRE-PROCEDURE:   Procedure:  Afib/ flutter ablation  Date/Time:  5/12/2022 8:04 AM    Written consent obtained?: Yes    Risks and benefits: Risks, benefits and alternatives were discussed    Consent given by:  Patient  Patient states understanding of procedure being performed: Yes    Patient's understanding of procedure matches consent: Yes    Procedure consent matches procedure scheduled: Yes    Expected level of sedation:  Moderate  Appropriately NPO:  Yes  Mallampati  :  Grade 2- soft palate, base of uvula, tonsillar pillars, and portion of posterior pharyngeal wall visible  Lungs:  Lungs clear with good breath sounds bilaterally  Heart:  Normal heart sounds and rate  History & Physical reviewed:  History and physical reviewed and no updates needed  Statement of review:  I have reviewed the lab findings, diagnostic data, medications, and the plan for sedation

## 2022-05-12 NOTE — ANESTHESIA POSTPROCEDURE EVALUATION
Patient: Peter Peralta    Procedure: Procedure(s):  Ablation Focal Atrial Fibrillation [[id number]]       Anesthesia Type:  General    Note:  Disposition: Inpatient   Postop Pain Control: Uneventful            Sign Out: Well controlled pain   PONV:    Neuro/Psych: Uneventful            Sign Out: Acceptable/Baseline neuro status   Airway/Respiratory: Uneventful            Sign Out: Acceptable/Baseline resp. status   CV/Hemodynamics: Uneventful            Sign Out: Acceptable CV status   Other NRE: NONE   DID A NON-ROUTINE EVENT OCCUR? No           Last vitals:  Vitals Value Taken Time   /69 05/12/22 1230   Temp 36.4  C (97.5  F) 05/12/22 1200   Pulse 95 05/12/22 1236   Resp 28 05/12/22 1236   SpO2 96 % 05/12/22 1235   Vitals shown include unvalidated device data.    Electronically Signed By: Carlos Castelan MD  May 12, 2022  1:16 PM

## 2022-05-12 NOTE — PROGRESS NOTES
Care Suites Admission Nursing Note    Patient Information  Name: Peter Peralta  Age: 71 year old  Reason for admission: Afib ablation  Care Suites arrival time: 0630    Visitor Information  Name: Emy  Informed of visitor restrictions: Yes  1 visitor allowed per patient   Visitor must screen negative for COVID symptoms   Visitor must wear a mask  Waiting rooms closed to visitors    Patient Admission/Assessment   Pre-procedure assessment complete: Yes  If abnormal assessment/labs, provider notified: N/A  NPO: Yes  Medications held per instructions/orders: N/A  Consent: obtained  If applicable, pregnancy test status: deferred  Patient oriented to room: Yes  Education/questions answered: Yes  Plan/other: ablation as planned.  Pt to have OSCAR before ablation.    Discharge Planning  Discharge name/phone number: Emy [phone]  Overnight post sedation caregiver: Emy  Discharge location: home    Анна Fischer RN

## 2022-05-12 NOTE — DISCHARGE INSTRUCTIONS
A Fib Ablation Discharge Instructions    After you go home:  Have an adult stay with you until tomorrow.  You may eat your normal diet, unless your doctor tells you otherwise.  RELAX and take it easy for 3 days.        For 24-48 hours (due to the sedation you received):  DO NOT DRIVE FOR 2 DAYS!   Do NOT make any important or legal decisions.  Do NOT drive or operate machines at home or at work.  Do NOT drink alcohol.    Care of Puncture Site:  Check the puncture site severy 1-2 hours while awake.  For 2-3 days, when you cough, sneeze, laugh or move your bowels, hold your hand over the puncture sites and press firmly.  Please remove Dressing after 24 hours.  Then apply a band aid daily for at least 3 days (if needed). If there is minor oozing, apply another band aid and remove it after 12 hours.   It is normal to have a small bruise or pea size lump at the sites.  You may shower. Do NOT take a bath, or use a hot tub or pool until groin site heals, which may take up to a week.  Do NOT scrub the site. Do NOT use lotion or powder near the puncture site.    Activity:  Do NOT lift, push or pull more than 10 pounds (equal to a gallon of milk) for 3 days.  NO repetitive motions such as loading , vacuuming, raking, shoveling.     Bleeding:  If you start bleeding from the groin site, lie down flat and press firmly on the site for 10 minutes or until bleeding stops.   Once bleeding stops, lay flat for 1-2 hours.  Call your A Fib nurse if bleeding does not stop or after hours will need to go to ER.       Go to ER or Call 911 right away if you have heavy bleeding or bleeding that does not stop.    Medications:  Take your medications, including blood thinners, unless your doctor tells you not to.  If you have stopped any other medicines, check with your nurse or provider about when to restart them.  If you have PAIN or a TIGHTNESS in your chest, you MAY takeTylenol (acetaminophen) and if this does not help, you MAY  take Advil (ibuprofen-400 mg with food).  If you have constipation or prone to constipation,  take a fiber supplement, ie metamucil or stool softners.    Call the A Fib RN if:  Chest pain not relieved by acetaminophen or ibuprofen  Difficulty swallowing and/or coughing up blood  Shortness of breath  Increased groin pain or a large or growing hard lump around the site.  Groin site is red, swollen, hot or tender.  Blood or fluid is draining from the groin site.  You have chills or a fever greater than 101 F (38 C).  Your leg feels numb, cool or changes color.  If groin pain is not relieved by Tylenol or Ibuprofen.  Recurrent irregular or fast heart rate lasting over 2-3 hours.  Any questions or concerns.    Heart rhythms:  You may have some irregular heartbeats. These feel very strong. They may make you feel that the A Fib is going to start again.  Give it time. The irregular beats should occur less often.    Follow Up Appointments:  Monday, May 23rd at 2:30 pm with Mellisa Burrell PA-C in [place]  Monday, August 15th at 1:45 pm with Dr Jones, in [facility]   A Fib clinic RN's Josephine Hunt [phone] (Mon-Fri, 8:00-4:30)   [phone] Option 2 (7 days a week) after hours for on call Cardiologist.

## 2022-05-12 NOTE — Clinical Note
Arrhythmia Type: atrial fibrillation.   Method of Cardioversion: synchronous.   The arrhythmia was terminated.   Energy shock delivered: 200 joules.   Post cardioversion rhythm: sinus rhythm.   No early return to atrial fibrillation (ERAF). No immediate return to atrial fibrillation (IRAF).

## 2022-05-12 NOTE — PROGRESS NOTES
"Care Suites Post Procedure Note    Patient Information  Name: Peter Peralta  Age: 71 year old    Post Procedure  Time patient returned to Care Suites: 1245  Concerns/abnormal assessment: none  If abnormal assessment, provider notified: N/A  Plan/Other: Cont bedrest as ordered.  Right groin WDL with gauze and stopcock covered with Tegaderm.  Site soft, no drainage.    Reports back back at 3/10, decreased after receiving dilaudid in PACU.  Will cont to monitor.  Rhythm sinus with RBBB.  neuro's intact.    1430--tolerating soda and crackers.  Declines any meal at this time.  Pain decreased to  \"1-2\".  HOB up to 30 degress.  Groin site remains CDI.  AVS reviewed with pt and spouse.    Анна Fischer RN     "

## 2022-05-12 NOTE — ANESTHESIA PROCEDURE NOTES
Airway       Patient location during procedure: OR       Procedure Start/Stop Times: 5/12/2022 8:38 AM  Staff -        Anesthesiologist:  Carlos Castelan MD       CRNA: Hodgkins, Christine Marie Volp, APRN CRNA       Performed By: CRNAIndications and Patient Condition       Indications for airway management: rebecca-procedural       Induction type:intravenous       Mask difficulty assessment: 2 - vent by mask + OA or adjuvant +/- NMBA    Final Airway Details       Final airway type: endotracheal airway       Successful airway: ETT - single  Endotracheal Airway Details        ETT size (mm): 8.0       Cuffed: yes       Successful intubation technique: direct laryngoscopy       DL Blade Type: Vences 2       Grade View of Cords: 1       Adjucts: stylet       Position: Right       Measured from: gums/teeth       Secured at (cm): 23       Bite block used: None    Post intubation assessment        Placement verified by: capnometry, equal breath sounds and chest rise        Number of attempts at approach: 1       Number of other approaches attempted: 0       Secured with: silk tape       Ease of procedure: easy       Dentition: Intact and Unchanged    Medication(s) Administered   Medication Administration Time: 5/12/2022 8:38 AM

## 2022-05-13 ENCOUNTER — TELEPHONE (OUTPATIENT)
Dept: CARDIOLOGY | Facility: CLINIC | Age: 72
End: 2022-05-13
Payer: MEDICARE

## 2022-05-13 DIAGNOSIS — I48.19 PERSISTENT ATRIAL FIBRILLATION (H): Primary | ICD-10-CM

## 2022-05-13 RX ORDER — FUROSEMIDE 20 MG
20 TABLET ORAL DAILY
Qty: 3 TABLET | Refills: 0 | Status: SHIPPED | OUTPATIENT
Start: 2022-05-13 | End: 2022-05-15

## 2022-05-13 NOTE — TELEPHONE ENCOUNTER
Spoke to pt who is feeling better today and overall is good. States that he has no pain in his chest and is not short of breath. States that groin is tender. Pt will remove dressing and place band aid for a few days. Pt states that he is slow to get urinating, but is getting better. Pt stat that he was up 5 pounds last night.  He can tell that he is retaining some fluid as his rings are a bit tighter.  Pt states that he has had a small BM, but he also is a bit constipated. Pt will hold his iron for a couple days to see if this helps. Pt told that he can move around a bit to see if he can tart waking everything up.  Discussed with Mellisa who recommended that pt be given Lasix 20 mg tabs for 3 days.  Pt told that he can take if he feels that he continues to be up in weight and holding on to fluid.  Pt states understanding. Pt also mentioned that he will try Metamucil to get his bowels moving. Pt is aware that Dr Jones is on call this weekend if needed.  Monico

## 2022-05-14 LAB
ATRIAL RATE - MUSE: 264 BPM
ATRIAL RATE - MUSE: 94 BPM
DIASTOLIC BLOOD PRESSURE - MUSE: NORMAL MMHG
DIASTOLIC BLOOD PRESSURE - MUSE: NORMAL MMHG
INTERPRETATION ECG - MUSE: NORMAL
INTERPRETATION ECG - MUSE: NORMAL
P AXIS - MUSE: 268 DEGREES
P AXIS - MUSE: 64 DEGREES
PR INTERVAL - MUSE: 170 MS
PR INTERVAL - MUSE: NORMAL MS
QRS DURATION - MUSE: 148 MS
QRS DURATION - MUSE: 152 MS
QT - MUSE: 436 MS
QT - MUSE: 466 MS
QTC - MUSE: 503 MS
QTC - MUSE: 545 MS
R AXIS - MUSE: 49 DEGREES
R AXIS - MUSE: 77 DEGREES
SYSTOLIC BLOOD PRESSURE - MUSE: NORMAL MMHG
SYSTOLIC BLOOD PRESSURE - MUSE: NORMAL MMHG
T AXIS - MUSE: -17 DEGREES
T AXIS - MUSE: -22 DEGREES
VENTRICULAR RATE- MUSE: 70 BPM
VENTRICULAR RATE- MUSE: 94 BPM

## 2022-05-15 ENCOUNTER — ANCILLARY PROCEDURE (OUTPATIENT)
Dept: ULTRASOUND IMAGING | Facility: CLINIC | Age: 72
End: 2022-05-15
Attending: EMERGENCY MEDICINE

## 2022-05-15 ENCOUNTER — APPOINTMENT (OUTPATIENT)
Dept: GENERAL RADIOLOGY | Facility: CLINIC | Age: 72
DRG: 273 | End: 2022-05-15
Attending: EMERGENCY MEDICINE
Payer: MEDICARE

## 2022-05-15 ENCOUNTER — HOSPITAL ENCOUNTER (INPATIENT)
Facility: CLINIC | Age: 72
LOS: 4 days | Discharge: HOME | DRG: 273 | End: 2022-05-19
Attending: EMERGENCY MEDICINE | Admitting: INTERNAL MEDICINE
Payer: MEDICARE

## 2022-05-15 ENCOUNTER — HOSPITAL ENCOUNTER (EMERGENCY)
Facility: CLINIC | Age: 72
Discharge: HOME | DRG: 273 | End: 2022-05-15
Attending: EMERGENCY MEDICINE
Payer: MEDICARE

## 2022-05-15 VITALS — WEIGHT: 235.67 LBS

## 2022-05-15 DIAGNOSIS — I50.22 CHRONIC SYSTOLIC CONGESTIVE HEART FAILURE (H): ICD-10-CM

## 2022-05-15 DIAGNOSIS — I48.19 PERSISTENT ATRIAL FIBRILLATION (H): ICD-10-CM

## 2022-05-15 DIAGNOSIS — Z98.890 STATUS POST ABLATION OF ATRIAL FIBRILLATION: ICD-10-CM

## 2022-05-15 DIAGNOSIS — Z86.79 STATUS POST ABLATION OF ATRIAL FIBRILLATION: ICD-10-CM

## 2022-05-15 DIAGNOSIS — R79.89 ELEVATED TROPONIN: ICD-10-CM

## 2022-05-15 DIAGNOSIS — R07.9 CHEST PAIN, UNSPECIFIED TYPE: ICD-10-CM

## 2022-05-15 DIAGNOSIS — I30.8 OTHER ACUTE PERICARDITIS: Primary | ICD-10-CM

## 2022-05-15 PROBLEM — I31.9 PERICARDITIS: Status: ACTIVE | Noted: 2022-05-15

## 2022-05-15 LAB
ANION GAP SERPL CALCULATED.3IONS-SCNC: 7 MMOL/L (ref 3–14)
ATRIAL RATE - MUSE: 86 BPM
BASOPHILS # BLD AUTO: 0.1 10E3/UL (ref 0–0.2)
BASOPHILS NFR BLD AUTO: 1 %
BUN SERPL-MCNC: 17 MG/DL (ref 7–30)
CALCIUM SERPL-MCNC: 8.5 MG/DL (ref 8.5–10.1)
CHLORIDE BLD-SCNC: 97 MMOL/L (ref 94–109)
CO2 SERPL-SCNC: 28 MMOL/L (ref 20–32)
CREAT SERPL-MCNC: 1.03 MG/DL (ref 0.66–1.25)
CRP SERPL-MCNC: 72.4 MG/L (ref 0–8)
DIASTOLIC BLOOD PRESSURE - MUSE: NORMAL MMHG
EOSINOPHIL # BLD AUTO: 0.1 10E3/UL (ref 0–0.7)
EOSINOPHIL NFR BLD AUTO: 1 %
ERYTHROCYTE [DISTWIDTH] IN BLOOD BY AUTOMATED COUNT: 13.8 % (ref 10–15)
ERYTHROCYTE [SEDIMENTATION RATE] IN BLOOD BY WESTERGREN METHOD: 22 MM/HR (ref 0–20)
GFR SERPL CREATININE-BSD FRML MDRD: 79 ML/MIN/1.73M2
GLUCOSE BLD-MCNC: 134 MG/DL (ref 70–99)
HCT VFR BLD AUTO: 41.6 % (ref 40–53)
HGB BLD-MCNC: 13.8 G/DL (ref 13.3–17.7)
HOLD SPECIMEN: NORMAL
HOLD SPECIMEN: NORMAL
IMM GRANULOCYTES # BLD: 0.1 10E3/UL
IMM GRANULOCYTES NFR BLD: 1 %
INTERPRETATION ECG - MUSE: NORMAL
LYMPHOCYTES # BLD AUTO: 1.2 10E3/UL (ref 0.8–5.3)
LYMPHOCYTES NFR BLD AUTO: 9 %
MAGNESIUM SERPL-MCNC: 1.8 MG/DL (ref 1.6–2.3)
MCH RBC QN AUTO: 30.1 PG (ref 26.5–33)
MCHC RBC AUTO-ENTMCNC: 33.2 G/DL (ref 31.5–36.5)
MCV RBC AUTO: 91 FL (ref 78–100)
MONOCYTES # BLD AUTO: 1.6 10E3/UL (ref 0–1.3)
MONOCYTES NFR BLD AUTO: 11 %
NEUTROPHILS # BLD AUTO: 11.3 10E3/UL (ref 1.6–8.3)
NEUTROPHILS NFR BLD AUTO: 77 %
NRBC # BLD AUTO: 0 10E3/UL
NRBC BLD AUTO-RTO: 0 /100
NT-PROBNP SERPL-MCNC: 2157 PG/ML (ref 0–900)
P AXIS - MUSE: 46 DEGREES
PLATELET # BLD AUTO: 447 10E3/UL (ref 150–450)
POTASSIUM BLD-SCNC: 3.7 MMOL/L (ref 3.4–5.3)
PR INTERVAL - MUSE: 158 MS
PROCALCITONIN SERPL-MCNC: <0.05 NG/ML
QRS DURATION - MUSE: 146 MS
QT - MUSE: 474 MS
QTC - MUSE: 567 MS
R AXIS - MUSE: 40 DEGREES
RBC # BLD AUTO: 4.59 10E6/UL (ref 4.4–5.9)
SODIUM SERPL-SCNC: 132 MMOL/L (ref 133–144)
SYSTOLIC BLOOD PRESSURE - MUSE: NORMAL MMHG
T AXIS - MUSE: 3 DEGREES
TROPONIN I SERPL HS-MCNC: 243 NG/L
TROPONIN I SERPL HS-MCNC: 265 NG/L
TROPONIN T BLD-MCNC: 0.21 UG/L
VENTRICULAR RATE- MUSE: 86 BPM
WBC # BLD AUTO: 14.4 10E3/UL (ref 4–11)

## 2022-05-15 PROCEDURE — 71046 X-RAY EXAM CHEST 2 VIEWS: CPT

## 2022-05-15 PROCEDURE — 93005 ELECTROCARDIOGRAM TRACING: CPT

## 2022-05-15 PROCEDURE — 99223 1ST HOSP IP/OBS HIGH 75: CPT | Mod: AI | Performed by: INTERNAL MEDICINE

## 2022-05-15 PROCEDURE — 84484 ASSAY OF TROPONIN QUANT: CPT | Performed by: EMERGENCY MEDICINE

## 2022-05-15 PROCEDURE — 250N000009 HC RX 250: Performed by: EMERGENCY MEDICINE

## 2022-05-15 PROCEDURE — 210N000002 HC R&B HEART CARE

## 2022-05-15 PROCEDURE — 83735 ASSAY OF MAGNESIUM: CPT | Performed by: EMERGENCY MEDICINE

## 2022-05-15 PROCEDURE — 84145 PROCALCITONIN (PCT): CPT | Performed by: EMERGENCY MEDICINE

## 2022-05-15 PROCEDURE — 83880 ASSAY OF NATRIURETIC PEPTIDE: CPT | Performed by: EMERGENCY MEDICINE

## 2022-05-15 PROCEDURE — 99285 EMERGENCY DEPT VISIT HI MDM: CPT | Mod: 25

## 2022-05-15 PROCEDURE — 85652 RBC SED RATE AUTOMATED: CPT | Performed by: INTERNAL MEDICINE

## 2022-05-15 PROCEDURE — 250N000011 HC RX IP 250 OP 636: Performed by: INTERNAL MEDICINE

## 2022-05-15 PROCEDURE — 250N000011 HC RX IP 250 OP 636: Performed by: EMERGENCY MEDICINE

## 2022-05-15 PROCEDURE — 96374 THER/PROPH/DIAG INJ IV PUSH: CPT

## 2022-05-15 PROCEDURE — 84484 ASSAY OF TROPONIN QUANT: CPT

## 2022-05-15 PROCEDURE — 96375 TX/PRO/DX INJ NEW DRUG ADDON: CPT

## 2022-05-15 PROCEDURE — 85025 COMPLETE CBC W/AUTO DIFF WBC: CPT | Performed by: EMERGENCY MEDICINE

## 2022-05-15 PROCEDURE — 80048 BASIC METABOLIC PNL TOTAL CA: CPT | Performed by: EMERGENCY MEDICINE

## 2022-05-15 PROCEDURE — 250N000013 HC RX MED GY IP 250 OP 250 PS 637: Performed by: EMERGENCY MEDICINE

## 2022-05-15 PROCEDURE — 86140 C-REACTIVE PROTEIN: CPT | Performed by: INTERNAL MEDICINE

## 2022-05-15 PROCEDURE — 96376 TX/PRO/DX INJ SAME DRUG ADON: CPT

## 2022-05-15 PROCEDURE — 36415 COLL VENOUS BLD VENIPUNCTURE: CPT | Performed by: EMERGENCY MEDICINE

## 2022-05-15 PROCEDURE — 93308 TTE F-UP OR LMTD: CPT

## 2022-05-15 RX ORDER — ONDANSETRON 2 MG/ML
4 INJECTION INTRAMUSCULAR; INTRAVENOUS ONCE
Status: COMPLETED | OUTPATIENT
Start: 2022-05-15 | End: 2022-05-15

## 2022-05-15 RX ORDER — MORPHINE SULFATE 4 MG/ML
4 INJECTION, SOLUTION INTRAMUSCULAR; INTRAVENOUS ONCE
Status: COMPLETED | OUTPATIENT
Start: 2022-05-15 | End: 2022-05-15

## 2022-05-15 RX ORDER — NITROGLYCERIN 0.4 MG/1
0.4 TABLET SUBLINGUAL ONCE
Status: COMPLETED | OUTPATIENT
Start: 2022-05-15 | End: 2022-05-15

## 2022-05-15 RX ORDER — KETOROLAC TROMETHAMINE 15 MG/ML
15 INJECTION, SOLUTION INTRAMUSCULAR; INTRAVENOUS EVERY 6 HOURS
Status: DISCONTINUED | OUTPATIENT
Start: 2022-05-15 | End: 2022-05-16

## 2022-05-15 RX ORDER — HYDROMORPHONE HCL IN WATER/PF 6 MG/30 ML
0.2 PATIENT CONTROLLED ANALGESIA SYRINGE INTRAVENOUS
Status: DISCONTINUED | OUTPATIENT
Start: 2022-05-15 | End: 2022-05-19 | Stop reason: HOSPADM

## 2022-05-15 RX ORDER — OXYCODONE HYDROCHLORIDE 5 MG/1
5 TABLET ORAL EVERY 4 HOURS PRN
Status: DISCONTINUED | OUTPATIENT
Start: 2022-05-15 | End: 2022-05-19 | Stop reason: HOSPADM

## 2022-05-15 RX ORDER — KETOROLAC TROMETHAMINE 15 MG/ML
10 INJECTION, SOLUTION INTRAMUSCULAR; INTRAVENOUS ONCE
Status: COMPLETED | OUTPATIENT
Start: 2022-05-15 | End: 2022-05-15

## 2022-05-15 RX ORDER — OLANZAPINE 10 MG/2ML
10 INJECTION, POWDER, FOR SOLUTION INTRAMUSCULAR ONCE
Status: DISCONTINUED | OUTPATIENT
Start: 2022-05-15 | End: 2022-05-15

## 2022-05-15 RX ORDER — MORPHINE SULFATE 2 MG/ML
2 INJECTION, SOLUTION INTRAMUSCULAR; INTRAVENOUS ONCE
Status: COMPLETED | OUTPATIENT
Start: 2022-05-15 | End: 2022-05-15

## 2022-05-15 RX ORDER — FENTANYL CITRATE 50 UG/ML
75 INJECTION, SOLUTION INTRAMUSCULAR; INTRAVENOUS ONCE
Status: DISCONTINUED | OUTPATIENT
Start: 2022-05-15 | End: 2022-05-15

## 2022-05-15 RX ADMIN — MORPHINE SULFATE 2 MG: 2 INJECTION, SOLUTION INTRAMUSCULAR; INTRAVENOUS at 21:41

## 2022-05-15 RX ADMIN — KETOROLAC TROMETHAMINE 10 MG: 15 INJECTION, SOLUTION INTRAMUSCULAR; INTRAVENOUS at 22:23

## 2022-05-15 RX ADMIN — MORPHINE SULFATE 4 MG: 4 INJECTION, SOLUTION INTRAMUSCULAR; INTRAVENOUS at 19:49

## 2022-05-15 RX ADMIN — LIDOCAINE HYDROCHLORIDE 30 ML: 20 SOLUTION ORAL; TOPICAL at 22:23

## 2022-05-15 RX ADMIN — NITROGLYCERIN 0.4 MG: 0.4 TABLET SUBLINGUAL at 22:22

## 2022-05-15 RX ADMIN — MORPHINE SULFATE 4 MG: 4 INJECTION, SOLUTION INTRAMUSCULAR; INTRAVENOUS at 20:27

## 2022-05-15 RX ADMIN — ONDANSETRON 4 MG: 2 INJECTION INTRAMUSCULAR; INTRAVENOUS at 20:06

## 2022-05-15 ASSESSMENT — ACTIVITIES OF DAILY LIVING (ADL): ADLS_ACUITY_SCORE: 35

## 2022-05-16 ENCOUNTER — APPOINTMENT (OUTPATIENT)
Dept: CARDIOLOGY | Facility: CLINIC | Age: 72
DRG: 273 | End: 2022-05-16
Attending: INTERNAL MEDICINE
Payer: MEDICARE

## 2022-05-16 LAB
ANION GAP SERPL CALCULATED.3IONS-SCNC: 9 MMOL/L (ref 3–14)
BI-PLANE LVEF ECHO: NORMAL
BUN SERPL-MCNC: 24 MG/DL (ref 7–30)
CALCIUM SERPL-MCNC: 8.3 MG/DL (ref 8.5–10.1)
CHLORIDE BLD-SCNC: 98 MMOL/L (ref 94–109)
CO2 SERPL-SCNC: 24 MMOL/L (ref 20–32)
CREAT SERPL-MCNC: 1.29 MG/DL (ref 0.66–1.25)
ERYTHROCYTE [DISTWIDTH] IN BLOOD BY AUTOMATED COUNT: 14.1 % (ref 10–15)
GFR SERPL CREATININE-BSD FRML MDRD: 60 ML/MIN/1.73M2
GLUCOSE BLD-MCNC: 143 MG/DL (ref 70–99)
HCT VFR BLD AUTO: 39.6 % (ref 40–53)
HGB BLD-MCNC: 13.1 G/DL (ref 13.3–17.7)
LACTATE SERPL-SCNC: 1 MMOL/L (ref 0.7–2)
LVEF ECHO: NORMAL
MCH RBC QN AUTO: 28.8 PG (ref 26.5–33)
MCHC RBC AUTO-ENTMCNC: 33.1 G/DL (ref 31.5–36.5)
MCV RBC AUTO: 87 FL (ref 78–100)
PLATELET # BLD AUTO: 391 10E3/UL (ref 150–450)
POTASSIUM BLD-SCNC: 4.1 MMOL/L (ref 3.4–5.3)
POTASSIUM BLD-SCNC: 4.1 MMOL/L (ref 3.4–5.3)
RBC # BLD AUTO: 4.55 10E6/UL (ref 4.4–5.9)
SARS-COV-2 RNA RESP QL NAA+PROBE: NEGATIVE
SODIUM SERPL-SCNC: 131 MMOL/L (ref 133–144)
TROPONIN I SERPL HS-MCNC: 201 NG/L
TROPONIN I SERPL HS-MCNC: 208 NG/L
WBC # BLD AUTO: 16.8 10E3/UL (ref 4–11)

## 2022-05-16 PROCEDURE — 258N000003 HC RX IP 258 OP 636: Performed by: INTERNAL MEDICINE

## 2022-05-16 PROCEDURE — 84484 ASSAY OF TROPONIN QUANT: CPT | Performed by: INTERNAL MEDICINE

## 2022-05-16 PROCEDURE — 99222 1ST HOSP IP/OBS MODERATE 55: CPT | Mod: 25 | Performed by: INTERNAL MEDICINE

## 2022-05-16 PROCEDURE — 99232 SBSQ HOSP IP/OBS MODERATE 35: CPT | Performed by: INTERNAL MEDICINE

## 2022-05-16 PROCEDURE — 93005 ELECTROCARDIOGRAM TRACING: CPT

## 2022-05-16 PROCEDURE — 85027 COMPLETE CBC AUTOMATED: CPT | Performed by: INTERNAL MEDICINE

## 2022-05-16 PROCEDURE — 83605 ASSAY OF LACTIC ACID: CPT | Performed by: INTERNAL MEDICINE

## 2022-05-16 PROCEDURE — 250N000011 HC RX IP 250 OP 636: Performed by: INTERNAL MEDICINE

## 2022-05-16 PROCEDURE — U0005 INFEC AGEN DETEC AMPLI PROBE: HCPCS | Performed by: INTERNAL MEDICINE

## 2022-05-16 PROCEDURE — 250N000013 HC RX MED GY IP 250 OP 250 PS 637: Performed by: INTERNAL MEDICINE

## 2022-05-16 PROCEDURE — 84132 ASSAY OF SERUM POTASSIUM: CPT | Performed by: INTERNAL MEDICINE

## 2022-05-16 PROCEDURE — 93306 TTE W/DOPPLER COMPLETE: CPT

## 2022-05-16 PROCEDURE — 36415 COLL VENOUS BLD VENIPUNCTURE: CPT | Performed by: INTERNAL MEDICINE

## 2022-05-16 PROCEDURE — 93010 ELECTROCARDIOGRAM REPORT: CPT | Performed by: INTERNAL MEDICINE

## 2022-05-16 PROCEDURE — 93306 TTE W/DOPPLER COMPLETE: CPT | Mod: 26 | Performed by: INTERNAL MEDICINE

## 2022-05-16 PROCEDURE — 210N000002 HC R&B HEART CARE

## 2022-05-16 PROCEDURE — 82310 ASSAY OF CALCIUM: CPT | Performed by: INTERNAL MEDICINE

## 2022-05-16 RX ORDER — AMIODARONE HYDROCHLORIDE 200 MG/1
200 TABLET ORAL DAILY
Status: DISCONTINUED | OUTPATIENT
Start: 2022-05-16 | End: 2022-05-16 | Stop reason: CLARIF

## 2022-05-16 RX ORDER — AZITHROMYCIN 250 MG/1
250 TABLET, FILM COATED ORAL DAILY
Status: DISCONTINUED | OUTPATIENT
Start: 2022-05-17 | End: 2022-05-16

## 2022-05-16 RX ORDER — LIDOCAINE 40 MG/G
CREAM TOPICAL
Status: DISCONTINUED | OUTPATIENT
Start: 2022-05-16 | End: 2022-05-19 | Stop reason: HOSPADM

## 2022-05-16 RX ORDER — ACETAMINOPHEN 650 MG/1
650 SUPPOSITORY RECTAL EVERY 6 HOURS PRN
Status: DISCONTINUED | OUTPATIENT
Start: 2022-05-16 | End: 2022-05-19 | Stop reason: HOSPADM

## 2022-05-16 RX ORDER — CLOPIDOGREL BISULFATE 75 MG/1
75 TABLET ORAL DAILY
Status: DISCONTINUED | OUTPATIENT
Start: 2022-05-16 | End: 2022-05-19 | Stop reason: HOSPADM

## 2022-05-16 RX ORDER — ATORVASTATIN CALCIUM 40 MG/1
40 TABLET, FILM COATED ORAL DAILY
Status: DISCONTINUED | OUTPATIENT
Start: 2022-05-16 | End: 2022-05-19 | Stop reason: HOSPADM

## 2022-05-16 RX ORDER — PANTOPRAZOLE SODIUM 40 MG/1
40 TABLET, DELAYED RELEASE ORAL
Status: DISCONTINUED | OUTPATIENT
Start: 2022-05-16 | End: 2022-05-19 | Stop reason: HOSPADM

## 2022-05-16 RX ORDER — CEFTRIAXONE 2 G/1
2 INJECTION, POWDER, FOR SOLUTION INTRAMUSCULAR; INTRAVENOUS EVERY 24 HOURS
Status: DISCONTINUED | OUTPATIENT
Start: 2022-05-16 | End: 2022-05-17

## 2022-05-16 RX ORDER — NALOXONE HYDROCHLORIDE 0.4 MG/ML
0.2 INJECTION, SOLUTION INTRAMUSCULAR; INTRAVENOUS; SUBCUTANEOUS
Status: DISCONTINUED | OUTPATIENT
Start: 2022-05-16 | End: 2022-05-19 | Stop reason: HOSPADM

## 2022-05-16 RX ORDER — AZITHROMYCIN 500 MG/1
500 INJECTION, POWDER, LYOPHILIZED, FOR SOLUTION INTRAVENOUS ONCE
Status: COMPLETED | OUTPATIENT
Start: 2022-05-16 | End: 2022-05-16

## 2022-05-16 RX ORDER — POLYETHYLENE GLYCOL 3350 17 G/17G
17 POWDER, FOR SOLUTION ORAL DAILY PRN
Status: DISCONTINUED | OUTPATIENT
Start: 2022-05-16 | End: 2022-05-19 | Stop reason: HOSPADM

## 2022-05-16 RX ORDER — NALOXONE HYDROCHLORIDE 0.4 MG/ML
0.4 INJECTION, SOLUTION INTRAMUSCULAR; INTRAVENOUS; SUBCUTANEOUS
Status: DISCONTINUED | OUTPATIENT
Start: 2022-05-16 | End: 2022-05-19 | Stop reason: HOSPADM

## 2022-05-16 RX ORDER — METOPROLOL SUCCINATE 25 MG/1
25 TABLET, EXTENDED RELEASE ORAL DAILY
Status: DISCONTINUED | OUTPATIENT
Start: 2022-05-16 | End: 2022-05-17

## 2022-05-16 RX ORDER — COLCHICINE 0.6 MG/1
0.6 TABLET ORAL DAILY
Status: DISCONTINUED | OUTPATIENT
Start: 2022-05-16 | End: 2022-05-19 | Stop reason: HOSPADM

## 2022-05-16 RX ORDER — ONDANSETRON 4 MG/1
4 TABLET, ORALLY DISINTEGRATING ORAL EVERY 6 HOURS PRN
Status: DISCONTINUED | OUTPATIENT
Start: 2022-05-16 | End: 2022-05-19 | Stop reason: HOSPADM

## 2022-05-16 RX ORDER — FLUTICASONE PROPIONATE 50 MCG
2 SPRAY, SUSPENSION (ML) NASAL DAILY
Status: DISCONTINUED | OUTPATIENT
Start: 2022-05-16 | End: 2022-05-19 | Stop reason: HOSPADM

## 2022-05-16 RX ORDER — DOXYCYCLINE 100 MG/1
100 CAPSULE ORAL EVERY 12 HOURS SCHEDULED
Status: DISCONTINUED | OUTPATIENT
Start: 2022-05-17 | End: 2022-05-17

## 2022-05-16 RX ORDER — ONDANSETRON 2 MG/ML
4 INJECTION INTRAMUSCULAR; INTRAVENOUS EVERY 6 HOURS PRN
Status: DISCONTINUED | OUTPATIENT
Start: 2022-05-16 | End: 2022-05-19 | Stop reason: HOSPADM

## 2022-05-16 RX ORDER — MESALAMINE 1.2 G/1
4800 TABLET, DELAYED RELEASE ORAL
Status: DISCONTINUED | OUTPATIENT
Start: 2022-05-16 | End: 2022-05-19 | Stop reason: HOSPADM

## 2022-05-16 RX ORDER — ACETAMINOPHEN 325 MG/1
650 TABLET ORAL EVERY 6 HOURS PRN
Status: DISCONTINUED | OUTPATIENT
Start: 2022-05-16 | End: 2022-05-19 | Stop reason: HOSPADM

## 2022-05-16 RX ADMIN — OXYCODONE HYDROCHLORIDE 5 MG: 5 TABLET ORAL at 17:05

## 2022-05-16 RX ADMIN — AZITHROMYCIN MONOHYDRATE 500 MG: 500 INJECTION, POWDER, LYOPHILIZED, FOR SOLUTION INTRAVENOUS at 10:45

## 2022-05-16 RX ADMIN — APIXABAN 5 MG: 5 TABLET, FILM COATED ORAL at 12:22

## 2022-05-16 RX ADMIN — PSYLLIUM HUSK 1 PACKET: 3.4 POWDER ORAL at 12:22

## 2022-05-16 RX ADMIN — APIXABAN 5 MG: 5 TABLET, FILM COATED ORAL at 21:40

## 2022-05-16 RX ADMIN — CLOPIDOGREL BISULFATE 75 MG: 75 TABLET ORAL at 09:49

## 2022-05-16 RX ADMIN — METOPROLOL SUCCINATE 25 MG: 25 TABLET, EXTENDED RELEASE ORAL at 09:48

## 2022-05-16 RX ADMIN — CEFTRIAXONE SODIUM 2 G: 2 INJECTION, POWDER, FOR SOLUTION INTRAMUSCULAR; INTRAVENOUS at 09:50

## 2022-05-16 RX ADMIN — ATORVASTATIN CALCIUM 40 MG: 40 TABLET, FILM COATED ORAL at 21:39

## 2022-05-16 RX ADMIN — Medication 1 MG: at 21:39

## 2022-05-16 RX ADMIN — ACETAMINOPHEN 650 MG: 325 TABLET ORAL at 14:49

## 2022-05-16 RX ADMIN — MESALAMINE 4800 MG: 1.2 TABLET, DELAYED RELEASE ORAL at 12:21

## 2022-05-16 RX ADMIN — OXYCODONE HYDROCHLORIDE 5 MG: 5 TABLET ORAL at 21:39

## 2022-05-16 RX ADMIN — FLUTICASONE PROPIONATE 2 SPRAY: 50 SPRAY, METERED NASAL at 10:25

## 2022-05-16 RX ADMIN — AMIODARONE HYDROCHLORIDE 150 MG: 1.5 INJECTION, SOLUTION INTRAVENOUS at 21:09

## 2022-05-16 RX ADMIN — COLCHICINE 0.6 MG: 0.6 TABLET ORAL at 12:22

## 2022-05-16 RX ADMIN — HYDROMORPHONE HYDROCHLORIDE 0.2 MG: 0.2 INJECTION, SOLUTION INTRAMUSCULAR; INTRAVENOUS; SUBCUTANEOUS at 08:50

## 2022-05-16 RX ADMIN — AMIODARONE HYDROCHLORIDE 200 MG: 200 TABLET ORAL at 09:49

## 2022-05-16 RX ADMIN — KETOROLAC TROMETHAMINE 15 MG: 15 INJECTION, SOLUTION INTRAMUSCULAR; INTRAVENOUS at 04:15

## 2022-05-16 RX ADMIN — SODIUM CHLORIDE 1 MG/MIN: 9 INJECTION, SOLUTION INTRAVENOUS at 21:08

## 2022-05-16 RX ADMIN — PANTOPRAZOLE SODIUM 40 MG: 40 TABLET, DELAYED RELEASE ORAL at 09:48

## 2022-05-16 ASSESSMENT — ACTIVITIES OF DAILY LIVING (ADL)
ADLS_ACUITY_SCORE: 20
ADLS_ACUITY_SCORE: 20
DRESSING/BATHING_DIFFICULTY: NO
ADLS_ACUITY_SCORE: 20
TOILETING_ISSUES: NO
ADLS_ACUITY_SCORE: 20
ADLS_ACUITY_SCORE: 20
WEAR_GLASSES_OR_BLIND: YES
ADLS_ACUITY_SCORE: 20
CONCENTRATING,_REMEMBERING_OR_MAKING_DECISIONS_DIFFICULTY: NO
ADLS_ACUITY_SCORE: 20
FALL_HISTORY_WITHIN_LAST_SIX_MONTHS: NO
ADLS_ACUITY_SCORE: 20
ADLS_ACUITY_SCORE: 35
ADLS_ACUITY_SCORE: 20
WALKING_OR_CLIMBING_STAIRS_DIFFICULTY: NO
CHANGE_IN_FUNCTIONAL_STATUS_SINCE_ONSET_OF_CURRENT_ILLNESS/INJURY: NO
DIFFICULTY_EATING/SWALLOWING: NO
ADLS_ACUITY_SCORE: 20
ADLS_ACUITY_SCORE: 20
DOING_ERRANDS_INDEPENDENTLY_DIFFICULTY: NO

## 2022-05-16 NOTE — ED NOTES
[facility]  ED Nurse Handoff Report    ED Chief complaint: No chief complaint on file.      ED Diagnosis:   Final diagnoses:   Chest pain, unspecified type   Elevated troponin   Status post ablation of atrial fibrillation & flutter       Code Status: MD to address    Allergies:   Allergies   Allergen Reactions     No Known Drug Allergies        Patient Story: pt presents from home by EMS w/indigestion and severe chest pain rating it a 8/10. Pt underwent an ablation for afib/flutter three days ago. Pt was feeling good for two days and then develop indigestion and chest pain radiating into the neck and L arm. Pt having SOB and is on 2L NC. Chest pain has been improving w/ nitroglycerin, morphine, and toradol. Pt having intermittent nausea relieved w/zofran. Troponin was 265 and 243 on repeat. EKG shows sinus rhythm w/occassional PVCs. Pt has a history of CHF, CAD, and has probable pericarditis.    Focused Assessment:  Aox4. VSS on 2L NC. Cardiac diet. SBA. PIV SL.     Treatments and/or interventions provided: chest x-ray. EKG - SR w/PVCs. meds per MAR.   Labs Ordered and Resulted from Time of ED Arrival to Time of ED Departure   BASIC METABOLIC PANEL - Abnormal       Result Value    Sodium 132 (*)     Potassium 3.7      Chloride 97      Carbon Dioxide (CO2) 28      Anion Gap 7      Urea Nitrogen 17      Creatinine 1.03      Calcium 8.5      Glucose 134 (*)     GFR Estimate 79     TROPONIN I - Abnormal    Troponin I High Sensitivity 265 (*)    CBC WITH PLATELETS AND DIFFERENTIAL - Abnormal    WBC Count 14.4 (*)     RBC Count 4.59      Hemoglobin 13.8      Hematocrit 41.6      MCV 91      MCH 30.1      MCHC 33.2      RDW 13.8      Platelet Count 447      % Neutrophils 77      % Lymphocytes 9      % Monocytes 11      % Eosinophils 1      % Basophils 1      % Immature Granulocytes 1      NRBCs per 100 WBC 0      Absolute Neutrophils 11.3 (*)     Absolute Lymphocytes 1.2      Absolute Monocytes 1.6 (*)      Absolute Eosinophils 0.1      Absolute Basophils 0.1      Absolute Immature Granulocytes 0.1      Absolute NRBCs 0.0     NT PROBNP INPATIENT - Abnormal    N terminal Pro BNP Inpatient 2,157 (*)    ISTAT TROPONIN POCT - Abnormal    TROPPC POCT 0.21 (*)    TROPONIN I - Abnormal    Troponin I High Sensitivity 243 (*)    ERYTHROCYTE SEDIMENTATION RATE AUTO - Abnormal    Erythrocyte Sedimentation Rate 22 (*)    CRP INFLAMMATION - Abnormal    CRP Inflammation 72.4 (*)    MAGNESIUM - Normal    Magnesium 1.8     PROCALCITONIN - Normal    Procalcitonin <0.05       Patient's response to treatments and/or interventions: tolerating well. Improved chest pain but not relieved.     To be done/followed up on inpatient unit:  cardiac consult. Repeat ECHO. hospitalist consult. Serial trops.     Does this patient have any cognitive concerns?: Aox4.     Activity level - Baseline/Home:  Independent  Activity Level - Current:   Stand with Assist    Patient's Preferred language: English   Needed?: No    Isolation: None  Infection: Not Applicable  Patient tested for COVID 19 prior to admission: YES  Bariatric?: No    Vital Signs:   Vitals:    05/15/22 2000 05/15/22 2030 05/15/22 2100 05/15/22 2200   BP: 115/69 124/76 (!) 140/87 132/88   Pulse: 87 89 93 93   Resp: 23  (!) 31 (!) 31   Temp:       TempSrc:       SpO2: 96% 98% 96% 93%       Cardiac Rhythm:     Was the PSS-3 completed:   Yes  What interventions are required if any?               Family Comments: family at bedside and updated w/plan of care.   OBS brochure/video discussed/provided to patient/family: N/A              Name of person given brochure if not patient: n/a              Relationship to patient: n/a    For the majority of the shift this patient's behavior was Green.   Behavioral interventions performed were n/a.    ED NURSE PHONE NUMBER: *[phone]

## 2022-05-16 NOTE — PLAN OF CARE
Goal Outcome Evaluation:                VSS on room air. Tele: SR with BBB. Started colchicine today. Receiving other pain meds (Dilaudid x 1, Tylenol, Oxycodone x 1) with moderate effect. He did ambulate in the hallway today and felt more discomfort/ pressure. Improved with rest.    Receiving IV abx for possible pneumonia. Tmax 99 this day shift.   Up independently or calls as needed.

## 2022-05-16 NOTE — CONSULTS
[facility]    Cardiology Consultation     Date of Admission:  5/15/2022    Assessment & Plan     1.  Pleuritic chest pain, most consistent with post ablation pericarditis  2.  Trivial-small pericardial effusion without evidence of tamponade  3.  AF/AFL s/p ablation on 5/12/2022, currently in NSR  4.  CAD s/p CHULA x2-RCA in 3/2022 (with moderate, IFR negative mid LAD disease)  5.  Chronic HFrEF (LVEF around 25%), roughly euvolemic, NYHA II-III  6.  Ulcerative colitis  7.  Hypertension  8.  Dyslipidemia    It was a pleasure to meet with Nathan at the bedside today.  I am sorry to hear that he has had this pain over the past couple of days.  Thankfully, his work-up to this point seems consistent with post ablation pericarditis rather than acute coronary syndrome, etc.  I recommended that we start him on colchicine, but the question of anti-inflammatory medications given his other antithrombotics and his history of ulcerative colitis is more complicated.  I discussed this issue with his primary cardiologist, Dr. Del Rosario, as well as his primary EP physician, Dr. Jones, and we ultimately agreed to continue his Eliquis and Plavix for now, with colchicine alone for the pericarditis.  If this is ineffective, we will consider adding high-dose aspirin down the road, either in addition to the Plavix or in place of it temporarily.  We will definitely want him to be on a PPI as well.    We will also want to get a repeat echocardiogram in the next couple of days to make sure that his pericardial effusion is stable.      -Follow-up formal TTE read  -Resume prior to admission antithrombotic regimen of Eliquis 5 mg twice daily and Plavix 75 mg daily  -Continue Protonix  -Start colchicine 0.6 mg daily (rather than twice daily given mild creatinine elevation, and concurrent use of amiodarone and azithromycin)-consider alternative to azithromycin if possible  -We will frequently reassess and consider high-dose  aspirin (or potentially NSAID) if this regimen alone is ineffective  -Continue atorvastatin 40 mg daily  -Continue Toprol-XL 25 mg daily  -Plan for repeat TTE on approximately 5/18/2022, or sooner if clinical situation dictates    Thank you for the very interesting consult.  We will continue to follow along with you.  Please feel free to call at any point with questions.      Jerman Cárdenas MD  Interventional Cardiology  May 16, 2022      Code Status    Full Code    Reason for Consult   Chest pain    Primary Care Physician   Donell Zuñiga    Chief Complaint   Chest pain    History is obtained from the patient    History of Present Illness   Peter Peralta is an extremely pleasant 71-year-old man here with chest pain several days after an AF/AFL ablation.  His past medical history is significant for HFrEF (LVEF around 25%), CAD s/p CHULA x2-RCA in 3/2022 (with moderate, IFR negative mid LAD disease), AF/AFL s/p ablation on 5/12/2022, ulcerative colitis, hypertension, and dyslipidemia.    Patient underwent AF and AFL ablation with Dr. Jones on 5/12/2022 due to failure of rhythm control strategy with amiodarone alone.  Ablation was successful with no immediate complications.  Patient felt well initially, but starting 2 days ago began having some constant chest discomfort that he initially felt was like indigestion, more so on the right side.  This gradually worsened and started to move over towards the left side, eventually becoming an 8-9 out of 10.  Accordingly, he called EMS and was brought in for evaluation.  Of note, he tells me that the pain is worst when he lies flat, and when he takes a deep breath.  No significant lightheadedness/syncope.  No lower extremity swelling.  His last issue with UC related GI bleeding was in fall 2021 per his report, though he does have chronically dark stools.    Work-up in the emergency department revealed a sodium of 132, potassium of 3.7, creatinine 1.03, estimated GFR of 79,  NT proBNP was elevated at 2157, white count was elevated at 14.4, hemoglobin and platelets were normal.  His high-sensitivity troponin started at 265, and down trended from there.  CRP was severely elevated at 72.4, but procalcitonin was less than 0.05.  EKG showed sinus rhythm with right bundle branch block and inferior/lateral ST/T wave changes.  I reviewed the results of his most recent angiograms from 3/2022, and this showed a moderate, nonflow limiting mid LAD lesion, as well as complete occlusion of the RCA that was successfully stented with excellent results by Dr. Del Rosario.  Follow-up angiography later that day showed continued patency of the stents.  Finally, I reviewed his echocardiogram, which has not yet been formally read today, but on my review appears to show an LVEF around 25% which is roughly his baseline, and a new, trivial-small pericardial effusion.      Past Medical History   I have reviewed this patient's medical history and updated it with pertinent information if needed.   Past Medical History:   Diagnosis Date     Actinic keratoses     face     Atrial fibrillation (H)     only after adenosine test     BPH (benign prostatic hypertrophy)      Chest pain      Coronary artery disease 2011 2011-Cath Lma 5%, Lad 40-50%, Lcx 30-40%, Lro35-80% (-FFR of Lad)     ED (erectile dysfunction)      Erectile dysfunction 12/30/2021     Family history of colon cancer 7/6/2012    mother age 65     GERD (gastroesophageal reflux disease)     nexium     Hyperlipidaemia      Hypertension 2/16/2022     IFG (impaired fasting glucose)      Murmur      Obesity, unspecified     Waist size 42, BMI 30.8     Right inguinal hernia 7/1/2014     Thrombocytosis      Ulcerative colitis (H)        Past Surgical History   I have reviewed this patient's surgical history and updated it with pertinent information if needed.  Past Surgical History:   Procedure Laterality Date     ANESTHESIA CARDIOVERSION N/A 3/22/2022     Procedure: ANESTHESIA, FOR CARDIOVERSION;  Surgeon: GENERIC ANESTHESIA PROVIDER;  Location:  OR     CARDIAC NUC POLINA STRESS TEST NL      Normal     CARDIOVERSION  12/2011    Atrial Fib     COLONOSCOPY  1/29/2014    Procedure: COMBINED COLONOSCOPY, SINGLE BIOPSY/POLYPECTOMY BY BIOPSY;  COLONOSCOPY;  Surgeon: Ashtyn Gonzales MD;  Location:  GI     COLONOSCOPY N/A 7/11/2018    Procedure: COMBINED COLONOSCOPY, SINGLE OR MULTIPLE BIOPSY/POLYPECTOMY BY BIOPSY;  COLONOSCOPY ;  Surgeon: Ashtyn Gonzales MD;  Location:  GI     CORONARY ANGIOGRAPHY ADULT ORDER  12/2011    1 LMA 5%, LAD 40-50%, LCx 30-40%, RCA 25-30%     CV CORONARY ANGIOGRAM N/A 3/3/2022    Procedure: Coronary Angiogram;  Surgeon: Eulalio Del Rosario MD;  Location:  HEART CARDIAC CATH LAB     CV CORONARY ANGIOGRAM N/A 3/3/2022    Procedure: Coronary Angiogram;  Surgeon: Eulalio Del Rosario MD;  Location:  HEART CARDIAC CATH LAB     CV INSTANTANEOUS WAVE-FREE RATIO N/A 3/3/2022    Procedure: Instantaneous Wave-Free Ratio;  Surgeon: Eulalio Del Rosario MD;  Location: [facility] CARDIAC CATH LAB     CV INTRAVASULAR ULTRASOUND N/A 3/3/2022    Procedure: Intravascular Ultrasound;  Surgeon: Eulalio Del Rosario MD;  Location:  HEART CARDIAC CATH LAB     CV INTRAVASULAR ULTRASOUND N/A 3/3/2022    Procedure: Intravascular Ultrasound;  Surgeon: Eulalio Del Rosario MD;  Location:  HEART CARDIAC CATH LAB     CV PCI ATHERECTOMY ORBITAL N/A 3/3/2022    Procedure: Percutaneous Coronary Intervention Atherectomy Rotational;  Surgeon: Eulalio Del Rosario MD;  Location:  HEART CARDIAC CATH LAB     CV TEMPORARY PACEMAKER INSERTION N/A 3/3/2022    Procedure: Temporary Pacemaker Insertion;  Surgeon: Eulalio Del Rosario MD;  Location:  HEART CARDIAC CATH LAB     ENT SURGERY      Ringing in the ears     EP ABLATION FOCAL AFIB N/A 5/12/2022    Procedure: Ablation Focal Atrial Fibrillation [[id number]];  Surgeon: Teodoro Jones MD;  Location: [facility] CARDIAC  CATH LAB     HC INJECTION SCLEROSING SOLUTION HEMORRHOID  8/24/2012    Procedure: HEMORRHOID INJECTION SCLEROSING SOLUTION;  Surgeon: Mayito Chow MD;  Location:  GI     HC TOOTH EXTRACTION W/FORCEP       HERNIA REPAIR  Needed     LASIK  1/2011     ROTATOR CUFF REPAIR RT/LT Left 10/15/2015    RCR Left - Dr. Carvalho     SOFT TISSUE SURGERY  10/2015    Torn rotator cuff       Prior to Admission Medications   Prior to Admission Medications   Prescriptions Last Dose Informant Patient Reported? Taking?   Calcium-Vitamin D-Vitamin K (CALCIUM + D + K) 750-500-40 MG-UNT-MCG TABS 5/15/2022 at Unknown time  Yes Yes   Sig: Take 2 tablets by mouth daily.   Multiple Vitamin (MULTIVITAMIN PO) 5/15/2022 at Unknown time  No Yes   Sig: Take 1 tablet by mouth daily   Probiotic Product (PROBIOTIC PO) 5/15/2022 at Unknown time  Yes Yes   Sig: Take 1 tablet by mouth daily   UNABLE TO FIND 5/15/2022 at Unknown time  Yes Yes   Sig: daily MEDICATION NAME: Move Free   acetaminophen (TYLENOL) 325 MG tablet PRN  No Yes   Sig: Take 2 tablets (650 mg) by mouth every 4 hours as needed for mild pain or headaches   amiodarone (PACERONE) 200 MG tablet 5/15/2022 at Unknown time  No Yes   Sig: Take 1 pill twice a day for 7 days, then decrease to 1 pill once a day.   Patient taking differently: Take 200 mg by mouth daily Take 1 pill twice a day for 7 days, then decrease to 1 pill once a day.   apixaban ANTICOAGULANT (ELIQUIS ANTICOAGULANT) 5 MG tablet 5/15/2022 at 6 PM  No Yes   Sig: Take 1 tablet (5 mg) by mouth 2 times daily   atorvastatin (LIPITOR) 40 MG tablet 5/14/2022 at Unknown time  No Yes   Sig: Take 1 tablet (40 mg) by mouth daily   clopidogrel (PLAVIX) 75 MG tablet 5/15/2022 at Unknown time  No Yes   Sig: Take 1 tablet (75 mg) by mouth daily   esomeprazole (NEXIUM) 20 MG DR capsule 5/15/2022 at Unknown time  Yes Yes   Sig: Take 20 mg by mouth every morning (before breakfast) Take 30-60 minutes before eating.   ferrous sulfate (FEROSUL)  325 (65 Fe) MG tablet 5/15/2022 at Unknown time  Yes Yes   Sig: Take 325 mg by mouth daily (with breakfast)   fluticasone (FLONASE) 50 MCG/ACT nasal spray 5/15/2022 at Unknown time  No Yes   Sig: Spray 2 sprays into both nostrils daily   lisinopril (ZESTRIL) 2.5 MG tablet 5/14/2022 at Unknown time  No Yes   Sig: Take 1 tablet (2.5 mg) by mouth At Bedtime   mesalamine (LIALDA) 1.2 g EC tablet 5/15/2022 at Unknown time  Yes Yes   Sig: Take 4,800 mg by mouth daily (with breakfast)    metoprolol succinate ER (TOPROL-XL) 50 MG 24 hr tablet 5/15/2022 at Unknown time  No Yes   Sig: Take 0.5 tablets (25 mg) by mouth daily Take 1/2 tab (25mg) twice a day. Hold for HR <55-60   Patient taking differently: Take 25 mg by mouth daily   psyllium (METAMUCIL) 58.6 % POWD 5/15/2022 at Unknown time  Yes Yes   Sig: Take 1 teaspoonful by mouth daily (At noon)   sildenafil (VIAGRA) 25 MG tablet PRN  No Yes   Sig: Take 3 tablets (75 mg) by mouth daily as needed   vitamin C (ASCORBIC ACID) 250 MG tablet 5/15/2022 at Unknown time  Yes Yes   Sig: Take 1 tablet (250 mg) by mouth daily      Facility-Administered Medications: None     Allergies   Allergies   Allergen Reactions     No Known Drug Allergies        Social History   I have reviewed this patient's social history and updated it with pertinent information if needed. Peter Peralta  reports that he quit smoking about 26 years ago. His smoking use included cigarettes. He has a 36.00 pack-year smoking history. He has never used smokeless tobacco. He reports current alcohol use. He reports that he does not use drugs.    Family History   I have reviewed this patient's family history and updated it with pertinent information if needed.   Family History   Problem Relation Age of Onset     Colon Cancer Mother 65        passed away from colon cancer - lived about 2 mo from dx 1981     C.A.D. Father         passed at 52 from MI     Cardiovascular Father      Heart Disease Father      Obesity  Father      Myocardial Infarction Father         52 - passed away     Coronary Artery Disease Father      Heart Disease Brother      Diabetes Brother      Coronary Artery Disease Brother      Hypertension Brother      Hyperlipidemia Brother      Coronary Artery Disease Brother         stent placed      Hyperlipidemia Brother      Sleep Apnea Brother      Arthritis Maternal Grandmother      Diabetes Maternal Grandmother      Arthritis Maternal Grandfather      Diabetes Maternal Grandfather      Substance Abuse Paternal Grandfather      Hypertension No family hx of      Cerebrovascular Disease No family hx of      Breast Cancer No family hx of      Prostate Cancer No family hx of      Alcohol/Drug No family hx of      Allergies No family hx of      Alzheimer Disease No family hx of      Circulatory No family hx of      Congenital Anomalies No family hx of      Connective Tissue Disorder No family hx of      Depression No family hx of      Eye Disorder No family hx of      Genetic Disorder No family hx of      Gastrointestinal Disease No family hx of      Genitourinary Problems No family hx of      Gynecology No family hx of      Musculoskeletal Disorder No family hx of      Neurologic Disorder No family hx of      Osteoporosis No family hx of      Psychotic Disorder No family hx of      Respiratory No family hx of      Thyroid Disease No family hx of      Anesthesia Reaction No family hx of      Blood Disease No family hx of        Review of Systems   A 10 point Review of Systems was completed and was negative other than noted in the HPI     Physical Exam   Temp: 98.8  F (37.1  C) Temp src: Oral BP: 105/70 Pulse: 86   Resp: 16 SpO2: 95 % O2 Device: None (Room air)    Vital Signs with Ranges  Temp:  [98.3  F (36.8  C)-100.8  F (38.2  C)] 98.8  F (37.1  C)  Pulse:  [81-98] 86  Resp:  [10-31] 16  BP: ()/(67-90) 105/70  SpO2:  [91 %-98 %] 95 %  227 lbs 8 oz    Constitutional:  Awake, alert, no acute distress  Eyes:  EOMI, sclera non-icteric  ENT: Trachea midline  Respiratory: Normal respiratory effort, CTAB  Cardiovascular: RRR, 2/6 systolic murmur at both the base and apex.  JVP does not appear elevated.  There is no LE edema.  Normal carotid upstrokes, no carotid bruits.  GI:  Nondistended, nontender.  Skin: Dry, no significant rash on exposed skin  Musculoskeletal:  Strength 5/5 in upper and lower extremities  Psychiatric: Appropriate affect      Data   Labs  I have personally reviewed the pertinent cardiac labs.    Most Recent 3 CBC's:  Recent Labs   Lab Test 05/16/22  0559 05/15/22  1920 05/12/22  0719   WBC 16.8* 14.4* 6.7   HGB 13.1* 13.8 14.8   MCV 87 91 89    447 494*       Most Recent 3 BMP's:  Recent Labs   Lab Test 05/16/22  0559 05/16/22  0200 05/15/22  1920 05/12/22  0719   *  --  132* 138   POTASSIUM 4.1 4.1 3.7 4.2   CHLORIDE 98  --  97 106   CO2 24  --  28 25   BUN 24  --  17 22   CR 1.29*  --  1.03 1.05   ANIONGAP 9  --  7 7   CAMERON 8.3*  --  8.5 8.8   *  --  134* 118*       Most Recent 2 LFT's:  Recent Labs   Lab Test 10/06/20  0929 08/27/19  0929   AST 35 26   ALT 70 48   ALKPHOS 66 93   BILITOTAL 0.8 0.9       Most Recent 3 INR's:  Recent Labs   Lab Test 03/03/22  0755   INR 1.00       Most Recent 3 Troponin's:No lab results found.    Most Recent Cholesterol Panel:  Recent Labs   Lab Test 03/03/22  0755   CHOL 117   LDL 55   HDL 36*   TRIG 129       Most Recent Hemoglobin A1c:  Recent Labs   Lab Test 12/30/21  1455   A1C 6.3*       Jerman Cárdenas MD  Interventional Cardiology  May 16, 2022      Clinically Significant Risk Factors Present on Admission        # Hyponatremia: Na = 131 mmol/L (Ref range: 133 - 144 mmol/L) on admission, will monitor as appropriate       # Coagulation Defect: home medication list includes an anticoagulant medication  # Platelet Defect: home medication list includes an antiplatelet medication   # Obesity: Estimated body mass index is 30.02 kg/m  as calculated  "from the following:    Height as of 5/12/22: 1.854 m (6' 1\").    Weight as of this encounter: 103.2 kg (227 lb 8 oz).    Cardiovascular: Cardiac Arrhythmia: Atrial fibrillation: Longstanding  Non-Rheumatic Valve Disease: Aortic valve stenosis  Mitral valve insufficiency                                   "

## 2022-05-16 NOTE — ED NOTES
Bed: ST03  Expected date:   Expected time:   Means of arrival:   Comments:  855 71m chest pain, NTG.  ETA 1900

## 2022-05-16 NOTE — ED NOTES
Wrong chart arrived initially. Currently marked for Merge by registration. Please refer to the other chart for triage information.

## 2022-05-16 NOTE — NURSING NOTE
PSG and follow-up appointment with provider scheduled. PSG hand-out mailed to patient. Joyce Nicolas, JOSEPH

## 2022-05-16 NOTE — PROGRESS NOTES
RECEIVING UNIT ED HANDOFF REVIEW    ED Nurse Handoff Report was reviewed by: Heriberto Brown RN on May 16, 2022 at 12:22 AM

## 2022-05-16 NOTE — ED NOTES
Ambulated to the bathroom - tolerated well. Pt eating aleyda crackers and apple juice. Pain partially relieved w/nitro and toradol.

## 2022-05-16 NOTE — ED PROVIDER NOTES
History   Chief Complaint:  No chief complaint on file.       HPI   Peter Peralta is a 71 year old male with history of atrial fibrillation and flutter status post ablation 3 days ago who presents with chest pain and pain with breathing.  Pain radiates into the left shoulder.  Pain was 8 out of 10 on paramedic arrival.  They gave nitroglycerin and moved him at which point the pain became 10 out of 10.  They used a second dose of nitroglycerin and pain came down to 7 out of 10.  Prehospital EKG tracing was reviewed with no evidence for ischemia or current dysrhythmia.  Aspirin was not given to patient's current Eliquis used last taken at 6 PM.  He does not have any complaints regarding the access site in the right groin.  No associated nausea, dizziness, diaphoresis.    POC US was entered under original ED encounter chart that had wrong name, MRN [id number]:      POC US ECHO LIMITED   Final Result   Limited Bedside Cardiac Ultrasound      Performed by: Dr. Mcfarlane   Indication: Chest pain and shortness of breath after ablation   Body area(s) imaged: parasternal and apical, unable to obtain subxyphoid   Findings: No visualized pericardial effusion   Impression: Same   Images archived to PACS           Review of Systems  Ten system ROS reviewed and is negative except as above     Allergies:    No Known Drug Allergies    Medications:    acetaminophen (TYLENOL) 325 MG tablet  amiodarone (PACERONE) 200 MG tablet  apixaban ANTICOAGULANT (ELIQUIS ANTICOAGULANT) 5 MG tablet  atorvastatin (LIPITOR) 40 MG tablet  Calcium-Vitamin D-Vitamin K (CALCIUM + D + K) 750-500-40 MG-UNT-MCG TABS  clopidogrel (PLAVIX) 75 MG tablet  esomeprazole (NEXIUM) 20 MG DR capsule  ferrous sulfate (FEROSUL) 325 (65 Fe) MG tablet  fluticasone (FLONASE) 50 MCG/ACT nasal spray  lisinopril (ZESTRIL) 2.5 MG tablet  mesalamine (LIALDA) 1.2 g EC tablet  metoprolol succinate ER (TOPROL-XL) 50 MG 24 hr tablet  Multiple Vitamin (MULTIVITAMIN PO)  Probiotic  Product (PROBIOTIC PO)  psyllium (METAMUCIL) 58.6 % POWD  sildenafil (VIAGRA) 25 MG tablet  UNABLE TO FIND  vitamin C (ASCORBIC ACID) 250 MG tablet        Past Medical History:       Past Medical History:   Diagnosis Date     Actinic keratoses      Atrial fibrillation (H)      BPH (benign prostatic hypertrophy)      Chest pain      Coronary artery disease 2011     ED (erectile dysfunction)      Erectile dysfunction 12/30/2021     Family history of colon cancer 7/6/2012     GERD (gastroesophageal reflux disease)      Hyperlipidaemia      Hypertension 2/16/2022     IFG (impaired fasting glucose)      Murmur      Obesity, unspecified      Right inguinal hernia 7/1/2014     Thrombocytosis      Ulcerative colitis (H)          Past Surgical History:      Past Surgical History:   Procedure Laterality Date     ANESTHESIA CARDIOVERSION N/A 3/22/2022    Procedure: ANESTHESIA, FOR CARDIOVERSION;  Surgeon: GENERIC ANESTHESIA PROVIDER;  Location:  OR     CARDIAC NUC POLINA STRESS TEST NL      Normal     CARDIOVERSION  12/2011    Atrial Fib     COLONOSCOPY  1/29/2014    Procedure: COMBINED COLONOSCOPY, SINGLE BIOPSY/POLYPECTOMY BY BIOPSY;  COLONOSCOPY;  Surgeon: Ashtyn Gonzales MD;  Location:  GI     COLONOSCOPY N/A 7/11/2018    Procedure: COMBINED COLONOSCOPY, SINGLE OR MULTIPLE BIOPSY/POLYPECTOMY BY BIOPSY;  COLONOSCOPY ;  Surgeon: Ashtyn Gonzales MD;  Location:  GI     CORONARY ANGIOGRAPHY ADULT ORDER  12/2011    1 LMA 5%, LAD 40-50%, LCx 30-40%, RCA 25-30%     CV CORONARY ANGIOGRAM N/A 3/3/2022    Procedure: Coronary Angiogram;  Surgeon: Eulalio Del Rosario MD;  Location: [facility] CARDIAC CATH LAB     CV CORONARY ANGIOGRAM N/A 3/3/2022    Procedure: Coronary Angiogram;  Surgeon: Eulalio Del Rosario MD;  Location: [facility] CARDIAC CATH LAB     CV INSTANTANEOUS WAVE-FREE RATIO N/A 3/3/2022    Procedure: Instantaneous Wave-Free Ratio;  Surgeon: Eulalio Del Rosario MD;  Location:  HEART CARDIAC CATH LAB     CV  INTRAVASULAR ULTRASOUND N/A 3/3/2022    Procedure: Intravascular Ultrasound;  Surgeon: Eulalio Del Rosario MD;  Location:  HEART CARDIAC CATH LAB     CV INTRAVASULAR ULTRASOUND N/A 3/3/2022    Procedure: Intravascular Ultrasound;  Surgeon: Eulalio Del Rosario MD;  Location:  HEART CARDIAC CATH LAB     CV PCI ATHERECTOMY ORBITAL N/A 3/3/2022    Procedure: Percutaneous Coronary Intervention Atherectomy Rotational;  Surgeon: Eulalio Del Rosario MD;  Location:  HEART CARDIAC CATH LAB     CV TEMPORARY PACEMAKER INSERTION N/A 3/3/2022    Procedure: Temporary Pacemaker Insertion;  Surgeon: Eulalio Del Rosario MD;  Location:  HEART CARDIAC CATH LAB     ENT SURGERY      Ringing in the ears     EP ABLATION FOCAL AFIB N/A 5/12/2022    Procedure: Ablation Focal Atrial Fibrillation [[id number]];  Surgeon: Teodoro Jones MD;  Location:  HEART CARDIAC CATH LAB     HC INJECTION SCLEROSING SOLUTION HEMORRHOID  8/24/2012    Procedure: HEMORRHOID INJECTION SCLEROSING SOLUTION;  Surgeon: Mayito Chow MD;  Location:  GI     HC TOOTH EXTRACTION W/FORCEP       HERNIA REPAIR  Needed     LASIK  1/2011     ROTATOR CUFF REPAIR RT/LT Left 10/15/2015    RCR Left - Dr. Carvalho     SOFT TISSUE SURGERY  10/2015    Torn rotator cuff        Family History:      Family History   Problem Relation Age of Onset     Colon Cancer Mother 65        passed away from colon cancer - lived about 2 mo from dx 1981     C.A.D. Father         passed at 52 from MI     Cardiovascular Father      Heart Disease Father      Obesity Father      Myocardial Infarction Father         52 - passed away     Coronary Artery Disease Father      Heart Disease Brother      Diabetes Brother      Coronary Artery Disease Brother      Hypertension Brother      Hyperlipidemia Brother      Coronary Artery Disease Brother         stent placed      Hyperlipidemia Brother      Sleep Apnea Brother      Arthritis Maternal Grandmother      Diabetes Maternal Grandmother       Arthritis Maternal Grandfather      Diabetes Maternal Grandfather      Substance Abuse Paternal Grandfather      Hypertension No family hx of      Cerebrovascular Disease No family hx of      Breast Cancer No family hx of      Prostate Cancer No family hx of      Alcohol/Drug No family hx of      Allergies No family hx of      Alzheimer Disease No family hx of      Circulatory No family hx of      Congenital Anomalies No family hx of      Connective Tissue Disorder No family hx of      Depression No family hx of      Eye Disorder No family hx of      Genetic Disorder No family hx of      Gastrointestinal Disease No family hx of      Genitourinary Problems No family hx of      Gynecology No family hx of      Musculoskeletal Disorder No family hx of      Neurologic Disorder No family hx of      Osteoporosis No family hx of      Psychotic Disorder No family hx of      Respiratory No family hx of      Thyroid Disease No family hx of      Anesthesia Reaction No family hx of      Blood Disease No family hx of          Social History:  Social History     Socioeconomic History     Marital status:      Spouse name: Emy     Number of children: 2     Years of education: 16     Highest education level: Not on file   Occupational History     Occupation: [employer]     Employer: [employer]   Tobacco Use     Smoking status: Former Smoker     Packs/day: 1.50     Years: 24.00     Pack years: 36.00     Types: Cigarettes     Quit date: 1996     Years since quittin.3     Smokeless tobacco: Never Used   Substance and Sexual Activity     Alcohol use: Yes     Alcohol/week: 0.0 standard drinks     Comment: occ     Drug use: No     Sexual activity: Yes     Partners: Female     Birth control/protection: None   Other Topics Concern      Service Yes     Blood Transfusions No     Caffeine Concern No     Comment: 2 cups per day     Occupational Exposure No     Hobby Hazards No     Sleep Concern No     Stress  Concern Yes     Weight Concern Yes     Special Diet Yes     Back Care No     Exercise No     Comment: 8,000 steps a day. Fitbit     Bike Helmet No     Comment: n/a     Seat Belt Yes     Self-Exams Yes     Parent/sibling w/ CABG, MI or angioplasty before 65F 55M? Yes     Comment: Father and brother   Social History Narrative    Eats fruits and vegetables every day. He takes extra vitamin D. He was advised to aim for 1200 mg of calcium per day.     Social Determinants of Health     Financial Resource Strain: Not on file   Food Insecurity: Not on file   Transportation Needs: Not on file   Physical Activity: Not on file   Stress: Not on file   Social Connections: Not on file   Intimate Partner Violence: Not on file   Housing Stability: Not on file       Physical Exam     Patient Vitals for the past 24 hrs:   BP Temp Temp src Pulse Resp SpO2   05/15/22 2200 132/88 -- -- 93 (!) 31 93 %   05/15/22 2100 (!) 140/87 -- -- 93 (!) 31 96 %   05/15/22 2030 124/76 -- -- 89 -- 98 %   05/15/22 2000 115/69 -- -- 87 23 96 %   05/15/22 1922 -- 98.3  F (36.8  C) Temporal -- -- --       Physical Exam  Eyes:  Sclera white; Pupils are equal and round  ENT:    External ears and nares normal  CV:  Rate as above with regular rhythm, difficult to hear, cannot appreciate friction rub  Resp:  Breath sounds clear and equal bilaterally    Non-labored, no retractions or accessory muscle use  GI:  Abdomen is soft, non-tender, non-distended    No rebound tenderness or peritoneal features  MS:  Moves all extremities  Skin:  Warm and dry  Neuro:  Speech is normal and fluent. No apparent deficit.      Emergency Department Course   EKG obtained under other MRN:  Time: 1914  Vent. Rate 86 bpm. SD interval 158. QRS duration 146. QT/QTc 474/567.  Read time: 1916  Interpretation: Sinus rhythm with occasional PVCs, right bundle branch block, T wave abnormality consider inferior ischemia, abnormal ECG  Interpreted by Dr. Mcfarlane.  Agree.  Morphology is similar  to prior on 5/12/2022, PVCs are new.  Previous QT/QTc was 436/545             Imaging:  XR Chest 2 Views   Final Result   IMPRESSION: Question some left lower lobe retrocardiac atelectasis and/or infiltrate. Right lung grossly clear.      Echocardiogram Complete    (Results Pending)     Report per radiology    Laboratory:  Labs Ordered and Resulted from Time of ED Arrival to Time of ED Departure   BASIC METABOLIC PANEL - Abnormal       Result Value    Sodium 132 (*)     Potassium 3.7      Chloride 97      Carbon Dioxide (CO2) 28      Anion Gap 7      Urea Nitrogen 17      Creatinine 1.03      Calcium 8.5      Glucose 134 (*)     GFR Estimate 79     TROPONIN I - Abnormal    Troponin I High Sensitivity 265 (*)    CBC WITH PLATELETS AND DIFFERENTIAL - Abnormal    WBC Count 14.4 (*)     RBC Count 4.59      Hemoglobin 13.8      Hematocrit 41.6      MCV 91      MCH 30.1      MCHC 33.2      RDW 13.8      Platelet Count 447      % Neutrophils 77      % Lymphocytes 9      % Monocytes 11      % Eosinophils 1      % Basophils 1      % Immature Granulocytes 1      NRBCs per 100 WBC 0      Absolute Neutrophils 11.3 (*)     Absolute Lymphocytes 1.2      Absolute Monocytes 1.6 (*)     Absolute Eosinophils 0.1      Absolute Basophils 0.1      Absolute Immature Granulocytes 0.1      Absolute NRBCs 0.0     NT PROBNP INPATIENT - Abnormal    N terminal Pro BNP Inpatient 2,157 (*)    ISTAT TROPONIN POCT - Abnormal    TROPPC POCT 0.21 (*)    TROPONIN I - Abnormal    Troponin I High Sensitivity 243 (*)    ERYTHROCYTE SEDIMENTATION RATE AUTO - Abnormal    Erythrocyte Sedimentation Rate 22 (*)    CRP INFLAMMATION - Abnormal    CRP Inflammation 72.4 (*)    MAGNESIUM - Normal    Magnesium 1.8     PROCALCITONIN - Normal    Procalcitonin <0.05          Procedures  Copied from other chart:   POC US ECHO LIMITED   Final Result   Limited Bedside Cardiac Ultrasound      Performed by: Dr. Mcfarlane   Indication: Chest pain and shortness of breath after  ablation   Body area(s) imaged: parasternal and apical, unable to obtain subxyphoid   Findings: No visualized pericardial effusion   Impression: Same   Images archived to PACS           Emergency Department Course:             Assessments:  I obtained history and examined the patient as noted above.   Multiple rechecks regarding symptom control and updates after discussion with cardiology    Consults:  Dr. Patel, cardiologist.  We spoke a second time at 8:33pm after he reviewed the ECGs.    Interventions:  Medications   oxyCODONE (ROXICODONE) tablet 5 mg (has no administration in time range)   HYDROmorphone (DILAUDID) injection 0.2 mg (has no administration in time range)   ketorolac (TORADOL) injection 15 mg (15 mg Intravenous Not Given 5/15/22 2223)   morphine (PF) injection 4 mg (4 mg Intravenous Given 5/15/22 1949)   ondansetron (ZOFRAN) injection 4 mg (4 mg Intravenous Given 5/15/22 2006)   morphine (PF) injection 4 mg (4 mg Intravenous Given 5/15/22 2027)   morphine (PF) injection 2 mg (2 mg Intravenous Given 5/15/22 2141)   nitroGLYcerin (NITROSTAT) sublingual tablet 0.4 mg (0.4 mg Sublingual Given 5/15/22 2222)   lidocaine (viscous) (XYLOCAINE) 2 % 15 mL, alum & mag hydroxide-simethicone (MAALOX) 15 mL GI Cocktail (30 mLs Oral Given 5/15/22 2223)   ketorolac (TORADOL) injection 10 mg (10 mg Intravenous Given 5/15/22 2223)        Impression & Plan     Medical Decision Making:  EKG w/o ischemia, dysrhythmia, or pericarditis.  Immediate bedside ultrasound to evaluate for pericardial effusion as a complication of the ablation was negative for an effusion.  Chest x-ray shows no pleural effusion.  Given his recent procedure and I think that this is pain secondary to that and I am not currently suspecting a PE.  He would be low risk for this again given his current anticoagulation status.  Case was discussed with cardiology after troponin came back elevated.  It may be elevated secondary to the procedure.  A second  troponin at 2 hours was recommended.  This returned stable.  He continues to have ongoing pain.  Cardiology had recommended an NSAID if troponin was stable risks and benefits of this given his current anticoagulation state and history of ulcerative colitis were discussed with him and his family.  Was stable to improving troponin, active ischemia is not suspected.  Given the ongoing pain medication low dose toradol was given.  Hospitalist was updated after seeing the second troponin.  Inpatient bed request made.  Admitted to Dr. Puri, hospitalist.  Pain free after NTG, GI cocktail, toradol.    Diagnosis:    ICD-10-CM    1. Chest pain, unspecified type  R07.9    2. Elevated troponin  R77.8    3. Status post ablation of atrial fibrillation & flutter  Z98.890     Z86.79           Piedad Mcfarlane MD  05/16/22 0011

## 2022-05-16 NOTE — ED PROVIDER NOTES
Encounter started with last name spelled wrong.  See MRN: [id number] for full provider note.  POC US was entered under this chart.  Will merge later.        POC US ECHO LIMITED   Final Result   Limited Bedside Cardiac Ultrasound      Performed by: Dr. Mcfarlane   Indication: Chest pain and shortness of breath after ablation   Body area(s) imaged: parasternal and apical, unable to obtain subxyphoid   Findings: No visualized pericardial effusion   Impression: Same   Images archived to PACS             Piedad Mcfarlane MD  05/15/22 1923       Piedad Mcfarlane MD  05/15/22 1924

## 2022-05-16 NOTE — H&P
"[facility]    History and Physical  Hospitalist       Date of Admission:  5/15/2022    Assessment & Plan   Peter Peralta is a 71 year old male with PMH of CAD, CHF, HTN, HLD, ulcerative colitis, mixed cardiomyopathy, who presents with chest pain    Probable pericarditis  Type 2 MI due to above  Atrial fib/flutter s/p ablation 5/12/2022  Patient underwent successful ablation for afib/flutter on 5/12/2022. On 5/14, he describes onset of persistent \"indigestion\" and then developed chest pain on 5/15. Chest pain is severe 8/10, pleuritic, positional and radiates to neck and shoulders, and also constant. Improving with nitroglycerin and then morphine in the ED. EKG shows multiple TWI in II, III, AVF, and anterior leads. Troponin is 265-->243. Noted leukocytosis. Overall symptoms, labwork, EKG appear consistent with pericarditis. Differential includes ACS, however given his troponin trend this appears to be lower on the differential.  - Appreciate Cardiology consult  - Toradol 15mg IV q6h for now. Patient is on Eliquis, and has ulcerative colitis, so he has relative contraindications/intolerance to NSAIDs. Will need Cardiology to assist with NSAID regimen.  - Trend troponins x2  - Oxycodone 5mg q4h PRN + Dilaudid IV PRN for ongoing chest pain  - Telemetry  - Repeat Echo ordered (instead of Stat)  - Check ESR, CRP  - Continue PTA apixaban pending pharm med rec  - Continue PTA Nexium pending pharm med rec    CAD s/p PCI to RCA  CHF  Mixed cardiomyopathy  Elevated NT pro BNP  Developed CHF early 2022, most recent OSCAR 5/12 shows EF 20-25% with global hypokinesis. Suspected to have mixed cardiomyopathy, ischemic and/or tachycardia induced. CAD with PCI to RCA. Follows with Cardiology here.   He reports that his weight increased 6lbs after his ablation but he has been taking Lasix daily and he is only one pound above his dry weight. NT pro BNP is elevated to 2157 here, no comparison.  - Cardiology " "consult as above  - Continue PTA lasix 20mg daily pending pharm med rec  - Continue PTA atorvastatin, Plavix, metoprolol, pending pharm med rec    Hyponatremia  Given patient's severe chest pain, and known heart failure, this could represent some SIADH versus mild volume overload.  - Lasix PO as above    Borderline hypoxia  Probable atelectasis  Patient is satting 89-90% on room air and he has prominent bibasilar crackles on exam. CXR shows possibly \"LLL atelectasis and/or infiltrate.\" He is presenting with severe chest pain, also noted leukocytosis, no cough. My suspicion for pneumonia is low, I suspect this is atelectasis.  - Incentive spirometer  - Ambulate if able  - Check procalcitonin (may not be very specific given pericarditis above)    COVID-19 SYMPTOMATIC testing: Patient has borderline hypoxia and noted bilateral crackles which are secondary to his chest pain and suspected pericarditis. For this reason, patient is considered LOW SUSPICION for COVID and does not need precautions while awaiting results.    DVT Prophylaxis: DOAC  Code Status: Full Code    Disposition: Expected discharge ~2-3 days, pericarditis is complicated by ongoing chest pain and relative contraindication to NSAID therapy    Eulalio Puri MD, MD    Primary Care Physician   Donell Zuñiga    Chief Complaint   Chest pain    History is obtained from the patient  Wife Emy, and son and daughter present at bedside, questions answered, updated  Case discussed with ED provider    History of Present Illness   Peter Peralta is a 71 year old male who presents with chest pain. He had an ablation three days ago. Felt great two days ago, but yesterday he describes onset of \"indigestion\" in his epigastric/right chest area that was somewhat persistent. Then today, his symptoms worsened significantly. He describes worsening indigestion and onset of chest pain on the left side of his chest, he describes it as severe 8/10, constant, heavy and " pressure in quality. The chest pain is pleuritic and positional--worse with laying down or laying forward. He endorses associated chills. Denies nausea, vomiting, palpitations, diaphoresis. Having some constipation but not too bad. He also is complaining of neck pain and left shoulder pain.    Past Medical History    I have reviewed this patient's medical history and updated it with pertinent information if needed.   Past Medical History:   Diagnosis Date     Actinic keratoses     face     Atrial fibrillation (H)     only after adenosine test     BPH (benign prostatic hypertrophy)      Chest pain      Coronary artery disease 2011 2011-Cath Lma 5%, Lad 40-50%, Lcx 30-40%, Qdq08-60% (-FFR of Lad)     ED (erectile dysfunction)      Erectile dysfunction 12/30/2021     Family history of colon cancer 7/6/2012    mother age 65     GERD (gastroesophageal reflux disease)     nexium     Hyperlipidaemia      Hypertension 2/16/2022     IFG (impaired fasting glucose)      Murmur      Obesity, unspecified     Waist size 42, BMI 30.8     Right inguinal hernia 7/1/2014     Thrombocytosis      Ulcerative colitis (H)        Past Surgical History   I have reviewed this patient's surgical history and updated it with pertinent information if needed.  Past Surgical History:   Procedure Laterality Date     ANESTHESIA CARDIOVERSION N/A 3/22/2022    Procedure: ANESTHESIA, FOR CARDIOVERSION;  Surgeon: GENERIC ANESTHESIA PROVIDER;  Location:  OR     CARDIAC NUC POLINA STRESS TEST NL      Normal     CARDIOVERSION  12/2011    Atrial Fib     COLONOSCOPY  1/29/2014    Procedure: COMBINED COLONOSCOPY, SINGLE BIOPSY/POLYPECTOMY BY BIOPSY;  COLONOSCOPY;  Surgeon: Ashtyn Gonzales MD;  Location:  GI     COLONOSCOPY N/A 7/11/2018    Procedure: COMBINED COLONOSCOPY, SINGLE OR MULTIPLE BIOPSY/POLYPECTOMY BY BIOPSY;  COLONOSCOPY ;  Surgeon: Ashtyn Gonzales MD;  Location:  GI     CORONARY ANGIOGRAPHY ADULT ORDER  12/2011    1 LMA 5%, LAD  40-50%, LCx 30-40%, RCA 25-30%     CV CORONARY ANGIOGRAM N/A 3/3/2022    Procedure: Coronary Angiogram;  Surgeon: Eulalio Del Rosario MD;  Location: [facility] CARDIAC CATH LAB     CV CORONARY ANGIOGRAM N/A 3/3/2022    Procedure: Coronary Angiogram;  Surgeon: Eulalio Del Rosario MD;  Location: [facility] CARDIAC CATH LAB     CV INSTANTANEOUS WAVE-FREE RATIO N/A 3/3/2022    Procedure: Instantaneous Wave-Free Ratio;  Surgeon: Eulalio Del Rosario MD;  Location: [facility] CARDIAC CATH LAB     CV INTRAVASULAR ULTRASOUND N/A 3/3/2022    Procedure: Intravascular Ultrasound;  Surgeon: Eulalio Del Rosario MD;  Location: [facility] CARDIAC CATH LAB     CV INTRAVASULAR ULTRASOUND N/A 3/3/2022    Procedure: Intravascular Ultrasound;  Surgeon: Eulalio Del Rosario MD;  Location: [facility] CARDIAC CATH LAB     CV PCI ATHERECTOMY ORBITAL N/A 3/3/2022    Procedure: Percutaneous Coronary Intervention Atherectomy Rotational;  Surgeon: Eulalio Del Rosario MD;  Location: [facility] CARDIAC CATH LAB     CV TEMPORARY PACEMAKER INSERTION N/A 3/3/2022    Procedure: Temporary Pacemaker Insertion;  Surgeon: Eulalio Del Rosario MD;  Location: [facility] CARDIAC CATH LAB     ENT SURGERY      Ringing in the ears     EP ABLATION FOCAL AFIB N/A 5/12/2022    Procedure: Ablation Focal Atrial Fibrillation [[id number]];  Surgeon: Teodoro Jones MD;  Location:  HEART CARDIAC CATH LAB     HC INJECTION SCLEROSING SOLUTION HEMORRHOID  8/24/2012    Procedure: HEMORRHOID INJECTION SCLEROSING SOLUTION;  Surgeon: Mayito Chow MD;  Location:  GI     HC TOOTH EXTRACTION W/FORCEP       HERNIA REPAIR  Needed     LASIK  1/2011     ROTATOR CUFF REPAIR RT/LT Left 10/15/2015    RCR Left - Dr. Carvlaho     SOFT TISSUE SURGERY  10/2015    Torn rotator cuff       Prior to Admission Medications   Prior to Admission Medications   Prescriptions Last Dose Informant Patient Reported? Taking?   Calcium-Vitamin D-Vitamin K (CALCIUM + D + K) 750-500-40 MG-UNT-MCG TABS   Yes No   Sig:  Take 2 tablets by mouth daily.   MULTIVITAMIN TABS   OR   No No   Si daily   Probiotic Product (PROBIOTIC PO)   Yes No   Sig: Take by mouth daily   UNABLE TO FIND   Yes No   Sig: daily MEDICATION NAME: Move Free   acetaminophen (TYLENOL) 325 MG tablet   No No   Sig: Take 2 tablets (650 mg) by mouth every 4 hours as needed for mild pain or headaches   amiodarone (PACERONE) 200 MG tablet   No No   Sig: Take 1 pill twice a day for 7 days, then decrease to 1 pill once a day.   apixaban ANTICOAGULANT (ELIQUIS ANTICOAGULANT) 5 MG tablet   No No   Sig: Take 1 tablet (5 mg) by mouth 2 times daily   atorvastatin (LIPITOR) 40 MG tablet   No No   Sig: Take 1 tablet (40 mg) by mouth daily   clopidogrel (PLAVIX) 75 MG tablet   No No   Sig: Take 1 tablet (75 mg) by mouth daily   esomeprazole (NEXIUM) 40 MG DR capsule   Yes No   Sig: Take 40 mg by mouth every morning (before breakfast) Take 30-60 minutes before eating.   ferrous sulfate (FEROSUL) 325 (65 Fe) MG tablet   Yes No   Sig: Take 325 mg by mouth daily (with breakfast)   fluticasone (FLONASE) 50 MCG/ACT nasal spray   No No   Sig: Spray 2 sprays into both nostrils daily   furosemide (LASIX) 20 MG tablet   No No   Sig: Take 1 tablet (20 mg) by mouth daily For fluid overload   lisinopril (ZESTRIL) 2.5 MG tablet   No No   Sig: Take 1 tablet (2.5 mg) by mouth At Bedtime   mesalamine (LIALDA) 1.2 g EC tablet   Yes No   Sig: Take 4,800 mg by mouth daily (with breakfast)    metoprolol succinate ER (TOPROL-XL) 50 MG 24 hr tablet   No No   Sig: Take 0.5 tablets (25 mg) by mouth daily Take 1/2 tab (25mg) twice a day. Hold for HR <55-60   nitroGLYcerin (NITROSTAT) 0.4 MG sublingual tablet   No No   Sig: For chest pain place 1 tablet under the tongue every 5 minutes for 3 doses. If symptoms persist 5 minutes after 1st dose call 911.   psyllium (METAMUCIL) 58.6 % POWD   Yes No   Sig: Take 2 teaspoonful by mouth 2 times daily    sildenafil (VIAGRA) 25 MG tablet   No No   Sig: Take 3  tablets (75 mg) by mouth daily as needed   vitamin C (ASCORBIC ACID) 1000 MG TABS   Yes No   Sig: Take 1,000 mg by mouth daily   zolpidem (AMBIEN) 5 MG tablet   No No   Sig: Take tablet by mouth 15 minutes prior to sleep, for Sleep Study      Facility-Administered Medications: None     Allergies   Allergies   Allergen Reactions     No Known Drug Allergies        Social History   I have reviewed this patient's social history and updated it with pertinent information if needed. Peter Peralta  reports that he quit smoking about 26 years ago. His smoking use included cigarettes. He has a 36.00 pack-year smoking history. He has never used smokeless tobacco. He reports current alcohol use. He reports that he does not use drugs. No alcohol use since his ablation.    Family History   I have reviewed this patient's family history and updated it with pertinent information if needed.   Family History   Problem Relation Age of Onset     Colon Cancer Mother 65        passed away from colon cancer - lived about 2 mo from  1981     C.A.D. Father         passed at 52 from MI     Cardiovascular Father      Heart Disease Father      Obesity Father      Myocardial Infarction Father         52 - passed away     Coronary Artery Disease Father      Heart Disease Brother      Diabetes Brother      Coronary Artery Disease Brother      Hypertension Brother      Hyperlipidemia Brother      Coronary Artery Disease Brother         stent placed      Hyperlipidemia Brother      Sleep Apnea Brother      Arthritis Maternal Grandmother      Diabetes Maternal Grandmother      Arthritis Maternal Grandfather      Diabetes Maternal Grandfather      Substance Abuse Paternal Grandfather      Hypertension No family hx of      Cerebrovascular Disease No family hx of      Breast Cancer No family hx of      Prostate Cancer No family hx of      Alcohol/Drug No family hx of      Allergies No family hx of      Alzheimer Disease No family hx of       Circulatory No family hx of      Congenital Anomalies No family hx of      Connective Tissue Disorder No family hx of      Depression No family hx of      Eye Disorder No family hx of      Genetic Disorder No family hx of      Gastrointestinal Disease No family hx of      Genitourinary Problems No family hx of      Gynecology No family hx of      Musculoskeletal Disorder No family hx of      Neurologic Disorder No family hx of      Osteoporosis No family hx of      Psychotic Disorder No family hx of      Respiratory No family hx of      Thyroid Disease No family hx of      Anesthesia Reaction No family hx of      Blood Disease No family hx of        Review of Systems   The 10 point Review of Systems is negative other than noted in the HPI or here.    Physical Exam   Temp: 98.3  F (36.8  C) Temp src: Temporal BP: 124/76 Pulse: 89   Resp: 23 SpO2: 98 %      Vital Signs with Ranges  Temp:  [98.3  F (36.8  C)] 98.3  F (36.8  C)  Pulse:  [87-89] 89  Resp:  [23] 23  BP: (115-124)/(69-76) 124/76  SpO2:  [96 %-98 %] 98 %  0 lbs 0 oz    Constitutional: Male appears uncomfortable and in mild distress from pain  Eyes: PERRL, nonicteric  HEENT: Normocephalic, atraumatic, oral mucosa moist  Respiratory: Bibasilar crackles  Cardiovascular: RRR, normal S1/2, no clear murmurs, no clear rub  GI: No organomegaly, normoactive bowel sounds, nontender  Vascular: No lower extremity pitting edema  Skin: No rashes  Musculoskeletal: Moves all extremities  Neurologic: A&Ox3  Psychiatric: Appropriate affect and mood    Data   Data reviewed today:  I personally reviewed EKG, labwork, CXR.  Recent Labs   Lab 05/15/22  1920 05/12/22  0719   WBC 14.4* 6.7   HGB 13.8 14.8   MCV 91 89    494*   * 138   POTASSIUM 3.7 4.2   CHLORIDE 97 106   CO2 28 25   BUN 17 22   CR 1.03 1.05   ANIONGAP 7 7   CAMERON 8.5 8.8   * 118*       Imaging:  Recent Results (from the past 24 hour(s))   XR Chest 2 Views    Narrative    EXAM: XR CHEST 2  VW  LOCATION: [facility]  DATE/TIME: 5/15/2022 7:29 PM    INDICATION: Chest pain and short of breath after ablation yesterday, currently sinus rhythm.  COMPARISON: None.      Impression    IMPRESSION: Question some left lower lobe retrocardiac atelectasis and/or infiltrate. Right lung grossly clear.

## 2022-05-16 NOTE — PROGRESS NOTES
"[facility]    Medicine Progress Note - Hospitalist Service    Date of Admission:  5/15/2022    Assessment & Plan          Peter Peralta is a 71 year old male with CAD, CHF, HTN, HLD, ulcerative colitis, mixed cardiomyopathy, who presented on 5/15 with chest pain after undergoing ablation on 5/12 2022     Post ablation pericarditis, CRP 72.4  Revealed-small pericardial effusion without evidence of tamponade  Atrial fib/flutter s/p ablation 5/12/2022  Type 2 MI due to above  - underwent successful ablation for afib/flutter on 5/12/2022.   - On 5/14, he developed persistent \"indigestion\" and then developed chest pain on 5/15.  Pain severe 8/10, constant, pleuritic, positional with radiation into neck and shoulders.  - Pain improved with supple nitroglycerin and morphine in the ED.   - EKG showed multiple TWI in II, III, AVF, and anterior leads.   - Troponin is 265-->243.   - CBC - leukocytosis.   - Overall symptoms, labwork, EKG consistent with pericarditis, post ablation pericarditis  - CXR showed probable infiltrate versus atelectasis and retrocardiac area.  He does not have any clear symptoms to indicate pneumonia but has leukocytosis, pleuritic chest pain, low-grade fever of 100.8 but no cough or shortness of breath.  This is likely secondary to acute pericarditis but will cover with antibiotics for presumed pneumonia-start on IV ceftriaxone plus doxycycline  -  Appreciate Cardiology consult  - Was started on IV Toradol 15mg q6h on admission.  - Discussed with cardiology.  He recently had cardiac stent placed and thus he is to restart Plavix.  Also with recent ablation, he needs to be on anticoagulation with Eliquis  - Cardiology recommended starting colchicine, stop Toradol  - Resume Eliquis and Plavix  - Will need repeat echo in next couple of days to make sure pericardial effusion is stable  - Oxycodone 5mg q4h PRN + Dilaudid IV PRN for pain   - Telemetry  - Formal echo read " "pending  - Monitor ESR, CRP  -200 mg continue amiodarone       CAD s/p CHULA x2-RCA in 3/2022   Chronic systolic CHF with reduced EF of 20-25%, Echo 5/12/2022  Mixed cardiomyopathy  Elevated NT pro BNP  Hyperlipidemia  - Developed CHF early 2022  - OSCAR 5/12 showed EF 20-25% with global hypokinesis. Suspected to have mixed cardiomyopathy - ischemic and/or tachycardia mediated.   -Underwent angiogram and found to have coronary artery disease.  S/p CHULA x2 to RCA in March 2022. Follows with Cardiology here.  - NT pro BNP elevated to 2157, no comparison.  On Lasix  - Continue lasix 20mg daily   - Continue atorvastatin, Plavix, metoprolol     Hyponatremia, sodium 131  -Monitor     Probable left lower lobe community-acquired pneumonia versus atelectasis  -Oxygen saturations were 89-90% on room air on admission but this was transient and since then his saturations have been in the mid 90s.    - CXR showed possibly \"LLL atelectasis and/or infiltrate.\"   -Patient for pneumonia is low but given leukocytosis, low-grade fever of 100.8, pleuritic chest pain, cover with IV antibiotics-ceftriaxone and doxycycline  -Procalcitonin negative    GERD  -Continue Protonix    Ulcerative colitis  -Stable on mesalamine.  No acute exacerbation  -Continue mesalamine       Diet: Combination Diet Low Saturated Fat Na <2400mg Diet, No Caffeine Diet    DVT Prophylaxis: DOAC  Power Catheter: Not present  Central Lines: None  Cardiac Monitoring: ACTIVE order. Indication: Chest pain/ ACS rule out (24 hours)  Code Status: Full Code      Disposition Plan   Expected Discharge:    Anticipated discharge location:  Awaiting care coordination huddle  Delays:            The patient's care was discussed with the Patient.    Francoise Anderson MD  Hospitalist Service  [facility]  Securely message with the Vocera Web Console (learn more here)  Text page via [employer] Paging/Directory         Clinically Significant Risk Factors Present on " "Admission         # Hyponatremia: Na = 131 mmol/L (Ref range: 133 - 144 mmol/L) on admission, will monitor as appropriate       # Coagulation Defect: home medication list includes an anticoagulant medication  # Platelet Defect: home medication list includes an antiplatelet medication   # Obesity: Estimated body mass index is 30.02 kg/m  as calculated from the following:    Height as of 5/12/22: 1.854 m (6' 1\").    Weight as of this encounter: 103.2 kg (227 lb 8 oz).      ______________________________________________________________________    Interval History   Patient reports he feels better than yesterday  Chest pain has improved  No nausea or vomiting   No shortness of breath  Low-grade temp of 100.8 overnight      Data reviewed today: I reviewed all medications, new labs and imaging results over the last 24 hours. I personally reviewed the EKG tracing showing Diffuse T wave inversions and the chest x-ray image(s) showing No clear infiltrate.    Physical Exam   Vital Signs: Temp: 99  F (37.2  C) Temp src: Oral BP: 105/70 Pulse: 86   Resp: 16 SpO2: 95 % O2 Device: None (Room air)    Weight: 227 lbs 8 oz    Constitutional-patient is awake and alert, resting in bed, in no acute distress  Cardiovascular-regular rate and rhythm, no murmurs, no edema  Pulmonary-lungs are clear to auscultation bilaterally, no wheezing or rhonchi  GI-abdomen is soft, nontender, nondistended, no hepatosplenomegaly or masses  Integumentary-skin is warm and dry, no rashes or ulcers  Neurological-patient is awake, alert and oriented x3.  Moving all 4 extremities, normal speech, no focal deficits      Data   Recent Labs   Lab 05/16/22  0559 05/16/22  0200 05/15/22  1920 05/12/22  0719   WBC 16.8*  --  14.4* 6.7   HGB 13.1*  --  13.8 14.8   MCV 87  --  91 89     --  447 494*   *  --  132* 138   POTASSIUM 4.1 4.1 3.7 4.2   CHLORIDE 98  --  97 106   CO2 24  --  28 25   BUN 24  --  17 22   CR 1.29*  --  1.03 1.05   ANIONGAP 9  --  7 " 7   CAMERON 8.3*  --  8.5 8.8   *  --  134* 118*     Recent Results (from the past 24 hour(s))   XR Chest 2 Views    Narrative    EXAM: XR CHEST 2 VW  LOCATION: [facility]  DATE/TIME: 5/15/2022 7:29 PM    INDICATION: Chest pain and short of breath after ablation yesterday, currently sinus rhythm.  COMPARISON: None.      Impression    IMPRESSION: Question some left lower lobe retrocardiac atelectasis and/or infiltrate. Right lung grossly clear.     Medications     - MEDICATION INSTRUCTIONS -         amiodarone  200 mg Oral Daily     apixaban ANTICOAGULANT  5 mg Oral BID     atorvastatin  40 mg Oral Daily     [START ON 5/17/2022] azithromycin  250 mg Oral Daily     cefTRIAXone  2 g Intravenous Q24H     clopidogrel  75 mg Oral Daily     colchicine  0.6 mg Oral Daily     fluticasone  2 spray Both Nostrils Daily     mesalamine  4,800 mg Oral Daily with breakfast     metoprolol succinate ER  25 mg Oral Daily     pantoprazole  40 mg Oral QAM AC     psyllium  1 packet Oral Daily     sodium chloride (PF)  3 mL Intracatheter Q8H

## 2022-05-16 NOTE — PHARMACY-ADMISSION MEDICATION HISTORY
Pharmacy Medication History  Admission medication history interview status for the 5/15/2022  admission is complete. See EPIC admission navigator for prior to admission medications     Location of Interview: Patient room  Medication history sources: Patient and Care Everywhere    Significant changes made to the medication list:  Removed Nitrostat, zolpidem, furosemide     In the past week, patient estimated taking medication this percent of the time: greater than 90%    Additional medication history information:   None    Medication reconciliation completed by provider prior to medication history? No    Time spent in this activity: 10 minutes    Prior to Admission medications    Medication Sig Last Dose Taking? Auth Provider   acetaminophen (TYLENOL) 325 MG tablet Take 2 tablets (650 mg) by mouth every 4 hours as needed for mild pain or headaches PRN Yes Jerman Jasso MD   amiodarone (PACERONE) 200 MG tablet Take 1 pill twice a day for 7 days, then decrease to 1 pill once a day.  Patient taking differently: Take 200 mg by mouth daily Take 1 pill twice a day for 7 days, then decrease to 1 pill once a day. 5/15/2022 at Unknown time Yes More, Yue Cobb PA-C   apixaban ANTICOAGULANT (ELIQUIS ANTICOAGULANT) 5 MG tablet Take 1 tablet (5 mg) by mouth 2 times daily 5/15/2022 at 6 PM Yes Yon Gilliam MD   atorvastatin (LIPITOR) 40 MG tablet Take 1 tablet (40 mg) by mouth daily 5/14/2022 at Unknown time Yes Donell Zuñiga MD   Calcium-Vitamin D-Vitamin K (CALCIUM + D + K) 750-500-40 MG-UNT-MCG TABS Take 2 tablets by mouth daily. 5/15/2022 at Unknown time Yes Reported, Patient   clopidogrel (PLAVIX) 75 MG tablet Take 1 tablet (75 mg) by mouth daily 5/15/2022 at Unknown time Yes Jerman Jasso MD   esomeprazole (NEXIUM) 20 MG DR capsule Take 20 mg by mouth every morning (before breakfast) Take 30-60 minutes before eating. 5/15/2022 at Unknown time Yes Reported, Patient   ferrous sulfate  (FEROSUL) 325 (65 Fe) MG tablet Take 325 mg by mouth daily (with breakfast) 5/15/2022 at Unknown time Yes Reported, Patient   fluticasone (FLONASE) 50 MCG/ACT nasal spray Spray 2 sprays into both nostrils daily 5/15/2022 at Unknown time Yes Jyothi Alvarez PA-C   lisinopril (ZESTRIL) 2.5 MG tablet Take 1 tablet (2.5 mg) by mouth At Bedtime 5/14/2022 at Unknown time Yes More, Yue Cobb PA-C   mesalamine (LIALDA) 1.2 g EC tablet Take 4,800 mg by mouth daily (with breakfast)  5/15/2022 at Unknown time Yes Reported, Patient   metoprolol succinate ER (TOPROL-XL) 50 MG 24 hr tablet Take 0.5 tablets (25 mg) by mouth daily Take 1/2 tab (25mg) twice a day. Hold for HR <55-60  Patient taking differently: Take 25 mg by mouth daily 5/15/2022 at Unknown time Yes Eulalio Del Rosario MD   Multiple Vitamin (MULTIVITAMIN PO) Take 1 tablet by mouth daily 5/15/2022 at Unknown time Yes Amos Roman MD   Probiotic Product (PROBIOTIC PO) Take 1 tablet by mouth daily 5/15/2022 at Unknown time Yes Reported, Patient   psyllium (METAMUCIL) 58.6 % POWD Take 1 teaspoonful by mouth daily (At noon) 5/15/2022 at Unknown time Yes Reported, Patient   sildenafil (VIAGRA) 25 MG tablet Take 3 tablets (75 mg) by mouth daily as needed PRN Yes Jerman Jasso MD   UNABLE TO FIND daily MEDICATION NAME: Move Free 5/15/2022 at Unknown time Yes Reported, Patient   vitamin C (ASCORBIC ACID) 250 MG tablet Take 1 tablet (250 mg) by mouth daily 5/15/2022 at Unknown time Yes Reported, Patient       The information provided in this note is only as accurate as the sources available at the time of update(s)

## 2022-05-16 NOTE — ED NOTES
[facility]  ED Arrival Note      Means of Arrival: EMS  Comes from: Home    Story: Comes from home, sudden onset of indigestion this afternoon. Pain of 8/10. EMS concerned for Abdonormal EKG but no Stemi in the field.         EMS/PD Interventions: EKG and PIV  EMS Medications: Nitroglycerin    Meets Stroke Criteria? No  Meets Trauma Criteria? No      Directed to: Stabilization room  Belongings: In room locker and Remain with patient             Triage Assessment     Row Name 05/15/22 1910       Triage Assessment (Adult)    Airway WDL WDL       Respiratory WDL    Respiratory WDL WDL       Skin Circulation/Temperature WDL    Skin Circulation/Temperature WDL WDL       Cardiac WDL    Cardiac WDL X   Reports mid sternal chest pain       Peripheral/Neurovascular WDL    Peripheral Neurovascular WDL WDL       Cognitive/Neuro/Behavioral WDL    Cognitive/Neuro/Behavioral WDL WDL

## 2022-05-17 LAB
ANION GAP SERPL CALCULATED.3IONS-SCNC: 6 MMOL/L (ref 3–14)
BUN SERPL-MCNC: 24 MG/DL (ref 7–30)
CALCIUM SERPL-MCNC: 8.1 MG/DL (ref 8.5–10.1)
CHLORIDE BLD-SCNC: 100 MMOL/L (ref 94–109)
CO2 SERPL-SCNC: 25 MMOL/L (ref 20–32)
CREAT SERPL-MCNC: 1.03 MG/DL (ref 0.66–1.25)
CRP SERPL-MCNC: 246 MG/L (ref 0–8)
ERYTHROCYTE [DISTWIDTH] IN BLOOD BY AUTOMATED COUNT: 14.1 % (ref 10–15)
GFR SERPL CREATININE-BSD FRML MDRD: 79 ML/MIN/1.73M2
GLUCOSE BLD-MCNC: 117 MG/DL (ref 70–99)
HCT VFR BLD AUTO: 39.3 % (ref 40–53)
HGB BLD-MCNC: 13.2 G/DL (ref 13.3–17.7)
MCH RBC QN AUTO: 29.1 PG (ref 26.5–33)
MCHC RBC AUTO-ENTMCNC: 33.6 G/DL (ref 31.5–36.5)
MCV RBC AUTO: 87 FL (ref 78–100)
PLATELET # BLD AUTO: 365 10E3/UL (ref 150–450)
POTASSIUM BLD-SCNC: 3.9 MMOL/L (ref 3.4–5.3)
RBC # BLD AUTO: 4.54 10E6/UL (ref 4.4–5.9)
SODIUM SERPL-SCNC: 131 MMOL/L (ref 133–144)
WBC # BLD AUTO: 12.3 10E3/UL (ref 4–11)

## 2022-05-17 PROCEDURE — 99232 SBSQ HOSP IP/OBS MODERATE 35: CPT | Performed by: INTERNAL MEDICINE

## 2022-05-17 PROCEDURE — 250N000013 HC RX MED GY IP 250 OP 250 PS 637: Performed by: INTERNAL MEDICINE

## 2022-05-17 PROCEDURE — 250N000011 HC RX IP 250 OP 636

## 2022-05-17 PROCEDURE — 210N000002 HC R&B HEART CARE

## 2022-05-17 PROCEDURE — 85027 COMPLETE CBC AUTOMATED: CPT | Performed by: INTERNAL MEDICINE

## 2022-05-17 PROCEDURE — 36415 COLL VENOUS BLD VENIPUNCTURE: CPT | Performed by: INTERNAL MEDICINE

## 2022-05-17 PROCEDURE — 80048 BASIC METABOLIC PNL TOTAL CA: CPT | Performed by: INTERNAL MEDICINE

## 2022-05-17 PROCEDURE — 86140 C-REACTIVE PROTEIN: CPT | Performed by: INTERNAL MEDICINE

## 2022-05-17 PROCEDURE — 250N000011 HC RX IP 250 OP 636: Performed by: INTERNAL MEDICINE

## 2022-05-17 RX ORDER — LISINOPRIL 2.5 MG/1
2.5 TABLET ORAL AT BEDTIME
Status: DISCONTINUED | OUTPATIENT
Start: 2022-05-17 | End: 2022-05-19 | Stop reason: HOSPADM

## 2022-05-17 RX ORDER — METOPROLOL SUCCINATE 25 MG/1
25 TABLET, EXTENDED RELEASE ORAL ONCE
Status: COMPLETED | OUTPATIENT
Start: 2022-05-17 | End: 2022-05-17

## 2022-05-17 RX ORDER — METOPROLOL SUCCINATE 50 MG/1
50 TABLET, EXTENDED RELEASE ORAL DAILY
Status: DISCONTINUED | OUTPATIENT
Start: 2022-05-18 | End: 2022-05-18

## 2022-05-17 RX ADMIN — CEFTRIAXONE SODIUM 2 G: 2 INJECTION, POWDER, FOR SOLUTION INTRAMUSCULAR; INTRAVENOUS at 09:43

## 2022-05-17 RX ADMIN — APIXABAN 5 MG: 5 TABLET, FILM COATED ORAL at 08:24

## 2022-05-17 RX ADMIN — DOXYCYCLINE HYCLATE 100 MG: 100 CAPSULE ORAL at 08:24

## 2022-05-17 RX ADMIN — ATORVASTATIN CALCIUM 40 MG: 40 TABLET, FILM COATED ORAL at 20:16

## 2022-05-17 RX ADMIN — METOPROLOL SUCCINATE 25 MG: 25 TABLET, EXTENDED RELEASE ORAL at 08:24

## 2022-05-17 RX ADMIN — CLOPIDOGREL BISULFATE 75 MG: 75 TABLET ORAL at 08:24

## 2022-05-17 RX ADMIN — PANTOPRAZOLE SODIUM 40 MG: 40 TABLET, DELAYED RELEASE ORAL at 08:24

## 2022-05-17 RX ADMIN — PSYLLIUM HUSK 1 PACKET: 3.4 POWDER ORAL at 12:59

## 2022-05-17 RX ADMIN — POLYETHYLENE GLYCOL 3350 17 G: 17 POWDER, FOR SOLUTION ORAL at 08:48

## 2022-05-17 RX ADMIN — LISINOPRIL 2.5 MG: 2.5 TABLET ORAL at 20:14

## 2022-05-17 RX ADMIN — APIXABAN 5 MG: 5 TABLET, FILM COATED ORAL at 20:16

## 2022-05-17 RX ADMIN — METOPROLOL SUCCINATE 25 MG: 25 TABLET, EXTENDED RELEASE ORAL at 11:08

## 2022-05-17 RX ADMIN — COLCHICINE 0.6 MG: 0.6 TABLET ORAL at 08:24

## 2022-05-17 RX ADMIN — MESALAMINE 4800 MG: 1.2 TABLET, DELAYED RELEASE ORAL at 08:23

## 2022-05-17 RX ADMIN — FLUTICASONE PROPIONATE 2 SPRAY: 50 SPRAY, METERED NASAL at 08:29

## 2022-05-17 ASSESSMENT — ACTIVITIES OF DAILY LIVING (ADL)
ADLS_ACUITY_SCORE: 20

## 2022-05-17 NOTE — PROGRESS NOTES
[facility]  Cardiology Progress Note    Date of Service (when I saw the patient): 05/17/2022  Primary Cardiologist: Dr. Del Rosario     Assessment & Plan   Peter Peralta is a 71 year old male who was admitted on 5/15/2022.     1. Pleuritic Chest Pain - likely post ablation pericarditis  - Echocardiogram 5/16 showed severe decreased LV 25-30%, severe global hypokinesis of the LV. Moderately dilated RV w/ moderately decreased RV systolic function. Trivial pericardial effusion. There is a septal bounce present suggesting  interventricular interdependence and effusive constrictive physiology.  - CRP elevated 246  - Symptom improvement with daily colchicine     2. Atrial Fibrillation/Flutter - s/p ablation 5/12  - Went into atrial fibrillation overnight, currently in A-fib with suboptimal rate control.   - Toprol increased to 50 mg daily.  - Amiodarone drip   - Eliquis for stroke prevention     3. CAD - s/p PCI to RCA 3/2022  - PTA Plavix, Toprol, atorvastatin    4. Chronic HFrEF  - EF 25-30%  - NT pro BNP 2157  - Fine crackles to bilateral bases  - Wt 229 ( baseline 240)    5. Hypertension  6. Hyperlipidemia  7. Ulcerative colitis.   8. Aortic Stenosis - Moderate. Low stroke volume index 2.3 m/sec, mean gradient 12 mmHg, CLEMENTINA 1.2 cm2.     Plan  1. Chest wall pain nearly resolved after initiation of colchicine - Continue  2. Recurrent A-fib with uncontrolled rates. Increase Toprol to 50 mg daily, give extra dose 25 mg now. Plan to up titrate Toprol as needed for HR < 100 bpm  3. Continue Amiodarone drip for now  4. Continue PTA Eliquis, Plavix, and atorvastatin.         NIURKA Huertas CNP  Text Page  (M-F, 7:30 am - 4:00 pm)    Interval History   Feeling much better today. Chest wall pain nearly resolved. Sed rate elevated. Recurrent A-fib, now on Amiodarone drip. BB increased for better HR control. Hypertensive, reassess after uptitration of BB.     Physical Exam   Temp: 97.9  F (36.6  C)  Temp src: Oral BP: 123/74 Pulse: (!) 123   Resp: 16 SpO2: 93 % O2 Device: None (Room air)    Vitals:    05/16/22 0544 05/17/22 0450   Weight: 103.2 kg (227 lb 8 oz) 103.9 kg (229 lb)     Vital Signs with Ranges  Temp:  [97.9  F (36.6  C)-99  F (37.2  C)] 97.9  F (36.6  C)  Pulse:  [] 123  Resp:  [16] 16  BP: (100-130)/(67-97) 123/74  SpO2:  [93 %-97 %] 93 %  I/O last 3 completed shifts:  In: 240 [P.O.:240]  Out: -     GEN:  In general, this is a overweight male in no acute distress.  Patient ambulatory.  HEENT:  Pupils equal, round. Sclerae nonicteric. Clear oropharynx. Mucous membranes moist.  NECK: Supple, no masses appreciated. Trachea midline. No JVD   C/V:  Irregular rate and rhythm, no murmur, rub or gallop. No S3 or RV heave.   RESP: Respirations are unlabored. Fine crackles to bilateral bases.   GI: Abdomen soft, nontender, nondistended. No HSM appreciated.   EXTREM: No LE edema. No cyanosis or clubbing.  NEURO: Alert and oriented, cooperative. . No obvious focal deficits.   PSYCH: Normal affect.  SKIN: Warm and dry. No rashes or petechiae appreciated.       Medications     amiodarone 0.5 mg/min (05/17/22 0826)     - MEDICATION INSTRUCTIONS -         apixaban ANTICOAGULANT  5 mg Oral BID     atorvastatin  40 mg Oral Daily     cefTRIAXone  2 g Intravenous Q24H     clopidogrel  75 mg Oral Daily     colchicine  0.6 mg Oral Daily     doxycycline hyclate  100 mg Oral Q12H CRICKET     fluticasone  2 spray Both Nostrils Daily     mesalamine  4,800 mg Oral Daily with breakfast     metoprolol succinate ER  25 mg Oral Once     [START ON 5/18/2022] metoprolol succinate ER  50 mg Oral Daily     pantoprazole  40 mg Oral QAM AC     psyllium  1 packet Oral Daily     sodium chloride (PF)  3 mL Intracatheter Q8H       Data   Reviewed       NIURKA Huertas CNP 5/17/2022

## 2022-05-17 NOTE — PROGRESS NOTES
"[facility]    Medicine Progress Note - Hospitalist Service    Date of Admission:  5/15/2022    Assessment & Plan          Peter Peralta is a 71 year old male with CAD, CHF, HTN, HLD, ulcerative colitis, mixed cardiomyopathy, who underwent ablation for A fib/flutter on 5/12 and presented on 5/15 with chest pain    #.  Post ablation acute pericarditis, CRP 72.4 --> 246  #.  Small pericardial effusion without evidence of tamponade  #.  Chronic paroxysmal atrial fib/flutter s/p ablation 5/12/2022  #.  Type 2 MI due to above  #.  In normal sinus rhythm on admission.  Developed A. fib with RVR on 5/16 evening  PTA -amiodarone 200 mg daily, apixaban 5 mg twice daily  - underwent successful ablation for afib/flutter on 5/12/2022.   - On 5/14, he developed persistent \"indigestion\" and then developed chest pain on 5/15.  Pain severe 8/10, constant, pleuritic, positional with radiation into neck and shoulders.  - Pain improved with supple nitroglycerin and morphine in the ED.   - EKG showed multiple  T wave inversion in II, III, AVF, and anterior leads.   - Troponin is 265-->243.   - CBC - leukocytosis.   - Overall symptoms, labwork, EKG consistent with post ablation pericarditis  - CXR showed probable infiltrate versus atelectasis and retrocardiac area.  He does not have any clear symptoms to indicate pneumonia but has leukocytosis, pleuritic chest pain, low-grade fever of 100.8 but no cough or shortness of breath.  This is likely secondary to acute pericarditis but covered with IV ceftriaxone plus doxycycline  -  Appreciate Cardiology consult  - Now on colchicine with improvement in symptoms.  Chest pain has resolved.  CRP elevated at 246  - Was in sinus rhythm on admission.  On evening of 5/16 went into A. fib with RVR and was started on amiodarone infusion  - Continue amiodarone infusion.  Increase metoprolol to 50 mg   - Continue Eliquis  - Will need repeat echo in next couple of days to make " "sure pericardial effusion is stable  - Oxycodone 5mg q4h PRN + Dilaudid IV PRN for pain   - Telemetry  - Formal echo read pending  - Monitor CRP         #.  CAD s/p CHULA x2-RCA in 3/2022   #.  Chronic biventricular systolic CHF with reduced EF of 25-30% and moderately reduced RV function, Echo 5/16/2022  #. 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 Elevated NT pro BNP  #. Hyperlipidemia  #. Valvular heart disease - Moderate AS, mild MS, Mild-moderate MR.  - Developed CHF early 2022  - OSCAR 5/12 showed EF 20-25% with global hypokinesis. Suspected to have mixed cardiomyopathy - ischemic and/or tachycardia mediated.   - Underwent angiogram and found to have coronary artery disease.  S/p CHULA x2 to RCA in March 2022. Follows with Cardiology here.  - NT pro BNP elevated to 2157, no comparison.  On Lasix  - Echo 5/16 showed - Severely decreased LV systolic function, EF 25-30%, severe global hypokinesia of the LV, Moderate AS. Moderately dilated RV, Moderately decreased RV systolic function, mild MS, Mild-moderate MR. Trivial pericardial effusion. There is a septal bounce present suggesting interventricular interdependence and effusive constrictive physiology.   - Continue lasix 20mg daily   - Continue atorvastatin, Plavix, metoprolol     #. Hyponatremia, sodium 131  -stable, Monitor     #. Probable left lower lobe community-acquired pneumonia versus atelectasis  - Oxygen saturations were 89-90% on room air on admission but this was transient and since then his saturations have been in the mid 90s.    - CXR showed possibly \"LLL atelectasis and/or infiltrate.\"   - suspicion for pneumonia is low but given leukocytosis, low-grade fever of 100.8, pleuritic chest pain, started on IV antibiotics-ceftriaxone and doxycycline  - Procalcitonin negative  - discontinue antibiotics as no clear evidence of pneumonia     #. GERD  -Continue Protonix    #. Ulcerative colitis  -Stable on mesalamine.  No acute " "exacerbation  -Continue mesalamine         Diet: Combination Diet Low Saturated Fat Na <2400mg Diet, No Caffeine Diet    DVT Prophylaxis: DOAC  Power Catheter: Not present  Central Lines: None  Cardiac Monitoring: ACTIVE order. Indication: Chest pain/ ACS rule out (24 hours)  Code Status: Full Code      Disposition Plan   Expected Discharge: 05/19/2022   Anticipated discharge location:  Awaiting care coordination huddle  Delays:            The patient's care was discussed with the Patient.    Francoise Anderson MD  Hospitalist Service  [facility]  Securely message with the Vocera Web Console (learn more here)  Text page via P[employer] Paging/Directory         Clinically Significant Risk Factors Present on Admission              # Obesity: Estimated body mass index is 30.21 kg/m  as calculated from the following:    Height as of 5/12/22: 1.854 m (6' 1\").    Weight as of this encounter: 103.9 kg (229 lb).      ______________________________________________________________________    Interval History   Patient reports he feels well.  Chest pain has resolved.  No shortness of breath   No nausea or vomiting   No shortness of breath  Afebrile  Went into A. fib with RVR last night.  Started on IV amiodarone infusion        Data reviewed today: I reviewed all medications, new labs and imaging results over the last 24 hours. I personally reviewed the EKG tracing showing Diffuse T wave inversions and the chest x-ray image(s) showing No clear infiltrate.    Physical Exam   Vital Signs: Temp: 97.9  F (36.6  C) Temp src: Oral BP: 98/70 Pulse: 83   Resp: 16 SpO2: 93 % O2 Device: None (Room air)    Weight: 229 lbs 0 oz    Constitutional-patient is awake and alert, resting in bed, in no acute distress  Cardiovascular-irregular rhythm, mild tachycardia, no murmurs, no edema  Pulmonary-lungs are clear to auscultation bilaterally, no wheezing or rhonchi  GI-abdomen is soft, nontender, nondistended, no " hepatosplenomegaly or masses  Integumentary-skin is warm and dry, no rashes or ulcers  Neurological-patient is awake, alert and oriented x3.  Moving all 4 extremities, normal speech, no focal deficits      Data   Recent Labs   Lab 05/17/22  0601 05/16/22  0559 05/16/22  0200 05/15/22  1920   WBC 12.3* 16.8*  --  14.4*   HGB 13.2* 13.1*  --  13.8   MCV 87 87  --  91    391  --  447   * 131*  --  132*   POTASSIUM 3.9 4.1 4.1 3.7   CHLORIDE 100 98  --  97   CO2 25 24  --  28   BUN 24 24  --  17   CR 1.03 1.29*  --  1.03   ANIONGAP 6 9  --  7   CAMERON 8.1* 8.3*  --  8.5   * 143*  --  134*     No results found for this or any previous visit (from the past 24 hour(s)).  Medications     amiodarone 0.5 mg/min (05/17/22 0826)     - MEDICATION INSTRUCTIONS -         apixaban ANTICOAGULANT  5 mg Oral BID     atorvastatin  40 mg Oral Daily     cefTRIAXone  2 g Intravenous Q24H     clopidogrel  75 mg Oral Daily     colchicine  0.6 mg Oral Daily     doxycycline hyclate  100 mg Oral Q12H CRICKET     fluticasone  2 spray Both Nostrils Daily     mesalamine  4,800 mg Oral Daily with breakfast     [START ON 5/18/2022] metoprolol succinate ER  50 mg Oral Daily     pantoprazole  40 mg Oral QAM AC     psyllium  1 packet Oral Daily     sodium chloride (PF)  3 mL Intracatheter Q8H

## 2022-05-17 NOTE — PROGRESS NOTES
Sw:  D: two voicemails received from patient's wife that due to patient's enhanced medical situation- she would like patient to be transferred to [facility] in [place]. Writer called and spoke with patient's wife via the phone and stated she is unable to complete this request and directed patient and spouse to discuss this with the doctor. Spouse stated that the MD was already in the room. Writer explained she would page MD to notify her regarding this request. Writer vianca CONNELL.     PAOLA Hercules, [facility]   Social Work   [facility]

## 2022-05-17 NOTE — PLAN OF CARE
Goal Outcome Evaluation:    Plan of Care Reviewed With: patient, spouse     Overall Patient Progress: no change       Neuro:intact  CV/Rhythm:Afib at 1915 on call cardiologist paged, started IV amio, bolus/gtt, stopped PO amio, ok to continue PO metoprolol per Dr Kade Modi  Resp/02:RA  GI/Diet:LSF diet  :voiding, SBA to BR  Skin/Incisions/Sites:intact  Pulses/CMS:intact  Edema:none  Activity/Falls Risk:fall risk due to meds, SBA  Lines/Drains/IVs:PIV  Labs/BGM:lactic fired, 1.0  Test/Procedures:repeat echo on 5/18  VS/Pain:-130s on arrival from CCU, 3/10 CP L sided - oxycodone given with relief  DC Plan:post improvement of effusion  Other:wife called, return call and message left - Ok'd per pt to return phone call

## 2022-05-17 NOTE — PROGRESS NOTES
Patient is A/O x 4, vss, a-febrile, denies chest pain or indigestion, up to the bathroom with SBA/independently, tele a-fib with CVR, patient on Amiodarone gtt @ 0.5 mg, possibly discharge to home tomorrow

## 2022-05-17 NOTE — PLAN OF CARE
[id number]: A&Ox4. VSS on RA. Tele afib RVR. Amiodarone drip infusing. Denies CP, shortness of breath or dizziness. LS clear. Up SBA/indep, calls appropriately.

## 2022-05-17 NOTE — PLAN OF CARE
Goal Outcome Evaluation:    VSS.  Monitor remains Atrial fib with CVR. Pt. Denies pain. Amiodarone at 0.5 mg/min. Continue to monitor. Plan for possible discharge tomorrow.

## 2022-05-18 ENCOUNTER — APPOINTMENT (OUTPATIENT)
Dept: CARDIOLOGY | Facility: CLINIC | Age: 72
DRG: 273 | End: 2022-05-18
Attending: NURSE PRACTITIONER
Payer: MEDICARE

## 2022-05-18 ENCOUNTER — APPOINTMENT (OUTPATIENT)
Dept: PHYSICAL THERAPY | Facility: CLINIC | Age: 72
DRG: 273 | End: 2022-05-18
Attending: INTERNAL MEDICINE
Payer: MEDICARE

## 2022-05-18 LAB
ANION GAP SERPL CALCULATED.3IONS-SCNC: 5 MMOL/L (ref 3–14)
ATRIAL RATE - MUSE: 84 BPM
BUN SERPL-MCNC: 24 MG/DL (ref 7–30)
CALCIUM SERPL-MCNC: 8.9 MG/DL (ref 8.5–10.1)
CHLORIDE BLD-SCNC: 106 MMOL/L (ref 94–109)
CO2 SERPL-SCNC: 24 MMOL/L (ref 20–32)
CREAT SERPL-MCNC: 1.04 MG/DL (ref 0.66–1.25)
DIASTOLIC BLOOD PRESSURE - MUSE: NORMAL MMHG
ERYTHROCYTE [DISTWIDTH] IN BLOOD BY AUTOMATED COUNT: 14.1 % (ref 10–15)
GFR SERPL CREATININE-BSD FRML MDRD: 78 ML/MIN/1.73M2
GLUCOSE BLD-MCNC: 120 MG/DL (ref 70–99)
HCT VFR BLD AUTO: 40 % (ref 40–53)
HGB BLD-MCNC: 13.1 G/DL (ref 13.3–17.7)
INTERPRETATION ECG - MUSE: NORMAL
LVEF ECHO: NORMAL
MCH RBC QN AUTO: 28.9 PG (ref 26.5–33)
MCHC RBC AUTO-ENTMCNC: 32.8 G/DL (ref 31.5–36.5)
MCV RBC AUTO: 88 FL (ref 78–100)
P AXIS - MUSE: 61 DEGREES
PLATELET # BLD AUTO: 422 10E3/UL (ref 150–450)
POTASSIUM BLD-SCNC: 4.1 MMOL/L (ref 3.4–5.3)
PR INTERVAL - MUSE: 160 MS
QRS DURATION - MUSE: 144 MS
QT - MUSE: 430 MS
QTC - MUSE: 508 MS
R AXIS - MUSE: 63 DEGREES
RBC # BLD AUTO: 4.53 10E6/UL (ref 4.4–5.9)
SODIUM SERPL-SCNC: 135 MMOL/L (ref 133–144)
SYSTOLIC BLOOD PRESSURE - MUSE: NORMAL MMHG
T AXIS - MUSE: 12 DEGREES
VENTRICULAR RATE- MUSE: 84 BPM
WBC # BLD AUTO: 10.6 10E3/UL (ref 4–11)

## 2022-05-18 PROCEDURE — 97530 THERAPEUTIC ACTIVITIES: CPT | Mod: GP

## 2022-05-18 PROCEDURE — 85014 HEMATOCRIT: CPT | Performed by: INTERNAL MEDICINE

## 2022-05-18 PROCEDURE — 80048 BASIC METABOLIC PNL TOTAL CA: CPT | Performed by: INTERNAL MEDICINE

## 2022-05-18 PROCEDURE — 36415 COLL VENOUS BLD VENIPUNCTURE: CPT | Performed by: INTERNAL MEDICINE

## 2022-05-18 PROCEDURE — 99232 SBSQ HOSP IP/OBS MODERATE 35: CPT | Performed by: INTERNAL MEDICINE

## 2022-05-18 PROCEDURE — 99232 SBSQ HOSP IP/OBS MODERATE 35: CPT | Mod: 25 | Performed by: INTERNAL MEDICINE

## 2022-05-18 PROCEDURE — 250N000013 HC RX MED GY IP 250 OP 250 PS 637: Performed by: INTERNAL MEDICINE

## 2022-05-18 PROCEDURE — 97161 PT EVAL LOW COMPLEX 20 MIN: CPT | Mod: GP

## 2022-05-18 PROCEDURE — 93308 TTE F-UP OR LMTD: CPT | Mod: 26 | Performed by: INTERNAL MEDICINE

## 2022-05-18 PROCEDURE — 93005 ELECTROCARDIOGRAM TRACING: CPT

## 2022-05-18 PROCEDURE — 93321 DOPPLER ECHO F-UP/LMTD STD: CPT | Mod: 26 | Performed by: INTERNAL MEDICINE

## 2022-05-18 PROCEDURE — 93325 DOPPLER ECHO COLOR FLOW MAPG: CPT | Mod: 26 | Performed by: INTERNAL MEDICINE

## 2022-05-18 PROCEDURE — 97110 THERAPEUTIC EXERCISES: CPT | Mod: GP

## 2022-05-18 PROCEDURE — 93308 TTE F-UP OR LMTD: CPT

## 2022-05-18 PROCEDURE — 250N000013 HC RX MED GY IP 250 OP 250 PS 637: Performed by: NURSE PRACTITIONER

## 2022-05-18 PROCEDURE — 93010 ELECTROCARDIOGRAM REPORT: CPT | Performed by: INTERNAL MEDICINE

## 2022-05-18 PROCEDURE — 93325 DOPPLER ECHO COLOR FLOW MAPG: CPT

## 2022-05-18 PROCEDURE — 210N000002 HC R&B HEART CARE

## 2022-05-18 RX ORDER — AMIODARONE HYDROCHLORIDE 200 MG/1
200 TABLET ORAL DAILY
Status: DISCONTINUED | OUTPATIENT
Start: 2022-05-25 | End: 2022-05-18

## 2022-05-18 RX ORDER — AMIODARONE HYDROCHLORIDE 200 MG/1
200 TABLET ORAL DAILY
Status: DISCONTINUED | OUTPATIENT
Start: 2022-05-21 | End: 2022-05-19 | Stop reason: HOSPADM

## 2022-05-18 RX ORDER — AMIODARONE HYDROCHLORIDE 200 MG/1
200 TABLET ORAL 2 TIMES DAILY
Status: DISCONTINUED | OUTPATIENT
Start: 2022-05-18 | End: 2022-05-19 | Stop reason: HOSPADM

## 2022-05-18 RX ORDER — FUROSEMIDE 20 MG
20 TABLET ORAL DAILY
Status: DISCONTINUED | OUTPATIENT
Start: 2022-05-18 | End: 2022-05-19 | Stop reason: HOSPADM

## 2022-05-18 RX ORDER — METOPROLOL SUCCINATE 100 MG/1
100 TABLET, EXTENDED RELEASE ORAL DAILY
Status: DISCONTINUED | OUTPATIENT
Start: 2022-05-18 | End: 2022-05-19 | Stop reason: HOSPADM

## 2022-05-18 RX ADMIN — APIXABAN 5 MG: 5 TABLET, FILM COATED ORAL at 21:06

## 2022-05-18 RX ADMIN — ATORVASTATIN CALCIUM 40 MG: 40 TABLET, FILM COATED ORAL at 21:06

## 2022-05-18 RX ADMIN — METOPROLOL SUCCINATE 100 MG: 100 TABLET, EXTENDED RELEASE ORAL at 11:36

## 2022-05-18 RX ADMIN — MESALAMINE 4800 MG: 1.2 TABLET, DELAYED RELEASE ORAL at 11:36

## 2022-05-18 RX ADMIN — CLOPIDOGREL BISULFATE 75 MG: 75 TABLET ORAL at 11:36

## 2022-05-18 RX ADMIN — ACETAMINOPHEN 650 MG: 325 TABLET ORAL at 11:44

## 2022-05-18 RX ADMIN — FUROSEMIDE 20 MG: 20 TABLET ORAL at 11:36

## 2022-05-18 RX ADMIN — PSYLLIUM HUSK 1 PACKET: 3.4 POWDER ORAL at 11:35

## 2022-05-18 RX ADMIN — PANTOPRAZOLE SODIUM 40 MG: 40 TABLET, DELAYED RELEASE ORAL at 11:36

## 2022-05-18 RX ADMIN — LISINOPRIL 2.5 MG: 2.5 TABLET ORAL at 21:06

## 2022-05-18 RX ADMIN — FLUTICASONE PROPIONATE 2 SPRAY: 50 SPRAY, METERED NASAL at 11:41

## 2022-05-18 RX ADMIN — AMIODARONE HYDROCHLORIDE 200 MG: 200 TABLET ORAL at 21:06

## 2022-05-18 RX ADMIN — COLCHICINE 0.6 MG: 0.6 TABLET ORAL at 11:36

## 2022-05-18 RX ADMIN — ACETAMINOPHEN 650 MG: 325 TABLET ORAL at 21:06

## 2022-05-18 RX ADMIN — AMIODARONE HYDROCHLORIDE 200 MG: 200 TABLET ORAL at 11:36

## 2022-05-18 RX ADMIN — APIXABAN 5 MG: 5 TABLET, FILM COATED ORAL at 11:36

## 2022-05-18 ASSESSMENT — ACTIVITIES OF DAILY LIVING (ADL)
ADLS_ACUITY_SCORE: 20

## 2022-05-18 NOTE — PROGRESS NOTES
05/18/22 1200   Quick Adds   Type of Visit Initial PT Evaluation   Living Environment   People in Home spouse   Current Living Arrangements house   Home Accessibility stairs within home   Number of Stairs, Within Home, Primary greater than 10 stairs   Stair Railings, Within Home, Primary railings safe and in good condition;railing on right side (ascending)   Transportation Anticipated car, drives self   Self-Care   Usual Activity Tolerance good   Current Activity Tolerance moderate   Regular Exercise No   Equipment Currently Used at Home none   Fall history within last six months no   General Information   Onset of Illness/Injury or Date of Surgery 05/15/22   Referring Physician Dr. Anderson   Patient/Family Therapy Goals Statement (PT) To go home   Pertinent History of Current Problem (include personal factors and/or comorbidities that impact the POC) Pt is a 71 year old male admitted with pericarditis   Cognition   Affect/Mental Status (Cognition) WFL   Orientation Status (Cognition) oriented x 4   Pain Assessment   Patient Currently in Pain No   Range of Motion (ROM)   Range of Motion ROM is WFL   Strength (Manual Muscle Testing)   Strength (Manual Muscle Testing) strength is WNL   Bed Mobility   Bed Mobility no deficits identified   Transfers   Transfers no deficits identified   Gait/Stairs (Locomotion)   [place] Level (Gait) independent   Balance   Balance Comments Good   Clinical Impression   Criteria for Skilled Therapeutic Intervention Yes, treatment indicated   PT Diagnosis (PT) Impaired endurance   Influenced by the following impairments Decreased endurance   Functional limitations due to impairments Difficulty with long distance ambulation   Clinical Presentation (PT Evaluation Complexity) Stable/Uncomplicated   Clinical Presentation Rationale VSS, pain controlled   Clinical Decision Making (Complexity) low complexity   Planned Therapy Interventions (PT) patient/family education   Risk & Benefits of  therapy have been explained evaluation/treatment results reviewed;care plan/treatment goals reviewed;risks/benefits reviewed;current/potential barriers reviewed;participants voiced agreement with care plan;participants included;patient   PT Discharge Planning   PT Discharge Recommendation (DC Rec) home   PT Rationale for DC Rec Pt independent with mobility and safe to discharge home from a mobility stand point.   Plan of Care Review   Plan of Care Reviewed With patient   Total Evaluation Time   Total Evaluation Time (Minutes) 10   Physical Therapy Goals   PT Frequency Daily   PT Goals Cardiac Phase 1   PT: Understanding of cardiac education to maximize quality of life, condition management, and health outcomes Patient;Verbalize   PT: Perform aerobic activity with stable cardiovascular response continuous;15 minutes;treadmill   PT: Functional/aerobic ambulation tolerance with stable cardiovascular response in order to return to home and community environment Independent;Greater than 300 feet   PT: Navigation of stairs simulating home set up with stable cardiovascular response in order to return to home and community environment Independent;Greater than 10 stairs

## 2022-05-18 NOTE — PROGRESS NOTES
[facility]  Cardiology Progress Note    Date of Service (when I saw the patient): 05/18/2022  Primary Cardiologist: Dr. Del Rosario     Assessment & Plan   Peter Peralta is a 71 year old male who was admitted on 5/15/2022.     1. Pleuritic Chest Pain - likely post ablation pericarditis  - Echocardiogram 5/16 showed severe decreased LV 25-30%, severe global hypokinesis of the LV. Moderately dilated RV w/ moderately decreased RV systolic function. Trivial pericardial effusion. There is a septal bounce present suggesting  interventricular interdependence and effusive constrictive physiology.  - CRP elevated 246  - Symptom improvement with daily colchicine     2. Atrial Fibrillation/Flutter - s/p ablation 5/12  - Currently in A-fib with suboptimal rate control.   - Toprol increased to 100 mg daily.  -  Amiodarone drip   - Eliquis for stroke prevention     3. CAD - s/p PCI to RCA 3/2022  - PTA Plavix, Toprol, atorvastatin    4. Chronic HFrEF  - EF 25-30%  - NT pro BNP 2157  - Fine crackles to bilateral bases  - Wt 227 ( baseline 240)  - No output recorded.     5. Hypertension  - Elevated this AM  6. Hyperlipidemia  7. Ulcerative colitis.   8. Aortic Stenosis - Moderate. Low stroke volume index 2.3 m/sec, mean gradient 12 mmHg, CLEMENTINA 1.2 cm2.     Plan  1. Chest wall pain nearly resolved on colchicine, continue x 3 months.   2. Currently in A-fib with borderline elevated rates. Increase Toprol to 100 mg daily.    3. Transition to PO Amiodarone, reviewed with EP, take 200mg BID x 3 days, then 200 mg daily.   4. Continue PTA Eliquis, Plavix, and atorvastatin.   5. Creatinine stable, add low dose lasix 20 mg daily.   6. Limited echocardiogram today.   7. Follow up with CORE and EP in 1-2 weeks as scheduled.   8. Anticipate discharge today if echo stable.         Sandra Knight, NIURKA CNP  Text Page  (M-F, 7:30 am - 4:00 pm)    Interval History   Feeling better today. Trivial Chest wall pain with  movement.  A-fib with borderline elevated rates. BB increased. Transition Amiodarone drip to PO. Low dose diuretics added.  Limited echo today.     Physical Exam   Temp: 97.7  F (36.5  C) Temp src: Oral BP: (!) 118/99 Pulse: 113   Resp: 16 SpO2: 97 % O2 Device: None (Room air)    Vitals:    05/16/22 0544 05/17/22 0450 05/18/22 0612   Weight: 103.2 kg (227 lb 8 oz) 103.9 kg (229 lb) 103.3 kg (227 lb 11.2 oz)     Vital Signs with Ranges  Temp:  [97.7  F (36.5  C)-99  F (37.2  C)] 97.7  F (36.5  C)  Pulse:  [] 113  Resp:  [16] 16  BP: ()/() 118/99  SpO2:  [95 %-97 %] 97 %  I/O last 3 completed shifts:  In: 1277.84 [P.O.:960; I.V.:317.84]  Out: -     GEN:  In general, this is a overweight male in no acute distress.  Patient ambulatory.  HEENT:  Pupils equal, round. Sclerae nonicteric. Clear oropharynx. Mucous membranes moist.  NECK: Supple, no masses appreciated. Trachea midline. No JVD   C/V:  Irregular rate and rhythm, no murmur, rub or gallop. No S3 or RV heave.   RESP: Respirations are unlabored. Fine crackles to bilateral bases.   GI: Abdomen soft, nontender, nondistended. No HSM appreciated.   EXTREM: No LE edema. Mild hand edema. No cyanosis or clubbing.  NEURO: Alert and oriented, cooperative. . No obvious focal deficits.   PSYCH: Normal affect.  SKIN: Warm and dry. No rashes or petechiae appreciated.       Medications     amiodarone 0.5 mg/min (05/18/22 0000)     - MEDICATION INSTRUCTIONS -         amiodarone  200 mg Oral BID     apixaban ANTICOAGULANT  5 mg Oral BID     atorvastatin  40 mg Oral Daily     clopidogrel  75 mg Oral Daily     colchicine  0.6 mg Oral Daily     fluticasone  2 spray Both Nostrils Daily     furosemide  20 mg Oral Daily     lisinopril  2.5 mg Oral At Bedtime     mesalamine  4,800 mg Oral Daily with breakfast     metoprolol succinate ER  100 mg Oral Daily     pantoprazole  40 mg Oral QAM AC     psyllium  1 packet Oral Daily     sodium chloride (PF)  3 mL Intracatheter  Q8H       Data   Reviewed       Sandra Knight, NIURKA CNP 5/18/2022

## 2022-05-18 NOTE — PLAN OF CARE
Pt is A&Ox4, independent in the room, cardiac diet. Discontinued amio gtts this shift, was irritating vein- IV removed. PIV in L AC, SL. Tele this AM was Afib CVR, then converted into SB- see EKG. Transitioned to oral Amio. Was hopeful to leave today, but is accepting of staying another night. Had BM this shift- last one on 5/15.

## 2022-05-18 NOTE — PLAN OF CARE
Pt here with pericarditis after ablation. A&Ox4. AVSS- tachycardic at times on RA. Tele afib cvr w/ BBB. Tolerating cardiac diet. Up independent. Denies pain. Amiodarone infusing. Cardiology following. Possible discharge to home today pending transition to oral amiodarone, rate control.

## 2022-05-18 NOTE — PROGRESS NOTES
[facility]    Medicine Progress Note - Hospitalist Service    Date of Admission:  5/15/2022    Assessment & Plan            Peter Peralta is a 71 year old male with CAD, CHF, HTN, HLD, ulcerative colitis, mixed cardiomyopathy, who underwent ablation for A fib/flutter on 5/12 and presented on 5/15 with chest pain    #.  Post ablation acute pericarditis, CRP 72.4 --> 246  #.  Small pericardial effusion without evidence of tamponade  #.  Chronic paroxysmal atrial fib/flutter s/p ablation 5/12/2022  #.  Type 2 MI due to above  #.  In normal sinus rhythm on admission.  Developed A. fib with RVR on 5/16 evening  PTA -amiodarone 200 mg daily, apixaban 5 mg twice daily  - underwent ablation for afib/flutter on 5/12/2022.   - Developed constant worsening severe, pleuritic and positional chest pain on 5/14 and presented on 5/15  - Pain improved with supple nitroglycerin and morphine in the ED.   - EKG showed multiple  T wave inversion in II, III, AVF, and anterior leads.   - Troponin is 265-->243.   - CBC - leukocytosis.   - Overall symptoms, labwork, EKG consistent with post ablation pericarditis  - CXR showed probable infiltrate versus atelectasis and retrocardiac area.  No symptoms to indicate pneumonia thus antibiotics discontinued in 24 hours   -  Appreciate Cardiology consult  - Now on colchicine with improvement in symptoms.  Chest pain improved, still experiences mild chest pain with activity.  CRP elevated at 246  - Was in sinus rhythm on admission.  On evening of 5/16 went into A. fib with RVR and was started on amiodarone infusion  - On amiodarone infusion.  Transition to p.o. amiodarone-200 mg twice daily for 3 days, then 200 mg daily  - increase metoprolol to 100 mg   - Continue Eliquis  -Repeat echo today to reassess pericardial effusion   - Oxycodone 5mg q4h PRN + Dilaudid IV PRN for pain   - Telemetry  - Monitor CRP  - Continue colchicine for 3 months  - Follow-up with CORE and EP  "in 1 to 2 weeks         #.  CAD s/p CHULA x2-RCA in 3/2022   #.  Chronic biventricular systolic CHF with reduced EF of 25-30% and moderately reduced RV function, Echo 5/16/2022  #. 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 Elevated NT pro BNP  #. Hyperlipidemia  #. Valvular heart disease - Moderate AS, mild MS, Mild-moderate MR.  - Developed CHF early 2022  - OSCAR 5/12 showed EF 20-25% with global hypokinesis. Suspected to have mixed cardiomyopathy - ischemic and/or tachycardia mediated.   - Underwent angiogram and found to have coronary artery disease.  S/p CHULA x2 to RCA in March 2022. Follows with Cardiology here.  - NT pro BNP elevated to 2157, no comparison.  On Lasix  - Echo 5/16 showed - Severely decreased LV systolic function, EF 25-30%, severe global hypokinesia of the LV, Moderate AS. Moderately dilated RV, Moderately decreased RV systolic function, mild MS, Mild-moderate MR. Trivial pericardial effusion. There is a septal bounce present suggesting interventricular interdependence and effusive constrictive physiology.   - Continue lasix 20mg daily   - Continue atorvastatin, Plavix, metoprolol     #. Hyponatremia, sodium 131  -Improved, now 135 , Monitor     #. Probable left lower lobe community-acquired pneumonia versus atelectasis  - Oxygen saturations were 89-90% on room air on admission but this was transient and since then his saturations have been in the mid 90s.    - CXR showed possibly \"LLL atelectasis and/or infiltrate.\"   - had leukocytosis, low-grade fever of 100.8, pleuritic chest pain.   -Findings were felt to be most likely due to acute pericarditis.  He was started on IV ceftriaxone and doxycycline but these were discontinued in 24 hours as there was no clinical evidence of pneumonia.  - Procalcitonin negative  - Continue to monitor off antibiotics    #. GERD  -Continue Protonix    #. Ulcerative colitis  -Stable on mesalamine.  No acute exacerbation  -Continue mesalamine      On " "5/17-discussion with patient's wife.  She wanted him transferred to [facility].  Discussed about transfer process with patient on 5/18 and with his wife on 5/17.  Patient is willing to wait at least another 24 hours with the hope that he will likely be discharged tomorrow and can follow-up with [facility] as outpatient       Diet: Combination Diet Low Saturated Fat Na <2400mg Diet, No Caffeine Diet    DVT Prophylaxis: DOAC  Power Catheter: Not present  Central Lines: None  Cardiac Monitoring: ACTIVE order. Indication: Chest pain/ ACS rule out (24 hours)  Code Status: Full Code      Disposition Plan   Expected Discharge: 05/19/2022   Anticipated discharge location:  Awaiting care coordination huddle  Delays:            The patient's care was discussed with the Patient.    Francoise Anderson MD  Hospitalist Service  [facility]  Securely message with the Vocera Web Console (learn more here)  Text page via [employer] Paging/Directory         Clinically Significant Risk Factors Present on Admission              # Obesity: Estimated body mass index is 30.04 kg/m  as calculated from the following:    Height as of 5/12/22: 1.854 m (6' 1\").    Weight as of this encounter: 103.3 kg (227 lb 11.2 oz).      ______________________________________________________________________    Interval History   Patient reports he feels well.  Chest pain has significantly improved, now feels some discomfort only with activity   No shortness of breath   No nausea or vomiting   Afebrile  Remains in A. fib with mild RVR        Data reviewed today: I reviewed all medications, new labs and imaging results over the last 24 hours. I personally reviewed no images or EKG's today.    Physical Exam   Vital Signs: Temp: 97.7  F (36.5  C) Temp src: Oral BP: 126/77 (Post CR) Pulse: 105   Resp: 16 SpO2: 96 % O2 Device: None (Room air)    Weight: 227 lbs 11.2 oz    Constitutional-patient is awake and alert, resting in bed, in no acute " distress  Cardiovascular-irregular rhythm, mild tachycardia, no murmurs, no edema  Pulmonary-lungs are clear to auscultation bilaterally, no wheezing or rhonchi  GI-abdomen is soft, nontender, nondistended, no hepatosplenomegaly or masses  Integumentary-skin is warm and dry, no rashes or ulcers  Neurological-patient is awake, alert and oriented x3.  Moving all 4 extremities, normal speech, no focal deficits      Data   Recent Labs   Lab 05/18/22  0624 05/17/22  0601 05/16/22  0559   WBC 10.6 12.3* 16.8*   HGB 13.1* 13.2* 13.1*   MCV 88 87 87    365 391    131* 131*   POTASSIUM 4.1 3.9 4.1   CHLORIDE 106 100 98   CO2 24 25 24   BUN 24 24 24   CR 1.04 1.03 1.29*   ANIONGAP 5 6 9   CAMERON 8.9 8.1* 8.3*   * 117* 143*     No results found for this or any previous visit (from the past 24 hour(s)).  Medications     - MEDICATION INSTRUCTIONS -         amiodarone  200 mg Oral BID     [START ON 5/21/2022] amiodarone  200 mg Oral Daily     apixaban ANTICOAGULANT  5 mg Oral BID     atorvastatin  40 mg Oral Daily     clopidogrel  75 mg Oral Daily     colchicine  0.6 mg Oral Daily     fluticasone  2 spray Both Nostrils Daily     furosemide  20 mg Oral Daily     lisinopril  2.5 mg Oral At Bedtime     mesalamine  4,800 mg Oral Daily with breakfast     metoprolol succinate ER  100 mg Oral Daily     pantoprazole  40 mg Oral QAM AC     psyllium  1 packet Oral Daily     sodium chloride (PF)  3 mL Intracatheter Q8H

## 2022-05-19 ENCOUNTER — APPOINTMENT (OUTPATIENT)
Dept: PHYSICAL THERAPY | Facility: CLINIC | Age: 72
DRG: 273 | End: 2022-05-19
Payer: MEDICARE

## 2022-05-19 VITALS
OXYGEN SATURATION: 98 % | SYSTOLIC BLOOD PRESSURE: 102 MMHG | HEART RATE: 86 BPM | RESPIRATION RATE: 18 BRPM | DIASTOLIC BLOOD PRESSURE: 81 MMHG | WEIGHT: 227.4 LBS | TEMPERATURE: 97.8 F | BODY MASS INDEX: 30 KG/M2

## 2022-05-19 PROCEDURE — 250N000013 HC RX MED GY IP 250 OP 250 PS 637: Performed by: INTERNAL MEDICINE

## 2022-05-19 PROCEDURE — 99239 HOSP IP/OBS DSCHRG MGMT >30: CPT | Performed by: INTERNAL MEDICINE

## 2022-05-19 PROCEDURE — 97530 THERAPEUTIC ACTIVITIES: CPT | Mod: GP

## 2022-05-19 PROCEDURE — 97110 THERAPEUTIC EXERCISES: CPT | Mod: GP

## 2022-05-19 PROCEDURE — 250N000013 HC RX MED GY IP 250 OP 250 PS 637: Performed by: NURSE PRACTITIONER

## 2022-05-19 RX ORDER — METOPROLOL SUCCINATE 100 MG/1
100 TABLET, EXTENDED RELEASE ORAL DAILY
Qty: 30 TABLET | Refills: 0 | Status: SHIPPED | OUTPATIENT
Start: 2022-05-20

## 2022-05-19 RX ORDER — AMIODARONE HYDROCHLORIDE 200 MG/1
TABLET ORAL
Qty: 104 TABLET | Refills: 3
Start: 2022-05-19

## 2022-05-19 RX ORDER — COLCHICINE 0.6 MG/1
0.6 TABLET ORAL DAILY
Qty: 90 TABLET | Refills: 0 | Status: SHIPPED | OUTPATIENT
Start: 2022-05-20

## 2022-05-19 RX ORDER — FUROSEMIDE 20 MG
20 TABLET ORAL DAILY
Qty: 30 TABLET | Refills: 0 | Status: SHIPPED | OUTPATIENT
Start: 2022-05-20 | End: 2022-05-24

## 2022-05-19 RX ADMIN — PANTOPRAZOLE SODIUM 40 MG: 40 TABLET, DELAYED RELEASE ORAL at 09:00

## 2022-05-19 RX ADMIN — MESALAMINE 4800 MG: 1.2 TABLET, DELAYED RELEASE ORAL at 08:35

## 2022-05-19 RX ADMIN — APIXABAN 5 MG: 5 TABLET, FILM COATED ORAL at 08:37

## 2022-05-19 RX ADMIN — FLUTICASONE PROPIONATE 2 SPRAY: 50 SPRAY, METERED NASAL at 08:38

## 2022-05-19 RX ADMIN — PSYLLIUM HUSK 1 PACKET: 3.4 POWDER ORAL at 12:01

## 2022-05-19 RX ADMIN — METOPROLOL SUCCINATE 100 MG: 100 TABLET, EXTENDED RELEASE ORAL at 08:36

## 2022-05-19 RX ADMIN — AMIODARONE HYDROCHLORIDE 200 MG: 200 TABLET ORAL at 08:35

## 2022-05-19 RX ADMIN — CLOPIDOGREL BISULFATE 75 MG: 75 TABLET ORAL at 08:36

## 2022-05-19 RX ADMIN — COLCHICINE 0.6 MG: 0.6 TABLET ORAL at 08:35

## 2022-05-19 RX ADMIN — FUROSEMIDE 20 MG: 20 TABLET ORAL at 08:35

## 2022-05-19 ASSESSMENT — ACTIVITIES OF DAILY LIVING (ADL)
ADLS_ACUITY_SCORE: 20

## 2022-05-19 NOTE — DISCHARGE SUMMARY
[facility]  Hospitalist Discharge Summary      Date of Admission:  5/15/2022  Date of Discharge:  5/19/2022  Discharging Provider: Francoise Anderson MD  Discharge Service: Hospitalist Service    Discharge Diagnoses     #.  Post ablation acute pericarditis,     #.  Small pericardial effusion without evidence of tamponade, stable on repeat echo    #.  Chronic paroxysmal atrial fib/flutter s/p ablation 5/12/2022  - Flipped into A. fib with RVR on 5/16  - On anticoagulation    #.  Type 2 MI due to above     #.  CAD s/p CHULA x2-RCA in 3/2022     #.  Chronic biventricular systolic CHF with reduced EF of 25-30% and moderately reduced RV function, Echo 5/16/2022    #. Mixed cardiomyopathy - ischemic + tachycardia mediated    #. Hyperlipidemia    #. Valvular heart disease - Moderate AS, mild MS, Mild-moderate MR.    #. Hyponatremia, sodium 131  -Improved, now 135     #. Probable left lower lobe community-acquired pneumonia-rule out.      #. GERD     #. Ulcerative colitis  -Stable on mesalamine.          Follow-ups Needed After Discharge   Follow-up Appointments     Follow-up and recommended labs and tests       Follow up with primary care provider, Donell Zuñiga, within 7 days for   hospital follow- up.  No follow up labs or test are needed.    Follow up with cardiology and EP as scheduled.  **SEE DETAILED   APPOINTMENTS BELOW.         {Additional follow-up instructions/to-do's for PCP    :    Unresulted Labs Ordered in the Past 30 Days of this Admission     No orders found from 4/15/2022 to 5/16/2022.          Discharge Disposition   Discharged to home  Condition at discharge: Stable      Hospital Course      Peter Peralta is a 71 year old male with CAD, CHF, HTN, HLD, ulcerative colitis, mixed cardiomyopathy, who underwent ablation for A fib/flutter on 5/12 and presented on 5/15 with chest pain which was felt to be due to post ablation acute pericarditis and responded to colchicine.  He was in  normal sinus rhythm on admission but flipped to A. fib with mild RVR during hospital stay.     #.  Post ablation acute pericarditis, CRP 72.4 --> 246  #.  Small pericardial effusion without evidence of tamponade  #.  Chronic paroxysmal atrial fib/flutter s/p ablation 5/12/2022  #.  Type 2 MI due to above  #.  In normal sinus rhythm on admission.  Developed A. fib with RVR on 5/16 evening  PTA -amiodarone 200 mg daily, apixaban 5 mg twice daily  - underwent ablation for afib/flutter on 5/12/2022.   - Developed constant worsening severe, pleuritic and positional chest pain on 5/14 and presented on 5/15  - Pain improved with supple nitroglycerin and morphine in the ED.   - EKG showed multiple  T wave inversion in II, III, AVF, and anterior leads.   - Troponin is 265-->243.   - CBC - leukocytosis.   - Overall symptoms, labwork, EKG consistent with post ablation pericarditis  - CXR showed probable infiltrate versus atelectasis and retrocardiac area.  No symptoms to indicate pneumonia   - Initial echo showed mild pericardial effusion that remained stable on repeat echo  - On colchicine with improvement in symptoms.    - Was in sinus rhythm on admission.  On evening of 5/16 went into A. fib with RVR and was started on amiodarone infusion, subsequently Transitioned back to p.o. amiodarone -200 mg twice daily for 3 days, then 200 mg daily. On 5/18, converted  Normal sinus rhythm but by 5/19 was back in A. fib.  - increased toprol XL to 100 mg   - Continue Eliquis  - Continue colchicine for 3 months  - Follow-up with CORE and EP in 1 to 2 weeks           #.  CAD s/p CHULA x2-RCA in 3/2022   #.  Chronic biventricular systolic CHF with reduced EF of 25-30% and moderately reduced RV function, Echo 5/16/2022  #. 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 Elevated NT pro BNP  #. Hyperlipidemia  #. Valvular heart disease - Moderate AS, mild MS, Mild-moderate MR.  - Developed CHF early 2022  - OSCAR 5/12 showed EF 20-25%  "with global hypokinesis. Suspected to have mixed cardiomyopathy - ischemic and/or tachycardia mediated.   - Underwent angiogram and found to have coronary artery disease.  S/p CHULA x2 to RCA in March 2022. Follows with Cardiology here.  - NT pro BNP elevated to 2157, no comparison.  On Lasix  - Echo 5/16 showed - Severely decreased LV systolic function, EF 25-30%, severe global hypokinesia of the LV, Moderate AS. Moderately dilated RV, Moderately decreased RV systolic function, mild MS, Mild-moderate MR. Trivial pericardial effusion. There is a septal bounce present suggesting interventricular interdependence and effusive constrictive physiology.   - Started on lasix 20mg daily   - Continue atorvastatin, Plavix, metoprolol     #. Hyponatremia, sodium 131  -Improved, now 135     #. Probable left lower lobe community-acquired pneumonia-rule out.  Likely atelectasis   - Oxygen saturations were 89-90% on room air on admission but this was transient and since then his saturations have been in the mid 90s.    - CXR showed possibly \"LLL atelectasis and/or infiltrate.\"   - had leukocytosis, low-grade fever of 100.8, pleuritic chest pain.   -Findings were felt to be most likely due to acute pericarditis.  He was started on IV ceftriaxone and doxycycline but these were discontinued in 24 hours as there was no clinical evidence of pneumonia.  - Procalcitonin negative     #. GERD  -Continue Protonix     #. Ulcerative colitis  -Stable on mesalamine.  No acute exacerbation  -Continue mesalamine    Consultations This Hospital Stay   CARDIOLOGY IP CONSULT  PHYSICAL THERAPY ADULT IP CONSULT    Code Status   Full Code    Time Spent on this Encounter   I, Francoise Anderson MD, personally saw the patient today and spent greater than 30 minutes discharging this patient.       Francoise Anderson MD  [facility]  [address]  [facility] [address]  Phone: " [phone]  ______________________________________________________________________    Physical Exam   Vital Signs: Temp: 97.8  F (36.6  C) Temp src: Oral BP: 106/69 (Post CR) Pulse: 83   Resp: 18 SpO2: 98 % O2 Device: None (Room air)    Weight: 227 lbs 6.4 oz    Constitutional-patient is awake and alert, resting in bed, in no acute distress  Cardiovascular-irregular rhythm, mild tachycardia, no murmurs, no edema  Pulmonary-lungs are clear to auscultation bilaterally, no wheezing or rhonchi  GI-abdomen is soft, nontender, nondistended, no hepatosplenomegaly or masses  Integumentary-skin is warm and dry, no rashes or ulcers  Neurological-patient is awake, alert and oriented x3.  Moving all 4 extremities, normal speech, no focal deficits             Primary Care Physician   Donell Zuñiga    Discharge Orders      Reason for your hospital stay    Pericarditis (inflammation of lining of heart) and atrial fibrillation     Follow-up and recommended labs and tests     Follow up with primary care provider, Donell Zuñiga, within 7 days for hospital follow- up.  No follow up labs or test are needed.    Follow up with cardiology and EP as scheduled.  **SEE DETAILED APPOINTMENTS BELOW.     Activity    Your activity upon discharge: activity as tolerated     Diet    Follow this diet upon discharge: Orders Placed This Encounter      Combination Diet Low Saturated Fat Na <2400mg Diet, No Caffeine Diet       Significant Results and Procedures   Results for orders placed or performed during the hospital encounter of 05/15/22   XR Chest 2 Views    Narrative    EXAM: XR CHEST 2 VW  LOCATION: [facility]  DATE/TIME: 5/15/2022 7:29 PM    INDICATION: Chest pain and short of breath after ablation yesterday, currently sinus rhythm.  COMPARISON: None.      Impression    IMPRESSION: Question some left lower lobe retrocardiac atelectasis and/or infiltrate. Right lung grossly clear.   Echocardiogram Complete     Value     "LVEF  25-30%    Biplane LVEF 29%    Narrative    [id number]  [id number]  [id number]  [id number]^RAMYA^GINA^TRICIA     [facility]  Echocardiography Laboratory  [facility] [place] [id number]     Name: BUCK SAMANIEGO  MRN: [id number]  : 1950  Study Date: 2022 09:02 AM  Age: 71 yrs  Gender: Male  Patient Location: [facility]  Reason For Study: Pericarditis  Ordering Physician: GINA BUI  Referring Physician: GINA BUI  Performed By: Indio Coleman     BSA: 2.3 m2  Height: 73 in  Weight: 227 lb  HR: 88  ______________________________________________________________________________  Procedure  Complete Echo Adult.  ______________________________________________________________________________  Interpretation Summary     Severely decreased left ventricular systolic function  The visual ejection fraction is 25-30%.  There is severe global hypokinesia of the left ventricle.  Moderate aortic stenosis. At a low stroke volume index (\"low flow\") the Vmax  is 2.3 m/sec, mean gradient 12 mmHg and CLEMENTINA by continuity 1.2 cm2. DVI 0.34.  The right ventricle is moderately dilated.  Moderately decreased right ventricular systolic function  There is mild mitral stenosis. Mild-moderate MR.  The inferior vena cava was normal in size with preserved respiratory  variability.  Trivial pericardial effusion. There is a septal bounce present suggesting  interventricular interdependence and effusive constrictive physiology. Inflow  profiles across mitral and tricuspid valves were not performed on this study.     On today's study there is a trival effusion with some evidence of effusive  constrictive physiology (no tampondade). There is also evidence of aortic  stenosis by hemodynamic assessment which was not noted on prior study dated  3/22/2022.  ______________________________________________________________________________  Left Ventricle  The left ventricle is mildly dilated. Severely " "decreased left ventricular  systolic function. The visual ejection fraction is 25-30%. Left ventricular  diastolic function is indeterminate. Biplane LVEF is 29%. There is severe  global hypokinesia of the left ventricle.     Right Ventricle  The right ventricle is moderately dilated. Moderately decreased right  ventricular systolic function.     Atria  The left atrium is severely dilated. The right atrium is severely dilated.     Mitral Valve  The mitral valve is normal in structure and function. There is mild to  moderate (1-2+) mitral regurgitation. There is mild mitral stenosis. The mean  mitral valve gradient is 2.6 mmHg.     Tricuspid Valve  The tricuspid valve is normal in structure and function. There is mild (1+)  tricuspid regurgitation. The right ventricular systolic pressure is  approximated at 26.7 mmHg plus the right atrial pressure.     Aortic Valve  There is moderate trileaflet aortic sclerosis. Moderate aortic stenosis. At a  low stroke volume index (\"low flow\") the Vmax is 2.3 m/sec, mean gradient 12  mmHg and CLEMENTINA by continuity 1.2 cm2. DVI 0.34.     Pulmonic Valve  The pulmonic valve is not well visualized. There is mild (1+) pulmonic  valvular regurgitation.     Vessels  The aortic root is normal size. The ascending aorta is Borderline dilated. The  inferior vena cava was normal in size with preserved respiratory variability.     Pericardium  Trivial pericardial effusion. Septal bounce present suggesting  interventricular interdependence.     ______________________________________________________________________________  MMode/2D Measurements & Calculations  RVDd: 5.2 cm  IVSd: 0.77 cm  LVIDd: 6.0 cm  LVIDs: 4.5 cm  LVPWd: 0.64 cm  FS: 25.2 %     LV mass(C)d: 158.4 grams  LV mass(C)dI: 69.8 grams/m2  Ao root diam: 3.3 cm  asc Aorta Diam: 3.7 cm  LVOT diam: 2.1 cm  LVOT area: 3.4 cm2  LA Volume (BP): 96.0 ml  LA Volume Index (BP): 42.3 ml/m2  RWT: 0.21     Doppler Measurements & Calculations  MV E " max dorian: 98.7 cm/sec  MV A max dorian: 42.2 cm/sec  MV E/A: 2.3     MV max P.9 mmHg  MV mean P.6 mmHg  MV V2 VTI: 25.2 cm  MVA(VTI): 1.9 cm2  MV dec time: 0.16 sec  Ao V2 max: 223.7 cm/sec  Ao max P.0 mmHg  Ao V2 mean: 165.4 cm/sec  Ao mean P.2 mmHg  Ao V2 VTI: 40.5 cm  CLEMENTINA(I,D): 1.2 cm2  CLEMENTINA(V,D): 1.1 cm2  LV V1 max P.2 mmHg  LV V1 max: 73.7 cm/sec  LV V1 VTI: 14.1 cm  SV(LVOT): 47.6 ml  SI(LVOT): 21.0 ml/m2  PA V2 max: 165.9 cm/sec  PA max P.0 mmHg  PA acc time: 0.10 sec  TR max dorian: 258.4 cm/sec  TR max P.7 mmHg  AV Dorian Ratio (DI): 0.33  CLEMENTINA Index (cm2/m2): 0.52  Lateral E/e': 13.0     ______________________________________________________________________________  Report approved by: Nini Olmstead 2022 01:07 PM         Echocardiogram Limited     Value    LVEF  30-35%    Narrative    [id number]  [id number]  [id number]  [id number]^MONSERRAT^TEVIN     [facility]  Echocardiography Laboratory  [address] [place] [address]     Name: BUCK SAMANIEGO  MRN: [id number]  : 1950  Study Date: 2022 01:46 PM  Age: 71 yrs  Gender: Male  Patient Location: [facility]  Reason For Study: Pericarditis  Ordering Physician: TEVIN GERMAN  Referring Physician: Donell Zuñiga  Performed By: Jose Orozco     BSA: 2.3 m2  Height: 73 in  Weight: 227 lb  HR: 78  BP: 111/90 mmHg  ______________________________________________________________________________  Procedure  Limited Portable Echo Adult.  ______________________________________________________________________________  Interpretation Summary     The left ventricle is mildly dilated.  The visual ejection fraction is 30-35%.  There is moderate global hypokinesia of the left ventricle.  Mildly decreased right ventricular systolic function  Trivial pericardial effusion  Findings similar to previous echo 2022.  ______________________________________________________________________________  Left Ventricle  The  left ventricle is mildly dilated. The visual ejection fraction is 30-35%.  There is moderate global hypokinesia of the left ventricle.     Right Ventricle  Mildly decreased right ventricular systolic function.     Atria  The left atrium is severely dilated. The right atrium is moderate to severely  dilated.     Mitral Valve  There is mild (1+) mitral regurgitation.     Tricuspid Valve  The right ventricular systolic pressure is approximated at 19.1 mmHg plus the  right atrial pressure. There is mild (1+) tricuspid regurgitation.     Pericardium  Trivial pericardial effusion.     Rhythm  The rhythm was atrial fibrillation.  ______________________________________________________________________________  MMode/2D Measurements & Calculations  IVSd: 1.00 cm  LVIDd: 5.8 cm  LVIDs: 5.2 cm  LVPWd: 0.97 cm  FS: 10.6 %  LV mass(C)d: 232.1 grams  LV mass(C)dI: 102.2 grams/m2  LA dimension: 5.0 cm  asc Aorta Diam: 3.1 cm  RWT: 0.33     Doppler Measurements & Calculations  TR max soniya: 218.5 cm/sec  TR max P.1 mmHg     ______________________________________________________________________________  Report approved by: Nini Moore 2022 03:20 PM               Discharge Medications   Current Discharge Medication List      START taking these medications    Details   colchicine (COLCYRS) 0.6 MG tablet Take 1 tablet (0.6 mg) by mouth daily  Qty: 90 tablet, Refills: 0    Associated Diagnoses: Other acute pericarditis      furosemide (LASIX) 20 MG tablet Take 1 tablet (20 mg) by mouth daily  Qty: 30 tablet, Refills: 0    Associated Diagnoses: Chronic systolic congestive heart failure (H)         CONTINUE these medications which have CHANGED    Details   amiodarone (PACERONE) 200 MG tablet 200 mg bid for 2 days, then 200 mg daily  Qty: 104 tablet, Refills: 3    Comments: 200 mg bid for 2 days, then 200 mg daily  Associated Diagnoses: Persistent atrial fibrillation (H)      metoprolol succinate ER (TOPROL XL) 100 MG 24 hr  tablet Take 1 tablet (100 mg) by mouth daily  Qty: 30 tablet, Refills: 0    Associated Diagnoses: Persistent atrial fibrillation (H)         CONTINUE these medications which have NOT CHANGED    Details   acetaminophen (TYLENOL) 325 MG tablet Take 2 tablets (650 mg) by mouth every 4 hours as needed for mild pain or headaches  Refills: 0    Associated Diagnoses: Pain      apixaban ANTICOAGULANT (ELIQUIS ANTICOAGULANT) 5 MG tablet Take 1 tablet (5 mg) by mouth 2 times daily  Qty: 120 tablet, Refills: 0    Associated Diagnoses: Persistent atrial fibrillation (H)      atorvastatin (LIPITOR) 40 MG tablet Take 1 tablet (40 mg) by mouth daily  Qty: 90 tablet, Refills: 3    Associated Diagnoses: Hyperlipidemia LDL goal <130      Calcium-Vitamin D-Vitamin K (CALCIUM + D + K) 750-500-40 MG-UNT-MCG TABS Take 2 tablets by mouth daily.      clopidogrel (PLAVIX) 75 MG tablet Take 1 tablet (75 mg) by mouth daily  Qty: 30 tablet, Refills: 3    Associated Diagnoses: ST elevation myocardial infarction involving right coronary artery (H)      esomeprazole (NEXIUM) 20 MG DR capsule Take 20 mg by mouth every morning (before breakfast) Take 30-60 minutes before eating.      ferrous sulfate (FEROSUL) 325 (65 Fe) MG tablet Take 325 mg by mouth daily (with breakfast)      fluticasone (FLONASE) 50 MCG/ACT nasal spray Spray 2 sprays into both nostrils daily  Qty: 16 g, Refills: 11    Associated Diagnoses: Chronic rhinitis      lisinopril (ZESTRIL) 2.5 MG tablet Take 1 tablet (2.5 mg) by mouth At Bedtime  Qty: 90 tablet, Refills: 3    Associated Diagnoses: Cardiomyopathy, nonischemic (H)      mesalamine (LIALDA) 1.2 g EC tablet Take 4,800 mg by mouth daily (with breakfast)       Multiple Vitamin (MULTIVITAMIN PO) Take 1 tablet by mouth daily  Refills: 0      Probiotic Product (PROBIOTIC PO) Take 1 tablet by mouth daily      psyllium (METAMUCIL) 58.6 % POWD Take 1 teaspoonful by mouth daily (At noon)      sildenafil (VIAGRA) 25 MG tablet Take 3  tablets (75 mg) by mouth daily as needed  Qty: 90 tablet, Refills: 0    Associated Diagnoses: Erectile dysfunction, unspecified erectile dysfunction type      UNABLE TO FIND daily MEDICATION NAME: Move Free      vitamin C (ASCORBIC ACID) 250 MG tablet Take 1 tablet (250 mg) by mouth daily           Allergies   Allergies   Allergen Reactions     No Known Drug Allergies

## 2022-05-19 NOTE — PLAN OF CARE
Goal Outcome Evaluation:  Alert and oriented x4. Up independent in the room.  PIV SL. Tylenol given x1 for headache. Tele NSR/ SB at times.

## 2022-05-19 NOTE — PLAN OF CARE
Physical Therapy Discharge Summary    Reason for therapy discharge:    Discharged to home.    Progress towards therapy goal(s). See goals on Care Plan in [facility] electronic health record for goal details.  Goals partially met.  Barriers to achieving goals:   discharge from facility.    Therapy recommendation(s):    No further therapy is recommended.

## 2022-05-20 ENCOUNTER — TELEPHONE (OUTPATIENT)
Dept: FAMILY MEDICINE | Facility: CLINIC | Age: 72
End: 2022-05-20

## 2022-05-20 ENCOUNTER — PATIENT OUTREACH (OUTPATIENT)
Dept: CARE COORDINATION | Facility: CLINIC | Age: 72
End: 2022-05-20
Payer: MEDICARE

## 2022-05-20 DIAGNOSIS — Z71.89 OTHER SPECIFIED COUNSELING: ICD-10-CM

## 2022-05-20 LAB
ATRIAL RATE - MUSE: 67 BPM
DIASTOLIC BLOOD PRESSURE - MUSE: NORMAL MMHG
INTERPRETATION ECG - MUSE: NORMAL
P AXIS - MUSE: 61 DEGREES
PR INTERVAL - MUSE: 164 MS
QRS DURATION - MUSE: 150 MS
QT - MUSE: 508 MS
QTC - MUSE: 536 MS
R AXIS - MUSE: 74 DEGREES
SYSTOLIC BLOOD PRESSURE - MUSE: NORMAL MMHG
T AXIS - MUSE: 54 DEGREES
VENTRICULAR RATE- MUSE: 67 BPM

## 2022-05-20 NOTE — PROGRESS NOTES
Clinic Care Coordination Contact  [facility]: Post-Discharge Note  SITUATION                                                      Admission:    Admission Date: 05/15/22   Reason for Admission: Post ablation acute pericarditis,   Discharge:   Discharge Date: 05/19/22  Discharge Diagnosis: Post ablation acute pericarditis,    BACKGROUND                                                      Per hospital discharge summary and inpatient provider notes:  71 year old male with CAD, CHF, HTN, HLD, ulcerative colitis, mixed cardiomyopathy, who underwent ablation for A fib/flutter on 5/12 and presented on 5/15 with chest pain which was felt to be due to post ablation acute pericarditis and responded to colchicine.  He was in normal sinus rhythm on admission but flipped to A. fib with mild RVR during hospital stay.     #.  Post ablation acute pericarditis, CRP 72.4 --> 246  #.  Small pericardial effusion without evidence of tamponade  #.  Chronic paroxysmal atrial fib/flutter s/p ablation 5/12/2022  #.  Type 2 MI due to above  #.  In normal sinus rhythm on admission.  Developed A. fib with RVR on 5/16 evening  PTA -amiodarone 200 mg daily, apixaban 5 mg twice daily  - underwent ablation for afib/flutter on 5/12/2022.   - Developed constant worsening severe, pleuritic and positional chest pain on 5/14 and presented on 5/15  - Pain improved with supple nitroglycerin and morphine in the ED.   - EKG showed multiple  T wave inversion in II, III, AVF, and anterior leads.   - Troponin is 265-->243.   - CBC - leukocytosis.   - Overall symptoms, labwork, EKG consistent with post ablation pericarditis  - CXR showed probable infiltrate versus atelectasis and retrocardiac area.  No symptoms to indicate pneumonia   - Initial echo showed mild pericardial effusion that remained stable on repeat echo  - On colchicine with improvement in symptoms.    - Was in sinus rhythm on admission.  On evening of 5/16 went into A. fib with RVR  "and was started on amiodarone infusion, subsequently Transitioned back to p.o. amiodarone -200 mg twice daily for 3 days, then 200 mg daily. On 5/18, converted  Normal sinus rhythm but by 5/19 was back in A. fib.  - increased toprol XL to 100 mg   - Continue Eliquis  - Continue colchicine for 3 months  - Follow-up with CORE and EP in 1 to 2 weeks           #.  CAD s/p CHULA x2-RCA in 3/2022   #.  Chronic biventricular systolic CHF with reduced EF of 25-30% and moderately reduced RV function, Echo 5/16/2022  #. 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 Elevated NT pro BNP  #. Hyperlipidemia  #. Valvular heart disease - Moderate AS, mild MS, Mild-moderate MR.  - Developed CHF early 2022  - OSCAR 5/12 showed EF 20-25% with global hypokinesis. Suspected to have mixed cardiomyopathy - ischemic and/or tachycardia mediated.   - Underwent angiogram and found to have coronary artery disease.  S/p CHULA x2 to RCA in March 2022. Follows with Cardiology here.  - NT pro BNP elevated to 2157, no comparison.  On Lasix  - Echo 5/16 showed - Severely decreased LV systolic function, EF 25-30%, severe global hypokinesia of the LV, Moderate AS. Moderately dilated RV, Moderately decreased RV systolic function, mild MS, Mild-moderate MR. Trivial pericardial effusion. There is a septal bounce present suggesting interventricular interdependence and effusive constrictive physiology.   - Started on lasix 20mg daily   - Continue atorvastatin, Plavix, metoprolol     #. Hyponatremia, sodium 131  -Improved, now 135     #. Probable left lower lobe community-acquired pneumonia-rule out.  Likely atelectasis   - Oxygen saturations were 89-90% on room air on admission but this was transient and since then his saturations have been in the mid 90s.    - CXR showed possibly \"LLL atelectasis and/or infiltrate.\"   - had leukocytosis, low-grade fever of 100.8, pleuritic chest pain.   -Findings were felt to be most likely due to acute " pericarditis.  He was started on IV ceftriaxone and doxycycline but these were discontinued in 24 hours as there was no clinical evidence of pneumonia.  - Procalcitonin negative     #. GERD  -Continue Protonix     #. Ulcerative colitis  -Stable on mesalamine.  No acute exacerbation  -Continue mesalamine             ASSESSMENT      Enrollment  Primary Care Care Coordination Status: Declined    Discharge Assessment  How are you doing now that you are home?: a little sluggish  How are your symptoms? (Red Flag symptoms escalate to triage hotline per guidelines): Improved  Do you feel your condition is stable enough to be safe at home until your provider visit?: Yes  Does the patient have their discharge instructions? : Yes  Does the patient have questions regarding their discharge instructions? : No  Were you started on any new medications or were there changes to any of your previous medications? : Yes  Does the patient have all of their medications?: Yes  Do you have questions regarding any of your medications? : No  Do you have all of your needed medical supplies or equipment (DME)?  (i.e. oxygen tank, CPAP, cane, etc.): Yes  Discharge follow-up appointment scheduled within 14 calendar days? : Yes  Discharge Follow Up Appointment Date: 05/23/22  Discharge Follow Up Appointment Scheduled with?: Specialty Care Provider (Cardiology)        PLAN                                                      Outpatient Plan:  Follow up with primary care provider, Donell Zuñiga, within 7 days for   hospital follow- up.  No follow up labs or test are needed.     Follow up with cardiology and EP as scheduled.  SEE DETAILED   APPOINTMENTS BELOW    Future Appointments   Date Time Provider Department Center   5/23/2022  2:30 PM Yari Burrell PA-C [facility]   6/1/2022 10:00 AM 1, Sh Pulmonary Rehab [facility]   6/2/2022  3:00 PM [id number]   6/6/2022  9:00 AM Yue Sequeira PA-C [facility]   7/12/2022  8:00 PM BED 1 SH SLEEP [facility]   7/26/2022 11:00 AM Eliza Sellers MD [facility]   8/15/2022  1:45 PM Teodoro Jones MD [facility]         For any urgent concerns, please contact our 24 hour nurse triage line: [phone] ([phone])         Izabella Marks MA

## 2022-05-22 NOTE — PROGRESS NOTES
[facility] HEART CLINIC    I had the pleasure of seeing Peter when he came for follow up of recent AFib ablation.  This 71 year old sees Dr. Jones for his history of:    1. Persistent AFib, typical AFlutter - noted to be in arrhythmia at GI appt 2/2022 vwith heart failure symptoms with EF 25 to 30%.  DCCV 3/2022 was unsuccessful.  Noted to be in AFlutter during office visit 4/21/2022.  Now s/p   2. Hypertension  3. Dyslipidemia  4. Mixed cardiomyopathy  5. CAD -angiogram in the setting of severe LV dysfunction showed occluded RCA, s/p thrombectomy and PCI 2/2022    Dr. Jones saw Mark 4/2022 at which time he reviewed his HF sxs starting 2/2022. He was noted to have severe LV dysfunction, with EF <20%.  Coronary angiography 3/2022 showed occluded RCA, and he underwent thrombectomy of the RCA with PCI.  He was also noted to be in persistent atrial fibrillation at that time.  He underwent attempt at DCCV 3/22/2022, which was unsuccessful.  He was started on amiodarone and met with Dr. Jones.  At that time, he continued to c/o fatigue and MOSS.  His EKG actually showed typical atrial flutter with an underlying RBBB.     Today his young age, underlying RBBB and LV dysfunction, he was felt not to be a good candidate for antiarrhythmic therapy.  He therefore opted to proceed with AFib ablation with preop chest CT and OSCAR.    On 5/12, he was noted to be in atrial flutter and underwent cardioversion followed by PVI and CTI ablation after OSCAR showed no intra-atrial clot.    He was discharged home the same day.  No medication changes were made.  When we contacted him the following day 5/13, he noted he was up 5 pounds.  Lasix prescription was provided, noting that if he did not diurese on his own over the weekend, he did not need to fill this.  His chest discomfort and shortness of breath was all improving.    Unfortunately, he was then hospitalized 5/15-19/2022 after presenting with CP thought d/t acute pericarditis.   "This responded to colchicine.  He initially was in NSR but unsurprisingly, went into AFib on 5/16/2022.  CRP peaked at 246. hsTrop 243.  Echo showed continued EF 25 to 30%, with trivial pericardial effusion.  No evidence of tamponade, but some suggestion of interventricular interdependence/effusive constrictive physiology.  Moderately reduced RVSF noted, mild MS and interestingly, low flow moderate AoS noted, which was not seen on the echo 3/22.    For his recurrent AFib, he was placed back on amiodarone 200 mg BID x 3 days, then 200 mg daily.  He had continued paroxysms of AFib, but was discharged in SR.  On 5/19, weight was 227#.  He was discharged home on increased metoprolol  mg daily, colchicine 0.6mg in 3 months, increased amiodarone 200 mg BID x 3 days, then 200 mg daily. Furosemide 20 mg daily started.     Interval History:  Nathan and Emy overall think things are improving since he got out of the hospital.  He states that he still has some minimal chest discomfort, with his \"pericarditis pain\" about a 1/10 and \"indigestion\" 2-3/10.  Denies any exertional discomfort.  He does remain on Nexium and colchicine.  He has a history of colitis, so did have some trouble with diarrhea after starting colchicine.  This is subsequently resolved and he does feel the colchicine has helped.    Denies any problems with edema, orthopnea or PND.  He was discharged on furosemide 20 mg daily.  No follow-up blood work has been done yet.  Weight is down about 5 pounds (he was 218#on his scale at home today).  He and Emy cannot remember a time that his weight was down so much.    Denies fever/chills.  Denies any problems with the right groin site.  He and Emy note an area of discomfort/redness at left antecubital area.  This is improved with time.  They are using ice packs.  No trouble swallowing.    He does not think he has had any atrial fibrillation since Friday night.  He does know he went back into it after he left " "the hospital, but has been in SR since Friday night 5/20.    BPs at home 100s/60s at the lowest.    VITALS:  Vitals: /76   Pulse 65   Ht 1.854 m (6' 1\")   Wt 101.1 kg (222 lb 12.8 oz)   BMI 29.39 kg/m      Diagnostic Testing:  EKG today, which I overread, showed SR 64 bpm. RBBB.   Echo 5/16/22 LVEF stable 25-30% with severe global HK of LV. RV moderately dilated with moderately decreased RVSF. Severe dilation of LA with volume index 42.3 mL/m .  1-2+ MR.  Mild MS with mean gradient 2.6 mmHg at 88 bpm.  1+ TR.  RVSP 27+ RAP.  Moderate aortic sclerosis.  Moderate aortic stenosis.  Low stroke-volume index, with V-max 2.3 m/s, mean gradient 12 mmHg and CLEMENTINA 1.2 cm .  DVI was 0.34.  1+ SC.  Normal aorta.  Trivial pericardial effusion.  Septal bounce present suggesting interventricular interdependence.  CTA Heart 4/29/2022 nl PV anatomy.  Severe coronary calcification with stents in the RCA noted.  Non-cardiac portion showed benign granuloma and benign fissural nodules  Echo 2/25/2022 - Severe LV dysfunction.  EF of 25-30%.  Global hypokinesis.  There was mild MR/TR.  Cath 3/3/2022 - Total occluded RCA.  Mild to moderate disease in the LAD/circumflex.  PCI to RCA with thrombectomy and Rotablator.   OSCAR 3/22/2022 - EF of 20 to 25%.  There were moderate to severe MR.  Component      Latest Ref Rng & Units 6/22/2017 5/15/2022 5/17/2022   CRP Inflammation      0.0 - 8.0 mg/L <2.9 72.4 (H) 246.0 (H)     Component      Latest Ref Rng & Units 5/15/2022 5/15/2022 5/16/2022 5/16/2022           7:20 PM  9:29 PM  2:00 AM  5:59 AM   Troponin I High Sensitivity      <79 ng/L 265 (HH) 243 (HH) 208 (HH) 201 (HH)     Component      Latest Ref Rng & Units 4/13/2022 5/17/2022 5/18/2022   Sodium      133 - 144 mmol/L 136 131 (L) 135   Potassium      3.4 - 5.3 mmol/L 4.4 3.9 4.1   Chloride      94 - 109 mmol/L 107 100 106   Carbon Dioxide      20 - 32 mmol/L 25 25 24   Anion Gap      3 - 14 mmol/L 4 6 5   Urea Nitrogen      7 - 30 " mg/dL 28 24 24   Creatinine      0.66 - 1.25 mg/dL 1.17 1.03 1.04   Calcium      8.5 - 10.1 mg/dL 9.3 8.1 (L) 8.9   Glucose      70 - 99 mg/dL 93 117 (H) 120 (H)   GFR Estimate      >60 mL/min/1.73m2 67 79 78     Component      Latest Ref Rng & Units 3/5/2022 5/16/2022 5/18/2022   WBC      4.0 - 11.0 10e3/uL 11.3 (H) 16.8 (H) 10.6   RBC Count      4.40 - 5.90 10e6/uL 4.31 (L) 4.55 4.53   Hemoglobin      13.3 - 17.7 g/dL 12.6 (L) 13.1 (L) 13.1 (L)   Hematocrit      40.0 - 53.0 % 38.3 (L) 39.6 (L) 40.0   MCV      78 - 100 fL 89 87 88   MCH      26.5 - 33.0 pg 29.2 28.8 28.9   MCHC      31.5 - 36.5 g/dL 32.9 33.1 32.8   RDW      10.0 - 15.0 % 12.8 14.1 14.1   Platelet Count      150 - 450 10e3/uL 338 391 422         Plan:  1. Repeat echo before Dr. Jones appointment 8/2022  2. Will need repeat amiodarone labs 8/2022 if Dr. Jones keeps him on amiodarone  3. Stop colchicine after 5/26.  Resume if CP returns.  4. BMP today given addition of furosemide.    Assessment/Plan:    1. Atrial arrhythmias, postprocedural pericarditis    As above, noted to have atrial fibrillation as well as typical atrial flutter, s/p DCCV, PVI and CTI ablation 5/12/2022 with Dr. Jones    He was discharged home on continued amiodarone and Eliquis the same day    Developed recurrent atrial fibrillation in the setting of acute pericarditis.  Amiodarone was reloaded, and he was discharged in sinus rhythm 5/19/2022.  Notes that he is last episode of  AFib was likely  Friday 5/20 PM and he has remained in sinus since then.        Remains on Eliquis for CHADSVASc 3 (cardiomyopathy, age, CAD).    EKG today shows SR/RBBB    PLAN:    Continue amiodarone 200 mg daily    Continue increased metoprolol  mg daily for now.  He is to contact us if his heart rate is <50 bpm for >24 hours or if he thinks he has been in AFib for >48 h    He will continue anticoagulation with Eliquis    See Dr. Jones 8/2022 as planned      Recommended warm compresses to the  left arm for post-IV discomfort      Recommended he try coming off of the colchicine after 1 week (5/26).  Given this was mild, I do not anticipate him requiring 3 months of colchicine as initially recommended on discharge.  If, he develops recurrent chest discomfort after stopping it, he can certainly restart this.    2. Mixed CM    LVEF noted 25-30% at time of presentation 2/2022.    Coronary angiography revealed an occluded RCA treated with thrombectomy/PCI    Also noted to be in persistent AFib.    Now s/p CTI and PVI ablation 5/12/2022    Repeat echo while hospitalized for postprocedural pericarditis continue to show LVEF 25-30% with global hypokinesis.      Today, he appears euvolemic.  Weights at home 218# (222# here)      On hospital discharge, started on Lasix 20 mg daily, continued on lisinopril 2.5 mg daily, and metoprolol XL increased to 100 mg daily    PLAN:    Plan to repeat echocardiogram before Dr. Jones's follow-up appointment to determine if restored SR improves LVEF.  We will cancel the echo that was scheduled in 6/2022 as would like to see how maintaining SR for a longer period of time will help    BMP today to assess renal function on new furosemide    We can move CORE visit at this time given he is euvolemic and echo is being postponed    3. Valvular abnormalities    As above, echocardiogram while hospitalized for pericarditis showed LVEF 25-30%, mild-moderate MR with mild MS (gradient 2.6 mmHg @ 88 bpm), 1+ TR and moderate aortic sclerosis with moderate aortic stenosis (low-flow)    PLAN:    Repeat echo prior to Dr. Jones appointment    4. On amiodarone therapy    Given his right bundle branch block and cardiomyopathy, a poor candidate for other antiarrhythmics.    Plan is to use this relatively short-term given his young age    Baseline TSH 2/2022 was normal.  AST and ALT were normal.    PLAN:    PFTs 6/2022 as planned    If Dr. Jones keeps him on amiodarone after 8/2022, he will be due for  labs at that time (not yet ordered)    Mellisa Burrell PA-C, MSPAS      Orders Placed This Encounter   Procedures     Basic metabolic panel     EKG 12-lead complete w/read - Clinics (performed today)     No orders of the defined types were placed in this encounter.    Medications Discontinued During This Encounter   Medication Reason     ferrous sulfate (FEROSUL) 325 (65 Fe) MG tablet Medication Reconciliation Clean Up     vitamin C (ASCORBIC ACID) 250 MG tablet          Encounter Diagnosis   Name Primary?     Paroxysmal atrial fibrillation (H)        CURRENT MEDICATIONS:  Current Outpatient Medications   Medication Sig Dispense Refill     acetaminophen (TYLENOL) 325 MG tablet Take 2 tablets (650 mg) by mouth every 4 hours as needed for mild pain or headaches  0     amiodarone (PACERONE) 200 MG tablet 200 mg bid for 2 days, then 200 mg daily 104 tablet 3     apixaban ANTICOAGULANT (ELIQUIS ANTICOAGULANT) 5 MG tablet Take 1 tablet (5 mg) by mouth 2 times daily 120 tablet 0     atorvastatin (LIPITOR) 40 MG tablet Take 1 tablet (40 mg) by mouth daily 90 tablet 3     Calcium-Vitamin D-Vitamin K (CALCIUM + D + K) 750-500-40 MG-UNT-MCG TABS Take 2 tablets by mouth daily.       clopidogrel (PLAVIX) 75 MG tablet Take 1 tablet (75 mg) by mouth daily 30 tablet 3     colchicine (COLCYRS) 0.6 MG tablet Take 1 tablet (0.6 mg) by mouth daily 90 tablet 0     esomeprazole (NEXIUM) 20 MG DR capsule Take 20 mg by mouth every morning (before breakfast) Take 30-60 minutes before eating.       fluticasone (FLONASE) 50 MCG/ACT nasal spray Spray 2 sprays into both nostrils daily 16 g 11     furosemide (LASIX) 20 MG tablet Take 1 tablet (20 mg) by mouth daily 30 tablet 0     lisinopril (ZESTRIL) 2.5 MG tablet Take 1 tablet (2.5 mg) by mouth At Bedtime 90 tablet 3     mesalamine (LIALDA) 1.2 g EC tablet Take 4,800 mg by mouth daily (with breakfast)        metoprolol succinate ER (TOPROL XL) 100 MG 24 hr tablet Take 1 tablet (100 mg) by mouth  "daily 30 tablet 0     Multiple Vitamin (MULTIVITAMIN PO) Take 1 tablet by mouth daily  0     Probiotic Product (PROBIOTIC PO) Take 1 tablet by mouth daily       psyllium (METAMUCIL) 58.6 % POWD Take 1 teaspoonful by mouth daily (At noon)       sildenafil (VIAGRA) 25 MG tablet Take 3 tablets (75 mg) by mouth daily as needed 90 tablet 0     UNABLE TO FIND daily MEDICATION NAME: Move Free         ALLERGIES     Allergies   Allergen Reactions     No Known Drug Allergies          Review of Systems:  Skin:  Negative     Eyes:  Positive for glasses  ENT:  Negative    Respiratory:  Positive for sleep apnea  Cardiovascular:       Gastroenterology: Positive for heartburn  Genitourinary:  Negative    Musculoskeletal:  Positive for neck pain  Neurologic:  Negative    Psychiatric:  Negative    Heme/Lymph/Imm:  Negative    Endocrine:  Negative      Physical Exam:  Vitals: /76   Pulse 65   Ht 1.854 m (6' 1\")   Wt 101.1 kg (222 lb 12.8 oz)   BMI 29.39 kg/m      Constitutional:  cooperative, alert and oriented, well developed, well nourished, in no acute distress        Skin:  warm and dry to the touch, no apparent skin lesions or masses noted        Head:  normocephalic, no masses or lesions        Eyes:  pupils equal and round;conjunctivae and lids unremarkable;sclera white        ENT:           Neck:  JVP normal;no carotid bruit        Chest:  normal breath sounds, clear to auscultation, normal A-P diameter, normal symmetry, normal respiratory excursion, no use of accessory muscles        Cardiac: regular rhythm;normal S1 and S2       holosystolic murmur;grade 1;grade 2          Abdomen:  abdomen soft        Vascular: pulses full and equal                               right femoral bruit (-) R groin site with minimal ecchymosis. No tenderness. No bruit.    Extremities and Back:  no deformities, clubbing, cyanosis, erythema observed;no edema        Neurological:  no gross motor deficits            PAST MEDICAL " HISTORY:  Past Medical History:   Diagnosis Date     Actinic keratoses     face     Atrial fibrillation (H)     only after adenosine test     BPH (benign prostatic hypertrophy)      Chest pain      Coronary artery disease 2011 2011-Cath Lma 5%, Lad 40-50%, Lcx 30-40%, Knx07-25% (-FFR of Lad)     ED (erectile dysfunction)      Erectile dysfunction 12/30/2021     Family history of colon cancer 7/6/2012    mother age 65     GERD (gastroesophageal reflux disease)     nexium     Hyperlipidaemia      Hypertension 2/16/2022     IFG (impaired fasting glucose)      Murmur      Obesity, unspecified     Waist size 42, BMI 30.8     Right inguinal hernia 7/1/2014     Thrombocytosis      Ulcerative colitis (H)        PAST SURGICAL HISTORY:  Past Surgical History:   Procedure Laterality Date     ANESTHESIA CARDIOVERSION N/A 3/22/2022    Procedure: ANESTHESIA, FOR CARDIOVERSION;  Surgeon: GENERIC ANESTHESIA PROVIDER;  Location:  OR     CARDIAC NUC POLINA STRESS TEST NL      Normal     CARDIOVERSION  12/2011    Atrial Fib     COLONOSCOPY  1/29/2014    Procedure: COMBINED COLONOSCOPY, SINGLE BIOPSY/POLYPECTOMY BY BIOPSY;  COLONOSCOPY;  Surgeon: Ashtyn Gonzales MD;  Location:  GI     COLONOSCOPY N/A 7/11/2018    Procedure: COMBINED COLONOSCOPY, SINGLE OR MULTIPLE BIOPSY/POLYPECTOMY BY BIOPSY;  COLONOSCOPY ;  Surgeon: Ashtyn Gonzales MD;  Location:  GI     CORONARY ANGIOGRAPHY ADULT ORDER  12/2011    1 LMA 5%, LAD 40-50%, LCx 30-40%, RCA 25-30%     CV CORONARY ANGIOGRAM N/A 3/3/2022    Procedure: Coronary Angiogram;  Surgeon: Eulalio Del Rosario MD;  Location: [facility] CARDIAC CATH LAB     CV CORONARY ANGIOGRAM N/A 3/3/2022    Procedure: Coronary Angiogram;  Surgeon: Eulalio Del Rosario MD;  Location: [facility] CARDIAC CATH LAB     CV INSTANTANEOUS WAVE-FREE RATIO N/A 3/3/2022    Procedure: Instantaneous Wave-Free Ratio;  Surgeon: Eulalio Del Rosario MD;  Location: [facility] CARDIAC CATH LAB     CV INTRAVASULAR ULTRASOUND  N/A 3/3/2022    Procedure: Intravascular Ultrasound;  Surgeon: Eulalio Del Rosaroi MD;  Location:  HEART CARDIAC CATH LAB     CV INTRAVASULAR ULTRASOUND N/A 3/3/2022    Procedure: Intravascular Ultrasound;  Surgeon: Eulalio Del Rosario MD;  Location:  HEART CARDIAC CATH LAB     CV PCI ATHERECTOMY ORBITAL N/A 3/3/2022    Procedure: Percutaneous Coronary Intervention Atherectomy Rotational;  Surgeon: Eulalio Del Rosario MD;  Location:  HEART CARDIAC CATH LAB     CV TEMPORARY PACEMAKER INSERTION N/A 3/3/2022    Procedure: Temporary Pacemaker Insertion;  Surgeon: Eulalio Del Rosario MD;  Location:  HEART CARDIAC CATH LAB     ENT SURGERY      Ringing in the ears     EP ABLATION FOCAL AFIB N/A 5/12/2022    Procedure: Ablation Focal Atrial Fibrillation [[id number]];  Surgeon: Teodoro Jones MD;  Location:  HEART CARDIAC CATH LAB     HC INJECTION SCLEROSING SOLUTION HEMORRHOID  8/24/2012    Procedure: HEMORRHOID INJECTION SCLEROSING SOLUTION;  Surgeon: Mayito Chow MD;  Location:  GI     HC TOOTH EXTRACTION W/FORCEP       HERNIA REPAIR  Needed     LASIK  1/2011     ROTATOR CUFF REPAIR RT/LT Left 10/15/2015    RCR Left - Dr. Carvalho     SOFT TISSUE SURGERY  10/2015    Torn rotator cuff       FAMILY HISTORY:  Family History   Problem Relation Age of Onset     Colon Cancer Mother 65        passed away from colon cancer - lived about 2 mo from dx 1981     C.A.D. Father         passed at 52 from MI     Cardiovascular Father      Heart Disease Father      Obesity Father      Myocardial Infarction Father         52 - passed away     Coronary Artery Disease Father      Heart Disease Brother      Diabetes Brother      Coronary Artery Disease Brother      Hypertension Brother      Hyperlipidemia Brother      Coronary Artery Disease Brother         stent placed      Hyperlipidemia Brother      Sleep Apnea Brother      Arthritis Maternal Grandmother      Diabetes Maternal Grandmother      Arthritis Maternal Grandfather       Diabetes Maternal Grandfather      Substance Abuse Paternal Grandfather      Hypertension No family hx of      Cerebrovascular Disease No family hx of      Breast Cancer No family hx of      Prostate Cancer No family hx of      Alcohol/Drug No family hx of      Allergies No family hx of      Alzheimer Disease No family hx of      Circulatory No family hx of      Congenital Anomalies No family hx of      Connective Tissue Disorder No family hx of      Depression No family hx of      Eye Disorder No family hx of      Genetic Disorder No family hx of      Gastrointestinal Disease No family hx of      Genitourinary Problems No family hx of      Gynecology No family hx of      Musculoskeletal Disorder No family hx of      Neurologic Disorder No family hx of      Osteoporosis No family hx of      Psychotic Disorder No family hx of      Respiratory No family hx of      Thyroid Disease No family hx of      Anesthesia Reaction No family hx of      Blood Disease No family hx of        SOCIAL HISTORY:  Social History     Socioeconomic History     Marital status:      Spouse name: Emy     Number of children: 2     Years of education: 16     Highest education level: None   Occupational History     Occupation: [employer]     Employer: [employer]   Tobacco Use     Smoking status: Former Smoker     Packs/day: 1.50     Years: 24.00     Pack years: 36.00     Types: Cigarettes     Quit date: 1996     Years since quittin.4     Smokeless tobacco: Never Used   Substance and Sexual Activity     Alcohol use: Yes     Alcohol/week: 0.0 standard drinks     Comment: occ     Drug use: No     Sexual activity: Yes     Partners: Female     Birth control/protection: None   Other Topics Concern      Service Yes     Blood Transfusions No     Caffeine Concern No     Comment: 2 cups per day     Occupational Exposure No     Hobby Hazards No     Sleep Concern No     Stress Concern Yes     Weight Concern Yes      Special Diet Yes     Back Care No     Exercise No     Comment: 8,000 steps a day. Fitbit     Bike Helmet No     Comment: n/a     Seat Belt Yes     Self-Exams Yes     Parent/sibling w/ CABG, MI or angioplasty before 65F 55M? Yes     Comment: Father and brother   Social History Narrative    Eats fruits and vegetables every day. He takes extra vitamin D. He was advised to aim for 1200 mg of calcium per day.

## 2022-05-23 ENCOUNTER — OFFICE VISIT (OUTPATIENT)
Dept: CARDIOLOGY | Facility: CLINIC | Age: 72
End: 2022-05-23
Attending: INTERNAL MEDICINE
Payer: MEDICARE

## 2022-05-23 ENCOUNTER — LAB (OUTPATIENT)
Dept: LAB | Facility: CLINIC | Age: 72
End: 2022-05-23

## 2022-05-23 VITALS
BODY MASS INDEX: 29.53 KG/M2 | HEART RATE: 65 BPM | SYSTOLIC BLOOD PRESSURE: 116 MMHG | WEIGHT: 222.8 LBS | DIASTOLIC BLOOD PRESSURE: 76 MMHG | HEIGHT: 73 IN

## 2022-05-23 DIAGNOSIS — I48.0 PAROXYSMAL ATRIAL FIBRILLATION (H): ICD-10-CM

## 2022-05-23 LAB
ANION GAP SERPL CALCULATED.3IONS-SCNC: 4 MMOL/L (ref 3–14)
BUN SERPL-MCNC: 25 MG/DL (ref 7–30)
CALCIUM SERPL-MCNC: 9.5 MG/DL (ref 8.5–10.1)
CHLORIDE BLD-SCNC: 101 MMOL/L (ref 94–109)
CO2 SERPL-SCNC: 27 MMOL/L (ref 20–32)
CREAT SERPL-MCNC: 1.24 MG/DL (ref 0.66–1.25)
GFR SERPL CREATININE-BSD FRML MDRD: 63 ML/MIN/1.73M2
GLUCOSE BLD-MCNC: 117 MG/DL (ref 70–99)
POTASSIUM BLD-SCNC: 4.2 MMOL/L (ref 3.4–5.3)
SODIUM SERPL-SCNC: 132 MMOL/L (ref 133–144)

## 2022-05-23 PROCEDURE — 36415 COLL VENOUS BLD VENIPUNCTURE: CPT | Performed by: PHYSICIAN ASSISTANT

## 2022-05-23 PROCEDURE — 80048 BASIC METABOLIC PNL TOTAL CA: CPT | Performed by: PHYSICIAN ASSISTANT

## 2022-05-23 PROCEDURE — 93000 ELECTROCARDIOGRAM COMPLETE: CPT | Performed by: PHYSICIAN ASSISTANT

## 2022-05-23 PROCEDURE — 99214 OFFICE O/P EST MOD 30 MIN: CPT | Performed by: PHYSICIAN ASSISTANT

## 2022-05-23 NOTE — PROGRESS NOTES
Assessment & Plan     Ulcerative colitis with complication, unspecified location (H)  Stable  Continue monitoring     Other congestive heart failure (H)  Stable, will keep monitoring with cardiologist     Atrial fibrillation, unspecified type (H)  Did ablation with EP, unfortunately ended with pericarditis, started Colchicine, other meds are adjusted as well   Currently on amiodarone 200mg/Apixaban 5mg bid/toprol XL increased to 100mg  Encouraged him to see cardiology as scheduled     Other acute pericarditis  Mentioned above            FUTURE APPOINTMENTS:       - Follow-up visit in 6 months for CPE    No follow-ups on file.    Donell Zuñiga MD  [facility] is a 71 year old who presents for the following health issues     HPI     Post Discharge Outreach 5/20/2022   Admission Date 5/15/2022   Reason for Admission Post ablation acute pericarditis,    Discharge Date 5/19/2022   Discharge Diagnosis Post ablation acute pericarditis,   How are you doing now that you are home? a little sluggish   How are your symptoms? (Red Flag symptoms escalate to triage hotline per guidelines) Improved   Do you feel your condition is stable enough to be safe at home until your provider visit? Yes   Does the patient have their discharge instructions?  Yes   Does the patient have questions regarding their discharge instructions?  No   Were you started on any new medications or were there changes to any of your previous medications?  Yes   Does the patient have all of their medications? Yes   Do you have questions regarding any of your medications?  No   Do you have all of your needed medical supplies or equipment (DME)?  (i.e. oxygen tank, CPAP, cane, etc.) Yes   Discharge follow-up appointment scheduled within 14 calendar days?  Yes   Discharge Follow Up Appointment Date 5/23/2022   Discharge Follow Up Appointment Scheduled with? Specialty Care Provider     Hospital Follow-up  Visit:    Hospital/Nursing Home/IP Rehab Facility: [facility]  Date of Admission: 5/16/22  Date of Discharge: 5/19/22  Reason(s) for Admission: Pericarditis      Was your hospitalization related to COVID-19? No   Problems taking medications regularly:  None  Medication changes since discharge: mentioned above   Problems adhering to non-medication therapy:  None    Summary of hospitalization:  [facility] discharge summary reviewed  Diagnostic Tests/Treatments reviewed.  Follow up needed: none  Other Healthcare Providers Involved in Patient s Care:         None  Update since discharge: stable.       Post Discharge Medication Reconciliation: discharge medications reconciled, continue medications without change.  Plan of care communicated with patient                Review of Systems   Constitutional, HEENT, cardiovascular, pulmonary, gi and gu systems are negative, except as otherwise noted.      Objective    /65   Pulse 74   Temp 97.8  F (36.6  C) (Temporal)   Wt 100.2 kg (221 lb)   SpO2 96%   BMI 29.16 kg/m    Body mass index is 29.16 kg/m .  Physical Exam   GENERAL: healthy, alert and no distress  EYES: Eyes grossly normal to inspection, PERRL and conjunctivae and sclerae normal  HENT: ear canals and TM's normal, nose and mouth without ulcers or lesions  NECK: no adenopathy, no asymmetry, masses, or scars and thyroid normal to palpation  RESP: lungs clear to auscultation - no rales, rhonchi or wheezes  CV: regular rate and rhythm, normal S1 S2, no S3 or S4, no murmur, click or rub, no peripheral edema and peripheral pulses strong  ABDOMEN: soft, nontender, no hepatosplenomegaly, no masses and bowel sounds normal  MS: no gross musculoskeletal defects noted, no edema  SKIN: no suspicious lesions or rashes  NEURO: Normal strength and tone, mentation intact and speech normal

## 2022-05-23 NOTE — LETTER
5/23/2022    Donell Zuñiga MD  [address] [facility] [address]    RE: Peter Peralta       Dear Colleague,     I had the pleasure of seeing Peter TIFFANIE Peralta in the [facility].  [facility]    I had the pleasure of seeing Peter when he came for follow up of recent AFib ablation.  This 71 year old sees Dr. Jones for his history of:    1. Persistent AFib, typical AFlutter - noted to be in arrhythmia at GI appt 2/2022 vwith heart failure symptoms with EF 25 to 30%.  DCCV 3/2022 was unsuccessful.  Noted to be in AFlutter during office visit 4/21/2022.  Now s/p   2. Hypertension  3. Dyslipidemia  4. Mixed cardiomyopathy  5. CAD -angiogram in the setting of severe LV dysfunction showed occluded RCA, s/p thrombectomy and PCI 2/2022    Dr. Jones saw Ross 4/2022 at which time he reviewed his HF sxs starting 2/2022. He was noted to have severe LV dysfunction, with EF <20%.  Coronary angiography 3/2022 showed occluded RCA, and he underwent thrombectomy of the RCA with PCI.  He was also noted to be in persistent atrial fibrillation at that time.  He underwent attempt at DCCV 3/22/2022, which was unsuccessful.  He was started on amiodarone and met with Dr. Jones.  At that time, he continued to c/o fatigue and MOSS.  His EKG actually showed typical atrial flutter with an underlying RBBB.     Today his young age, underlying RBBB and LV dysfunction, he was felt not to be a good candidate for antiarrhythmic therapy.  He therefore opted to proceed with AFib ablation with preop chest CT and OSCAR.    On 5/12, he was noted to be in atrial flutter and underwent cardioversion followed by PVI and CTI ablation after OSCAR showed no intra-atrial clot.    He was discharged home the same day.  No medication changes were made.  When we contacted him the following day 5/13, he noted he was up 5 pounds.  Lasix prescription was provided, noting that if he did not diurese on his own over the  "weekend, he did not need to fill this.  His chest discomfort and shortness of breath was all improving.    Unfortunately, he was then hospitalized 5/15-19/2022 after presenting with CP thought d/t acute pericarditis.  This responded to colchicine.  He initially was in NSR but unsurprisingly, went into AFib on 5/16/2022.  CRP peaked at 246. hsTrop 243.  Echo showed continued EF 25 to 30%, with trivial pericardial effusion.  No evidence of tamponade, but some suggestion of interventricular interdependence/effusive constrictive physiology.  Moderately reduced RVSF noted, mild MS and interestingly, low flow moderate AoS noted, which was not seen on the echo 3/22.    For his recurrent AFib, he was placed back on amiodarone 200 mg BID x 3 days, then 200 mg daily.  He had continued paroxysms of AFib, but was discharged in SR.  On 5/19, weight was 227#.  He was discharged home on increased metoprolol  mg daily, colchicine 0.6mg in 3 months, increased amiodarone 200 mg BID x 3 days, then 200 mg daily. Furosemide 20 mg daily started.     Interval History:  Nathan and Emy overall think things are improving since he got out of the hospital.  He states that he still has some minimal chest discomfort, with his \"pericarditis pain\" about a 1/10 and \"indigestion\" 2-3/10.  Denies any exertional discomfort.  He does remain on Nexium and colchicine.  He has a history of colitis, so did have some trouble with diarrhea after starting colchicine.  This is subsequently resolved and he does feel the colchicine has helped.    Denies any problems with edema, orthopnea or PND.  He was discharged on furosemide 20 mg daily.  No follow-up blood work has been done yet.  Weight is down about 5 pounds (he was 218#on his scale at home today).  He and Emy cannot remember a time that his weight was down so much.    Denies fever/chills.  Denies any problems with the right groin site.  Chandana and Emy note an area of discomfort/redness at left " "antecubital area.  This is improved with time.  They are using ice packs.  No trouble swallowing.    He does not think he has had any atrial fibrillation since Friday night.  He does know he went back into it after he left the hospital, but has been in SR since Friday night 5/20.    BPs at home 100s/60s at the lowest.    VITALS:  Vitals: /76   Pulse 65   Ht 1.854 m (6' 1\")   Wt 101.1 kg (222 lb 12.8 oz)   BMI 29.39 kg/m      Diagnostic Testing:  EKG today, which I overread, showed SR 64 bpm. RBBB.   Echo 5/16/22 LVEF stable 25-30% with severe global HK of LV. RV moderately dilated with moderately decreased RVSF. Severe dilation of LA with volume index 42.3 mL/m .  1-2+ MR.  Mild MS with mean gradient 2.6 mmHg at 88 bpm.  1+ TR.  RVSP 27+ RAP.  Moderate aortic sclerosis.  Moderate aortic stenosis.  Low stroke-volume index, with V-max 2.3 m/s, mean gradient 12 mmHg and CLEMENTINA 1.2 cm .  DVI was 0.34.  1+ ND.  Normal aorta.  Trivial pericardial effusion.  Septal bounce present suggesting interventricular interdependence.  CTA Heart 4/29/2022 nl PV anatomy.  Severe coronary calcification with stents in the RCA noted.  Non-cardiac portion showed benign granuloma and benign fissural nodules  Echo 2/25/2022 - Severe LV dysfunction.  EF of 25-30%.  Global hypokinesis.  There was mild MR/TR.  Cath 3/3/2022 - Total occluded RCA.  Mild to moderate disease in the LAD/circumflex.  PCI to RCA with thrombectomy and Rotablator.   OSCAR 3/22/2022 - EF of 20 to 25%.  There were moderate to severe MR.  Component      Latest Ref Rng & Units 6/22/2017 5/15/2022 5/17/2022   CRP Inflammation      0.0 - 8.0 mg/L <2.9 72.4 (H) 246.0 (H)     Component      Latest Ref Rng & Units 5/15/2022 5/15/2022 5/16/2022 5/16/2022           7:20 PM  9:29 PM  2:00 AM  5:59 AM   Troponin I High Sensitivity      <79 ng/L 265 () 243 () 208 (HH) 201 ()     Component      Latest Ref Rng & Units 4/13/2022 5/17/2022 5/18/2022   Sodium      133 - 144 " mmol/L 136 131 (L) 135   Potassium      3.4 - 5.3 mmol/L 4.4 3.9 4.1   Chloride      94 - 109 mmol/L 107 100 106   Carbon Dioxide      20 - 32 mmol/L 25 25 24   Anion Gap      3 - 14 mmol/L 4 6 5   Urea Nitrogen      7 - 30 mg/dL 28 24 24   Creatinine      0.66 - 1.25 mg/dL 1.17 1.03 1.04   Calcium      8.5 - 10.1 mg/dL 9.3 8.1 (L) 8.9   Glucose      70 - 99 mg/dL 93 117 (H) 120 (H)   GFR Estimate      >60 mL/min/1.73m2 67 79 78     Component      Latest Ref Rng & Units 3/5/2022 5/16/2022 5/18/2022   WBC      4.0 - 11.0 10e3/uL 11.3 (H) 16.8 (H) 10.6   RBC Count      4.40 - 5.90 10e6/uL 4.31 (L) 4.55 4.53   Hemoglobin      13.3 - 17.7 g/dL 12.6 (L) 13.1 (L) 13.1 (L)   Hematocrit      40.0 - 53.0 % 38.3 (L) 39.6 (L) 40.0   MCV      78 - 100 fL 89 87 88   MCH      26.5 - 33.0 pg 29.2 28.8 28.9   MCHC      31.5 - 36.5 g/dL 32.9 33.1 32.8   RDW      10.0 - 15.0 % 12.8 14.1 14.1   Platelet Count      150 - 450 10e3/uL 338 391 422         Plan:  1. Repeat echo before Dr. Jones appointment 8/2022  2. Will need repeat amiodarone labs 8/2022 if Dr. Jones keeps him on amiodarone  3. Stop colchicine after 5/26.  Resume if CP returns.  4. BMP today given addition of furosemide.    Assessment/Plan:    1. Atrial arrhythmias, postprocedural pericarditis    As above, noted to have atrial fibrillation as well as typical atrial flutter, s/p DCCV, PVI and CTI ablation 5/12/2022 with Dr. Jones    He was discharged home on continued amiodarone and Eliquis the same day    Developed recurrent atrial fibrillation in the setting of acute pericarditis.  Amiodarone was reloaded, and he was discharged in sinus rhythm 5/19/2022.  Notes that he is last episode of  AFib was likely  Friday 5/20 PM and he has remained in sinus since then.        Remains on Eliquis for CHADSVASc 3 (cardiomyopathy, age, CAD).    EKG today shows SR/RBBB    PLAN:    Continue amiodarone 200 mg daily    Continue increased metoprolol  mg daily for now.  He is to  contact us if his heart rate is <50 bpm for >24 hours or if he thinks he has been in AFib for >48 h    He will continue anticoagulation with Eliquis    See Dr. Jones 8/2022 as planned      Recommended warm compresses to the left arm for post-IV discomfort      Recommended he try coming off of the colchicine after 1 week (5/26).  Given this was mild, I do not anticipate him requiring 3 months of colchicine as initially recommended on discharge.  If, he develops recurrent chest discomfort after stopping it, he can certainly restart this.    2. Mixed CM    LVEF noted 25-30% at time of presentation 2/2022.    Coronary angiography revealed an occluded RCA treated with thrombectomy/PCI    Also noted to be in persistent AFib.    Now s/p CTI and PVI ablation 5/12/2022    Repeat echo while hospitalized for postprocedural pericarditis continue to show LVEF 25-30% with global hypokinesis.      Today, he appears euvolemic.  Weights at home 218# (222# here)      On hospital discharge, started on Lasix 20 mg daily, continued on lisinopril 2.5 mg daily, and metoprolol XL increased to 100 mg daily    PLAN:    Plan to repeat echocardiogram before Dr. Jones's follow-up appointment to determine if restored SR improves LVEF.  We will cancel the echo that was scheduled in 6/2022 as would like to see how maintaining SR for a longer period of time will help    BMP today to assess renal function on new furosemide    We can move CORE visit at this time given he is euvolemic and echo is being postponed    3. Valvular abnormalities    As above, echocardiogram while hospitalized for pericarditis showed LVEF 25-30%, mild-moderate MR with mild MS (gradient 2.6 mmHg @ 88 bpm), 1+ TR and moderate aortic sclerosis with moderate aortic stenosis (low-flow)    PLAN:    Repeat echo prior to Dr. Jones appointment    4. On amiodarone therapy    Given his right bundle branch block and cardiomyopathy, a poor candidate for other antiarrhythmics.    Plan is  to use this relatively short-term given his young age    Baseline TSH 2/2022 was normal.  AST and ALT were normal.    PLAN:    PFTs 6/2022 as planned    If Dr. Jones keeps him on amiodarone after 8/2022, he will be due for labs at that time (not yet ordered)    Mellisa Burrell PA-C, MSPAS      Orders Placed This Encounter   Procedures     Basic metabolic panel     EKG 12-lead complete w/read - Clinics (performed today)     No orders of the defined types were placed in this encounter.    Medications Discontinued During This Encounter   Medication Reason     ferrous sulfate (FEROSUL) 325 (65 Fe) MG tablet Medication Reconciliation Clean Up     vitamin C (ASCORBIC ACID) 250 MG tablet          Encounter Diagnosis   Name Primary?     Paroxysmal atrial fibrillation (H)        CURRENT MEDICATIONS:  Current Outpatient Medications   Medication Sig Dispense Refill     acetaminophen (TYLENOL) 325 MG tablet Take 2 tablets (650 mg) by mouth every 4 hours as needed for mild pain or headaches  0     amiodarone (PACERONE) 200 MG tablet 200 mg bid for 2 days, then 200 mg daily 104 tablet 3     apixaban ANTICOAGULANT (ELIQUIS ANTICOAGULANT) 5 MG tablet Take 1 tablet (5 mg) by mouth 2 times daily 120 tablet 0     atorvastatin (LIPITOR) 40 MG tablet Take 1 tablet (40 mg) by mouth daily 90 tablet 3     Calcium-Vitamin D-Vitamin K (CALCIUM + D + K) 750-500-40 MG-UNT-MCG TABS Take 2 tablets by mouth daily.       clopidogrel (PLAVIX) 75 MG tablet Take 1 tablet (75 mg) by mouth daily 30 tablet 3     colchicine (COLCYRS) 0.6 MG tablet Take 1 tablet (0.6 mg) by mouth daily 90 tablet 0     esomeprazole (NEXIUM) 20 MG DR capsule Take 20 mg by mouth every morning (before breakfast) Take 30-60 minutes before eating.       fluticasone (FLONASE) 50 MCG/ACT nasal spray Spray 2 sprays into both nostrils daily 16 g 11     furosemide (LASIX) 20 MG tablet Take 1 tablet (20 mg) by mouth daily 30 tablet 0     lisinopril (ZESTRIL) 2.5 MG tablet Take 1 tablet  "(2.5 mg) by mouth At Bedtime 90 tablet 3     mesalamine (LIALDA) 1.2 g EC tablet Take 4,800 mg by mouth daily (with breakfast)        metoprolol succinate ER (TOPROL XL) 100 MG 24 hr tablet Take 1 tablet (100 mg) by mouth daily 30 tablet 0     Multiple Vitamin (MULTIVITAMIN PO) Take 1 tablet by mouth daily  0     Probiotic Product (PROBIOTIC PO) Take 1 tablet by mouth daily       psyllium (METAMUCIL) 58.6 % POWD Take 1 teaspoonful by mouth daily (At noon)       sildenafil (VIAGRA) 25 MG tablet Take 3 tablets (75 mg) by mouth daily as needed 90 tablet 0     UNABLE TO FIND daily MEDICATION NAME: Move Free         ALLERGIES     Allergies   Allergen Reactions     No Known Drug Allergies          Review of Systems:  Skin:  Negative     Eyes:  Positive for glasses  ENT:  Negative    Respiratory:  Positive for sleep apnea  Cardiovascular:       Gastroenterology: Positive for heartburn  Genitourinary:  Negative    Musculoskeletal:  Positive for neck pain  Neurologic:  Negative    Psychiatric:  Negative    Heme/Lymph/Imm:  Negative    Endocrine:  Negative      Physical Exam:  Vitals: /76   Pulse 65   Ht 1.854 m (6' 1\")   Wt 101.1 kg (222 lb 12.8 oz)   BMI 29.39 kg/m      Constitutional:  cooperative, alert and oriented, well developed, well nourished, in no acute distress        Skin:  warm and dry to the touch, no apparent skin lesions or masses noted        Head:  normocephalic, no masses or lesions        Eyes:  pupils equal and round;conjunctivae and lids unremarkable;sclera white        ENT:           Neck:  JVP normal;no carotid bruit        Chest:  normal breath sounds, clear to auscultation, normal A-P diameter, normal symmetry, normal respiratory excursion, no use of accessory muscles        Cardiac: regular rhythm;normal S1 and S2       holosystolic murmur;grade 1;grade 2          Abdomen:  abdomen soft        Vascular: pulses full and equal                               right femoral bruit (-) R groin " site with minimal ecchymosis. No tenderness. No bruit.    Extremities and Back:  no deformities, clubbing, cyanosis, erythema observed;no edema        Neurological:  no gross motor deficits            PAST MEDICAL HISTORY:  Past Medical History:   Diagnosis Date     Actinic keratoses     face     Atrial fibrillation (H)     only after adenosine test     BPH (benign prostatic hypertrophy)      Chest pain      Coronary artery disease 2011 2011-Cath Lma 5%, Lad 40-50%, Lcx 30-40%, Btv90-23% (-FFR of Lad)     ED (erectile dysfunction)      Erectile dysfunction 12/30/2021     Family history of colon cancer 7/6/2012    mother age 65     GERD (gastroesophageal reflux disease)     nexium     Hyperlipidaemia      Hypertension 2/16/2022     IFG (impaired fasting glucose)      Murmur      Obesity, unspecified     Waist size 42, BMI 30.8     Right inguinal hernia 7/1/2014     Thrombocytosis      Ulcerative colitis (H)        PAST SURGICAL HISTORY:  Past Surgical History:   Procedure Laterality Date     ANESTHESIA CARDIOVERSION N/A 3/22/2022    Procedure: ANESTHESIA, FOR CARDIOVERSION;  Surgeon: GENERIC ANESTHESIA PROVIDER;  Location:  OR     CARDIAC NUC POLINA STRESS TEST NL      Normal     CARDIOVERSION  12/2011    Atrial Fib     COLONOSCOPY  1/29/2014    Procedure: COMBINED COLONOSCOPY, SINGLE BIOPSY/POLYPECTOMY BY BIOPSY;  COLONOSCOPY;  Surgeon: Ashtyn Gonzales MD;  Location:  GI     COLONOSCOPY N/A 7/11/2018    Procedure: COMBINED COLONOSCOPY, SINGLE OR MULTIPLE BIOPSY/POLYPECTOMY BY BIOPSY;  COLONOSCOPY ;  Surgeon: Ashtyn Gonzales MD;  Location:  GI     CORONARY ANGIOGRAPHY ADULT ORDER  12/2011    1 LMA 5%, LAD 40-50%, LCx 30-40%, RCA 25-30%     CV CORONARY ANGIOGRAM N/A 3/3/2022    Procedure: Coronary Angiogram;  Surgeon: Eulalio Del Rosario MD;  Location: [facility] CARDIAC CATH LAB     CV CORONARY ANGIOGRAM N/A 3/3/2022    Procedure: Coronary Angiogram;  Surgeon: Eulalio Del Rosario MD;  Location: [facility]  CARDIAC CATH LAB     CV INSTANTANEOUS WAVE-FREE RATIO N/A 3/3/2022    Procedure: Instantaneous Wave-Free Ratio;  Surgeon: Eulalio Del Rosario MD;  Location: [facility] CARDIAC CATH LAB     CV INTRAVASULAR ULTRASOUND N/A 3/3/2022    Procedure: Intravascular Ultrasound;  Surgeon: Eulalio Del Rosario MD;  Location: [facility] CARDIAC CATH LAB     CV INTRAVASULAR ULTRASOUND N/A 3/3/2022    Procedure: Intravascular Ultrasound;  Surgeon: Eulalio Del Rosario MD;  Location: [facility] CARDIAC CATH LAB     CV PCI ATHERECTOMY ORBITAL N/A 3/3/2022    Procedure: Percutaneous Coronary Intervention Atherectomy Rotational;  Surgeon: Eulalio Del Rosario MD;  Location: [facility] CARDIAC CATH LAB     CV TEMPORARY PACEMAKER INSERTION N/A 3/3/2022    Procedure: Temporary Pacemaker Insertion;  Surgeon: Eulalio Del Rosario MD;  Location: [facility] CARDIAC CATH LAB     ENT SURGERY      Ringing in the ears     EP ABLATION FOCAL AFIB N/A 5/12/2022    Procedure: Ablation Focal Atrial Fibrillation [[id number]];  Surgeon: Teodoro Jones MD;  Location: [facility] CARDIAC CATH LAB     HC INJECTION SCLEROSING SOLUTION HEMORRHOID  8/24/2012    Procedure: HEMORRHOID INJECTION SCLEROSING SOLUTION;  Surgeon: Mayito Chow MD;  Location:  GI     HC TOOTH EXTRACTION W/FORCEP       HERNIA REPAIR  Needed     LASIK  1/2011     ROTATOR CUFF REPAIR RT/LT Left 10/15/2015    RCR Left - Dr. Carvalho     SOFT TISSUE SURGERY  10/2015    Torn rotator cuff       FAMILY HISTORY:  Family History   Problem Relation Age of Onset     Colon Cancer Mother 65        passed away from colon cancer - lived about 2 mo from dx 1981     C.A.D. Father         passed at 52 from MI     Cardiovascular Father      Heart Disease Father      Obesity Father      Myocardial Infarction Father         52 - passed away     Coronary Artery Disease Father      Heart Disease Brother      Diabetes Brother      Coronary Artery Disease Brother      Hypertension Brother      Hyperlipidemia Brother       Coronary Artery Disease Brother         stent placed      Hyperlipidemia Brother      Sleep Apnea Brother      Arthritis Maternal Grandmother      Diabetes Maternal Grandmother      Arthritis Maternal Grandfather      Diabetes Maternal Grandfather      Substance Abuse Paternal Grandfather      Hypertension No family hx of      Cerebrovascular Disease No family hx of      Breast Cancer No family hx of      Prostate Cancer No family hx of      Alcohol/Drug No family hx of      Allergies No family hx of      Alzheimer Disease No family hx of      Circulatory No family hx of      Congenital Anomalies No family hx of      Connective Tissue Disorder No family hx of      Depression No family hx of      Eye Disorder No family hx of      Genetic Disorder No family hx of      Gastrointestinal Disease No family hx of      Genitourinary Problems No family hx of      Gynecology No family hx of      Musculoskeletal Disorder No family hx of      Neurologic Disorder No family hx of      Osteoporosis No family hx of      Psychotic Disorder No family hx of      Respiratory No family hx of      Thyroid Disease No family hx of      Anesthesia Reaction No family hx of      Blood Disease No family hx of        SOCIAL HISTORY:  Social History     Socioeconomic History     Marital status:      Spouse name: Emy     Number of children: 2     Years of education: 16     Highest education level: None   Occupational History     Occupation: [employer]     Employer: [employer]   Tobacco Use     Smoking status: Former Smoker     Packs/day: 1.50     Years: 24.00     Pack years: 36.00     Types: Cigarettes     Quit date: 1996     Years since quittin.4     Smokeless tobacco: Never Used   Substance and Sexual Activity     Alcohol use: Yes     Alcohol/week: 0.0 standard drinks     Comment: occ     Drug use: No     Sexual activity: Yes     Partners: Female     Birth control/protection: None   Other Topics Concern       Service Yes     Blood Transfusions No     Caffeine Concern No     Comment: 2 cups per day     Occupational Exposure No     Hobby Hazards No     Sleep Concern No     Stress Concern Yes     Weight Concern Yes     Special Diet Yes     Back Care No     Exercise No     Comment: 8,000 steps a day. Fitbit     Bike Helmet No     Comment: n/a     Seat Belt Yes     Self-Exams Yes     Parent/sibling w/ CABG, MI or angioplasty before 65F 55M? Yes     Comment: Father and brother   Social History Narrative    Eats fruits and vegetables every day. He takes extra vitamin D. He was advised to aim for 1200 mg of calcium per day.       Thank you for allowing me to participate in the care of your patient.      Sincerely,     Yari Burrell PA-C     [facility]  cc:   Teodoro Jones MD  [address]  [place] [address]

## 2022-05-23 NOTE — PATIENT INSTRUCTIONS
Nathan - it was good to see you and Emy today!    EKG today showed normal rhythm - this is good news!  I'm glad that you're starting to feel better  Reviewed the procedure (cardioversion, PVI for AFib and CTI for AFlutter)  Reviewed that breathing seems to be better with the furosemide 20 mg twice a day  Glad chest pain seems to be better and diarrhea is calming down    PLAN:  Would stop the colchicine after 5/26 - call and restart if the chest discomfort  Call if HR <50 bpm or >120 bpm for >24 hours or if in AFib and feeling OK x >48 hours. AFib RNs [phone]  Get updated echo prior to your appt with Dr. Jones (~8/2022)  OK to cancel the appt with Yue Sequeira and echo in 6/2022  Warm compresses to L arm for the post-IV discomfort  Blood work today      CALL if issues/concerns! [phone]

## 2022-05-24 ENCOUNTER — OFFICE VISIT (OUTPATIENT)
Dept: FAMILY MEDICINE | Facility: CLINIC | Age: 72
End: 2022-05-24
Payer: MEDICARE

## 2022-05-24 VITALS
WEIGHT: 221 LBS | TEMPERATURE: 97.8 F | DIASTOLIC BLOOD PRESSURE: 65 MMHG | SYSTOLIC BLOOD PRESSURE: 119 MMHG | OXYGEN SATURATION: 96 % | BODY MASS INDEX: 29.16 KG/M2 | HEART RATE: 74 BPM

## 2022-05-24 DIAGNOSIS — I50.22 CHRONIC SYSTOLIC CONGESTIVE HEART FAILURE (H): ICD-10-CM

## 2022-05-24 DIAGNOSIS — I30.8 OTHER ACUTE PERICARDITIS: ICD-10-CM

## 2022-05-24 DIAGNOSIS — I50.9 OTHER CONGESTIVE HEART FAILURE (H): Primary | ICD-10-CM

## 2022-05-24 DIAGNOSIS — K51.919 ULCERATIVE COLITIS WITH COMPLICATION, UNSPECIFIED LOCATION (H): ICD-10-CM

## 2022-05-24 DIAGNOSIS — I48.91 ATRIAL FIBRILLATION, UNSPECIFIED TYPE (H): ICD-10-CM

## 2022-05-24 PROCEDURE — 99495 TRANSJ CARE MGMT MOD F2F 14D: CPT | Performed by: FAMILY MEDICINE

## 2022-05-24 RX ORDER — FUROSEMIDE 20 MG
10 TABLET ORAL DAILY
Refills: 0
Start: 2022-05-24 | End: 2022-07-22

## 2022-05-24 NOTE — PROGRESS NOTES
"Based on MyChart note from 5/24/2022      Nathan Velasquez it was nice to meet you and Emy yesterday!  Here's the result of your blood work ... your sodium is a little low and Cr is up (but still technically normal). Your blood sugar is listed as \"high\" but you were not fasting so it's normal.     Given you do not appear to be retaining a lot of fluid based on your exam and your sodium is low and Cr is up a bit, please DECREASE your furosemide to just 1/2 tablet (10 mg) daily.      Pls call with questions/concerns!     JERALD Hernandes PA  "

## 2022-06-30 ENCOUNTER — TRANSFERRED RECORDS (OUTPATIENT)
Dept: HEALTH INFORMATION MANAGEMENT | Facility: CLINIC | Age: 72
End: 2022-06-30

## 2022-07-12 ENCOUNTER — THERAPY VISIT (OUTPATIENT)
Dept: SLEEP MEDICINE | Facility: CLINIC | Age: 72
End: 2022-07-12
Attending: INTERNAL MEDICINE
Payer: MEDICARE

## 2022-07-12 DIAGNOSIS — R35.1 NOCTURIA: ICD-10-CM

## 2022-07-12 DIAGNOSIS — R94.30 CARDIAC LV EJECTION FRACTION 21-30%: ICD-10-CM

## 2022-07-12 DIAGNOSIS — G47.30 OBSERVED SLEEP APNEA: ICD-10-CM

## 2022-07-12 PROCEDURE — 95810 POLYSOM 6/> YRS 4/> PARAM: CPT | Performed by: INTERNAL MEDICINE

## 2022-07-13 ENCOUNTER — TELEPHONE (OUTPATIENT)
Dept: CARDIOLOGY | Facility: CLINIC | Age: 72
End: 2022-07-13

## 2022-07-13 NOTE — TELEPHONE ENCOUNTER
Pt called in stating he wants to do colonoscopy. Reviewed chart pt has stenting 3/3/22, and returned to cath lab and was felt to have had clot which resolved by the time he did reach cath lab.    Per 5/23/22 Office visit note NP/PA  FAM Toussainty -Persistent AFib, typical AFlutter - noted to be in arrhythmia at GI appt 2/2022 vwith heart failure symptoms with EF 25 to 30%.  DCCV 3/2022 was unsuccessful.  Noted to be in AFlutter during office visit 4/21/2022.  Now s/p   2. Hypertension  3. Dyslipidemia  4. Mixed cardiomyopathy  5. CAD -angiogram in the setting of severe LV dysfunction showed occluded RCA, s/p thrombectomy and PCI 3/2022     Dr. Jones saw Mark 4/2022 at which time he reviewed his HF sxs starting 2/2022. He was noted to have severe LV dysfunction, with EF <20%.  Coronary angiography 3/2022 showed occluded RCA, and he underwent thrombectomy of the RCA with PCI.  He was also noted to be in persistent atrial fibrillation at that time.  He underwent attempt at DCCV 3/22/2022, which was unsuccessful.  He was started on amiodarone and met with Dr. Jones.  At that time, he continued to c/o fatigue and MOSS.  His EKG actually showed typical atrial flutter with an underlying RBBB.      Today his young age, underlying RBBB and LV dysfunction, he was felt not to be a good candidate for antiarrhythmic therapy.  He therefore opted to proceed with AFib ablation with preop chest CT and OSCAR.     On 5/12, he was noted to be in atrial flutter and underwent cardioversion followed by PVI and CTI ablation after OSCAR showed no intra-atrial clot.     He was discharged home the same day.  No medication changes were made.  When we contacted him the following day 5/13, he noted he was up 5 pounds.  Lasix prescription was provided, noting that if he did not diurese on his own over the weekend, he did not need to fill this.  His chest discomfort and shortness of breath was all improving.     Unfortunately, he was then hospitalized  5/15-19/2022 after presenting with CP thought d/t acute pericarditis.  This responded to colchicine.  He initially was in NSR but unsurprisingly, went into AFib on 5/16/2022.  CRP peaked at 246. hsTrop 243.  Echo showed continued EF 25 to 30%, with trivial pericardial effusion.  No evidence of tamponade, but some suggestion of interventricular interdependence/effusive constrictive physiology.  Moderately reduced RVSF noted, mild MS and interestingly, low flow moderate AoS noted, which was not seen on the echo 3/22.     For his recurrent AFib, he was placed back on amiodarone 200 mg BID x 3 days, then 200 mg daily.  He had continued paroxysms of AFib, but was discharged in SR.  On 5/19, weight was 227#.  He was discharged home on increased metoprolol  mg daily, colchicine 0.6mg in 3 months, increased amiodarone 200 mg BID x 3 days, then 200 mg daily. Furosemide 20 mg daily started.      Discussed with Pt he does see DR Jones 8/23/22, and will have scheduling call Pt to set up appt with DR Del Rosario. Asked Pt to discuss with both providers prep for colonoscopy. Pt says he is doing ok presently and does not need it urgently.     There is note in DR Del Rosario F/U orders for stress test. Will message provider if he still wants that at this time?   JAYDE Way RN

## 2022-07-13 NOTE — TELEPHONE ENCOUNTER
----- Message from Cortney Way RN sent at 7/13/2022 10:38 AM CDT -----  Pt needs F/U with Carin in next 2 months he already has echo scheduled before sees Robert. Thanks

## 2022-07-21 ASSESSMENT — SLEEP AND FATIGUE QUESTIONNAIRES
HOW LIKELY ARE YOU TO NOD OFF OR FALL ASLEEP WHILE SITTING AND TALKING TO SOMEONE: WOULD NEVER DOZE
HOW LIKELY ARE YOU TO NOD OFF OR FALL ASLEEP WHILE LYING DOWN TO REST IN THE AFTERNOON WHEN CIRCUMSTANCES PERMIT: SLIGHT CHANCE OF DOZING
HOW LIKELY ARE YOU TO NOD OFF OR FALL ASLEEP WHILE SITTING QUIETLY AFTER LUNCH WITHOUT ALCOHOL: WOULD NEVER DOZE
HOW LIKELY ARE YOU TO NOD OFF OR FALL ASLEEP WHILE SITTING AND READING: WOULD NEVER DOZE
HOW LIKELY ARE YOU TO NOD OFF OR FALL ASLEEP WHILE WATCHING TV: SLIGHT CHANCE OF DOZING
HOW LIKELY ARE YOU TO NOD OFF OR FALL ASLEEP IN A CAR, WHILE STOPPED FOR A FEW MINUTES IN TRAFFIC: WOULD NEVER DOZE
HOW LIKELY ARE YOU TO NOD OFF OR FALL ASLEEP WHILE SITTING INACTIVE IN A PUBLIC PLACE: WOULD NEVER DOZE
HOW LIKELY ARE YOU TO NOD OFF OR FALL ASLEEP WHEN YOU ARE A PASSENGER IN A CAR FOR AN HOUR WITHOUT A BREAK: WOULD NEVER DOZE

## 2022-07-22 PROBLEM — G47.33 OSA (OBSTRUCTIVE SLEEP APNEA): Status: RESOLVED | Noted: 2022-03-05 | Resolved: 2022-07-22

## 2022-07-22 NOTE — PROCEDURES
" SLEEP STUDY INTERPRETATION  DIAGNOSTIC POLYSOMNOGRAPHY REPORT      Patient: BUCK SAMNAIEGO  YOB: 1950  Study Date: 7/12/2022  MRN: [id number]  Referring Provider: Donell Zuñiga MD  Ordering Provider: Eliza Sellers MD    Indications for Polysomnography: The patient is a 72 year old Male who is 6' 1\" and weighs 222.0 lbs. His BMI is 29.4, Dana sleepiness scale 1 and neck circumference is 40 cm. Relevant medical history includes coronary artery disease, hypertension, atrial fibrillation, heart failure. A diagnostic polysomnogram was performed to evaluate for sleep apnea.    Polysomnogram Data: A full night polysomnogram recorded the standard physiologic parameters including EEG, EOG, EMG, ECG, nasal and oral airflow. Respiratory parameters of chest and abdominal movements were recorded with respiratory inductance plethysmography. Oxygen saturation was recorded by pulse oximetry. Hypopnea scoring rule used: 1B 4%.    Sleep Architecture: Decreased sleep efficiency with increased wake after sleep onset and normal arousal index,  The total recording time of the polysomnogram was 502.5 minutes. The total sleep time was 425.5 minutes. Sleep latency was normal at 8.5 minutes with the use of a sleep aid (zolpidem 5 mg). REM latency was 111.0 minutes. Arousal index was normal at 15.8 arousals per hour. Sleep efficiency decreased at 84.7%. Wake after sleep onset was 68.5 minutes. The patient spent 8.5% of total sleep time in Stage N1, 50.4% in Stage N2, 16.0% in Stage N3, and 25.1% in REM. Time in REM supine was - minutes.    Respiration: No significant sleep apnea; lack of supine sleep my lead to underestimation of severity if sleep disordered breathing.    Events ? The polysomnogram revealed a presence of - obstructive, 11 central, and 2 mixed apneas resulting in an apnea index of 1.8 events per hour. There were 14 obstructive hypopneas and - central hypopneas resulting in an obstructive hypopnea " index of 2.0 and central hypopnea index of - events per hour. The combined apnea/hypopnea index was 3.8 events per hour (central apnea/hypopnea index was 1.6 events per hour). The REM AHI was 7.3 events per hour. The supine AHI was 35.7 events per hour. The RERA index was 0.8 events per hour.  The RDI was 4.7 events per hour.    Snoring - was reported as mild/intermittent.    Respiratory rate and pattern - was notable for normal respiratory rate and pattern.    Sustained Sleep Associated Hypoventilation - Transcutaneous carbon dioxide monitoring was not used, however significant hypoventilation was not suggested by oximetry.    Sleep Associated Hypoxemia - (Greater than 5 minutes O2 sat at or below 88%) was not present. Baseline oxygen saturation was 92.5%. Lowest oxygen saturation was 88.0%. Time spent less than or equal to 88% was 0 minutes. Time spent less than or equal to 89% was 0.6 minutes.    Movement Activity: Frequent periodic limb movement including during REM sleep.    Periodic Limb Activity - There were 278 PLMs during the entire study. The PLM index was 39.2 movements per hour. The PLM Arousal Index was 6.2 per hour.    REM EMG Activity - Excessive transient muscle activity was present.    Nocturnal Behavior - Abnormal sleep related behaviors were not noted.    Bruxism - None apparent.    Cardiac Summary: Sinus rhythm with normal rates.  The average pulse rate was 52.4 bpm. The minimum pulse rate was 47.0 bpm while the maximum pulse rate was 63.0 bpm.  Arrhythmias were not noted.      Assessment:     Decreased sleep efficiency with increased wake after sleep onset and normal arousal index.    No significant sleep apnea; lack of supine sleep my lead to underestimation of severity if sleep disordered breathing.    Frequent periodic limb movements including during REM sleep.    Sinus rhythm with normal rates.    Recommendations:    Restrict sleep to non-supine positions.    Advice regarding the risks of  drowsy driving.    Suggest optimizing sleep schedule and avoiding sleep deprivation.    Pharmacologic therapy should be used for management of restless legs syndrome only if present and clinically indicated and not based on the presence of periodic limb movements alone.    Diagnostic Codes:   Unspecified Sleep Disturbance G47.9  Periodic Limb Movement Disorder G47.61        _____________________________________   Electronically Signed By: Eliza Sellers MD 7/22/2022

## 2022-07-23 LAB — SLPCOMP: NORMAL

## 2022-07-26 ENCOUNTER — VIRTUAL VISIT (OUTPATIENT)
Dept: SLEEP MEDICINE | Facility: CLINIC | Age: 72
End: 2022-07-26
Payer: MEDICARE

## 2022-07-26 VITALS — HEIGHT: 73 IN | WEIGHT: 214 LBS | BODY MASS INDEX: 28.36 KG/M2

## 2022-07-26 DIAGNOSIS — G47.30 OBSERVED SLEEP APNEA: Primary | ICD-10-CM

## 2022-07-26 PROCEDURE — 99213 OFFICE O/P EST LOW 20 MIN: CPT | Mod: 95 | Performed by: INTERNAL MEDICINE

## 2022-07-26 RX ORDER — PANTOPRAZOLE SODIUM 40 MG/1
40 TABLET, DELAYED RELEASE ORAL
COMMUNITY
Start: 2022-06-03

## 2022-07-26 NOTE — PATIENT INSTRUCTIONS
Positioning Device  Positioning devices are generally used when sleep apnea is mild and only occurs on your back.This example shows a pillow that straps around the waist. It may be appropriate for those whose sleep study shows milder sleep apnea that occurs primarily when lying flat on one's back. Preliminary studies have shown benefit but effectiveness at home may need to be verified by a home sleep test. These devices are generally not covered by medical insurance.  Examples of devices that maintain sleeping on the back to prevent snoring and mild sleep apnea.    Belt type body positioner  http://"[employer]/    Electronic reminder  http://nightshifttherapy.com/  http://www.[employer].com.au/

## 2022-07-26 NOTE — NURSING NOTE
Sleep positioning device order sent via mail to home at [address] [facility] [id number].      TANMAY Babin  [facility]

## 2022-07-26 NOTE — LETTER
7/26/2022         RE: Peter Peralta  [address] [place] [address]        Dear Colleague,    Thank you for referring your patient, Peter Peralta, to the [facility]. Please see a copy of my visit note below.    Nathan is a 72 year old who is being evaluated via a billable video visit.      How would you like to obtain your AVS? MyChart  If the video visit is dropped, the invitation should be resent by: Send to e-mail at: [email or web address]  Will anyone else be joining your video visit? Wife, present during visit  Jyothi Avila        Video-Visit Details    Video Start Time: 10:55 AM    Type of service:  Video Visit    Video End Time:11:05 AM    Originating Location (pt. Location): Home    Distant Location (provider location):  [facility]     Platform used for Video Visit: Kell     Chief complaint: Follow-up sleep study results    History of Present Illness: 72-year-old gentleman with history of coronary artery disease, hypertension, atrial fibrillation and heart failure.  He had a sleep evaluation that showed conflicting results and then was seen by us for further evaluation.  He underwent recent PSG with sleep aid to ensure adequate sleep time.  He reports that he felt he slept reasonably well that night.  He got up once to go to the bathroom which is similar to at home.  Since his last visit he has been in sinus rhythm and feels that his sleep quality has improved.  He has ambulatory heart monitoring scheduled for August.  He has occasional feeling of mild restlessness while watching TV in the evening but denies any impact on his ability to fall asleep.  No new sleep concerns.    The results of the sleep study were reviewed with him in detail today and showed no significant sleep apnea overall.  He slept about 18 minutes supine and during that period he would have severe sleep apnea.  There were frequent periodic limb movements noted.   Average heart rate in the low 50s.  It was sinus rhythm.    Casey Sleepiness Scale  Total score - Casey: 2 (7/21/2022  4:14 PM)   (Less than 10 normal)    Insomnia Severity Scale  ROSIO Total Score: 2  (normal 0-7, mild 8-14, moderate 15-21, severe 22-28)    Past Medical History:   Diagnosis Date     Actinic keratoses     face     Atrial fibrillation (H)     only after adenosine test     BPH (benign prostatic hypertrophy)      Chest pain      CHF (congestive heart failure) (H) 5/24/2022     Coronary artery disease 2011 2011-Cath Lma 5%, Lad 40-50%, Lcx 30-40%, Swv66-75% (-FFR of Lad)     ED (erectile dysfunction)      Erectile dysfunction 12/30/2021     Family history of colon cancer 7/6/2012    mother age 65     GERD (gastroesophageal reflux disease)     nexium     Hyperlipidaemia      Hypertension 2/16/2022     IFG (impaired fasting glucose)      Murmur      Obesity, unspecified     Waist size 42, BMI 30.8     Right inguinal hernia 7/1/2014     Thrombocytosis      Ulcerative colitis (H)        Allergies   Allergen Reactions     No Known Drug Allergies        Current Outpatient Medications   Medication     acetaminophen (TYLENOL) 325 MG tablet     amiodarone (PACERONE) 200 MG tablet     apixaban ANTICOAGULANT (ELIQUIS ANTICOAGULANT) 5 MG tablet     atorvastatin (LIPITOR) 40 MG tablet     Calcium-Vitamin D-Vitamin K (CALCIUM + D + K) 750-500-40 MG-UNT-MCG TABS     clopidogrel (PLAVIX) 75 MG tablet     colchicine (COLCYRS) 0.6 MG tablet     fluticasone (FLONASE) 50 MCG/ACT nasal spray     lisinopril (ZESTRIL) 2.5 MG tablet     mesalamine (LIALDA) 1.2 g EC tablet     metoprolol succinate ER (TOPROL XL) 100 MG 24 hr tablet     Multiple Vitamin (MULTIVITAMIN PO)     pantoprazole (PROTONIX) 40 MG EC tablet     Probiotic Product (PROBIOTIC PO)     psyllium (METAMUCIL) 58.6 % POWD     sildenafil (VIAGRA) 25 MG tablet     UNABLE TO FIND     esomeprazole (NEXIUM) 20 MG DR capsule     No current facility-administered  medications for this visit.       Social History     Socioeconomic History     Marital status:      Spouse name: Emy     Number of children: 2     Years of education: 16     Highest education level: Not on file   Occupational History     Occupation: [employer]     Employer: [employer]   Tobacco Use     Smoking status: Former Smoker     Packs/day: 1.50     Years: 24.00     Pack years: 36.00     Types: Cigarettes     Quit date: 1996     Years since quittin.5     Smokeless tobacco: Never Used   Substance and Sexual Activity     Alcohol use: Yes     Alcohol/week: 0.0 standard drinks     Comment: occ     Drug use: No     Sexual activity: Yes     Partners: Female     Birth control/protection: None   Other Topics Concern      Service Yes     Blood Transfusions No     Caffeine Concern No     Comment: 2 cups per day     Occupational Exposure No     Hobby Hazards No     Sleep Concern No     Stress Concern Yes     Weight Concern Yes     Special Diet Yes     Back Care No     Exercise No     Comment: 8,000 steps a day. Fitbit     Bike Helmet No     Comment: n/a     Seat Belt Yes     Self-Exams Yes     Parent/sibling w/ CABG, MI or angioplasty before 65F 55M? Yes     Comment: Father and brother   Social History Narrative    Eats fruits and vegetables every day. He takes extra vitamin D. He was advised to aim for 1200 mg of calcium per day.     Social Determinants of Health     Financial Resource Strain: Not on file   Food Insecurity: Not on file   Transportation Needs: Not on file   Physical Activity: Not on file   Stress: Not on file   Social Connections: Not on file   Intimate Partner Violence: Not on file   Housing Stability: Not on file       Family History   Problem Relation Age of Onset     Colon Cancer Mother 65        passed away from colon cancer - lived about 2 mo from dx 1981     C.A.D. Father         passed at 52 from MI     Cardiovascular Father      Heart Disease Father      Obesity  "Father      Myocardial Infarction Father         52 - passed away     Coronary Artery Disease Father      Heart Disease Brother      Diabetes Brother      Coronary Artery Disease Brother      Hypertension Brother      Hyperlipidemia Brother      Coronary Artery Disease Brother         stent placed      Hyperlipidemia Brother      Sleep Apnea Brother      Arthritis Maternal Grandmother      Diabetes Maternal Grandmother      Arthritis Maternal Grandfather      Diabetes Maternal Grandfather      Substance Abuse Paternal Grandfather      Hypertension No family hx of      Cerebrovascular Disease No family hx of      Breast Cancer No family hx of      Prostate Cancer No family hx of      Alcohol/Drug No family hx of      Allergies No family hx of      Alzheimer Disease No family hx of      Circulatory No family hx of      Congenital Anomalies No family hx of      Connective Tissue Disorder No family hx of      Depression No family hx of      Eye Disorder No family hx of      Genetic Disorder No family hx of      Gastrointestinal Disease No family hx of      Genitourinary Problems No family hx of      Gynecology No family hx of      Musculoskeletal Disorder No family hx of      Neurologic Disorder No family hx of      Osteoporosis No family hx of      Psychotic Disorder No family hx of      Respiratory No family hx of      Thyroid Disease No family hx of      Anesthesia Reaction No family hx of      Blood Disease No family hx of            EXAM:  Ht 1.854 m (6' 1\")   Wt 97.1 kg (214 lb)   BMI 28.23 kg/m    GENERAL: Alert and no distress  EYES: Eyes grossly normal to inspection.  No discharge or erythema, or obvious scleral/conjunctival abnormalities.  RESP: No audible wheeze, cough, or visible cyanosis.  No visible retractions or increased work of breathing.    SKIN: Visible skin clear. No significant rash, abnormal pigmentation or lesions.  NEURO: Cranial nerves grossly intact.  Mentation and speech appropriate for " age.  PSYCH: Mentation appears normal, affect normal judgement and insight intact, normal speech and appearance well-groomed.         ASSESSMENT:  72-year-old gentleman with coronary artery disease, hypertension, atrial fibrillation now in sinus rhythm and overall sleeping on no significant sleep apnea when sleeping on sides.    PLAN:  Recommended that patient continue to sleep on his sides and avoid supine sleeping.  We will provide him information regarding devices that can be used to encourage side sleeping.  If he should need to sleep on his back in the future for long period of time such as if he has significant hip shoulder pains he may need a repeat sleep study to be done in the supine position.  He may benefit from CPAP therapy during that time.  Patient is can follow-up with us as needed.      20 minutes spent on the date of the encounter doing chart review, history and exam, documentation and further activities per the note    Eliza Sellers M.D.  Pulmonary/Critical Care/Sleep Medicine    [facility]   Floor [address], [address]   [address] [place], [place] [address]. [place] [address]   Appointments: [phone]    The above note was dictated using voice recognition software and may include typographical errors. Please contact the author for any clarifications.              Again, thank you for allowing me to participate in the care of your patient.        Sincerely,        Eliza Sellers MD

## 2022-07-26 NOTE — PROGRESS NOTES
Nathan is a 72 year old who is being evaluated via a billable video visit.      How would you like to obtain your AVS? MyChart  If the video visit is dropped, the invitation should be resent by: Send to e-mail at: [email or web address]  Will anyone else be joining your video visit? Wife, present during visit  Jyothi Avila        Video-Visit Details    Video Start Time: 10:55 AM    Type of service:  Video Visit    Video End Time:11:05 AM    Originating Location (pt. Location): Home    Distant Location (provider location):  [facility]     Platform used for Video Visit: Kell     Chief complaint: Follow-up sleep study results    History of Present Illness: 72-year-old gentleman with history of coronary artery disease, hypertension, atrial fibrillation and heart failure.  He had a sleep evaluation that showed conflicting results and then was seen by us for further evaluation.  He underwent recent PSG with sleep aid to ensure adequate sleep time.  He reports that he felt he slept reasonably well that night.  He got up once to go to the bathroom which is similar to at home.  Since his last visit he has been in sinus rhythm and feels that his sleep quality has improved.  He has ambulatory heart monitoring scheduled for August.  He has occasional feeling of mild restlessness while watching TV in the evening but denies any impact on his ability to fall asleep.  No new sleep concerns.    The results of the sleep study were reviewed with him in detail today and showed no significant sleep apnea overall.  He slept about 18 minutes supine and during that period he would have severe sleep apnea.  There were frequent periodic limb movements noted.  Average heart rate in the low 50s.  It was sinus rhythm.    [place] Sleepiness Scale  Total score - [place]: 2 (7/21/2022  4:14 PM)   (Less than 10 normal)    Insomnia Severity Scale  ROSIO Total Score: 2  (normal 0-7, mild 8-14, moderate 15-21, severe  22-28)    Past Medical History:   Diagnosis Date     Actinic keratoses     face     Atrial fibrillation (H)     only after adenosine test     BPH (benign prostatic hypertrophy)      Chest pain      CHF (congestive heart failure) (H) 5/24/2022     Coronary artery disease 2011 2011-Cath Lma 5%, Lad 40-50%, Lcx 30-40%, Vdw87-57% (-FFR of Lad)     ED (erectile dysfunction)      Erectile dysfunction 12/30/2021     Family history of colon cancer 7/6/2012    mother age 65     GERD (gastroesophageal reflux disease)     nexium     Hyperlipidaemia      Hypertension 2/16/2022     IFG (impaired fasting glucose)      Murmur      Obesity, unspecified     Waist size 42, BMI 30.8     Right inguinal hernia 7/1/2014     Thrombocytosis      Ulcerative colitis (H)        Allergies   Allergen Reactions     No Known Drug Allergies        Current Outpatient Medications   Medication     acetaminophen (TYLENOL) 325 MG tablet     amiodarone (PACERONE) 200 MG tablet     apixaban ANTICOAGULANT (ELIQUIS ANTICOAGULANT) 5 MG tablet     atorvastatin (LIPITOR) 40 MG tablet     Calcium-Vitamin D-Vitamin K (CALCIUM + D + K) 750-500-40 MG-UNT-MCG TABS     clopidogrel (PLAVIX) 75 MG tablet     colchicine (COLCYRS) 0.6 MG tablet     fluticasone (FLONASE) 50 MCG/ACT nasal spray     lisinopril (ZESTRIL) 2.5 MG tablet     mesalamine (LIALDA) 1.2 g EC tablet     metoprolol succinate ER (TOPROL XL) 100 MG 24 hr tablet     Multiple Vitamin (MULTIVITAMIN PO)     pantoprazole (PROTONIX) 40 MG EC tablet     Probiotic Product (PROBIOTIC PO)     psyllium (METAMUCIL) 58.6 % POWD     sildenafil (VIAGRA) 25 MG tablet     UNABLE TO FIND     esomeprazole (NEXIUM) 20 MG DR capsule     No current facility-administered medications for this visit.       Social History     Socioeconomic History     Marital status:      Spouse name: Emy     Number of children: 2     Years of education: 16     Highest education level: Not on file   Occupational History      Occupation: [employer]     Employer: [employer]   Tobacco Use     Smoking status: Former Smoker     Packs/day: 1.50     Years: 24.00     Pack years: 36.00     Types: Cigarettes     Quit date: 1996     Years since quittin.5     Smokeless tobacco: Never Used   Substance and Sexual Activity     Alcohol use: Yes     Alcohol/week: 0.0 standard drinks     Comment: occ     Drug use: No     Sexual activity: Yes     Partners: Female     Birth control/protection: None   Other Topics Concern      Service Yes     Blood Transfusions No     Caffeine Concern No     Comment: 2 cups per day     Occupational Exposure No     Hobby Hazards No     Sleep Concern No     Stress Concern Yes     Weight Concern Yes     Special Diet Yes     Back Care No     Exercise No     Comment: 8,000 steps a day. Fitbit     Bike Helmet No     Comment: n/a     Seat Belt Yes     Self-Exams Yes     Parent/sibling w/ CABG, MI or angioplasty before 65F 55M? Yes     Comment: Father and brother   Social History Narrative    Eats fruits and vegetables every day. He takes extra vitamin D. He was advised to aim for 1200 mg of calcium per day.     Social Determinants of Health     Financial Resource Strain: Not on file   Food Insecurity: Not on file   Transportation Needs: Not on file   Physical Activity: Not on file   Stress: Not on file   Social Connections: Not on file   Intimate Partner Violence: Not on file   Housing Stability: Not on file       Family History   Problem Relation Age of Onset     Colon Cancer Mother 65        passed away from colon cancer - lived about 2 mo from dx      C.A.D. Father         passed at 52 from MI     Cardiovascular Father      Heart Disease Father      Obesity Father      Myocardial Infarction Father         52 - passed away     Coronary Artery Disease Father      Heart Disease Brother      Diabetes Brother      Coronary Artery Disease Brother      Hypertension Brother      Hyperlipidemia Brother       "Coronary Artery Disease Brother         stent placed      Hyperlipidemia Brother      Sleep Apnea Brother      Arthritis Maternal Grandmother      Diabetes Maternal Grandmother      Arthritis Maternal Grandfather      Diabetes Maternal Grandfather      Substance Abuse Paternal Grandfather      Hypertension No family hx of      Cerebrovascular Disease No family hx of      Breast Cancer No family hx of      Prostate Cancer No family hx of      Alcohol/Drug No family hx of      Allergies No family hx of      Alzheimer Disease No family hx of      Circulatory No family hx of      Congenital Anomalies No family hx of      Connective Tissue Disorder No family hx of      Depression No family hx of      Eye Disorder No family hx of      Genetic Disorder No family hx of      Gastrointestinal Disease No family hx of      Genitourinary Problems No family hx of      Gynecology No family hx of      Musculoskeletal Disorder No family hx of      Neurologic Disorder No family hx of      Osteoporosis No family hx of      Psychotic Disorder No family hx of      Respiratory No family hx of      Thyroid Disease No family hx of      Anesthesia Reaction No family hx of      Blood Disease No family hx of            EXAM:  Ht 1.854 m (6' 1\")   Wt 97.1 kg (214 lb)   BMI 28.23 kg/m    GENERAL: Alert and no distress  EYES: Eyes grossly normal to inspection.  No discharge or erythema, or obvious scleral/conjunctival abnormalities.  RESP: No audible wheeze, cough, or visible cyanosis.  No visible retractions or increased work of breathing.    SKIN: Visible skin clear. No significant rash, abnormal pigmentation or lesions.  NEURO: Cranial nerves grossly intact.  Mentation and speech appropriate for age.  PSYCH: Mentation appears normal, affect normal judgement and insight intact, normal speech and appearance well-groomed.         ASSESSMENT:  72-year-old gentleman with coronary artery disease, hypertension, atrial fibrillation now in sinus " rhythm and overall sleeping on no significant sleep apnea when sleeping on sides.    PLAN:  Recommended that patient continue to sleep on his sides and avoid supine sleeping.  We will provide him information regarding devices that can be used to encourage side sleeping.  If he should need to sleep on his back in the future for long period of time such as if he has significant hip shoulder pains he may need a repeat sleep study to be done in the supine position.  He may benefit from CPAP therapy during that time.  Patient is can follow-up with us as needed.      20 minutes spent on the date of the encounter doing chart review, history and exam, documentation and further activities per the note    Eliza Sellers M.D.  Pulmonary/Critical Care/Sleep Medicine    [facility]   Floor 1, [address]   [address] [place] [address]. [place] [address]   Appointments: [phone]    The above note was dictated using voice recognition software and may include typographical errors. Please contact the author for any clarifications.

## 2022-07-27 ENCOUNTER — TELEPHONE (OUTPATIENT)
Dept: CARDIOLOGY | Facility: CLINIC | Age: 72
End: 2022-07-27

## 2022-07-27 NOTE — TELEPHONE ENCOUNTER
----- Message from Silvia Camacho sent at 7/27/2022  9:25 AM CDT -----  Regarding: Pt Care  I contacted this pt this morning to schedule echo and Follow-up appt with Dr Jones and he told me that he is receiving his care with [facility]. His EF score is at 45 now.  He wanted to let Dr Jones know this.  Thanks, Silvia

## 2022-08-17 ENCOUNTER — TRANSFERRED RECORDS (OUTPATIENT)
Dept: HEALTH INFORMATION MANAGEMENT | Facility: CLINIC | Age: 72
End: 2022-08-17

## 2022-08-18 ENCOUNTER — TRANSFERRED RECORDS (OUTPATIENT)
Dept: HEALTH INFORMATION MANAGEMENT | Facility: CLINIC | Age: 72
End: 2022-08-18

## 2022-10-16 ENCOUNTER — HEALTH MAINTENANCE LETTER (OUTPATIENT)
Age: 72
End: 2022-10-16

## 2023-03-26 ENCOUNTER — HEALTH MAINTENANCE LETTER (OUTPATIENT)
Age: 73
End: 2023-03-26

## 2023-06-06 NOTE — TELEPHONE ENCOUNTER
Called pt to schedule hosp f/u. Appt scheduled for Tuesday 5/24 at 1:10pm (check-in) with Dr. Zuñiga in Same-Day slot.    Rachelle Olson,  [facility]    
It shouldn't be in the double book slot. If he takes slot with a double book slot, please remove the one for him      thx    
Pt calling for hosp f/u appt. Pt was at [facility] from 5/15 - 5/19 for pericarditis. Ok to use Same-Day slot on 5/24, 5/25, or 5/26 to see pt for hosp f/u?    Team to call pt back to schedule hosp f/u appt one Dr. Zuñiga advises.  Ph: [phone]    Rachelle Olson,  [facility]        
12

## 2024-06-01 ENCOUNTER — HEALTH MAINTENANCE LETTER (OUTPATIENT)
Age: 74
End: 2024-06-01

## 2025-06-14 ENCOUNTER — HEALTH MAINTENANCE LETTER (OUTPATIENT)
Age: 75
End: 2025-06-14

## (undated) DEVICE — NDL TRNSEPT 18GA X 71CM 86 DEG

## (undated) DEVICE — PATCH CARTO 3 EXTERNAL REFERENCE 3D MAPPING CREFP6

## (undated) DEVICE — CATH EP CATH EP REPRO DAIG RSPN FX CRV DX EP C

## (undated) DEVICE — TUBING PRESSURE 72" FEMALE TO MALE LL H965907017221

## (undated) DEVICE — GUIDEWIRE VASC 0.014INX180CM RUNTHROUGH 25-1011

## (undated) DEVICE — LUB INST 20ML RTGLD 6/PK H7492354800162

## (undated) DEVICE — CATH GUIDELINER 6FR 5571

## (undated) DEVICE — GUIDEWIRE ROTAWIRE .014 325CM 228240022

## (undated) DEVICE — CUTTING BALLOON WOLVERINE 3X10MM

## (undated) DEVICE — CATH BALLOON NC EMERGE 3.00X15MM H7493926715300

## (undated) DEVICE — CATH THERMOCOOL SMARTTOUCH SF FJ CURVE

## (undated) DEVICE — GUIDEWIRE TRNSEPT 0.014 X35CM SAFE SE

## (undated) DEVICE — LINE MONITOR NASAL SMART CAPNOLINE ADULT LONG 12463

## (undated) DEVICE — INTRO SHEATH 4FRX10CM PINNACLE RSS402

## (undated) DEVICE — GW SURG OMNIWIRE STRAIGHT TIP L185CM PRESSURE GU 89185

## (undated) DEVICE — INTRODUCER SHEATH FAST-CATH 9FRX12CM 406116

## (undated) DEVICE — PACK EP SRG PROC LF DISP SAN32EPFSR

## (undated) DEVICE — CATH PRONTO EXTRACTION 5010

## (undated) DEVICE — CATH BALLOON EMERGE 2.5X12MM H7493918912250

## (undated) DEVICE — CATH FINECROSS MG 1.8-2.6FR X 130CM

## (undated) DEVICE — BURR ROTAPRO OS 2.0 1.50MM H749394671500

## (undated) DEVICE — CATH SOUNDSTAR 8FRX90CM 10439011

## (undated) DEVICE — SYR ANGIOGRAPHY MULTIUSE KIT ACIST 014612

## (undated) DEVICE — Device

## (undated) DEVICE — INTRODUCER SHEATH GREEN 6.5FRX11CM .038IN PSI-6F-11-038ACT

## (undated) DEVICE — CATH DIAG 4FR JL 4.5 538417

## (undated) DEVICE — RAD INTRODUCER KIT MICRO 5FRX10CM .018 NITINOL G/W

## (undated) DEVICE — INTRO SHEATH 5FRX10CM PINNACLE RSS502

## (undated) DEVICE — INTRO SHEATH 6FRX10CM PINNACLE RSS602

## (undated) DEVICE — CATH LAUNCHER 6FR JR 4.0 LA6JR40

## (undated) DEVICE — CATH BALLOON EMERGE 2.5X15MM H7493918915250

## (undated) DEVICE — DEFIB PRO-PADZ LVP LQD GEL ADULT 8900-2105-01

## (undated) DEVICE — TOTE ANGIO CORP PC15AT SAN32CC83O

## (undated) DEVICE — KIT HAND CONTROL ANGIOTOUCH ACIST 65CM AT-P65

## (undated) DEVICE — CATH ANGIO INFINITI 3DRC 4FRX100CM 538476

## (undated) DEVICE — CATH IVUS OPTICROSS HD 6 3.6FR 1.18MM DIA 135CML H7493935408

## (undated) DEVICE — CABLE ADAPTER PACING 6FT REMINGTON ADAP 2000

## (undated) DEVICE — MANIFOLD KIT ANGIO AUTOMATED 014613

## (undated) DEVICE — INFL DVC KIT W/10CC NITRO IN4530

## (undated) DEVICE — INTRODUCER SHEATH FAST-CATH SWARTZ 8.5FRX63CM SL1 CVD 406849

## (undated) DEVICE — TUBE SET SMARKABLATE IRRIGATION

## (undated) DEVICE — NDL PERC ENTRY THINWALL 18GA 7.0" G00166

## (undated) DEVICE — INTRODUCER SHEATH OBTURATOR 5FRX13CM LF OBT-5F-11

## (undated) DEVICE — GUIDEWIRE VASC 0.014INX190CM J TIP CGRXT190HJ

## (undated) DEVICE — CATH NAV PENTARAY F CURVE

## (undated) DEVICE — KIT DRAIN 6FR CATHETER PIGTAIL PERICARDIOCENTESIS PC101/A

## (undated) DEVICE — CATH EP 7FR X 115CM DECANAV CA

## (undated) RX ORDER — HEPARIN SODIUM 1000 [USP'U]/ML
INJECTION, SOLUTION INTRAVENOUS; SUBCUTANEOUS
Status: DISPENSED
Start: 2022-03-03

## (undated) RX ORDER — NITROGLYCERIN 5 MG/ML
VIAL (ML) INTRAVENOUS
Status: DISPENSED
Start: 2022-03-03

## (undated) RX ORDER — EPINEPHRINE IN SOD CHLOR,ISO 1 MG/10 ML
SYRINGE (ML) INTRAVENOUS
Status: DISPENSED
Start: 2022-03-03

## (undated) RX ORDER — EPTIFIBATIDE 2 MG/ML
INJECTION, SOLUTION INTRAVENOUS
Status: DISPENSED
Start: 2022-03-03

## (undated) RX ORDER — FENTANYL CITRATE 50 UG/ML
INJECTION, SOLUTION INTRAMUSCULAR; INTRAVENOUS
Status: DISPENSED
Start: 2022-03-03

## (undated) RX ORDER — NALOXONE HYDROCHLORIDE 0.4 MG/ML
INJECTION, SOLUTION INTRAMUSCULAR; INTRAVENOUS; SUBCUTANEOUS
Status: DISPENSED
Start: 2022-03-22

## (undated) RX ORDER — FENTANYL CITRATE 50 UG/ML
INJECTION, SOLUTION INTRAMUSCULAR; INTRAVENOUS
Status: DISPENSED
Start: 2018-07-11

## (undated) RX ORDER — HEPARIN SODIUM 1000 [USP'U]/ML
INJECTION, SOLUTION INTRAVENOUS; SUBCUTANEOUS
Status: DISPENSED
Start: 2022-05-12

## (undated) RX ORDER — HEPARIN SODIUM 200 [USP'U]/100ML
INJECTION, SOLUTION INTRAVENOUS
Status: DISPENSED
Start: 2022-05-12

## (undated) RX ORDER — LIDOCAINE HYDROCHLORIDE 10 MG/ML
INJECTION, SOLUTION EPIDURAL; INFILTRATION; INTRACAUDAL; PERINEURAL
Status: DISPENSED
Start: 2022-05-12

## (undated) RX ORDER — CLOPIDOGREL 300 MG/1
TABLET, FILM COATED ORAL
Status: DISPENSED
Start: 2022-03-03

## (undated) RX ORDER — FLUMAZENIL 0.1 MG/ML
INJECTION, SOLUTION INTRAVENOUS
Status: DISPENSED
Start: 2022-03-22

## (undated) RX ORDER — PROTAMINE SULFATE 10 MG/ML
INJECTION, SOLUTION INTRAVENOUS
Status: DISPENSED
Start: 2022-05-12

## (undated) RX ORDER — GLYCOPYRROLATE 0.2 MG/ML
INJECTION, SOLUTION INTRAMUSCULAR; INTRAVENOUS
Status: DISPENSED
Start: 2022-03-22

## (undated) RX ORDER — DOPAMINE HYDROCHLORIDE 160 MG/100ML
INJECTION, SOLUTION INTRAVENOUS
Status: DISPENSED
Start: 2022-03-03

## (undated) RX ORDER — FENTANYL CITRATE-0.9 % NACL/PF 10 MCG/ML
PLASTIC BAG, INJECTION (ML) INTRAVENOUS
Status: DISPENSED
Start: 2022-03-03

## (undated) RX ORDER — REGADENOSON 0.08 MG/ML
INJECTION, SOLUTION INTRAVENOUS
Status: DISPENSED
Start: 2022-02-24

## (undated) RX ORDER — LIDOCAINE HYDROCHLORIDE 10 MG/ML
INJECTION, SOLUTION EPIDURAL; INFILTRATION; INTRACAUDAL; PERINEURAL
Status: DISPENSED
Start: 2022-03-03

## (undated) RX ORDER — FENTANYL CITRATE 50 UG/ML
INJECTION, SOLUTION INTRAMUSCULAR; INTRAVENOUS
Status: DISPENSED
Start: 2022-05-12

## (undated) RX ORDER — FENTANYL CITRATE 50 UG/ML
INJECTION, SOLUTION INTRAMUSCULAR; INTRAVENOUS
Status: DISPENSED
Start: 2022-03-22

## (undated) RX ORDER — POTASSIUM CHLORIDE 1500 MG/1
TABLET, EXTENDED RELEASE ORAL
Status: DISPENSED
Start: 2022-03-22

## (undated) RX ORDER — HEPARIN SODIUM 200 [USP'U]/100ML
INJECTION, SOLUTION INTRAVENOUS
Status: DISPENSED
Start: 2022-03-03

## (undated) RX ORDER — ATROPINE SULFATE 0.1 MG/ML
INJECTION INTRAVENOUS
Status: DISPENSED
Start: 2022-03-03

## (undated) RX ORDER — VERAPAMIL HYDROCHLORIDE 2.5 MG/ML
INJECTION, SOLUTION INTRAVENOUS
Status: DISPENSED
Start: 2022-03-03

## (undated) RX ORDER — HYDROMORPHONE HYDROCHLORIDE 1 MG/ML
INJECTION, SOLUTION INTRAMUSCULAR; INTRAVENOUS; SUBCUTANEOUS
Status: DISPENSED
Start: 2022-05-12

## (undated) RX ORDER — LIDOCAINE HYDROCHLORIDE 40 MG/ML
SOLUTION TOPICAL
Status: DISPENSED
Start: 2022-03-22